# Patient Record
Sex: MALE | Race: WHITE | NOT HISPANIC OR LATINO | Employment: FULL TIME | URBAN - METROPOLITAN AREA
[De-identification: names, ages, dates, MRNs, and addresses within clinical notes are randomized per-mention and may not be internally consistent; named-entity substitution may affect disease eponyms.]

---

## 2017-02-01 ENCOUNTER — OFFICE VISIT (OUTPATIENT)
Dept: INTERNAL MEDICINE | Facility: CLINIC | Age: 62
End: 2017-02-01

## 2017-02-01 VITALS
BODY MASS INDEX: 28.71 KG/M2 | HEART RATE: 88 BPM | SYSTOLIC BLOOD PRESSURE: 140 MMHG | DIASTOLIC BLOOD PRESSURE: 100 MMHG | WEIGHT: 212 LBS | HEIGHT: 72 IN | TEMPERATURE: 98.4 F | OXYGEN SATURATION: 98 %

## 2017-02-01 DIAGNOSIS — N52.9 ERECTILE DYSFUNCTION, UNSPECIFIED ERECTILE DYSFUNCTION TYPE: ICD-10-CM

## 2017-02-01 DIAGNOSIS — J06.9 ACUTE URI: Primary | ICD-10-CM

## 2017-02-01 DIAGNOSIS — I10 ESSENTIAL HYPERTENSION: ICD-10-CM

## 2017-02-01 PROCEDURE — 99213 OFFICE O/P EST LOW 20 MIN: CPT | Performed by: NURSE PRACTITIONER

## 2017-02-01 RX ORDER — SILDENAFIL 100 MG/1
100 TABLET, FILM COATED ORAL DAILY PRN
COMMUNITY
End: 2017-02-01 | Stop reason: SDUPTHER

## 2017-02-01 RX ORDER — FLUTICASONE PROPIONATE 50 MCG
2 SPRAY, SUSPENSION (ML) NASAL DAILY
Qty: 9.9 ML | Refills: 1 | Status: SHIPPED | OUTPATIENT
Start: 2017-02-01 | End: 2017-03-03

## 2017-02-01 RX ORDER — GUAIFENESIN 600 MG/1
1200 TABLET, EXTENDED RELEASE ORAL 2 TIMES DAILY
Qty: 28 TABLET | Refills: 0 | Status: SHIPPED | OUTPATIENT
Start: 2017-02-01 | End: 2017-02-08

## 2017-02-01 RX ORDER — AZITHROMYCIN 250 MG/1
250 TABLET, FILM COATED ORAL DAILY
Qty: 6 TABLET | Refills: 0 | Status: SHIPPED | OUTPATIENT
Start: 2017-02-01 | End: 2017-02-21 | Stop reason: HOSPADM

## 2017-02-01 RX ORDER — SILDENAFIL 100 MG/1
100 TABLET, FILM COATED ORAL DAILY PRN
Qty: 10 TABLET | Refills: 2 | Status: SHIPPED | OUTPATIENT
Start: 2017-02-01 | End: 2017-02-21 | Stop reason: HOSPADM

## 2017-02-01 NOTE — PATIENT INSTRUCTIONS
"Upper Respiratory Infection, Adult  Most upper respiratory infections (URIs) are a viral infection of the air passages leading to the lungs. A URI affects the nose, throat, and upper air passages. The most common type of URI is nasopharyngitis and is typically referred to as \"the common cold.\"  URIs run their course and usually go away on their own. Most of the time, a URI does not require medical attention, but sometimes a bacterial infection in the upper airways can follow a viral infection. This is called a secondary infection. Sinus and middle ear infections are common types of secondary upper respiratory infections.  Bacterial pneumonia can also complicate a URI. A URI can worsen asthma and chronic obstructive pulmonary disease (COPD). Sometimes, these complications can require emergency medical care and may be life threatening.   CAUSES  Almost all URIs are caused by viruses. A virus is a type of germ and can spread from one person to another.   RISKS FACTORS  You may be at risk for a URI if:   · You smoke.    · You have chronic heart or lung disease.  · You have a weakened defense (immune) system.    · You are very young or very old.    · You have nasal allergies or asthma.  · You work in crowded or poorly ventilated areas.  · You work in health care facilities or schools.  SIGNS AND SYMPTOMS   Symptoms typically develop 2-3 days after you come in contact with a cold virus. Most viral URIs last 7-10 days. However, viral URIs from the influenza virus (flu virus) can last 14-18 days and are typically more severe. Symptoms may include:   · Runny or stuffy (congested) nose.    · Sneezing.    · Cough.    · Sore throat.    · Headache.    · Fatigue.    · Fever.    · Loss of appetite.    · Pain in your forehead, behind your eyes, and over your cheekbones (sinus pain).  · Muscle aches.    DIAGNOSIS   Your health care provider may diagnose a URI by:  · Physical exam.  · Tests to check that your symptoms are not due to " another condition such as:  ¨ Strep throat.  ¨ Sinusitis.  ¨ Pneumonia.  ¨ Asthma.  TREATMENT   A URI goes away on its own with time. It cannot be cured with medicines, but medicines may be prescribed or recommended to relieve symptoms. Medicines may help:  · Reduce your fever.  · Reduce your cough.  · Relieve nasal congestion.  HOME CARE INSTRUCTIONS   · Take medicines only as directed by your health care provider.    · Gargle warm saltwater or take cough drops to comfort your throat as directed by your health care provider.  · Use a warm mist humidifier or inhale steam from a shower to increase air moisture. This may make it easier to breathe.  · Drink enough fluid to keep your urine clear or pale yellow.    · Eat soups and other clear broths and maintain good nutrition.    · Rest as needed.    · Return to work when your temperature has returned to normal or as your health care provider advises. You may need to stay home longer to avoid infecting others. You can also use a face mask and careful hand washing to prevent spread of the virus.  · Increase the usage of your inhaler if you have asthma.    · Do not use any tobacco products, including cigarettes, chewing tobacco, or electronic cigarettes. If you need help quitting, ask your health care provider.  PREVENTION   The best way to protect yourself from getting a cold is to practice good hygiene.   · Avoid oral or hand contact with people with cold symptoms.    · Wash your hands often if contact occurs.    There is no clear evidence that vitamin C, vitamin E, echinacea, or exercise reduces the chance of developing a cold. However, it is always recommended to get plenty of rest, exercise, and practice good nutrition.   SEEK MEDICAL CARE IF:   · You are getting worse rather than better.    · Your symptoms are not controlled by medicine.    · You have chills.  · You have worsening shortness of breath.  · You have brown or red mucus.  · You have yellow or brown nasal  discharge.  · You have pain in your face, especially when you bend forward.  · You have a fever.  · You have swollen neck glands.  · You have pain while swallowing.  · You have white areas in the back of your throat.  SEEK IMMEDIATE MEDICAL CARE IF:   · You have severe or persistent:    Headache.    Ear pain.    Sinus pain.    Chest pain.  · You have chronic lung disease and any of the following:    Wheezing.    Prolonged cough.    Coughing up blood.    A change in your usual mucus.  · You have a stiff neck.  · You have changes in your:    Vision.    Hearing.    Thinking.    Mood.  MAKE SURE YOU:   · Understand these instructions.  · Will watch your condition.  · Will get help right away if you are not doing well or get worse.     This information is not intended to replace advice given to you by your health care provider. Make sure you discuss any questions you have with your health care provider.     Document Released: 06/13/2002 Document Revised: 05/03/2016 Document Reviewed: 03/25/2015  [employer] Interactive Patient Education ©2016 [employer]

## 2017-02-01 NOTE — PROGRESS NOTES
Subjective   Sander White is a 61 y.o. male.     Cough   This is a new problem. Episode onset: 2 weeks  The problem has been unchanged. The cough is non-productive. Associated symptoms include chills, rhinorrhea and shortness of breath. Pertinent negatives include no chest pain, ear congestion, ear pain, fever, headaches, myalgias, postnasal drip, sore throat or wheezing. Nothing aggravates the symptoms. He has tried nothing for the symptoms. His past medical history is significant for asthma (childhood ) and environmental allergies. There is no history of bronchitis or pneumonia.        The following portions of the patient's history were reviewed and updated as appropriate: allergies, current medications and problem list.    Review of Systems   Constitutional: Positive for chills. Negative for appetite change, fatigue and fever.   HENT: Positive for congestion (nasal ), rhinorrhea and sneezing. Negative for ear discharge, ear pain, facial swelling, hearing loss, postnasal drip, sinus pressure, sore throat and tinnitus.    Respiratory: Positive for cough and shortness of breath. Negative for chest tightness and wheezing.    Cardiovascular: Negative for chest pain.   Gastrointestinal: Negative for abdominal pain.   Musculoskeletal: Negative for myalgias.   Allergic/Immunologic: Positive for environmental allergies.   Neurological: Negative for dizziness and headaches.   Hematological: Negative for adenopathy.       Objective   Physical Exam   Constitutional: He appears well-developed and well-nourished. He is cooperative. He does not have a sickly appearance. He does not appear ill.   HENT:   Head: Normocephalic.   Right Ear: Hearing, tympanic membrane and external ear normal. No drainage, swelling or tenderness. No mastoid tenderness. Tympanic membrane is not injected, not scarred, not erythematous and not bulging. Tympanic membrane mobility is normal. No middle ear effusion. No decreased hearing is noted.   Left  Ear: Hearing, tympanic membrane and external ear normal. No drainage, swelling or tenderness. No mastoid tenderness. Tympanic membrane is not injected, not scarred and not bulging. Tympanic membrane mobility is normal.  No middle ear effusion. No decreased hearing is noted.   Nose: Mucosal edema and rhinorrhea present. No sinus tenderness or nasal deformity. Right sinus exhibits no maxillary sinus tenderness and no frontal sinus tenderness. Left sinus exhibits no maxillary sinus tenderness and no frontal sinus tenderness.   Mouth/Throat: Oropharynx is clear and moist and mucous membranes are normal. Normal dentition. No tonsillar exudate.   Eyes: Conjunctivae and lids are normal. Pupils are equal, round, and reactive to light. Right eye exhibits no discharge and no exudate. Left eye exhibits no discharge and no exudate.   Neck: Trachea normal and normal range of motion. Carotid bruit is not present. No edema present. No thyroid mass and no thyromegaly present.   Cardiovascular: Regular rhythm, normal heart sounds and normal pulses.    No murmur heard.  Pulses:       Dorsalis pedis pulses are 2+ on the right side, and 2+ on the left side.        Posterior tibial pulses are 2+ on the right side, and 2+ on the left side.   Repeat bp  Left arm 138/90  No pedal edema    Pulmonary/Chest: Breath sounds normal. No respiratory distress. He has no decreased breath sounds. He has no wheezes. He has no rhonchi. He has no rales.   Dry cough    Lymphadenopathy:        Head (right side): No submental, no submandibular, no tonsillar, no preauricular, no posterior auricular and no occipital adenopathy present.        Head (left side): No submental, no submandibular, no tonsillar, no preauricular, no posterior auricular and no occipital adenopathy present.   Neurological: He is alert.   Skin: Skin is warm, dry and intact. No cyanosis. Nails show no clubbing.       Assessment/Plan   Sander was seen today for cough and med  refill.    Diagnoses and all orders for this visit:    Acute URI  -     azithromycin (ZITHROMAX Z-KENDRICK) 250 MG tablet; Take 1 tablet by mouth Daily. Take 2 tablets the first day, then 1 tablet daily for 4 days.  -     guaiFENesin (MUCINEX) 600 MG 12 hr tablet; Take 2 tablets by mouth 2 (Two) Times a Day for 7 days.  -     fluticasone (FLONASE) 50 MCG/ACT nasal spray; 2 sprays into each nostril Daily for 30 days. Administer 2 sprays in each nostril for each dose.    Erectile dysfunction, unspecified erectile dysfunction type  -     sildenafil (VIAGRA) 100 MG tablet; Take 1 tablet by mouth Daily As Needed for erectile dysfunction.    Essential hypertension  Comments:  he didn't take his medication; goal less than 140/90; call in 2 weeks with readings        He reports he has been without his bp medication for several weeks and has not taken his dose today. I reminded him the importance of taking blood pressure daily and monitoring. He will call in 2 weeks with readings. The goal of bp is less than 140/90. He will need to return with fasting labs at next OV.

## 2017-02-15 ENCOUNTER — APPOINTMENT (OUTPATIENT)
Dept: CARDIOLOGY | Facility: HOSPITAL | Age: 62
End: 2017-02-15
Attending: INTERNAL MEDICINE

## 2017-02-15 ENCOUNTER — APPOINTMENT (OUTPATIENT)
Dept: CT IMAGING | Facility: HOSPITAL | Age: 62
End: 2017-02-15

## 2017-02-15 ENCOUNTER — APPOINTMENT (OUTPATIENT)
Dept: GENERAL RADIOLOGY | Facility: HOSPITAL | Age: 62
End: 2017-02-15
Attending: INTERNAL MEDICINE

## 2017-02-15 ENCOUNTER — APPOINTMENT (OUTPATIENT)
Dept: GENERAL RADIOLOGY | Facility: HOSPITAL | Age: 62
End: 2017-02-15

## 2017-02-15 ENCOUNTER — APPOINTMENT (OUTPATIENT)
Dept: NEUROLOGY | Facility: HOSPITAL | Age: 62
End: 2017-02-15
Attending: INTERNAL MEDICINE

## 2017-02-15 ENCOUNTER — HOSPITAL ENCOUNTER (INPATIENT)
Facility: HOSPITAL | Age: 62
LOS: 6 days | Discharge: HOME | End: 2017-02-21
Attending: EMERGENCY MEDICINE | Admitting: INTERNAL MEDICINE

## 2017-02-15 DIAGNOSIS — I21.3 ST ELEVATION MYOCARDIAL INFARCTION (STEMI), UNSPECIFIED ARTERY (HCC): Primary | ICD-10-CM

## 2017-02-15 DIAGNOSIS — I49.01 CARDIAC ARREST WITH VENTRICULAR FIBRILLATION (HCC): ICD-10-CM

## 2017-02-15 DIAGNOSIS — R13.10 DYSPHAGIA, UNSPECIFIED TYPE: ICD-10-CM

## 2017-02-15 DIAGNOSIS — I46.9 CARDIAC ARREST WITH VENTRICULAR FIBRILLATION (HCC): ICD-10-CM

## 2017-02-15 LAB
ABO GROUP BLD: NORMAL
ACT BLD: 358 SECONDS (ref 82–152)
ALBUMIN SERPL-MCNC: 3.8 G/DL (ref 3.5–5.2)
ALBUMIN SERPL-MCNC: 4.3 G/DL (ref 3.5–5.2)
ALBUMIN/GLOB SERPL: 1.5 G/DL
ALBUMIN/GLOB SERPL: 1.6 G/DL
ALP SERPL-CCNC: 78 U/L (ref 39–117)
ALP SERPL-CCNC: 88 U/L (ref 39–117)
ALT SERPL W P-5'-P-CCNC: 303 U/L (ref 1–41)
ALT SERPL W P-5'-P-CCNC: 414 U/L (ref 1–41)
ANION GAP SERPL CALCULATED.3IONS-SCNC: 13.6 MMOL/L
ANION GAP SERPL CALCULATED.3IONS-SCNC: 16.4 MMOL/L
ANION GAP SERPL CALCULATED.3IONS-SCNC: 19.9 MMOL/L
ANION GAP SERPL CALCULATED.3IONS-SCNC: 29.8 MMOL/L
APTT PPP: 27 SECONDS (ref 22.7–35.4)
APTT PPP: 28.9 SECONDS (ref 22.7–35.4)
APTT PPP: 30.6 SECONDS (ref 22.7–35.4)
APTT PPP: 52.4 SECONDS (ref 22.7–35.4)
ARTERIAL PATENCY WRIST A: ABNORMAL
ARTERIAL PATENCY WRIST A: ABNORMAL
ARTERIAL PATENCY WRIST A: POSITIVE
ASCENDING AORTA: 3.2 CM
AST SERPL-CCNC: 256 U/L (ref 1–40)
AST SERPL-CCNC: 443 U/L (ref 1–40)
ATMOSPHERIC PRESS: 742.7 MMHG
ATMOSPHERIC PRESS: 748.4 MMHG
ATMOSPHERIC PRESS: 749.5 MMHG
BASE EXCESS BLDA CALC-SCNC: -12.6 MMOL/L (ref 0–2)
BASE EXCESS BLDA CALC-SCNC: -3.3 MMOL/L (ref 0–2)
BASE EXCESS BLDA CALC-SCNC: -6.1 MMOL/L (ref 0–2)
BASOPHILS # BLD AUTO: 0.01 10*3/MM3 (ref 0–0.2)
BASOPHILS # BLD AUTO: 0.01 10*3/MM3 (ref 0–0.2)
BASOPHILS # BLD AUTO: 0.03 10*3/MM3 (ref 0–0.2)
BASOPHILS # BLD AUTO: 0.05 10*3/MM3 (ref 0–0.2)
BASOPHILS NFR BLD AUTO: 0.1 % (ref 0–1.5)
BASOPHILS NFR BLD AUTO: 0.3 % (ref 0–1.5)
BDY SITE: ABNORMAL
BH CV ECHO MEAS - ACS: 2.1 CM
BH CV ECHO MEAS - AO MEAN PG (FULL): 0 MMHG
BH CV ECHO MEAS - AO MEAN PG: 1 MMHG
BH CV ECHO MEAS - AO ROOT AREA (BSA CORRECTED): 1.6
BH CV ECHO MEAS - AO ROOT AREA: 9.1 CM^2
BH CV ECHO MEAS - AO ROOT DIAM: 3.4 CM
BH CV ECHO MEAS - AO V2 MAX: 75 CM/SEC
BH CV ECHO MEAS - AO V2 MEAN: 50 CM/SEC
BH CV ECHO MEAS - AO V2 VTI: 14.3 CM
BH CV ECHO MEAS - ASC AORTA: 3.2 CM
BH CV ECHO MEAS - AVA(I,A): 3.5 CM^2
BH CV ECHO MEAS - AVA(I,D): 3.5 CM^2
BH CV ECHO MEAS - BSA(HAYCOCK): 2.2 M^2
BH CV ECHO MEAS - BSA: 2.2 M^2
BH CV ECHO MEAS - BZI_BMI: 28.8 KILOGRAMS/M^2
BH CV ECHO MEAS - BZI_METRIC_HEIGHT: 182.9 CM
BH CV ECHO MEAS - BZI_METRIC_WEIGHT: 96.2 KG
BH CV ECHO MEAS - CONTRAST EF (2CH): 33.8 ML/M^2
BH CV ECHO MEAS - CONTRAST EF 4CH: 36.3 ML/M^2
BH CV ECHO MEAS - EDV(CUBED): 79.5 ML
BH CV ECHO MEAS - EDV(MOD-SP2): 77 ML
BH CV ECHO MEAS - EDV(MOD-SP4): 80 ML
BH CV ECHO MEAS - EDV(TEICH): 83.1 ML
BH CV ECHO MEAS - EF(CUBED): 54.8 %
BH CV ECHO MEAS - EF(MOD-SP2): 33.8 %
BH CV ECHO MEAS - EF(MOD-SP4): 36.3 %
BH CV ECHO MEAS - EF(TEICH): 46.9 %
BH CV ECHO MEAS - ESV(CUBED): 35.9 ML
BH CV ECHO MEAS - ESV(MOD-SP2): 51 ML
BH CV ECHO MEAS - ESV(MOD-SP4): 51 ML
BH CV ECHO MEAS - ESV(TEICH): 44.1 ML
BH CV ECHO MEAS - FS: 23.3 %
BH CV ECHO MEAS - IVS/LVPW: 1
BH CV ECHO MEAS - IVSD: 1.1 CM
BH CV ECHO MEAS - LAT PEAK E' VEL: 7 CM/SEC
BH CV ECHO MEAS - LV DIASTOLIC VOL/BSA (35-75): 36.6 ML/M^2
BH CV ECHO MEAS - LV MASS(C)D: 162.9 GRAMS
BH CV ECHO MEAS - LV MASS(C)DI: 74.6 GRAMS/M^2
BH CV ECHO MEAS - LV MEAN PG: 1 MMHG
BH CV ECHO MEAS - LV SYSTOLIC VOL/BSA (12-30): 23.4 ML/M^2
BH CV ECHO MEAS - LV V1 MAX: 57 CM/SEC
BH CV ECHO MEAS - LV V1 MEAN: 38.9 CM/SEC
BH CV ECHO MEAS - LV V1 VTI: 11.9 CM
BH CV ECHO MEAS - LVIDD: 4.3 CM
BH CV ECHO MEAS - LVIDS: 3.3 CM
BH CV ECHO MEAS - LVLD AP2: 7.2 CM
BH CV ECHO MEAS - LVLD AP4: 8 CM
BH CV ECHO MEAS - LVLS AP2: 7 CM
BH CV ECHO MEAS - LVLS AP4: 6.9 CM
BH CV ECHO MEAS - LVOT AREA (M): 4.2 CM^2
BH CV ECHO MEAS - LVOT AREA: 4.2 CM^2
BH CV ECHO MEAS - LVOT DIAM: 2.3 CM
BH CV ECHO MEAS - LVPWD: 1.1 CM
BH CV ECHO MEAS - MED PEAK E' VEL: 6 CM/SEC
BH CV ECHO MEAS - MV A DUR: 0.16 SEC
BH CV ECHO MEAS - MV A MAX VEL: 57.8 CM/SEC
BH CV ECHO MEAS - MV DEC SLOPE: 219 CM/SEC^2
BH CV ECHO MEAS - MV DEC TIME: 0.18 SEC
BH CV ECHO MEAS - MV E MAX VEL: 52.8 CM/SEC
BH CV ECHO MEAS - MV E/A: 0.91
BH CV ECHO MEAS - MV MEAN PG: 1 MMHG
BH CV ECHO MEAS - MV P1/2T MAX VEL: 66.2 CM/SEC
BH CV ECHO MEAS - MV P1/2T: 88.5 MSEC
BH CV ECHO MEAS - MV V2 MEAN: 42.6 CM/SEC
BH CV ECHO MEAS - MV V2 VTI: 23.3 CM
BH CV ECHO MEAS - MVA P1/2T LCG: 3.3 CM^2
BH CV ECHO MEAS - MVA(P1/2T): 2.5 CM^2
BH CV ECHO MEAS - MVA(VTI): 2.1 CM^2
BH CV ECHO MEAS - PA ACC SLOPE: 36.1 CM/SEC^2
BH CV ECHO MEAS - PA ACC TIME: 0.11 SEC
BH CV ECHO MEAS - PA MAX PG (FULL): 2.1 MMHG
BH CV ECHO MEAS - PA MAX PG: 2.5 MMHG
BH CV ECHO MEAS - PA PR(ACCEL): 28.2 MMHG
BH CV ECHO MEAS - PA V2 MAX: 78.7 CM/SEC
BH CV ECHO MEAS - PVA(V,A): 2.9 CM^2
BH CV ECHO MEAS - PVA(V,D): 2.9 CM^2
BH CV ECHO MEAS - QP/QS: 0.9
BH CV ECHO MEAS - RAP SYSTOLE: 15 MMHG
BH CV ECHO MEAS - RV MAX PG: 0.36 MMHG
BH CV ECHO MEAS - RV MEAN PG: 0 MMHG
BH CV ECHO MEAS - RV V1 MAX: 30.1 CM/SEC
BH CV ECHO MEAS - RV V1 MEAN: 18 CM/SEC
BH CV ECHO MEAS - RV V1 VTI: 5.9 CM
BH CV ECHO MEAS - RVOT AREA: 7.5 CM^2
BH CV ECHO MEAS - RVOT DIAM: 3.1 CM
BH CV ECHO MEAS - RVSP: 40.4 MMHG
BH CV ECHO MEAS - SI(AO): 59.5 ML/M^2
BH CV ECHO MEAS - SI(CUBED): 20 ML/M^2
BH CV ECHO MEAS - SI(LVOT): 22.6 ML/M^2
BH CV ECHO MEAS - SI(MOD-SP2): 11.9 ML/M^2
BH CV ECHO MEAS - SI(MOD-SP4): 13.3 ML/M^2
BH CV ECHO MEAS - SI(TEICH): 17.8 ML/M^2
BH CV ECHO MEAS - SV(AO): 129.8 ML
BH CV ECHO MEAS - SV(CUBED): 43.6 ML
BH CV ECHO MEAS - SV(LVOT): 49.4 ML
BH CV ECHO MEAS - SV(MOD-SP2): 26 ML
BH CV ECHO MEAS - SV(MOD-SP4): 29 ML
BH CV ECHO MEAS - SV(RVOT): 44.4 ML
BH CV ECHO MEAS - SV(TEICH): 38.9 ML
BH CV ECHO MEAS - TAPSE (>1.6): 1.6 CM2
BH CV ECHO MEAS - TR MAX VEL: 252 CM/SEC
BH CV XLRA - RV BASE: 3.1 CM
BH CV XLRA - TDI S': 11 CM/SEC
BILIRUB SERPL-MCNC: 0.4 MG/DL (ref 0.1–1.2)
BILIRUB SERPL-MCNC: 0.5 MG/DL (ref 0.1–1.2)
BLD GP AB SCN SERPL QL: NEGATIVE
BUN BLD-MCNC: 23 MG/DL (ref 8–23)
BUN BLD-MCNC: 24 MG/DL (ref 8–23)
BUN BLD-MCNC: 25 MG/DL (ref 8–23)
BUN BLD-MCNC: 26 MG/DL (ref 8–23)
BUN/CREAT SERPL: 14.2 (ref 7–25)
BUN/CREAT SERPL: 18.3 (ref 7–25)
BUN/CREAT SERPL: 24 (ref 7–25)
BUN/CREAT SERPL: 27.7 (ref 7–25)
CA-I BLD-MCNC: 4.7 MG/DL (ref 4.6–5.4)
CA-I BLD-MCNC: 4.7 MG/DL (ref 4.6–5.4)
CA-I BLD-MCNC: 4.8 MG/DL (ref 4.6–5.4)
CA-I SERPL ISE-MCNC: 1.17 MMOL/L (ref 1.1–1.35)
CA-I SERPL ISE-MCNC: 1.17 MMOL/L (ref 1.1–1.35)
CA-I SERPL ISE-MCNC: 1.19 MMOL/L (ref 1.1–1.35)
CALCIUM SPEC-SCNC: 7.6 MG/DL (ref 8.6–10.5)
CALCIUM SPEC-SCNC: 7.9 MG/DL (ref 8.6–10.5)
CALCIUM SPEC-SCNC: 8.3 MG/DL (ref 8.6–10.5)
CALCIUM SPEC-SCNC: 8.7 MG/DL (ref 8.6–10.5)
CHLORIDE SERPL-SCNC: 101 MMOL/L (ref 98–107)
CHLORIDE SERPL-SCNC: 106 MMOL/L (ref 98–107)
CHLORIDE SERPL-SCNC: 106 MMOL/L (ref 98–107)
CHLORIDE SERPL-SCNC: 97 MMOL/L (ref 98–107)
CHOLEST SERPL-MCNC: 202 MG/DL (ref 0–200)
CO2 SERPL-SCNC: 15.2 MMOL/L (ref 22–29)
CO2 SERPL-SCNC: 18.6 MMOL/L (ref 22–29)
CO2 SERPL-SCNC: 21.1 MMOL/L (ref 22–29)
CO2 SERPL-SCNC: 21.4 MMOL/L (ref 22–29)
CREAT BLD-MCNC: 0.83 MG/DL (ref 0.76–1.27)
CREAT BLD-MCNC: 1 MG/DL (ref 0.76–1.27)
CREAT BLD-MCNC: 1.42 MG/DL (ref 0.76–1.27)
CREAT BLD-MCNC: 1.76 MG/DL (ref 0.76–1.27)
D-LACTATE SERPL-SCNC: 2.1 MMOL/L (ref 0.5–2)
D-LACTATE SERPL-SCNC: 2.8 MMOL/L (ref 0.5–2)
D-LACTATE SERPL-SCNC: 5.6 MMOL/L (ref 0.5–2)
DEPRECATED RDW RBC AUTO: 41.7 FL (ref 37–54)
DEPRECATED RDW RBC AUTO: 42.4 FL (ref 37–54)
DEPRECATED RDW RBC AUTO: 42.7 FL (ref 37–54)
DEPRECATED RDW RBC AUTO: 43.4 FL (ref 37–54)
E/E' RATIO: 9
EOSINOPHIL # BLD AUTO: 0 10*3/MM3 (ref 0–0.7)
EOSINOPHIL # BLD AUTO: 0 10*3/MM3 (ref 0–0.7)
EOSINOPHIL # BLD AUTO: 0.04 10*3/MM3 (ref 0–0.7)
EOSINOPHIL # BLD AUTO: 0.48 10*3/MM3 (ref 0–0.7)
EOSINOPHIL NFR BLD AUTO: 0 % (ref 0.3–6.2)
EOSINOPHIL NFR BLD AUTO: 0 % (ref 0.3–6.2)
EOSINOPHIL NFR BLD AUTO: 0.2 % (ref 0.3–6.2)
EOSINOPHIL NFR BLD AUTO: 3.3 % (ref 0.3–6.2)
ERYTHROCYTE [DISTWIDTH] IN BLOOD BY AUTOMATED COUNT: 12.8 % (ref 11.5–14.5)
ERYTHROCYTE [DISTWIDTH] IN BLOOD BY AUTOMATED COUNT: 12.9 % (ref 11.5–14.5)
ERYTHROCYTE [DISTWIDTH] IN BLOOD BY AUTOMATED COUNT: 12.9 % (ref 11.5–14.5)
ERYTHROCYTE [DISTWIDTH] IN BLOOD BY AUTOMATED COUNT: 13 % (ref 11.5–14.5)
GFR SERPL CREATININE-BSD FRML MDRD: 40 ML/MIN/1.73
GFR SERPL CREATININE-BSD FRML MDRD: 51 ML/MIN/1.73
GFR SERPL CREATININE-BSD FRML MDRD: 76 ML/MIN/1.73
GFR SERPL CREATININE-BSD FRML MDRD: 95 ML/MIN/1.73
GLOBULIN UR ELPH-MCNC: 2.4 GM/DL
GLOBULIN UR ELPH-MCNC: 2.8 GM/DL
GLUCOSE BLD-MCNC: 138 MG/DL (ref 65–99)
GLUCOSE BLD-MCNC: 144 MG/DL (ref 65–99)
GLUCOSE BLD-MCNC: 248 MG/DL (ref 65–99)
GLUCOSE BLD-MCNC: 419 MG/DL (ref 65–99)
GLUCOSE BLDC GLUCOMTR-MCNC: 109 MG/DL (ref 70–130)
GLUCOSE BLDC GLUCOMTR-MCNC: 114 MG/DL (ref 70–130)
GLUCOSE BLDC GLUCOMTR-MCNC: 116 MG/DL (ref 70–130)
GLUCOSE BLDC GLUCOMTR-MCNC: 120 MG/DL (ref 70–130)
GLUCOSE BLDC GLUCOMTR-MCNC: 123 MG/DL (ref 70–130)
GLUCOSE BLDC GLUCOMTR-MCNC: 132 MG/DL (ref 70–130)
GLUCOSE BLDC GLUCOMTR-MCNC: 134 MG/DL (ref 70–130)
GLUCOSE BLDC GLUCOMTR-MCNC: 160 MG/DL (ref 70–130)
GLUCOSE BLDC GLUCOMTR-MCNC: 206 MG/DL (ref 70–130)
HBA1C MFR BLD: 5.59 % (ref 4.8–5.6)
HCO3 BLDA-SCNC: 15.9 MMOL/L (ref 22–28)
HCO3 BLDA-SCNC: 21.2 MMOL/L (ref 22–28)
HCO3 BLDA-SCNC: 21.9 MMOL/L (ref 22–28)
HCT VFR BLD AUTO: 44.4 % (ref 40.4–52.2)
HCT VFR BLD AUTO: 44.5 % (ref 40.4–52.2)
HCT VFR BLD AUTO: 45.4 % (ref 40.4–52.2)
HCT VFR BLD AUTO: 48.8 % (ref 40.4–52.2)
HCT VFR BLDA CALC: 49 % (ref 38–51)
HDLC SERPL-MCNC: 42 MG/DL (ref 40–60)
HGB BLD-MCNC: 14.9 G/DL (ref 13.7–17.6)
HGB BLD-MCNC: 15.2 G/DL (ref 13.7–17.6)
HGB BLD-MCNC: 15.3 G/DL (ref 13.7–17.6)
HGB BLD-MCNC: 16.2 G/DL (ref 13.7–17.6)
HGB BLDA-MCNC: 16.7 G/DL (ref 12–17)
HOLD SPECIMEN: NORMAL
HOLD SPECIMEN: NORMAL
HOROWITZ INDEX BLD+IHG-RTO: 100 %
HOROWITZ INDEX BLD+IHG-RTO: 40 %
HOROWITZ INDEX BLD+IHG-RTO: 70 %
IMM GRANULOCYTES # BLD: 0.04 10*3/MM3 (ref 0–0.03)
IMM GRANULOCYTES # BLD: 0.12 10*3/MM3 (ref 0–0.03)
IMM GRANULOCYTES # BLD: 0.25 10*3/MM3 (ref 0–0.03)
IMM GRANULOCYTES # BLD: 0.36 10*3/MM3 (ref 0–0.03)
IMM GRANULOCYTES NFR BLD: 0.3 % (ref 0–0.5)
IMM GRANULOCYTES NFR BLD: 0.6 % (ref 0–0.5)
IMM GRANULOCYTES NFR BLD: 0.9 % (ref 0–0.5)
IMM GRANULOCYTES NFR BLD: 2.5 % (ref 0–0.5)
INR PPP: 1.05 (ref 0.9–1.1)
INR PPP: 1.09 (ref 0.9–1.1)
INR PPP: 1.09 (ref 0.9–1.1)
INR PPP: 1.39 (ref 0.9–1.1)
LDLC SERPL CALC-MCNC: 128 MG/DL (ref 0–100)
LDLC/HDLC SERPL: 3.04 {RATIO}
LEFT ATRIUM VOLUME INDEX: 16 ML/M2
LYMPHOCYTES # BLD AUTO: 0.45 10*3/MM3 (ref 0.9–4.8)
LYMPHOCYTES # BLD AUTO: 0.65 10*3/MM3 (ref 0.9–4.8)
LYMPHOCYTES # BLD AUTO: 1.48 10*3/MM3 (ref 0.9–4.8)
LYMPHOCYTES # BLD AUTO: 5.46 10*3/MM3 (ref 0.9–4.8)
LYMPHOCYTES NFR BLD AUTO: 2.4 % (ref 19.6–45.3)
LYMPHOCYTES NFR BLD AUTO: 37.4 % (ref 19.6–45.3)
LYMPHOCYTES NFR BLD AUTO: 5 % (ref 19.6–45.3)
LYMPHOCYTES NFR BLD AUTO: 5.6 % (ref 19.6–45.3)
MAGNESIUM SERPL-MCNC: 2 MG/DL (ref 1.6–2.4)
MAGNESIUM SERPL-MCNC: 2.1 MG/DL (ref 1.6–2.4)
MAGNESIUM SERPL-MCNC: 2.2 MG/DL (ref 1.6–2.4)
MCH RBC QN AUTO: 30.5 PG (ref 27–32.7)
MCH RBC QN AUTO: 30.7 PG (ref 27–32.7)
MCH RBC QN AUTO: 30.7 PG (ref 27–32.7)
MCH RBC QN AUTO: 30.8 PG (ref 27–32.7)
MCHC RBC AUTO-ENTMCNC: 32.8 G/DL (ref 32.6–36.4)
MCHC RBC AUTO-ENTMCNC: 33.2 G/DL (ref 32.6–36.4)
MCHC RBC AUTO-ENTMCNC: 34.2 G/DL (ref 32.6–36.4)
MCHC RBC AUTO-ENTMCNC: 34.4 G/DL (ref 32.6–36.4)
MCV RBC AUTO: 89.5 FL (ref 79.8–96.2)
MCV RBC AUTO: 89.7 FL (ref 79.8–96.2)
MCV RBC AUTO: 91.7 FL (ref 79.8–96.2)
MCV RBC AUTO: 93.6 FL (ref 79.8–96.2)
MODALITY: ABNORMAL
MONOCYTES # BLD AUTO: 1.05 10*3/MM3 (ref 0.2–1.2)
MONOCYTES # BLD AUTO: 1.19 10*3/MM3 (ref 0.2–1.2)
MONOCYTES # BLD AUTO: 1.61 10*3/MM3 (ref 0.2–1.2)
MONOCYTES # BLD AUTO: 2.46 10*3/MM3 (ref 0.2–1.2)
MONOCYTES NFR BLD AUTO: 7.2 % (ref 5–12)
MONOCYTES NFR BLD AUTO: 8.7 % (ref 5–12)
MONOCYTES NFR BLD AUTO: 9.2 % (ref 5–12)
MONOCYTES NFR BLD AUTO: 9.3 % (ref 5–12)
NEUTROPHILS # BLD AUTO: 11.01 10*3/MM3 (ref 1.9–8.1)
NEUTROPHILS # BLD AUTO: 16.42 10*3/MM3 (ref 1.9–8.1)
NEUTROPHILS # BLD AUTO: 22.28 10*3/MM3 (ref 1.9–8.1)
NEUTROPHILS # BLD AUTO: 7.2 10*3/MM3 (ref 1.9–8.1)
NEUTROPHILS NFR BLD AUTO: 49.3 % (ref 42.7–76)
NEUTROPHILS NFR BLD AUTO: 83.9 % (ref 42.7–76)
NEUTROPHILS NFR BLD AUTO: 85.4 % (ref 42.7–76)
NEUTROPHILS NFR BLD AUTO: 88.2 % (ref 42.7–76)
O2 A-A PPRESDIFF RESPIRATORY: 0.1 MMHG
O2 A-A PPRESDIFF RESPIRATORY: 0.4 MMHG
O2 A-A PPRESDIFF RESPIRATORY: 0.5 MMHG
PCO2 BLDA: 39 MM HG (ref 35–45)
PCO2 BLDA: 44.7 MM HG (ref 35–45)
PCO2 BLDA: 47 MM HG (ref 35–45)
PEEP RESPIRATORY: 5 CM[H2O]
PH BLDA: 7.16 PH UNITS (ref 7.35–7.45)
PH BLDA: 7.26 PH UNITS (ref 7.35–7.45)
PH BLDA: 7.36 PH UNITS (ref 7.35–7.45)
PHOSPHATE SERPL-MCNC: 2.5 MG/DL (ref 2.5–4.5)
PHOSPHATE SERPL-MCNC: 4.8 MG/DL (ref 2.5–4.5)
PHOSPHATE SERPL-MCNC: 4.8 MG/DL (ref 2.5–4.5)
PLATELET # BLD AUTO: 135 10*3/MM3 (ref 140–500)
PLATELET # BLD AUTO: 175 10*3/MM3 (ref 140–500)
PLATELET # BLD AUTO: 202 10*3/MM3 (ref 140–500)
PLATELET # BLD AUTO: 253 10*3/MM3 (ref 140–500)
PMV BLD AUTO: 11.1 FL (ref 6–12)
PMV BLD AUTO: 11.2 FL (ref 6–12)
PMV BLD AUTO: 11.3 FL (ref 6–12)
PMV BLD AUTO: 11.5 FL (ref 6–12)
PO2 BLDA: 122.3 MM HG (ref 80–100)
PO2 BLDA: 180.2 MM HG (ref 80–100)
PO2 BLDA: 86.8 MM HG (ref 80–100)
POTASSIUM BLD-SCNC: 2.8 MMOL/L (ref 3.5–5.2)
POTASSIUM BLD-SCNC: 3.8 MMOL/L (ref 3.5–5.2)
POTASSIUM BLD-SCNC: 4.1 MMOL/L (ref 3.5–5.2)
POTASSIUM BLD-SCNC: 4.2 MMOL/L (ref 3.5–5.2)
PROT SERPL-MCNC: 6.2 G/DL (ref 6–8.5)
PROT SERPL-MCNC: 7.1 G/DL (ref 6–8.5)
PROTHROMBIN TIME: 13.3 SECONDS (ref 11.7–14.2)
PROTHROMBIN TIME: 13.7 SECONDS (ref 11.7–14.2)
PROTHROMBIN TIME: 13.7 SECONDS (ref 11.7–14.2)
PROTHROMBIN TIME: 16.6 SECONDS (ref 11.7–14.2)
RBC # BLD AUTO: 4.85 10*6/MM3 (ref 4.6–6)
RBC # BLD AUTO: 4.95 10*6/MM3 (ref 4.6–6)
RBC # BLD AUTO: 4.97 10*6/MM3 (ref 4.6–6)
RBC # BLD AUTO: 5.32 10*6/MM3 (ref 4.6–6)
RH BLD: NEGATIVE
SAO2 % BLDA: 93 % (ref 95–98)
SAO2 % BLDCOA: 93.4 % (ref 92–99)
SAO2 % BLDCOA: 98.6 % (ref 92–99)
SAO2 % BLDCOA: 99.4 % (ref 92–99)
SET MECH RESP RATE: 14
SET MECH RESP RATE: 14
SET MECH RESP RATE: 20
SODIUM BLD-SCNC: 141 MMOL/L (ref 136–145)
SODIUM BLD-SCNC: 141 MMOL/L (ref 136–145)
SODIUM BLD-SCNC: 142 MMOL/L (ref 136–145)
SODIUM BLD-SCNC: 142 MMOL/L (ref 136–145)
TOTAL RATE: 20 BREATHS/MINUTE
TOTAL RATE: 20 BREATHS/MINUTE
TOTAL RATE: 37 BREATHS/MINUTE
TRIGL SERPL-MCNC: 161 MG/DL (ref 0–150)
TROPONIN T SERPL-MCNC: 0.02 NG/ML (ref 0–0.03)
TROPONIN T SERPL-MCNC: 2.83 NG/ML (ref 0–0.03)
TROPONIN T SERPL-MCNC: 3.11 NG/ML (ref 0–0.03)
VENTILATOR MODE: ABNORMAL
VLDLC SERPL-MCNC: 32.2 MG/DL (ref 5–40)
VT ON VENT VENT: 489 ML
VT ON VENT VENT: 532 ML
VT ON VENT VENT: 600 ML
WBC NRBC COR # BLD: 12.9 10*3/MM3 (ref 4.5–10.7)
WBC NRBC COR # BLD: 14.6 10*3/MM3 (ref 4.5–10.7)
WBC NRBC COR # BLD: 18.61 10*3/MM3 (ref 4.5–10.7)
WBC NRBC COR # BLD: 26.54 10*3/MM3 (ref 4.5–10.7)
WHOLE BLOOD HOLD SPECIMEN: NORMAL
WHOLE BLOOD HOLD SPECIMEN: NORMAL

## 2017-02-15 PROCEDURE — 85014 HEMATOCRIT: CPT

## 2017-02-15 PROCEDURE — 86850 RBC ANTIBODY SCREEN: CPT

## 2017-02-15 PROCEDURE — 25010000002 MIDAZOLAM PER 1 MG

## 2017-02-15 PROCEDURE — 99255 IP/OBS CONSLTJ NEW/EST HI 80: CPT | Performed by: PSYCHIATRY & NEUROLOGY

## 2017-02-15 PROCEDURE — 74000 HC ABDOMEN KUB: CPT

## 2017-02-15 PROCEDURE — 85025 COMPLETE CBC W/AUTO DIFF WBC: CPT | Performed by: INTERNAL MEDICINE

## 2017-02-15 PROCEDURE — 84484 ASSAY OF TROPONIN QUANT: CPT | Performed by: EMERGENCY MEDICINE

## 2017-02-15 PROCEDURE — 4A023N7 MEASUREMENT OF CARDIAC SAMPLING AND PRESSURE, LEFT HEART, PERCUTANEOUS APPROACH: ICD-10-PCS | Performed by: INTERNAL MEDICINE

## 2017-02-15 PROCEDURE — 82330 ASSAY OF CALCIUM: CPT | Performed by: INTERNAL MEDICINE

## 2017-02-15 PROCEDURE — 82803 BLOOD GASES ANY COMBINATION: CPT

## 2017-02-15 PROCEDURE — 93306 TTE W/DOPPLER COMPLETE: CPT | Performed by: INTERNAL MEDICINE

## 2017-02-15 PROCEDURE — 25010000002 PROPOFOL 10 MG/ML EMULSION

## 2017-02-15 PROCEDURE — 83605 ASSAY OF LACTIC ACID: CPT | Performed by: INTERNAL MEDICINE

## 2017-02-15 PROCEDURE — 82962 GLUCOSE BLOOD TEST: CPT

## 2017-02-15 PROCEDURE — C1874 STENT, COATED/COV W/DEL SYS: HCPCS | Performed by: INTERNAL MEDICINE

## 2017-02-15 PROCEDURE — 25010000002 FENTANYL CITRATE (PF) 100 MCG/2ML SOLUTION 20 ML VIAL: Performed by: INTERNAL MEDICINE

## 2017-02-15 PROCEDURE — 25010000002 AMIODARONE IN DEXTROSE 5% 360 MG/200ML SOLUTION: Performed by: EMERGENCY MEDICINE

## 2017-02-15 PROCEDURE — C1894 INTRO/SHEATH, NON-LASER: HCPCS | Performed by: INTERNAL MEDICINE

## 2017-02-15 PROCEDURE — 02C04ZZ EXTIRPATION OF MATTER FROM CORONARY ARTERY, ONE ARTERY, PERCUTANEOUS ENDOSCOPIC APPROACH: ICD-10-PCS | Performed by: INTERNAL MEDICINE

## 2017-02-15 PROCEDURE — 25010000002 PROPOFOL 10 MG/ML EMULSION: Performed by: EMERGENCY MEDICINE

## 2017-02-15 PROCEDURE — 0 IOPAMIDOL PER 1 ML: Performed by: INTERNAL MEDICINE

## 2017-02-15 PROCEDURE — C1769 GUIDE WIRE: HCPCS | Performed by: INTERNAL MEDICINE

## 2017-02-15 PROCEDURE — 85018 HEMOGLOBIN: CPT

## 2017-02-15 PROCEDURE — 5A1935Z RESPIRATORY VENTILATION, LESS THAN 24 CONSECUTIVE HOURS: ICD-10-PCS | Performed by: EMERGENCY MEDICINE

## 2017-02-15 PROCEDURE — 5A2204Z RESTORATION OF CARDIAC RHYTHM, SINGLE: ICD-10-PCS | Performed by: EMERGENCY MEDICINE

## 2017-02-15 PROCEDURE — 71010 HC CHEST PA OR AP: CPT

## 2017-02-15 PROCEDURE — C9606 PERC D-E COR REVASC W AMI S: HCPCS | Performed by: INTERNAL MEDICINE

## 2017-02-15 PROCEDURE — 85730 THROMBOPLASTIN TIME PARTIAL: CPT | Performed by: INTERNAL MEDICINE

## 2017-02-15 PROCEDURE — 93458 L HRT ARTERY/VENTRICLE ANGIO: CPT | Performed by: INTERNAL MEDICINE

## 2017-02-15 PROCEDURE — 85610 PROTHROMBIN TIME: CPT | Performed by: EMERGENCY MEDICINE

## 2017-02-15 PROCEDURE — 84100 ASSAY OF PHOSPHORUS: CPT | Performed by: INTERNAL MEDICINE

## 2017-02-15 PROCEDURE — 94799 UNLISTED PULMONARY SVC/PX: CPT

## 2017-02-15 PROCEDURE — C1887 CATHETER, GUIDING: HCPCS | Performed by: INTERNAL MEDICINE

## 2017-02-15 PROCEDURE — 94002 VENT MGMT INPAT INIT DAY: CPT

## 2017-02-15 PROCEDURE — 25010000002 EPTIFIBATIDE PER 5 MG: Performed by: INTERNAL MEDICINE

## 2017-02-15 PROCEDURE — 86901 BLOOD TYPING SEROLOGIC RH(D): CPT

## 2017-02-15 PROCEDURE — 80048 BASIC METABOLIC PNL TOTAL CA: CPT | Performed by: INTERNAL MEDICINE

## 2017-02-15 PROCEDURE — 31500 INSERT EMERGENCY AIRWAY: CPT

## 2017-02-15 PROCEDURE — 86900 BLOOD TYPING SEROLOGIC ABO: CPT

## 2017-02-15 PROCEDURE — 31500 INSERT EMERGENCY AIRWAY: CPT | Performed by: EMERGENCY MEDICINE

## 2017-02-15 PROCEDURE — 5A12012 PERFORMANCE OF CARDIAC OUTPUT, SINGLE, MANUAL: ICD-10-PCS | Performed by: EMERGENCY MEDICINE

## 2017-02-15 PROCEDURE — 25010000002 PROPOFOL 1000 MG/ML EMULSION: Performed by: EMERGENCY MEDICINE

## 2017-02-15 PROCEDURE — 83036 HEMOGLOBIN GLYCOSYLATED A1C: CPT | Performed by: INTERNAL MEDICINE

## 2017-02-15 PROCEDURE — 83735 ASSAY OF MAGNESIUM: CPT | Performed by: INTERNAL MEDICINE

## 2017-02-15 PROCEDURE — 93306 TTE W/DOPPLER COMPLETE: CPT

## 2017-02-15 PROCEDURE — 0BH17EZ INSERTION OF ENDOTRACHEAL AIRWAY INTO TRACHEA, VIA NATURAL OR ARTIFICIAL OPENING: ICD-10-PCS | Performed by: EMERGENCY MEDICINE

## 2017-02-15 PROCEDURE — 85730 THROMBOPLASTIN TIME PARTIAL: CPT | Performed by: EMERGENCY MEDICINE

## 2017-02-15 PROCEDURE — 05HM33Z INSERTION OF INFUSION DEVICE INTO RIGHT INTERNAL JUGULAR VEIN, PERCUTANEOUS APPROACH: ICD-10-PCS | Performed by: INTERNAL MEDICINE

## 2017-02-15 PROCEDURE — 25010000002 BIVALIRUDIN

## 2017-02-15 PROCEDURE — 70450 CT HEAD/BRAIN W/O DYE: CPT

## 2017-02-15 PROCEDURE — C1725 CATH, TRANSLUMIN NON-LASER: HCPCS | Performed by: INTERNAL MEDICINE

## 2017-02-15 PROCEDURE — 93010 ELECTROCARDIOGRAM REPORT: CPT | Performed by: INTERNAL MEDICINE

## 2017-02-15 PROCEDURE — 027044Z DILATION OF CORONARY ARTERY, ONE ARTERY WITH DRUG-ELUTING INTRALUMINAL DEVICE, PERCUTANEOUS ENDOSCOPIC APPROACH: ICD-10-PCS | Performed by: INTERNAL MEDICINE

## 2017-02-15 PROCEDURE — 93005 ELECTROCARDIOGRAM TRACING: CPT | Performed by: EMERGENCY MEDICINE

## 2017-02-15 PROCEDURE — 25010000003 POTASSIUM CHLORIDE 10 MEQ/100ML SOLUTION: Performed by: INTERNAL MEDICINE

## 2017-02-15 PROCEDURE — 84484 ASSAY OF TROPONIN QUANT: CPT | Performed by: INTERNAL MEDICINE

## 2017-02-15 PROCEDURE — 80061 LIPID PANEL: CPT | Performed by: INTERNAL MEDICINE

## 2017-02-15 PROCEDURE — 85610 PROTHROMBIN TIME: CPT | Performed by: INTERNAL MEDICINE

## 2017-02-15 PROCEDURE — 85347 COAGULATION TIME ACTIVATED: CPT

## 2017-02-15 PROCEDURE — 25010000002 FENTANYL CITRATE (PF) 100 MCG/2ML SOLUTION 5 ML AMPULE: Performed by: INTERNAL MEDICINE

## 2017-02-15 PROCEDURE — 80053 COMPREHEN METABOLIC PANEL: CPT | Performed by: EMERGENCY MEDICINE

## 2017-02-15 PROCEDURE — 92941 PRQ TRLML REVSC TOT OCCL AMI: CPT | Performed by: INTERNAL MEDICINE

## 2017-02-15 PROCEDURE — B2111ZZ FLUOROSCOPY OF MULTIPLE CORONARY ARTERIES USING LOW OSMOLAR CONTRAST: ICD-10-PCS | Performed by: INTERNAL MEDICINE

## 2017-02-15 PROCEDURE — 36600 WITHDRAWAL OF ARTERIAL BLOOD: CPT

## 2017-02-15 PROCEDURE — 93005 ELECTROCARDIOGRAM TRACING: CPT | Performed by: INTERNAL MEDICINE

## 2017-02-15 PROCEDURE — 99291 CRITICAL CARE FIRST HOUR: CPT | Performed by: INTERNAL MEDICINE

## 2017-02-15 PROCEDURE — 95953 HC EEG 24 HRS 16 OR MORE LEADS PORTABLE MONITOR: CPT

## 2017-02-15 PROCEDURE — 99284 EMERGENCY DEPT VISIT MOD MDM: CPT

## 2017-02-15 PROCEDURE — C1757 CATH, THROMBECTOMY/EMBOLECT: HCPCS | Performed by: INTERNAL MEDICINE

## 2017-02-15 PROCEDURE — 85025 COMPLETE CBC W/AUTO DIFF WBC: CPT | Performed by: EMERGENCY MEDICINE

## 2017-02-15 DEVICE — XIENCE ALPINE EVEROLIMUS ELUTING CORONARY STENT SYSTEM 2.50 MM X 15 MM / RAPID-EXCHANGE
Type: IMPLANTABLE DEVICE | Status: FUNCTIONAL
Brand: XIENCE ALPINE

## 2017-02-15 RX ORDER — ACETAMINOPHEN 650 MG/1
650 SUPPOSITORY RECTAL EVERY 4 HOURS PRN
Status: DISCONTINUED | OUTPATIENT
Start: 2017-02-15 | End: 2017-02-21 | Stop reason: HOSPADM

## 2017-02-15 RX ORDER — ASPIRIN 300 MG/1
300 SUPPOSITORY RECTAL ONCE
Status: COMPLETED | OUTPATIENT
Start: 2017-02-15 | End: 2017-02-15

## 2017-02-15 RX ORDER — PANTOPRAZOLE SODIUM 40 MG/10ML
40 INJECTION, POWDER, LYOPHILIZED, FOR SOLUTION INTRAVENOUS
Status: DISCONTINUED | OUTPATIENT
Start: 2017-02-15 | End: 2017-02-17

## 2017-02-15 RX ORDER — LIDOCAINE HYDROCHLORIDE 20 MG/ML
INJECTION, SOLUTION INFILTRATION; PERINEURAL AS NEEDED
Status: DISCONTINUED | OUTPATIENT
Start: 2017-02-15 | End: 2017-02-15 | Stop reason: HOSPADM

## 2017-02-15 RX ORDER — ASPIRIN 325 MG
TABLET ORAL AS NEEDED
Status: DISCONTINUED | OUTPATIENT
Start: 2017-02-15 | End: 2017-02-15 | Stop reason: HOSPADM

## 2017-02-15 RX ORDER — ACETAMINOPHEN 325 MG/1
650 TABLET ORAL EVERY 4 HOURS PRN
Status: DISCONTINUED | OUTPATIENT
Start: 2017-02-15 | End: 2017-02-21 | Stop reason: HOSPADM

## 2017-02-15 RX ORDER — SODIUM CHLORIDE 9 MG/ML
100 INJECTION, SOLUTION INTRAVENOUS CONTINUOUS
Status: DISCONTINUED | OUTPATIENT
Start: 2017-02-15 | End: 2017-02-17

## 2017-02-15 RX ORDER — EPTIFIBATIDE 0.75 MG/ML
INJECTION, SOLUTION INTRAVENOUS CONTINUOUS PRN
Status: DISCONTINUED | OUTPATIENT
Start: 2017-02-15 | End: 2017-02-15 | Stop reason: HOSPADM

## 2017-02-15 RX ORDER — CISATRACURIUM BESYLATE 2 MG/ML
100 INJECTION, SOLUTION INTRAVENOUS ONCE
Status: COMPLETED | OUTPATIENT
Start: 2017-02-15 | End: 2017-02-15

## 2017-02-15 RX ORDER — VECURONIUM BROMIDE 1 MG/ML
INJECTION, POWDER, LYOPHILIZED, FOR SOLUTION INTRAVENOUS
Status: COMPLETED
Start: 2017-02-15 | End: 2017-02-15

## 2017-02-15 RX ORDER — ASPIRIN 81 MG/1
81 TABLET, CHEWABLE ORAL DAILY
Status: DISCONTINUED | OUTPATIENT
Start: 2017-02-15 | End: 2017-02-21 | Stop reason: HOSPADM

## 2017-02-15 RX ORDER — PROPOFOL 10 MG/ML
VIAL (ML) INTRAVENOUS
Status: COMPLETED
Start: 2017-02-15 | End: 2017-02-15

## 2017-02-15 RX ORDER — POTASSIUM CHLORIDE 7.45 MG/ML
INJECTION INTRAVENOUS CONTINUOUS PRN
Status: DISCONTINUED | OUTPATIENT
Start: 2017-02-15 | End: 2017-02-15 | Stop reason: HOSPADM

## 2017-02-15 RX ORDER — MIDAZOLAM HYDROCHLORIDE 1 MG/ML
INJECTION INTRAMUSCULAR; INTRAVENOUS
Status: COMPLETED
Start: 2017-02-15 | End: 2017-02-15

## 2017-02-15 RX ORDER — LISINOPRIL 2.5 MG/1
2.5 TABLET ORAL DAILY
Status: DISCONTINUED | OUTPATIENT
Start: 2017-02-15 | End: 2017-02-17

## 2017-02-15 RX ORDER — SODIUM CHLORIDE 0.9 % (FLUSH) 0.9 %
1-10 SYRINGE (ML) INJECTION AS NEEDED
Status: DISCONTINUED | OUTPATIENT
Start: 2017-02-15 | End: 2017-02-21 | Stop reason: HOSPADM

## 2017-02-15 RX ORDER — VECURONIUM BROMIDE 1 MG/ML
10 INJECTION, POWDER, LYOPHILIZED, FOR SOLUTION INTRAVENOUS ONCE
Status: COMPLETED | OUTPATIENT
Start: 2017-02-15 | End: 2017-02-15

## 2017-02-15 RX ORDER — MIDAZOLAM HYDROCHLORIDE 1 MG/ML
5 INJECTION INTRAMUSCULAR; INTRAVENOUS ONCE
Status: COMPLETED | OUTPATIENT
Start: 2017-02-15 | End: 2017-02-15

## 2017-02-15 RX ORDER — SODIUM CHLORIDE 9 MG/ML
INJECTION, SOLUTION INTRAVENOUS CONTINUOUS PRN
Status: DISCONTINUED | OUTPATIENT
Start: 2017-02-15 | End: 2017-02-15 | Stop reason: HOSPADM

## 2017-02-15 RX ADMIN — PROPOFOL 40 MCG/KG/MIN: 10 INJECTION, EMULSION INTRAVENOUS at 09:00

## 2017-02-15 RX ADMIN — MIDAZOLAM HYDROCHLORIDE 5 MG: 1 INJECTION, SOLUTION INTRAMUSCULAR; INTRAVENOUS at 03:51

## 2017-02-15 RX ADMIN — MINERAL OIL, PETROLATUM: 425; 568 OINTMENT OPHTHALMIC at 23:59

## 2017-02-15 RX ADMIN — PANTOPRAZOLE SODIUM 40 MG: 40 INJECTION, POWDER, FOR SOLUTION INTRAVENOUS at 12:54

## 2017-02-15 RX ADMIN — MINERAL OIL, PETROLATUM: 425; 568 OINTMENT OPHTHALMIC at 16:55

## 2017-02-15 RX ADMIN — PROPOFOL 40 MCG/KG/MIN: 10 INJECTION, EMULSION INTRAVENOUS at 08:18

## 2017-02-15 RX ADMIN — PROPOFOL 5 MCG/KG/MIN: 10 INJECTION, EMULSION INTRAVENOUS at 03:58

## 2017-02-15 RX ADMIN — MIDAZOLAM HYDROCHLORIDE 5 MG: 1 INJECTION INTRAMUSCULAR; INTRAVENOUS at 03:51

## 2017-02-15 RX ADMIN — CISATRACURIUM BESYLATE 9620 MCG: 2 INJECTION INTRAVENOUS at 08:55

## 2017-02-15 RX ADMIN — PROPOFOL 40 MCG/KG/MIN: 10 INJECTION, EMULSION INTRAVENOUS at 12:33

## 2017-02-15 RX ADMIN — MINERAL OIL, PETROLATUM: 425; 568 OINTMENT OPHTHALMIC at 13:38

## 2017-02-15 RX ADMIN — MINERAL OIL, PETROLATUM: 425; 568 OINTMENT OPHTHALMIC at 20:16

## 2017-02-15 RX ADMIN — FENTANYL CITRATE 25 MCG/HR: 50 INJECTION, SOLUTION INTRAMUSCULAR; INTRAVENOUS at 09:12

## 2017-02-15 RX ADMIN — VECURONIUM BROMIDE 10 MG: 1 INJECTION, POWDER, LYOPHILIZED, FOR SOLUTION INTRAVENOUS at 04:02

## 2017-02-15 RX ADMIN — AMIODARONE HYDROCHLORIDE 0.5 MG/MIN: 1.8 INJECTION, SOLUTION INTRAVENOUS at 14:12

## 2017-02-15 RX ADMIN — PROPOFOL 40 MCG/KG/MIN: 10 INJECTION, EMULSION INTRAVENOUS at 20:43

## 2017-02-15 RX ADMIN — SODIUM CHLORIDE 3 MCG/KG/MIN: 9 INJECTION, SOLUTION INTRAVENOUS at 08:55

## 2017-02-15 RX ADMIN — SODIUM CHLORIDE 100 ML/HR: 9 INJECTION, SOLUTION INTRAVENOUS at 15:01

## 2017-02-15 RX ADMIN — PROPOFOL 40 MCG/KG/MIN: 10 INJECTION, EMULSION INTRAVENOUS at 16:29

## 2017-02-15 RX ADMIN — TICAGRELOR 90 MG: 90 TABLET ORAL at 17:29

## 2017-02-15 RX ADMIN — AMIODARONE HYDROCHLORIDE 1 MG/MIN: 1.8 INJECTION, SOLUTION INTRAVENOUS at 04:01

## 2017-02-15 RX ADMIN — AMIODARONE HYDROCHLORIDE 1 MG/MIN: 1.8 INJECTION, SOLUTION INTRAVENOUS at 09:00

## 2017-02-15 RX ADMIN — SODIUM CHLORIDE 1000 ML: 9 INJECTION, SOLUTION INTRAVENOUS at 04:01

## 2017-02-15 RX ADMIN — SODIUM CHLORIDE 100 ML/HR: 9 INJECTION, SOLUTION INTRAVENOUS at 09:30

## 2017-02-15 RX ADMIN — TICAGRELOR 90 MG: 90 TABLET ORAL at 12:08

## 2017-02-15 RX ADMIN — ASPIRIN 300 MG: 300 SUPPOSITORY RECTAL at 04:16

## 2017-02-15 NOTE — ED NOTES
COOLING PADS FOR GAYMAR 3 APPLIED BY CCU CHARGE RN AND ANOTHER CCU NURSE     Fatoumata Álvarez RN  02/15/17 [id number]

## 2017-02-15 NOTE — PLAN OF CARE
Problem: Patient Care Overview (Adult)  Goal: Plan of Care Review  Outcome: Ongoing (interventions implemented as appropriate)  Goal: Adult Individualization and Mutuality  Outcome: Ongoing (interventions implemented as appropriate)  Goal: Discharge Needs Assessment  Outcome: Ongoing (interventions implemented as appropriate)    Problem: Fall Risk (Adult)  Goal: Identify Related Risk Factors and Signs and Symptoms  Outcome: Ongoing (interventions implemented as appropriate)  Goal: Absence of Falls  Outcome: Ongoing (interventions implemented as appropriate)    Problem: Airway, Artificial (Adult)  Goal: Signs and Symptoms of Listed Potential Problems Will be Absent or Manageable (Airway, Artificial)  Outcome: Ongoing (interventions implemented as appropriate)    Problem: Skin Integrity Impairment, Risk/Actual (Adult)  Goal: Identify Related Risk Factors and Signs and Symptoms  Outcome: Ongoing (interventions implemented as appropriate)  Goal: Skin Integrity/Wound Healing  Outcome: Ongoing (interventions implemented as appropriate)    Problem: Cardiac Catheterization with/without PCI (Adult)  Goal: Signs and Symptoms of Listed Potential Problems Will be Absent or Manageable (Cardiac Catheterization with/without PCI)  Outcome: Ongoing (interventions implemented as appropriate)

## 2017-02-15 NOTE — ED NOTES
"PER PT'S WIFE, PT TOOK A \"VIAGRA-LIKE\" PILL APROX 2100.     Fatoumata Álvarez, RN  02/15/17 0411    "

## 2017-02-15 NOTE — CONSULTS
[facility]  Phone: [phone]  Charles Medrano MD      Subjective   LOS: 0 days     Thank you for this consultation.  61-year-old male who was complaining of some heartburn last night.  He also developed increasing chest pain.  He was nauseous and throwing up.  Around 2 AM he became unresponsive and EMS was called.  He received CPR for 40-45 minutes before return of systemic circulation.  He was intubated.  On arrival here he has the acute MI and was taken to the catheter lab.  The coronary ramus was occluded and the stent was placed.  He is now in the ICU.  He is not hypotensive.  He is moving somewhat but the not appropriately.  He was quite obtunded down in the ED.  Decision was made to hold the patient due to prolonged downtime.    Otherwise no prior history of heart disease.  Patient does often complain of burning.  No prior cardiac workup.  History of hypertension.  Remote smoker quit in 1999.  No regular alcohol.  Strong family history of heart disease.  Brother dying at an early age of massive MI.     I have reviewed and edited the Past Medical History, Past Surgical History, Home Medications, Social History and Family History as of 6:47 AM on 02/15/17.    Prescriptions Prior to Admission   Medication Sig Dispense Refill Last Dose   • azithromycin (ZITHROMAX Z-KENDRICK) 250 MG tablet Take 1 tablet by mouth Daily. Take 2 tablets the first day, then 1 tablet daily for 4 days. 6 tablet 0    • fluticasone (FLONASE) 50 MCG/ACT nasal spray 2 sprays into each nostril Daily for 30 days. Administer 2 sprays in each nostril for each dose. 9.9 mL 1    • lisinopril (ZESTRIL) 2.5 MG tablet Take 1 tablet by mouth daily. 90 tablet 3 Taking   • Omega-3 Fatty Acids (OMEGA 3 PO) Take 1 capsule by mouth daily.   Taking   • Pyridoxine HCl (VITAMIN B-6 PO) Take 1 tablet by mouth daily.   Taking   • sildenafil (VIAGRA) 100 MG tablet Take 1 tablet by mouth Daily As Needed for erectile dysfunction. 10 tablet 2    • vitamin  B-12 (CYANOCOBALAMIN) 1000 MCG tablet Take 1,000 mcg by mouth daily.   Taking   • vitamin C (ASCORBIC ACID) 500 MG tablet Take 500 mg by mouth daily.   Taking       Review of Systems   Unable to perform ROS: Patient unresponsive       Vital Signs past 24hrs  BP range: BP: ()/() 111/96  Pulse range: Heart Rate:  [] 98  Resp rate range: Resp:  [22-32] 22  Temp range: Temp (24hrs), Av.1 °F (36.2 °C), Min:96.6 °F (35.9 °C), Max:97.5 °F (36.4 °C)    Oxygen range: SpO2:  [93 %-100 %] 100 %;  ;   O2 Device: mechanical ventilator   ; There is no height or weight on file to calculate BMI.  I/O this shift:  In: 85 [P.O.:60; I.V.:25]  Out: 1175 [Urine:1175]  Adult male currently on a ventilator.  Pupils are equal and reactive to light.  I could not get him to withdraw to painful stimuli but he is currently on propofol.  He was earlier reaching for his ET tube.  Lungs reveal bilateral air entry clear to auscultation.  Heart examination S1-S2 present rhythm regular.  No edema lower extremities.  Abdomen soft nontender bowel sounds present though diminished.  Peripheral pulses palpated.  The skin is cool but patient is being cold.  Capillary refill is good.    Results Review:    I have reviewed the laboratory and imaging data since the last note by LPC physician. My annotations are as noted in assessment and plan.    Medication Review:  I have reviewed the current MAR.  My annotations are as noted in assessment and plan.    Plan   PCCM Problems  Acute MI now status post primary PCI  Status post cardiac arrest with prolonged resuscitation  Acute respiratory failure on mechanical ventilation  Severe metabolic acidosis due to above  Severe hypokalemia  Acute renal failure  Suspected aspiration pneumonia  Relevant Medical Diagnoses  Hypertension    Plan of Treatment  Manage for the acute MI per cardiology team.    Prolonged resuscitation and the decreased mental status.  Therefore patient is being pulled for  hypothermia.    Ventilator settings were adjusted and repeat ABG will be reviewed.    So far has received one run of potassium.  Due to acute renal failure will be cautious and await repeat labs.  Ex    Chest x-ray showed suggestive of aspiration pneumonia.  We will start him on Zosyn.    Thankfully not hypotensive.  Monitor blood pressure and consider using metoprolol for acute MI.    I spoke with the wife and updated her.    Since patient is to receive Brilinta and the levels of this drug may be reduced by the fosphenytoin I am not going to give him Cerebyx.    Charles Medrano MD  02/15/17  6:47 AM     I spent +45 mins critical care time in care of this patient outside of any procedures.      EMR Dragon/Transcription disclaimer:   Much of this encounter note is an electronic transcription/translation of spoken language to printed text. The electronic translation of spoken language may permit erroneous, or at times, nonsensical words or phrases to be inadvertently transcribed; Although I have reviewed the note for such errors, some may still exist.

## 2017-02-15 NOTE — CONSULTS
INTERNAL MEDICINE CONSULTATION     CONSULTING PHYSICIAN: Latrell Rivera MD     REQUESTING PHYSICIAN: Kristina Billings MD    REASON FOR CONSULTATION: Follow medical problems.    HISTORY: A 61-year-old white male with history of hypertension, gastroesophageal reflux disease and osteoarthritis, who had been in good health until last night when he developed severe nausea and vomiting followed by chest pressure. He ended up in the [facility] ER and was found to have acute MI. He was taken to the cardiac catheterization lab and had a stent placed and is now on the ventilator and sedated. I am asked to follow the patient for medical problems.    At the time of interview, again, the patient is unresponsive on the ventilator and sedated. Most of the history was obtained from the wife, nursing staff and old records.     According to the wife, he was fine until 1 a.m. last night when he had severe heartburn followed by nausea and vomiting which did not relieve ,and then he had chest discomfort with some shortness of breath. He was diaphoretic too. No fever, no diarrhea, no other complaint.    PAST MEDICAL HISTORY:   1. Hypertension.  2. Gastroesophageal reflux disease.  3. Osteoarthritis.    SOCIAL HISTORY: No tobacco, alcohol or drug abuse.    FAMILY HISTORY: Noncontributory.    ALLERGIES:   1. AMOXICILLIN.  2. PENICILLIN.    CURRENT MEDICATIONS:  1. Aspirin.  2. Lisinopril.  3. Protonix.  4. Brilinta.  5. Amiodarone.    PHYSICAL EXAMINATION:   GENERAL: On the ventilator, sedated.  VITAL SIGNS: Hypothermic. Pulse 65, respirations 14, blood pressure 109/79.  LUNGS: He is moving air bilaterally.  HEART: S1 and S2.  ABDOMEN: Hypoactive bowel sounds.  EXTREMITIES: No edema.  NEUROLOGIC: Unresponsive.    DIAGNOSTIC DATA: White count 18.6, hemoglobin 15.2, platelets 282,000. BUN 24, creatinine 1.0, potassium 4.2, CO2 is 18.6. Blood sugar is 123 to 138. LDL is 128. Chest x-ray is no active disease. EKG shows  sinus rhythm, prolonged QT interval, nonspecific ST-T wave changes. Initial EKG showed AFib/flutter.    ASSESSMENT:   1. Status post acute myocardial infarction.  2. Status post cardiac catheterization with stent placement.  3. Respiratory failure.  4. Decreased mental status.  5. Hypothermia.  6. History of hypertension.  7. History of gastroesophageal reflux disease.  8. Increased white count.    RECOMMENDATIONS:   1. He is on supportive care.  2. Empiric IV antibiotics started.  3. Celebrex started by Neurology.  4. Will check a thyroid profile.  5. Will follow closely along with Cardiology, Pulmonary and Neurology. Further recommendations according to hospital course.

## 2017-02-15 NOTE — PROCEDURES
Central Venous Catheter Insertion Procedure Note      Indications:  need for frequent blood draws    Procedure Details   Informed consent was obtained for the procedure, including sedation.  Risks of lung perforation, hemorrhage, arrhythmia, and adverse drug reaction were discussed.     Maximum sterile technique was used including usual patient drapes, antiseptics and physician sterile garments.    Under sterile conditions the skin above the on the right internal jugular vein was prepped with Chlorhexidine and covered with a sterile drape. Local anesthesia was applied to the skin and subcutaneous tissues with lidocaine 1%. An 18-gauge needle was then inserted into the vein. A guide wire was then passed easily through the catheter. A quad was then inserted into the vessel over the guide wire. The catheter was sutured into place and dressed following sterile protocol with Biopatch placed.  Ultrasound was used to visualize the site of insertion    Findings:  There were no changes to vital signs. All catheter ports were flushed with saline. Patient did tolerate procedure well.    Recommendations:  CXR ordered to verify placement. No chest x-ray ordered if this was a femoral vein.    Uli Resendiz MD  2/15/2017

## 2017-02-15 NOTE — ED PROVIDER NOTES
EMERGENCY DEPARTMENT ENCOUNTER    CHIEF COMPLAINT  Chief Complaint: Cardiac Arrest  History given by: EMS  History limited by: Acuity of condition  Room Number: 20/20  PMD: Husam Gomez MD      HPI:  Pt is a 61 y.o. male who presents with cardiac arrest. EMS was called for chest pain and vomiting. Pt was in cardiac arrest on EMS arrival. CPR was started at 0248. Pt was given 10 epi, 300 amnio, 1 bicarb and defibrillated 3 times PTA. Pt has a hx of Hypertension.    Location of Arrest - Home  Witnessed Arrest - Yes  Bystander CPR - No  Time of Collapse - Unknown  Time CPR Initiated - 0248  First Medical Professional on Scene - EMS   Time on Scene - 0247  AED Used - Yes  Initial Cardiac Rhythm - Other - Sinus tachycardia  ROSC in Field - Yes, with pulse on arrival to ED  Time of Arrival to [facility] ED - 0332  STEMI - Yes  Treatment Prior to Arrival - 10 epi, 300 amnio, 1 bicarb      PAST MEDICAL HISTORY  Active Ambulatory Problems     Diagnosis Date Noted   • Airway hyperreactivity 05/03/2016   • Elevated cholesterol 05/03/2016   • Gain of weight 05/03/2016   • ED (erectile dysfunction) 07/15/2016     Resolved Ambulatory Problems     Diagnosis Date Noted   • No Resolved Ambulatory Problems     Past Medical History   Diagnosis Date   • Chronic pain in right foot    • GERD (gastroesophageal reflux disease)    • Hypertension        PAST SURGICAL HISTORY  Past Surgical History   Procedure Laterality Date   • Colonoscopy N/A 02/27/2015     Normal   • Appendectomy     • Hernia repair Right      Inguinal   • Cyst removal N/A      Neck   • Cholecystectomy         FAMILY HISTORY  Family History   Problem Relation Age of Onset   • Diabetes Mother      Type 2   • Diabetes Father      Type 2   • Stroke Father    • Aneurysm Brother      Brain       SOCIAL HISTORY  Social History     Social History   • Marital status:      Spouse name: N/A   • Number of children: N/A   • Years of education: N/A     Occupational History   •  Not on file.     Social History Main Topics   • Smoking status: Former Smoker     Packs/day: 0.50     Years: 7.00     Types: Cigarettes     Quit date: 1999   • Smokeless tobacco: Never Used   • Alcohol use Yes   • Drug use: No   • Sexual activity: Defer     Other Topics Concern   • Not on file     Social History Narrative   • No narrative on file       ALLERGIES  Amoxicillin and Penicillins    REVIEW OF SYSTEMS  Review of Systems   Unable to perform ROS: Acuity of condition       PHYSICAL EXAM  ED Triage Vitals   Temp Heart Rate Resp BP SpO2   02/15/17 0340 02/15/17 0340 02/15/17 0340 02/15/17 0340 02/15/17 0340   96.6 °F (35.9 °C) 130 32 137/105 99 %      Temp src Heart Rate Source Patient Position BP Location FiO2 (%)   02/15/17 0340 -- -- -- --   Tympanic           Physical Exam   HENT:   Pt is minimally responsive with some non purposeful movements. Pt is breathing on his own.    Eyes:   Pupils are 4 and sluggish.   Cardiovascular: Tachycardia present.    Palpable Pulse.   Pulmonary/Chest:   Keen airway in place.    Abdominal: Soft.   Nursing note and vitals reviewed.      LAB RESULTS  Lab Results (last 24 hours)     Procedure Component Value Units Date/Time    CBC & Differential [[id number]] Collected:  02/15/17 0339    Specimen:  Blood Updated:  02/15/17 0348    Narrative:       The following orders were created for panel order CBC & Differential.  Procedure                               Abnormality         Status                     ---------                               -----------         ------                     CBC Auto Differential[[id number]]         Abnormal            Final result                 Please view results for these tests on the individual orders.    Comprehensive Metabolic Panel [[id number]] Collected:  02/15/17 0339    Specimen:  Blood Updated:  02/15/17 0354    Troponin [[id number]] Collected:  02/15/17 0339    Specimen:  Blood Updated:  02/15/17 0354    CBC Auto Differential [[id number]]   (Abnormal) Collected:  02/15/17 0339    Specimen:  Blood Updated:  02/15/17 0348     WBC 14.60 (H) 10*3/mm3      RBC 4.85 10*6/mm3      Hemoglobin 14.9 g/dL      Hematocrit 45.4 %      MCV 93.6 fL      MCH 30.7 pg      MCHC 32.8 g/dL      RDW 12.8 %      RDW-SD 43.4 fl      MPV 11.5 fL      Platelets 135 (L) 10*3/mm3      Neutrophil % 49.3 %      Lymphocyte % 37.4 %      Monocyte % 7.2 %      Eosinophil % 3.3 %      Basophil % 0.3 %      Immature Grans % 2.5 (H) %      Neutrophils, Absolute 7.20 10*3/mm3      Lymphocytes, Absolute 5.46 (H) 10*3/mm3      Monocytes, Absolute 1.05 10*3/mm3      Eosinophils, Absolute 0.48 10*3/mm3      Basophils, Absolute 0.05 10*3/mm3      Immature Grans, Absolute 0.36 (H) 10*3/mm3     Protime-INR [[id number]] Collected:  02/15/17 0339    Specimen:  Blood Updated:  02/15/17 0439    aPTT [[id number]] Collected:  02/15/17 0339    Specimen:  Blood Updated:  02/15/17 0439    Blood Gas, Arterial [[id number]]  (Abnormal) Collected:  02/15/17 0352    Specimen:  Arterial Blood Updated:  02/15/17 0354     Site Arterial: right radial      Agapito's Test Positive      pH, Arterial 7.159 (C) pH units       Critical:Notify Dr dr ervin (15-Feb-17 03:53:54)Read back ok        pCO2, Arterial 44.7 mm Hg      pO2, Arterial 86.8 mm Hg      HCO3, Arterial 15.9 (L) mmol/L      Base Excess, Arterial -12.6 (L) mmol/L      O2 Saturation Calculated 93.4 %      A-a Gradiant 0.1 mmHg      Barometric Pressure for Blood Gas 742.7 mmHg      Modality Adult Vent      FIO2 100 %      Ventilator Mode VC      Set Tidal Volume 600      Set Mech Resp Rate 14      Rate 37 Breaths/minute      PEEP 5     Narrative:       sat 97 Meter: [id number] : [id number] Kimberly Gómez I ordered the above labs and reviewed the results    RADIOLOGY  CT Head Without Contrast    (Results Pending)   XR Chest 1 View    (Results Pending)            PROCEDURES  Intubation  Date/Time: 2/15/2017 3:55 AM  Performed by: JORDAN ERVIN  GENEVIEVE  Authorized by: JORDAN MORA   Consent: The procedure was performed in an emergent situation.  Patient identity confirmed: arm band  Indications: airway protection  Intubation method: direct  Patient status: sedated  Preoxygenation: KING/LMA  Pretreatment medications: midazolam  Sedatives: propofol  Paralytic: vecuronium  Laryngoscope size: Mac 4  Tube size: 7.5 mm  Tube type: cuffed  Number of attempts: 1  Cricoid pressure: no  Cords visualized: yes  Post-procedure assessment: chest rise and CO2 detector  Breath sounds: equal  Cuff inflated: yes  ETT to lip: 24 cm  Tube secured with: ETT arechiga  Chest x-ray interpreted by radiologist.  Chest x-ray findings: endotracheal tube in appropriate position  Patient tolerance: Patient tolerated the procedure well with no immediate complications    Critical Care  Performed by: JORDAN MORA  Authorized by: JORDAN MORA   Total critical care time: 45 minutes  Critical care time was exclusive of separately billable procedures and treating other patients.  Critical care was necessary to treat or prevent imminent or life-threatening deterioration of the following conditions: cardiac failure.  Critical care was time spent personally by me on the following activities: blood draw for specimens, development of treatment plan with patient or surrogate, discussions with consultants, discussions with primary provider, gastric intubation, interpretation of cardiac output measurements, evaluation of patient's response to treatment, examination of patient, obtaining history from patient or surrogate, ordering and performing treatments and interventions, ordering and review of radiographic studies, ordering and review of laboratory studies, pulse oximetry, re-evaluation of patient's condition and review of old charts.        EKG           EKG time: 0341  Rhythm/Rate: Narrow complex tachycardia  ST and T waves: ST elevation in V5 and VT with ST depression in V3 and AVF suggestive  of MI     PROGRESS AND CONSULTS  ED Course     0334  Pt is breathing on his own and has a pulse    0335  Placed call to cardiac interventionalist     0338  Ordered labs for further evaluation.     0340  Spoke with dr. Duncan.Informed him of pt's arrival to ED and plans for care as of now     0343  Pt is conscious but not alert and not following commands and moving around non purposefully.  He has intact brainstem reflexes, but shows signs of significantly impaired cerebral function.    0344  Transmitted EKG to dr. Duncan for review.     0347  Spoke with Dr. duncan. Said to change ET tube. The decision to cool pt was made after discussing with dr. Duncan based on pt having altered level of consciousness, not following commands and non purposeful movements only. Cath lab activated. Dr Duncan accepts pt to be admitted to critical care.     0355  Ordered Versed to calm pt    0359  Ordered Norcuron, Diprivan, aspirin suppository.    0403  Per spouse, pt took something viagra-like tonight around 2100. She reports they went to bed around 2200. She woke up around 0100 and heard him in the bathroom. He had been c/o chest pain, which he thought was indigestion. He went to the bathroom and stayed longer then usual and when she arrived he was clutching his chest and stated it was not indigestion. Discussed MI with family and plan for pt to go to cardiac cath lab. Spouse reports hx of brother dying of MI.     0418  Placed a call to Pulmonology for consult to admit.    0419  Ordered CT head and CXR for further evaluation.     0425  Discussed case with Dr. Medrano. He agrees with plan of care.      MEDICAL DECISION MAKING  Results were reviewed/discussed with the patient and they were also made aware of online access. Pt also made aware that some labs, such as cultures, will not be resulted during ER visit and follow up with PMD is necessary.     MDM  Number of Diagnoses or Management Options     Amount  and/or Complexity of Data Reviewed  Clinical lab tests: reviewed and ordered (pH, Arterial 7.159 (C)   WBC - 14.6)  Obtain history from someone other than the patient: yes (EMS/family)  Discuss the patient with other providers: yes (Dr. Medrano, Dr Billings)           DIAGNOSIS  Final diagnoses:   ST elevation myocardial infarction (STEMI), unspecified artery   Cardiac arrest with ventricular fibrillation       DISPOSITION  ADMISSION    Discussed treatment plan and reason for admission with pt/family and admitting physician.  Pt/family voiced understanding of the plan for admission for further testing/treatment as needed.         Latest Documented Vital Signs:  As of 4:41 AM  BP- 108/71 HR- 100 Temp- 96.6 °F (35.9 °C) (Tympanic) O2 sat- 94%    --  Documentation assistance provided by nahed Brown for Dr. Linares.  Information recorded by the nahed was done at my direction and has been verified and validated by me.       Charla Brown  02/15/17 0446       Charla Brown  02/15/17 0454       Peng Linares MD  02/16/17 0041

## 2017-02-15 NOTE — H&P
"[facility]  History & Physical Note                                                                                    Patient Identification:  Sander White:   61 y.o.  male  1955  [id number]            Chief Complaint   Patient presents with   • Cardiac Arrest     CPR IN PROGRESS UPON ARRIVAL       Date of Admission:  2-    Admitting Physician-: Dr Billings    Reason for Admission:ST elevation myocardial infarction (STEMI), unspecified artery [I21.3]  Cardiac arrest with ventricular fibrillation [I46.9, I49.01], Chief Complaint   Patient presents with   • Cardiac Arrest     CPR IN PROGRESS UPON ARRIVAL       History of Present Illness:     HPI & PMH limited due to patient critical condition    This 68-year-old white male presented to the ER with CPR in progress on amiodarone with oral airway in place.  There was no documented history of CAD or previous ischemic workup however he is being treated for hypertension and ED.  According to medical records, he took a he took a Viagra-like substance at approximately 2100.  At some point in the evening, he complained of complaint of heartburn and developed nausea and vomiting.  He was in full arrest upon EMS arrival at 02 48 and received 10 doses of epinephrine, 300 mg of amiodarone and one amp of sodium bicarbonate.  CPR was in progress upon arrival to ED with return of spontaneous rhythm \"arrow complex tachycardia\" after arrival.  EKG (see below  shows lateral ST elevation with reciprocal depression in the inferior leads consistent with acute coronary syndrome    Cardiac Risk Factors: Hypertension    Past Medical History:  Past Medical History   Diagnosis Date   • Chronic pain in right foot    • ED (erectile dysfunction) 7/15/2016   • GERD (gastroesophageal reflux disease)    • Hypertension     at least 3 stable    Past Surgical History:  Past Surgical History   Procedure Laterality Date   • Colonoscopy N/A 02/27/2015     Normal   • " Appendectomy     • Hernia repair Right      Inguinal   • Cyst removal N/A      Neck   • Cholecystectomy          Social History:   Social History   Substance Use Topics   • Smoking status: Former Smoker     Packs/day: 0.50     Years: 7.00     Types: Cigarettes     Quit date: 1999   • Smokeless tobacco: Never Used   • Alcohol use Yes        Family History:  Family History   Problem Relation Age of Onset   • Diabetes Mother      Type 2   • Diabetes Father      Type 2   • Stroke Father    • Aneurysm Brother      Brain          Allergies:  Allergies   Allergen Reactions   • Amoxicillin Itching   • Penicillins        Home Meds:  No current facility-administered medications on file prior to encounter.      Current Outpatient Prescriptions on File Prior to Encounter   Medication Sig Dispense Refill   • azithromycin (ZITHROMAX Z-KENDRICK) 250 MG tablet Take 1 tablet by mouth Daily. Take 2 tablets the first day, then 1 tablet daily for 4 days. 6 tablet 0   • fluticasone (FLONASE) 50 MCG/ACT nasal spray 2 sprays into each nostril Daily for 30 days. Administer 2 sprays in each nostril for each dose. 9.9 mL 1   • lisinopril (ZESTRIL) 2.5 MG tablet Take 1 tablet by mouth daily. 90 tablet 3   • Omega-3 Fatty Acids (OMEGA 3 PO) Take 1 capsule by mouth daily.     • Pyridoxine HCl (VITAMIN B-6 PO) Take 1 tablet by mouth daily.     • sildenafil (VIAGRA) 100 MG tablet Take 1 tablet by mouth Daily As Needed for erectile dysfunction. 10 tablet 2   • vitamin B-12 (CYANOCOBALAMIN) 1000 MCG tablet Take 1,000 mcg by mouth daily.     • vitamin C (ASCORBIC ACID) 500 MG tablet Take 500 mg by mouth daily.           Scheduled Meds:    artificial tears  Q4H   aspirin 81 mg Daily   cisatracurium 100 mcg/kg Once   lisinopril 2.5 mg Daily   ticagrelor 90 mg BID           INTAKE AND OUTPUT:    Intake/Output Summary (Last 24 hours) at 02/15/17 0861  Last data filed at 02/15/17 0679   Gross per 24 hour   Intake     85 ml   Output   1175 ml   Net  -1090 ml            Review of Systems:  Unable to obtain due to patient illness/condition  Constitutional:    Eyes:    ENT/oropharynx:    Cardiovascular:    Respiratory:    Gastrointestinal:    Genitourinary:    Neurological:    Musculoskeletal:    Integument:        Constitutional:  Temp:  [96.6 °F (35.9 °C)-97.5 °F (36.4 °C)] 97.5 °F (36.4 °C)  Heart Rate:  [] 86  Resp:  [18-32] 18  BP: ()/() 109/81  FiO2 (%):  [100 %] 100 %       Physical Exam by Dr Billings             Physical Exam  Visit Vitals   • /81   • Pulse 65   • Temp 97.5 °F (36.4 °C) (Rectal)   • Resp 14   • SpO2 100%       General appearance: intubated, paralised  Eyes: anicteric sclerae, moist conjunctivae; no lid-lag; PERRLA   HENT: Atraumatic; oropharynx clear with moist mucous membranes and no mucosal ulcerations;  normal hard and soft palate   Neck: Trachea midline; FROM, supple, no thyromegaly or lymphadenopathy   Lungs: CTA, with normal respiratory effort and no intercostal retractions   CV: S1-S2 regular, no murmurs, no rub, no gallop   Abdomen: Soft, non-tender; no masses or HSM   Extremities: No peripheral edema or extremity lymphadenopathy  Skin: Normal temperature, turgor and texture; no rash, ulcers or subcutaneous nodules   Psych:intubated                                      Cardiographics    Admit ECG:       Telemetry:        ECHO:  pending      Lab Review:    Results from last 7 days  Lab Units 02/15/17  0632 02/15/17  0339   TROPONIN T ng/mL 2.830* 0.016       Results from last 7 days  Lab Units 02/15/17  0632   MAGNESIUM mg/dL 2.2       Results from last 7 days  Lab Units 02/15/17  0632   SODIUM mmol/L 142   POTASSIUM mmol/L 3.8   BUN mg/dL 26*   CREATININE mg/dL 1.42*   CALCIUM mg/dL 8.3*       Results from last 7 days  Lab Units 02/15/17  0632 02/15/17  0457 02/15/17  0339   WBC 10*3/mm3 26.54*  --  14.60*   HEMOGLOBIN g/dL 16.2  --  14.9   HEMOGLOBIN, ARTERIAL POC g/dL  --  16.7  --    HEMATOCRIT % 48.8  --   45.4   HEMATOCRIT POC %  --  49  --    PLATELETS 10*3/mm3 253  --  135*       Results from last 7 days  Lab Units 02/15/17  0632 02/15/17  0339   INR  1.39* 1.09   APTT seconds 52.4* 30.6        CXR IMPRESSION:  Extensive infiltrates in the right lung are suspected, clinical  correlation is recommended           Assessment / Plan:  - STEMI  - s/p resuscitated cardiopulmonary arrest            Recommendations:  Pt with cp for 12 hrs, had sudden cardiac arrest, required defib x 3 , with EKG suggestive of emily st elevation will need emergency cath possible pci    Con vent support    D/ W family    Started on cooling protocol for anoxic encephalopathy      Con Sedation    Pul consult    Con antiplateltes          Kristina Billings MD  2/15/2017  8:56 AM    Critical level encounter of 45 min          EMR Dragon/Transcription disclaimer:   Much of this encounter note is an electronic transcription/translation of spoken language to printed text. The electronic translation of spoken language may permit erroneous, or at times, nonsensical words or phrases to be inadvertently transcribed; Although I have reviewed the note for such errors, some may still exist.

## 2017-02-15 NOTE — NURSING NOTE
Wife updated on plan of care and pt status. Consents signed for Central and arterial line.  Juanito ENGLISH.

## 2017-02-15 NOTE — PLAN OF CARE
Problem: Skin Integrity Impairment, Risk/Actual (Adult)  Goal: Identify Related Risk Factors and Signs and Symptoms  Outcome: Ongoing (interventions implemented as appropriate)  Goal: Skin Integrity/Wound Healing  Outcome: Ongoing (interventions implemented as appropriate)    Problem: Cardiac Catheterization with/without PCI (Adult)  Goal: Signs and Symptoms of Listed Potential Problems Will be Absent or Manageable (Cardiac Catheterization with/without PCI)  Outcome: Ongoing (interventions implemented as appropriate)

## 2017-02-15 NOTE — PLAN OF CARE
Problem: SAFETY - NON-VIOLENT RESTRAINT  Goal: Remains free of injury from restraints (Non-Violent Restraint)  Outcome: Outcome(s) achieved Date Met:  02/15/17  Goal: Free from restraint(s) (Non-Violent Restraint)  Outcome: Outcome(s) achieved Date Met:  02/15/17

## 2017-02-15 NOTE — CONSULTS
Patient Identification:  NAME:  Sander White  Age:  61 y.o.   Sex:  male   :  1955   MRN:  [id number]       Chief complaint: Does not have one, reason for consult cardiac arrest.  Evaluate mental status decreased    History of present illness:  This patient is a 61-year-old white male with history of hypertension GERD arthritis who had cardiac arrest today he is been coded appropriately come to the hospital raise now being cooled on a ventilator getting IV Cerebyx and a paralytic he does not over breathes the ventilator is he is on a paralytic the duration of this arrest is not known quality was a total cardiac arrest location is not pertinent modifying factors he's on a ventilator in getting sedation and a paralytic agent reviewed the CAT scan with an independent eyeball review and it shows chronic changes only      Past medical history:  Past Medical History   Diagnosis Date   • Arthritis    • Chronic pain in right foot    • ED (erectile dysfunction) 7/15/2016   • GERD (gastroesophageal reflux disease)    • Hypertension        Allergies:  Amoxicillin and Penicillins    Home medications:  Prescriptions Prior to Admission   Medication Sig Dispense Refill Last Dose   • azithromycin (ZITHROMAX Z-KENDRICK) 250 MG tablet Take 1 tablet by mouth Daily. Take 2 tablets the first day, then 1 tablet daily for 4 days. 6 tablet 0    • fluticasone (FLONASE) 50 MCG/ACT nasal spray 2 sprays into each nostril Daily for 30 days. Administer 2 sprays in each nostril for each dose. 9.9 mL 1    • lisinopril (ZESTRIL) 2.5 MG tablet Take 1 tablet by mouth daily. 90 tablet 3 Taking   • Omega-3 Fatty Acids (OMEGA 3 PO) Take 1 capsule by mouth daily.   Taking   • Pyridoxine HCl (VITAMIN B-6 PO) Take 1 tablet by mouth daily.   Taking   • sildenafil (VIAGRA) 100 MG tablet Take 1 tablet by mouth Daily As Needed for erectile dysfunction. 10 tablet 2    • vitamin B-12 (CYANOCOBALAMIN) 1000 MCG tablet Take 1,000 mcg by mouth daily.   Taking    • vitamin C (ASCORBIC ACID) 500 MG tablet Take 500 mg by mouth daily.   Taking        Hospital medications:    artificial tears  Both Eyes Q4H   aspirin 81 mg Oral Daily   lisinopril 2.5 mg Oral Daily   pantoprazole 40 mg Intravenous Q AM   ticagrelor 90 mg Oral BID       amiodarone 1 mg/min Last Rate: 0.5 mg/min (02/15/17 1412)   cisatracurium (NIMBEX) infusion 0.5 mcg/kg/min (Ideal) Last Rate: 1 mcg/kg/min (02/15/17 1200)   fentaNYL (SUBLIMAZE) infusion 5 mcg/ml 250 mL 25-50 mcg/hr Last Rate: 25 mcg/hr (02/15/17 1400)   propofol 5-50 mcg/kg/min Last Rate: 40 mcg/kg/min (02/15/17 1233)   sodium chloride 100 mL/hr Last Rate: 100 mL/hr (02/15/17 0930)     •  acetaminophen **OR** acetaminophen  •  influenza vaccine  •  sodium chloride  •  sodium phosphate IVPB      Objective:  Vitals Ranges:   Temp:  [96.6 °F (35.9 °C)-97.5 °F (36.4 °C)] 97.5 °F (36.4 °C)  Heart Rate:  [] 71  Resp:  [14-32] 14  BP: ()/() 103/79  Arterial Line BP: (105-155)/(65-89) 105/67  FiO2 (%):  [40 %-100 %] 40 %    Review of systems cannot be performed as he is sedated on a ventilator  Physical Exam:  He is sedated lying in bed quietly cannot answer questions orientation fund of knowledge attention span and concentration recent remote memory he is in no distress he does not over breathes the ventilator but he is on a paralytic pupils 1 mm without reaction no corneal responses no doll's no other cranial nerves to be performed again he is on a paralytic no pain was given so the motor exam reveals flaccid ×4 extremities no atrophy fasciculations or resting tremor reflexes absent throughout of course as he is on a paralytic and toes are mute bilaterally sensation coordination station and gait cannot be tested heart regular without murmur neck supple without bruits    Results review:   I reviewed the patient's new clinical results.    Data review:  Lab Results (last 24 hours)     Procedure Component Value Units Date/Time    CBC &  Differential [[id number]] Collected:  02/15/17 0339    Specimen:  Blood Updated:  02/15/17 0348    Narrative:       The following orders were created for panel order CBC & Differential.  Procedure                               Abnormality         Status                     ---------                               -----------         ------                     CBC Auto Differential[[id number]]         Abnormal            Final result                 Please view results for these tests on the individual orders.    CBC Auto Differential [[id number]]  (Abnormal) Collected:  02/15/17 0339    Specimen:  Blood Updated:  02/15/17 0348     WBC 14.60 (H) 10*3/mm3      RBC 4.85 10*6/mm3      Hemoglobin 14.9 g/dL      Hematocrit 45.4 %      MCV 93.6 fL      MCH 30.7 pg      MCHC 32.8 g/dL      RDW 12.8 %      RDW-SD 43.4 fl      MPV 11.5 fL      Platelets 135 (L) 10*3/mm3      Neutrophil % 49.3 %      Lymphocyte % 37.4 %      Monocyte % 7.2 %      Eosinophil % 3.3 %      Basophil % 0.3 %      Immature Grans % 2.5 (H) %      Neutrophils, Absolute 7.20 10*3/mm3      Lymphocytes, Absolute 5.46 (H) 10*3/mm3      Monocytes, Absolute 1.05 10*3/mm3      Eosinophils, Absolute 0.48 10*3/mm3      Basophils, Absolute 0.05 10*3/mm3      Immature Grans, Absolute 0.36 (H) 10*3/mm3     Blood Gas, Arterial [[id number]]  (Abnormal) Collected:  02/15/17 0352    Specimen:  Arterial Blood Updated:  02/15/17 0354     Site Arterial: right radial      Agapito's Test Positive      pH, Arterial 7.159 (C) pH units       Critical:Notify Dr dr ervin (15-Feb-17 03:53:54)Read back ok        pCO2, Arterial 44.7 mm Hg      pO2, Arterial 86.8 mm Hg      HCO3, Arterial 15.9 (L) mmol/L      Base Excess, Arterial -12.6 (L) mmol/L      O2 Saturation Calculated 93.4 %      A-a Gradiant 0.1 mmHg      Barometric Pressure for Blood Gas 742.7 mmHg      Modality Adult Vent      FIO2 100 %      Ventilator Mode VC      Set Tidal Volume 600      Set Mech Resp Rate 14      Rate 37  Breaths/minute      PEEP 5     Narrative:       sat 97 Meter: [id number] : [id number] Kimberly Gómez    Troponin [[id number]]  (Normal) Collected:  02/15/17 0339    Specimen:  Blood Updated:  02/15/17 0447     Troponin T 0.016 ng/mL     Narrative:       Troponin T Reference Ranges:  Less than 0.03 ng/mL:    Negative for AMI  0.03 to 0.09 ng/mL:      Indeterminant for AMI  Greater than 0.09 ng/mL: Positive for AMI    Protime-INR [[id number]]  (Normal) Collected:  02/15/17 0339    Specimen:  Blood Updated:  02/15/17 0451     Protime 13.7 Seconds      INR 1.09     aPTT [[id number]]  (Normal) Collected:  02/15/17 0339    Specimen:  Blood Updated:  02/15/17 0451     PTT 30.6 seconds     Comprehensive Metabolic Panel [[id number]]  (Abnormal) Collected:  02/15/17 0339    Specimen:  Blood Updated:  02/15/17 0500     Glucose 419 (C) mg/dL      BUN 25 (H) mg/dL      Creatinine 1.76 (H) mg/dL      Sodium 142 mmol/L      Potassium 2.8 (L) mmol/L      Chloride 97 (L) mmol/L      CO2 15.2 (L) mmol/L      Calcium 8.7 mg/dL      Total Protein 6.2 g/dL      Albumin 3.80 g/dL      ALT (SGPT) 303 (H) U/L      AST (SGOT) 256 (H) U/L      Alkaline Phosphatase 78 U/L      Total Bilirubin 0.4 mg/dL      eGFR Non African Amer 40 (L) mL/min/1.73      Globulin 2.4 gm/dL      A/G Ratio 1.6 g/dL      BUN/Creatinine Ratio 14.2      Anion Gap 29.8 mmol/L     POC Glucose Fingerstick [[id number]]  (Abnormal) Collected:  02/15/17 0612    Specimen:  Blood Updated:  02/15/17 0613     Glucose 206 (H) mg/dL     Narrative:       Meter: [id number] : [id number] Kevin Crooks    POC Panel 9, ISTAT [[id number]]  (Abnormal) Collected:  02/15/17 0457    Specimen:  Blood Updated:  02/15/17 0625     Hemoglobin 16.7 g/dL      Hematocrit 49 %      O2 Saturation, Arterial 93 (L) %       Serial Number: [id number]    : [id number]       POC Activated Clotting Time [[id number]]  (Abnormal) Collected:  02/15/17 0512    Specimen:  Blood Updated:  02/15/17 0625      Activated Clotting Time  358 (H) Seconds       Serial Number: [id number]    : [id number]       CBC & Differential [[id number]] Collected:  02/15/17 0632    Specimen:  Blood Updated:  02/15/17 0707    Narrative:       The following orders were created for panel order CBC & Differential.  Procedure                               Abnormality         Status                     ---------                               -----------         ------                     CBC Auto Differential[[id number]]         Abnormal            Final result                 Please view results for these tests on the individual orders.    CBC Auto Differential [[id number]]  (Abnormal) Collected:  02/15/17 0632    Specimen:  Blood Updated:  02/15/17 0707     WBC 26.54 (H) 10*3/mm3      RBC 5.32 10*6/mm3      Hemoglobin 16.2 g/dL      Hematocrit 48.8 %      MCV 91.7 fL      MCH 30.5 pg      MCHC 33.2 g/dL      RDW 12.9 %      RDW-SD 42.7 fl      MPV 11.3 fL      Platelets 253 10*3/mm3      Neutrophil % 83.9 (H) %      Lymphocyte % 5.6 (L) %      Monocyte % 9.3 %      Eosinophil % 0.2 (L) %      Basophil % 0.1 %      Immature Grans % 0.9 (H) %      Neutrophils, Absolute 22.28 (H) 10*3/mm3      Lymphocytes, Absolute 1.48 10*3/mm3      Monocytes, Absolute 2.46 (H) 10*3/mm3      Eosinophils, Absolute 0.04 10*3/mm3      Basophils, Absolute 0.03 10*3/mm3      Immature Grans, Absolute 0.25 (H) 10*3/mm3     Lactic Acid, Plasma [[id number]]  (Abnormal) Collected:  02/15/17 0632    Specimen:  Blood Updated:  02/15/17 0711     Lactate 5.6 (C) mmol/L     Protime-INR [[id number]]  (Abnormal) Collected:  02/15/17 0632    Specimen:  Blood Updated:  02/15/17 0712     Protime 16.6 (H) Seconds      INR 1.39 (H)     aPTT [[id number]]  (Abnormal) Collected:  02/15/17 0632    Specimen:  Blood Updated:  02/15/17 0712     PTT 52.4 (H) seconds     Comprehensive Metabolic Panel [[id number]]  (Abnormal) Collected:  02/15/17 0632    Specimen:  Blood Updated:  02/15/17 0716     Glucose  248 (H) mg/dL      BUN 26 (H) mg/dL      Creatinine 1.42 (H) mg/dL      Sodium 142 mmol/L      Potassium 3.8 mmol/L      Chloride 101 mmol/L      CO2 21.1 (L) mmol/L      Calcium 8.3 (L) mg/dL      Total Protein 7.1 g/dL      Albumin 4.30 g/dL      ALT (SGPT) 414 (H) U/L      AST (SGOT) 443 (H) U/L      Alkaline Phosphatase 88 U/L      Total Bilirubin 0.5 mg/dL      eGFR Non African Amer 51 (L) mL/min/1.73      Globulin 2.8 gm/dL      A/G Ratio 1.5 g/dL      BUN/Creatinine Ratio 18.3      Anion Gap 19.9 mmol/L     Calcium, Ionized [[id number]]  (Normal) Collected:  02/15/17 0632    Specimen:  Blood Updated:  02/15/17 0723     Ionized Calcium 1.17 mmol/L      Ionized Calcium 4.7 mg/dL     POC Glucose Fingerstick [[id number]]  (Abnormal) Collected:  02/15/17 0726    Specimen:  Blood Updated:  02/15/17 0729     Glucose 160 (H) mg/dL     Narrative:       Meter: [id number] : [id number] Thomas Orta    Troponin [[id number]]  (Abnormal) Collected:  02/15/17 0632    Specimen:  Blood Updated:  02/15/17 0740     Troponin T 2.830 (C) ng/mL     Narrative:       Troponin T Reference Ranges:  Less than 0.03 ng/mL:    Negative for AMI  0.03 to 0.09 ng/mL:      Indeterminant for AMI  Greater than 0.09 ng/mL: Positive for AMI    Magnesium [[id number]]  (Normal) Collected:  02/15/17 0632    Specimen:  Blood Updated:  02/15/17 0749     Magnesium 2.2 mg/dL     Phosphorus [[id number]]  (Abnormal) Collected:  02/15/17 0632    Specimen:  Blood Updated:  02/15/17 0749     Phosphorus 4.8 (H) mg/dL     May Draw [[id number]] Collected:  02/15/17 0339    Specimen:  Blood Updated:  02/15/17 0801    Narrative:       The following orders were created for panel order May Draw.  Procedure                               Abnormality         Status                     ---------                               -----------         ------                     Light Blue Top[[id number]]                                    Final result               Green Top  (Gel)[[id number]]                                   Final result               Lavender Top[[id number]]                                      Final result               Gold Top - SST[[id number]]                                    Final result                 Please view results for these tests on the individual orders.    Light Blue Top [[id number]] Collected:  02/15/17 0339    Specimen:  Blood Updated:  02/15/17 0801     Extra Tube hold for add-on       Auto resulted       Green Top (Gel) [[id number]] Collected:  02/15/17 0339    Specimen:  Blood Updated:  02/15/17 0801     Extra Tube Hold for add-ons.       Auto resulted.       Lavender Top [[id number]] Collected:  02/15/17 0339    Specimen:  Blood Updated:  02/15/17 0801     Extra Tube hold for add-on       Auto resulted       Gold Top - SST [[id number]] Collected:  02/15/17 0339    Specimen:  Blood Updated:  02/15/17 0801     Extra Tube Hold for add-ons.       Auto resulted.       POC Glucose Fingerstick [[id number]]  (Abnormal) Collected:  02/15/17 1007    Specimen:  Blood Updated:  02/15/17 1008     Glucose 132 (H) mg/dL     Narrative:       Meter: [id number] : [id number] Amrita MCKEON    Blood Gas, Arterial [[id number]]  (Abnormal) Collected:  02/15/17 1151    Specimen:  Arterial Blood Updated:  02/15/17 1155     Site Arterial Line      Agapito's Test N/A      pH, Arterial 7.261 (C) pH units       Critical:Notify Dr dr skinner (15-Feb-17 11:53:29)Read back ok        pCO2, Arterial 47.0 (H) mm Hg      pO2, Arterial 180.2 (H) mm Hg      HCO3, Arterial 21.2 (L) mmol/L      Base Excess, Arterial -6.1 (L) mmol/L      O2 Saturation Calculated 99.4 (H) %      A-a Gradiant 0.4 mmHg      Barometric Pressure for Blood Gas 748.4 mmHg      Modality Adult Vent      FIO2 70 %      Ventilator Mode PC      Set Tidal Volume 532      Set Mech Resp Rate 14      Rate 20 Breaths/minute      PEEP 5     Narrative:       o2sat 99 insp press 14 Meter: [id number] : [id number] Nico Saldaña     CBC & Differential [[id number]] Collected:  02/15/17 1229    Specimen:  Blood Updated:  02/15/17 1255    Narrative:       The following orders were created for panel order CBC & Differential.  Procedure                               Abnormality         Status                     ---------                               -----------         ------                     CBC Auto Differential[[id number]]         Abnormal            Final result                 Please view results for these tests on the individual orders.    CBC Auto Differential [[id number]]  (Abnormal) Collected:  02/15/17 1229    Specimen:  Blood Updated:  02/15/17 1255     WBC 18.61 (H) 10*3/mm3      RBC 4.95 10*6/mm3      Hemoglobin 15.2 g/dL      Hematocrit 44.4 %      MCV 89.7 fL      MCH 30.7 pg      MCHC 34.2 g/dL      RDW 13.0 %      RDW-SD 42.4 fl      MPV 11.1 fL      Platelets 202 10*3/mm3      Neutrophil % 88.2 (H) %      Lymphocyte % 2.4 (L) %      Monocyte % 8.7 %      Eosinophil % 0.0 (L) %      Basophil % 0.1 %      Immature Grans % 0.6 (H) %      Neutrophils, Absolute 16.42 (H) 10*3/mm3      Lymphocytes, Absolute 0.45 (L) 10*3/mm3      Monocytes, Absolute 1.61 (H) 10*3/mm3      Eosinophils, Absolute 0.00 10*3/mm3      Basophils, Absolute 0.01 10*3/mm3      Immature Grans, Absolute 0.12 (H) 10*3/mm3     POC Glucose Fingerstick [[id number]]  (Abnormal) Collected:  02/15/17 1249    Specimen:  Blood Updated:  02/15/17 1301     Glucose 134 (H) mg/dL     Narrative:       Meter: [id number] : [id number] Amrita Mary Carmen L.    aPTT [[id number]]  (Normal) Collected:  02/15/17 1229    Specimen:  Blood Updated:  02/15/17 1307     PTT 28.9 seconds     Protime-INR [[id number]]  (Normal) Collected:  02/15/17 1229    Specimen:  Blood Updated:  02/15/17 1307     Protime 13.7 Seconds      INR 1.09     Magnesium [[id number]]  (Normal) Collected:  02/15/17 1228    Specimen:  Blood Updated:  02/15/17 1313     Magnesium 2.0 mg/dL     Lipid Panel [[id number]]  (Abnormal)  Collected:  02/15/17 1228    Specimen:  Blood Updated:  02/15/17 1313     Total Cholesterol 202 (H) mg/dL      Triglycerides 161 (H) mg/dL      HDL Cholesterol 42 mg/dL      LDL Cholesterol  128 (H) mg/dL      VLDL Cholesterol 32.2 mg/dL      LDL/HDL Ratio 3.04     Narrative:       Cholesterol Reference Ranges  (U.S. Department of Health and Human Services ATP III Classifications)    Desirable          <200 mg/dL  Borderline High    200-239 mg/dL  High Risk          >240 mg/dL      Triglyceride Reference Ranges  (U.S. Department of Health and Human Services ATP III Classifications)    Normal           <150 mg/dL  Borderline High  150-199 mg/dL  High             200-499 mg/dL  Very High        >500 mg/dL    HDL Reference Ranges  (U.S. Department of Health and Human Services ATP III Classifcations)    Low     <40 mg/dl (major risk factor for CHD)  High    >60 mg/dl ('negative' risk factor for CHD)        LDL Reference Ranges  (U.S. Department of Health and Human Services ATP III Classifcations)    Optimal          <100 mg/dL  Near Optimal     100-129 mg/dL  Borderline High  130-159 mg/dL  High             160-189 mg/dL  Very High        >189 mg/dL    Basic Metabolic Panel [[id number]]  (Abnormal) Collected:  02/15/17 1228    Specimen:  Blood Updated:  02/15/17 1313     Glucose 138 (H) mg/dL      BUN 24 (H) mg/dL      Creatinine 1.00 mg/dL      Sodium 141 mmol/L      Potassium 4.2 mmol/L      Chloride 106 mmol/L      CO2 18.6 (L) mmol/L      Calcium 7.6 (L) mg/dL      eGFR Non African Amer 76 mL/min/1.73      BUN/Creatinine Ratio 24.0      Anion Gap 16.4 mmol/L     Narrative:       GFR Normal >60  Chronic Kidney Disease <60  Kidney Failure <15    Phosphorus [[id number]]  (Abnormal) Collected:  02/15/17 1228    Specimen:  Blood Updated:  02/15/17 1313     Phosphorus 4.8 (H) mg/dL     Calcium, Ionized [[id number]]  (Normal) Collected:  02/15/17 1229    Specimen:  Blood Updated:  02/15/17 1314     Ionized Calcium 1.17 mmol/L       Ionized Calcium 4.7 mg/dL     Lactic Acid, Plasma [[id number]]  (Abnormal) Collected:  02/15/17 1228    Specimen:  Blood Updated:  02/15/17 1321     Lactate 2.8 (C) mmol/L     POC Glucose Fingerstick [[id number]]  (Normal) Collected:  02/15/17 1420    Specimen:  Blood Updated:  02/15/17 1421     Glucose 123 mg/dL     Narrative:       Meter: [id number] : [id number] Amrita MCKEON    Hemoglobin A1c [[id number]]  (Normal) Collected:  02/15/17 1229    Specimen:  Blood Updated:  02/15/17 1427     Hemoglobin A1C 5.59 %     Narrative:       Hemoglobin A1C Ranges:    Increased Risk for Diabetes  5.7% to 6.4%  Diabetes                     >= 6.5%  Diabetic Goal                < 7.0%           Imaging:  Imaging Results (last 24 hours)     Procedure Component Value Units Date/Time    XR Chest 1 View [[id number]] Collected:  02/15/17 0444     Updated:  02/15/17 0444    Narrative:       X-RAY CHEST 1 VIEW.     HISTORY: Intubation.     COMPARISON: No prior studies for comparison.     FINDINGS:  Cardiomediastinal silhouette is within normal limits.         Diffuse opacity within the right hemithorax concerning for infiltrates,  no definite effusion.              Impression:       Extensive infiltrates in the right lung are suspected, clinical  correlation is recommended.           CT Head Without Contrast [[id number]] Collected:  02/15/17 0447     Updated:  02/15/17 0447    Narrative:       CT SCAN OF THE BRAIN WITHOUT CONTRAST.     TECHNIQUE: Multiple axial images of the brain were obtained from the  vertex to the base of the brain.     HISTORY:  Unresponsive, cardiac arrest.     COMPARISON:  No prior studies for comparison.     FINDINGS:   Midline structures are within normal limits, there is no hydrocephalus.  Gray-white matter differentiation is maintained.         Orbits are within normal limits. Mild mucosal disease of the paranasal  sinuses. Mastoid air cells are well aerated.             Impression:       No acute  intracranial pathology.                 XR Abdomen KUB [[id number]] Collected:  02/15/17 0702     Updated:  02/15/17 0708    Narrative:       EMERGENCY SUPINE ABDOMEN     HISTORY: 61-year-old male for oral gastric tube placement     FINDINGS:  1. Tip of the oral gastric tube is in good position at the distal  stomach.     This report was finalized on 2/15/2017 7:05 AM by Dr. Aidan Blackwell MD.       XR Chest Post CVA Port [[id number]] Collected:  02/15/17 0848     Updated:  02/15/17 0855    Narrative:       XR CHEST POST CVA PORT-     Clinical: Central line placement     COMPARISON 02/15/2017 at 0431 hours, current examination 0828 hours     FINDINGS: There is been interval placement of a nasogastric tube tip is  subdiaphragmatic out of the field-of-view. Endotracheal tube unchanged  in position.        Right IJ catheter tip superimposes the projected area of the superior  vena cava, no pneumothorax.        Progression of the right lung opacity with now perihilar consolidation.  Pneumonia versus asymmetric pulmonary edema. There is however no  associated pleural effusion identified. Cardiomediastinal silhouette is  stable. Cardiac size upper limits of normal. The remainder is  unremarkable.     This report was finalized on 2/15/2017 8:52 AM by Dr. Mick Montalvo MD.                Assessment and Plan:     Active Problems:    ST elevation myocardial infarction (STEMI)    This patient is status post cardiac arrest.  He has some degree of an anoxic encephalopathy I am certain, but at this point he is sedated and on a paralytic.  We will continue to give him aggressive support time and prayer he is being cooled and is being treated with IV  Cerebyx further recommendations will be made when I reassess him tomorrow.  I did perform an independent eyeball review of the CAT scan shows no acute abnormality of the brain  I will check additional diagnostic testing in the form of an EEG that I will independently eyeball review.     02/15/17  2:37 PM

## 2017-02-16 ENCOUNTER — APPOINTMENT (OUTPATIENT)
Dept: GENERAL RADIOLOGY | Facility: HOSPITAL | Age: 62
End: 2017-02-16
Attending: INTERNAL MEDICINE

## 2017-02-16 LAB
ANION GAP SERPL CALCULATED.3IONS-SCNC: 12.9 MMOL/L
ANION GAP SERPL CALCULATED.3IONS-SCNC: 13.5 MMOL/L
ANION GAP SERPL CALCULATED.3IONS-SCNC: 14.7 MMOL/L
ANION GAP SERPL CALCULATED.3IONS-SCNC: 16.3 MMOL/L
APTT PPP: 24.5 SECONDS (ref 22.7–35.4)
APTT PPP: 26.3 SECONDS (ref 22.7–35.4)
APTT PPP: 26.4 SECONDS (ref 22.7–35.4)
APTT PPP: 28 SECONDS (ref 22.7–35.4)
ARTERIAL PATENCY WRIST A: POSITIVE
ATMOSPHERIC PRESS: 749.5 MMHG
ATMOSPHERIC PRESS: 750.5 MMHG
ATMOSPHERIC PRESS: 750.9 MMHG
BACTERIA UR QL AUTO: ABNORMAL /HPF
BASE EXCESS BLDA CALC-SCNC: -0.2 MMOL/L (ref 0–2)
BASE EXCESS BLDA CALC-SCNC: -3.7 MMOL/L (ref 0–2)
BASE EXCESS BLDA CALC-SCNC: 0.5 MMOL/L (ref 0–2)
BASOPHILS # BLD AUTO: 0.01 10*3/MM3 (ref 0–0.2)
BASOPHILS NFR BLD AUTO: 0.1 % (ref 0–1.5)
BDY SITE: ABNORMAL
BILIRUB UR QL STRIP: NEGATIVE
BUN BLD-MCNC: 16 MG/DL (ref 8–23)
BUN BLD-MCNC: 16 MG/DL (ref 8–23)
BUN BLD-MCNC: 18 MG/DL (ref 8–23)
BUN BLD-MCNC: 20 MG/DL (ref 8–23)
BUN/CREAT SERPL: 16.3 (ref 7–25)
BUN/CREAT SERPL: 17.8 (ref 7–25)
BUN/CREAT SERPL: 22 (ref 7–25)
BUN/CREAT SERPL: 24.1 (ref 7–25)
CA-I BLD-MCNC: 4.6 MG/DL (ref 4.6–5.4)
CA-I BLD-MCNC: 4.6 MG/DL (ref 4.6–5.4)
CA-I BLD-MCNC: 4.8 MG/DL (ref 4.6–5.4)
CA-I BLD-MCNC: 4.8 MG/DL (ref 4.6–5.4)
CA-I SERPL ISE-MCNC: 1.14 MMOL/L (ref 1.1–1.35)
CA-I SERPL ISE-MCNC: 1.15 MMOL/L (ref 1.1–1.35)
CA-I SERPL ISE-MCNC: 1.19 MMOL/L (ref 1.1–1.35)
CA-I SERPL ISE-MCNC: 1.19 MMOL/L (ref 1.1–1.35)
CALCIUM SPEC-SCNC: 7.8 MG/DL (ref 8.6–10.5)
CALCIUM SPEC-SCNC: 8 MG/DL (ref 8.6–10.5)
CALCIUM SPEC-SCNC: 8.1 MG/DL (ref 8.6–10.5)
CALCIUM SPEC-SCNC: 8.2 MG/DL (ref 8.6–10.5)
CHLORIDE SERPL-SCNC: 105 MMOL/L (ref 98–107)
CHLORIDE SERPL-SCNC: 106 MMOL/L (ref 98–107)
CHLORIDE SERPL-SCNC: 106 MMOL/L (ref 98–107)
CHLORIDE SERPL-SCNC: 107 MMOL/L (ref 98–107)
CLARITY UR: ABNORMAL
CO2 SERPL-SCNC: 21.3 MMOL/L (ref 22–29)
CO2 SERPL-SCNC: 21.7 MMOL/L (ref 22–29)
CO2 SERPL-SCNC: 22.1 MMOL/L (ref 22–29)
CO2 SERPL-SCNC: 23.5 MMOL/L (ref 22–29)
COLOR UR: YELLOW
CREAT BLD-MCNC: 0.82 MG/DL (ref 0.76–1.27)
CREAT BLD-MCNC: 0.83 MG/DL (ref 0.76–1.27)
CREAT BLD-MCNC: 0.9 MG/DL (ref 0.76–1.27)
CREAT BLD-MCNC: 0.98 MG/DL (ref 0.76–1.27)
D-LACTATE SERPL-SCNC: 1.3 MMOL/L (ref 0.5–2)
D-LACTATE SERPL-SCNC: 1.5 MMOL/L (ref 0.5–2)
D-LACTATE SERPL-SCNC: 1.8 MMOL/L (ref 0.5–2)
D-LACTATE SERPL-SCNC: 1.9 MMOL/L (ref 0.5–2)
D-LACTATE SERPL-SCNC: 2.6 MMOL/L (ref 0.5–2)
DEPRECATED RDW RBC AUTO: 42.9 FL (ref 37–54)
DEPRECATED RDW RBC AUTO: 42.9 FL (ref 37–54)
DEPRECATED RDW RBC AUTO: 43.3 FL (ref 37–54)
DEPRECATED RDW RBC AUTO: 43.8 FL (ref 37–54)
EOSINOPHIL # BLD AUTO: 0.01 10*3/MM3 (ref 0–0.7)
EOSINOPHIL # BLD AUTO: 0.03 10*3/MM3 (ref 0–0.7)
EOSINOPHIL # BLD AUTO: 0.06 10*3/MM3 (ref 0–0.7)
EOSINOPHIL # BLD AUTO: 0.06 10*3/MM3 (ref 0–0.7)
EOSINOPHIL NFR BLD AUTO: 0.1 % (ref 0.3–6.2)
EOSINOPHIL NFR BLD AUTO: 0.3 % (ref 0.3–6.2)
EOSINOPHIL NFR BLD AUTO: 0.5 % (ref 0.3–6.2)
EOSINOPHIL NFR BLD AUTO: 0.5 % (ref 0.3–6.2)
ERYTHROCYTE [DISTWIDTH] IN BLOOD BY AUTOMATED COUNT: 13 % (ref 11.5–14.5)
ERYTHROCYTE [DISTWIDTH] IN BLOOD BY AUTOMATED COUNT: 13.1 % (ref 11.5–14.5)
ERYTHROCYTE [DISTWIDTH] IN BLOOD BY AUTOMATED COUNT: 13.2 % (ref 11.5–14.5)
ERYTHROCYTE [DISTWIDTH] IN BLOOD BY AUTOMATED COUNT: 13.3 % (ref 11.5–14.5)
GFR SERPL CREATININE-BSD FRML MDRD: 78 ML/MIN/1.73
GFR SERPL CREATININE-BSD FRML MDRD: 86 ML/MIN/1.73
GFR SERPL CREATININE-BSD FRML MDRD: 95 ML/MIN/1.73
GFR SERPL CREATININE-BSD FRML MDRD: 96 ML/MIN/1.73
GLUCOSE BLD-MCNC: 110 MG/DL (ref 65–99)
GLUCOSE BLD-MCNC: 115 MG/DL (ref 65–99)
GLUCOSE BLD-MCNC: 118 MG/DL (ref 65–99)
GLUCOSE BLD-MCNC: 119 MG/DL (ref 65–99)
GLUCOSE BLDC GLUCOMTR-MCNC: 104 MG/DL (ref 70–130)
GLUCOSE BLDC GLUCOMTR-MCNC: 113 MG/DL (ref 70–130)
GLUCOSE BLDC GLUCOMTR-MCNC: 117 MG/DL (ref 70–130)
GLUCOSE BLDC GLUCOMTR-MCNC: 121 MG/DL (ref 70–130)
GLUCOSE BLDC GLUCOMTR-MCNC: 78 MG/DL (ref 70–130)
GLUCOSE BLDC GLUCOMTR-MCNC: 81 MG/DL (ref 70–130)
GLUCOSE BLDC GLUCOMTR-MCNC: 84 MG/DL (ref 70–130)
GLUCOSE BLDC GLUCOMTR-MCNC: 93 MG/DL (ref 70–130)
GLUCOSE BLDC GLUCOMTR-MCNC: 94 MG/DL (ref 70–130)
GLUCOSE UR STRIP-MCNC: NEGATIVE MG/DL
HCO3 BLDA-SCNC: 21.6 MMOL/L (ref 22–28)
HCO3 BLDA-SCNC: 24.4 MMOL/L (ref 22–28)
HCO3 BLDA-SCNC: 25.3 MMOL/L (ref 22–28)
HCT VFR BLD AUTO: 40.7 % (ref 40.4–52.2)
HCT VFR BLD AUTO: 41.3 % (ref 40.4–52.2)
HCT VFR BLD AUTO: 42.3 % (ref 40.4–52.2)
HCT VFR BLD AUTO: 43 % (ref 40.4–52.2)
HGB BLD-MCNC: 13.5 G/DL (ref 13.7–17.6)
HGB BLD-MCNC: 13.9 G/DL (ref 13.7–17.6)
HGB BLD-MCNC: 14.3 G/DL (ref 13.7–17.6)
HGB BLD-MCNC: 14.5 G/DL (ref 13.7–17.6)
HGB UR QL STRIP.AUTO: ABNORMAL
HOROWITZ INDEX BLD+IHG-RTO: 30 %
HOROWITZ INDEX BLD+IHG-RTO: 40 %
HOROWITZ INDEX BLD+IHG-RTO: 40 %
HYALINE CASTS UR QL AUTO: ABNORMAL /LPF
IMM GRANULOCYTES # BLD: 0.03 10*3/MM3 (ref 0–0.03)
IMM GRANULOCYTES # BLD: 0.03 10*3/MM3 (ref 0–0.03)
IMM GRANULOCYTES # BLD: 0.04 10*3/MM3 (ref 0–0.03)
IMM GRANULOCYTES # BLD: 0.08 10*3/MM3 (ref 0–0.03)
IMM GRANULOCYTES NFR BLD: 0.2 % (ref 0–0.5)
IMM GRANULOCYTES NFR BLD: 0.3 % (ref 0–0.5)
IMM GRANULOCYTES NFR BLD: 0.3 % (ref 0–0.5)
IMM GRANULOCYTES NFR BLD: 0.5 % (ref 0–0.5)
INR PPP: 0.99 (ref 0.9–1.1)
INR PPP: 1 (ref 0.9–1.1)
INR PPP: 1.02 (ref 0.9–1.1)
INR PPP: 1.05 (ref 0.9–1.1)
KETONES UR QL STRIP: ABNORMAL
LEUKOCYTE ESTERASE UR QL STRIP.AUTO: NEGATIVE
LYMPHOCYTES # BLD AUTO: 0.51 10*3/MM3 (ref 0.9–4.8)
LYMPHOCYTES # BLD AUTO: 0.66 10*3/MM3 (ref 0.9–4.8)
LYMPHOCYTES # BLD AUTO: 0.86 10*3/MM3 (ref 0.9–4.8)
LYMPHOCYTES # BLD AUTO: 1.21 10*3/MM3 (ref 0.9–4.8)
LYMPHOCYTES NFR BLD AUTO: 10.7 % (ref 19.6–45.3)
LYMPHOCYTES NFR BLD AUTO: 3.9 % (ref 19.6–45.3)
LYMPHOCYTES NFR BLD AUTO: 3.9 % (ref 19.6–45.3)
LYMPHOCYTES NFR BLD AUTO: 7.4 % (ref 19.6–45.3)
MAGNESIUM SERPL-MCNC: 1.8 MG/DL (ref 1.6–2.4)
MAGNESIUM SERPL-MCNC: 1.9 MG/DL (ref 1.6–2.4)
MAGNESIUM SERPL-MCNC: 1.9 MG/DL (ref 1.6–2.4)
MAGNESIUM SERPL-MCNC: 2 MG/DL (ref 1.6–2.4)
MCH RBC QN AUTO: 30.1 PG (ref 27–32.7)
MCH RBC QN AUTO: 30.3 PG (ref 27–32.7)
MCH RBC QN AUTO: 30.5 PG (ref 27–32.7)
MCH RBC QN AUTO: 30.7 PG (ref 27–32.7)
MCHC RBC AUTO-ENTMCNC: 33.2 G/DL (ref 32.6–36.4)
MCHC RBC AUTO-ENTMCNC: 33.7 G/DL (ref 32.6–36.4)
MCHC RBC AUTO-ENTMCNC: 33.7 G/DL (ref 32.6–36.4)
MCHC RBC AUTO-ENTMCNC: 33.8 G/DL (ref 32.6–36.4)
MCV RBC AUTO: 90 FL (ref 79.8–96.2)
MCV RBC AUTO: 90.2 FL (ref 79.8–96.2)
MCV RBC AUTO: 90.8 FL (ref 79.8–96.2)
MCV RBC AUTO: 90.9 FL (ref 79.8–96.2)
MODALITY: ABNORMAL
MONOCYTES # BLD AUTO: 1.24 10*3/MM3 (ref 0.2–1.2)
MONOCYTES # BLD AUTO: 1.46 10*3/MM3 (ref 0.2–1.2)
MONOCYTES # BLD AUTO: 1.47 10*3/MM3 (ref 0.2–1.2)
MONOCYTES # BLD AUTO: 1.77 10*3/MM3 (ref 0.2–1.2)
MONOCYTES NFR BLD AUTO: 10.5 % (ref 5–12)
MONOCYTES NFR BLD AUTO: 10.7 % (ref 5–12)
MONOCYTES NFR BLD AUTO: 11.2 % (ref 5–12)
MONOCYTES NFR BLD AUTO: 13 % (ref 5–12)
NEUTROPHILS # BLD AUTO: 10.91 10*3/MM3 (ref 1.9–8.1)
NEUTROPHILS # BLD AUTO: 14.36 10*3/MM3 (ref 1.9–8.1)
NEUTROPHILS # BLD AUTO: 8.52 10*3/MM3 (ref 1.9–8.1)
NEUTROPHILS # BLD AUTO: 9.39 10*3/MM3 (ref 1.9–8.1)
NEUTROPHILS NFR BLD AUTO: 75.4 % (ref 42.7–76)
NEUTROPHILS NFR BLD AUTO: 81.2 % (ref 42.7–76)
NEUTROPHILS NFR BLD AUTO: 84.1 % (ref 42.7–76)
NEUTROPHILS NFR BLD AUTO: 84.9 % (ref 42.7–76)
NITRITE UR QL STRIP: NEGATIVE
O2 A-A PPRESDIFF RESPIRATORY: 0.4 MMHG
PCO2 BLDA: 36.3 MM HG (ref 35–45)
PCO2 BLDA: 39 MM HG (ref 35–45)
PCO2 BLDA: 43.6 MM HG (ref 35–45)
PEEP RESPIRATORY: 5 CM[H2O]
PH BLDA: 7.35 PH UNITS (ref 7.35–7.45)
PH BLDA: 7.37 PH UNITS (ref 7.35–7.45)
PH BLDA: 7.43 PH UNITS (ref 7.35–7.45)
PH UR STRIP.AUTO: 5.5 [PH] (ref 5–8)
PHOSPHATE SERPL-MCNC: 1.2 MG/DL (ref 2.5–4.5)
PHOSPHATE SERPL-MCNC: 2.4 MG/DL (ref 2.5–4.5)
PHOSPHATE SERPL-MCNC: 2.5 MG/DL (ref 2.5–4.5)
PHOSPHATE SERPL-MCNC: 3 MG/DL (ref 2.5–4.5)
PLATELET # BLD AUTO: 153 10*3/MM3 (ref 140–500)
PLATELET # BLD AUTO: 162 10*3/MM3 (ref 140–500)
PLATELET # BLD AUTO: 163 10*3/MM3 (ref 140–500)
PLATELET # BLD AUTO: 171 10*3/MM3 (ref 140–500)
PMV BLD AUTO: 10.9 FL (ref 6–12)
PMV BLD AUTO: 10.9 FL (ref 6–12)
PMV BLD AUTO: 11.2 FL (ref 6–12)
PMV BLD AUTO: 11.3 FL (ref 6–12)
PO2 BLDA: 100.9 MM HG (ref 80–100)
PO2 BLDA: 66.5 MM HG (ref 80–100)
PO2 BLDA: 94.7 MM HG (ref 80–100)
POTASSIUM BLD-SCNC: 3.3 MMOL/L (ref 3.5–5.2)
POTASSIUM BLD-SCNC: 3.8 MMOL/L (ref 3.5–5.2)
POTASSIUM BLD-SCNC: 3.9 MMOL/L (ref 3.5–5.2)
POTASSIUM BLD-SCNC: 4.3 MMOL/L (ref 3.5–5.2)
PROT UR QL STRIP: NEGATIVE
PROTHROMBIN TIME: 12.7 SECONDS (ref 11.7–14.2)
PROTHROMBIN TIME: 12.8 SECONDS (ref 11.7–14.2)
PROTHROMBIN TIME: 13 SECONDS (ref 11.7–14.2)
PROTHROMBIN TIME: 13.3 SECONDS (ref 11.7–14.2)
PSV: 8 CMH2O
RBC # BLD AUTO: 4.48 10*6/MM3 (ref 4.6–6)
RBC # BLD AUTO: 4.59 10*6/MM3 (ref 4.6–6)
RBC # BLD AUTO: 4.69 10*6/MM3 (ref 4.6–6)
RBC # BLD AUTO: 4.73 10*6/MM3 (ref 4.6–6)
RBC # UR: ABNORMAL /HPF
REF LAB TEST METHOD: ABNORMAL
SAO2 % BLDCOA: 93.6 % (ref 92–99)
SAO2 % BLDCOA: 97 % (ref 92–99)
SAO2 % BLDCOA: 97.6 % (ref 92–99)
SET MECH RESP RATE: 14
SET MECH RESP RATE: 20
SET MECH RESP RATE: 8
SODIUM BLD-SCNC: 142 MMOL/L (ref 136–145)
SODIUM BLD-SCNC: 144 MMOL/L (ref 136–145)
SP GR UR STRIP: 1.02 (ref 1–1.03)
SQUAMOUS #/AREA URNS HPF: ABNORMAL /HPF
TOTAL RATE: 14 BREATHS/MINUTE
TOTAL RATE: 20 BREATHS/MINUTE
TOTAL RATE: 9 BREATHS/MINUTE
TROPONIN T SERPL-MCNC: 1.9 NG/ML (ref 0–0.03)
TROPONIN T SERPL-MCNC: 2.32 NG/ML (ref 0–0.03)
TSH SERPL DL<=0.05 MIU/L-ACNC: 0.28 MIU/ML (ref 0.27–4.2)
UROBILINOGEN UR QL STRIP: ABNORMAL
VENTILATOR MODE: ABNORMAL
VT ON VENT VENT: 523 ML
VT ON VENT VENT: 772 ML
VT ON VENT VENT: 775 ML
WBC NRBC COR # BLD: 11.3 10*3/MM3 (ref 4.5–10.7)
WBC NRBC COR # BLD: 11.57 10*3/MM3 (ref 4.5–10.7)
WBC NRBC COR # BLD: 12.98 10*3/MM3 (ref 4.5–10.7)
WBC NRBC COR # BLD: 16.89 10*3/MM3 (ref 4.5–10.7)
WBC UR QL AUTO: ABNORMAL /HPF

## 2017-02-16 PROCEDURE — 84484 ASSAY OF TROPONIN QUANT: CPT | Performed by: INTERNAL MEDICINE

## 2017-02-16 PROCEDURE — 82330 ASSAY OF CALCIUM: CPT | Performed by: INTERNAL MEDICINE

## 2017-02-16 PROCEDURE — 93010 ELECTROCARDIOGRAM REPORT: CPT | Performed by: INTERNAL MEDICINE

## 2017-02-16 PROCEDURE — 25010000002 PROPOFOL 1000 MG/ML EMULSION: Performed by: EMERGENCY MEDICINE

## 2017-02-16 PROCEDURE — 94799 UNLISTED PULMONARY SVC/PX: CPT

## 2017-02-16 PROCEDURE — 99232 SBSQ HOSP IP/OBS MODERATE 35: CPT | Performed by: INTERNAL MEDICINE

## 2017-02-16 PROCEDURE — 71010 HC CHEST PA OR AP: CPT

## 2017-02-16 PROCEDURE — 25010000002 FUROSEMIDE PER 20 MG: Performed by: INTERNAL MEDICINE

## 2017-02-16 PROCEDURE — 82803 BLOOD GASES ANY COMBINATION: CPT

## 2017-02-16 PROCEDURE — 83605 ASSAY OF LACTIC ACID: CPT | Performed by: INTERNAL MEDICINE

## 2017-02-16 PROCEDURE — 82962 GLUCOSE BLOOD TEST: CPT

## 2017-02-16 PROCEDURE — 87040 BLOOD CULTURE FOR BACTERIA: CPT | Performed by: INTERNAL MEDICINE

## 2017-02-16 PROCEDURE — 83735 ASSAY OF MAGNESIUM: CPT | Performed by: INTERNAL MEDICINE

## 2017-02-16 PROCEDURE — 80048 BASIC METABOLIC PNL TOTAL CA: CPT | Performed by: INTERNAL MEDICINE

## 2017-02-16 PROCEDURE — 94003 VENT MGMT INPAT SUBQ DAY: CPT

## 2017-02-16 PROCEDURE — 81001 URINALYSIS AUTO W/SCOPE: CPT | Performed by: INTERNAL MEDICINE

## 2017-02-16 PROCEDURE — 85730 THROMBOPLASTIN TIME PARTIAL: CPT | Performed by: INTERNAL MEDICINE

## 2017-02-16 PROCEDURE — 25010000002 AMIODARONE IN DEXTROSE 5% 360 MG/200ML SOLUTION: Performed by: EMERGENCY MEDICINE

## 2017-02-16 PROCEDURE — 25010000003 POTASSIUM CHLORIDE PER 2 MEQ: Performed by: INTERNAL MEDICINE

## 2017-02-16 PROCEDURE — 93005 ELECTROCARDIOGRAM TRACING: CPT | Performed by: INTERNAL MEDICINE

## 2017-02-16 PROCEDURE — 99231 SBSQ HOSP IP/OBS SF/LOW 25: CPT | Performed by: PSYCHIATRY & NEUROLOGY

## 2017-02-16 PROCEDURE — 85025 COMPLETE CBC W/AUTO DIFF WBC: CPT | Performed by: INTERNAL MEDICINE

## 2017-02-16 PROCEDURE — 84484 ASSAY OF TROPONIN QUANT: CPT | Performed by: NURSE PRACTITIONER

## 2017-02-16 PROCEDURE — 25010000002 MORPHINE PER 10 MG: Performed by: INTERNAL MEDICINE

## 2017-02-16 PROCEDURE — 84443 ASSAY THYROID STIM HORMONE: CPT | Performed by: HOSPITALIST

## 2017-02-16 PROCEDURE — 84100 ASSAY OF PHOSPHORUS: CPT | Performed by: INTERNAL MEDICINE

## 2017-02-16 PROCEDURE — 85610 PROTHROMBIN TIME: CPT | Performed by: INTERNAL MEDICINE

## 2017-02-16 PROCEDURE — 36600 WITHDRAWAL OF ARTERIAL BLOOD: CPT

## 2017-02-16 RX ORDER — DEXMEDETOMIDINE HYDROCHLORIDE 4 UG/ML
.2-1.5 INJECTION, SOLUTION INTRAVENOUS CONTINUOUS
Status: DISCONTINUED | OUTPATIENT
Start: 2017-02-16 | End: 2017-02-16 | Stop reason: SDUPTHER

## 2017-02-16 RX ORDER — FUROSEMIDE 10 MG/ML
40 INJECTION INTRAMUSCULAR; INTRAVENOUS ONCE
Status: COMPLETED | OUTPATIENT
Start: 2017-02-16 | End: 2017-02-16

## 2017-02-16 RX ORDER — ALPRAZOLAM 0.25 MG/1
0.25 TABLET ORAL EVERY 6 HOURS PRN
Status: DISCONTINUED | OUTPATIENT
Start: 2017-02-16 | End: 2017-02-21 | Stop reason: HOSPADM

## 2017-02-16 RX ORDER — POTASSIUM CHLORIDE 29.8 MG/ML
20 INJECTION INTRAVENOUS ONCE
Status: COMPLETED | OUTPATIENT
Start: 2017-02-16 | End: 2017-02-16

## 2017-02-16 RX ORDER — NICOTINE POLACRILEX 4 MG
15 LOZENGE BUCCAL
Status: DISCONTINUED | OUTPATIENT
Start: 2017-02-16 | End: 2017-02-21 | Stop reason: HOSPADM

## 2017-02-16 RX ORDER — CARVEDILOL 3.12 MG/1
3.12 TABLET ORAL EVERY 12 HOURS SCHEDULED
Status: DISCONTINUED | OUTPATIENT
Start: 2017-02-16 | End: 2017-02-20

## 2017-02-16 RX ORDER — DEXTROSE MONOHYDRATE 25 G/50ML
25 INJECTION, SOLUTION INTRAVENOUS
Status: DISCONTINUED | OUTPATIENT
Start: 2017-02-16 | End: 2017-02-21 | Stop reason: HOSPADM

## 2017-02-16 RX ORDER — AMIODARONE HYDROCHLORIDE 200 MG/1
200 TABLET ORAL
Status: DISCONTINUED | OUTPATIENT
Start: 2017-02-24 | End: 2017-02-16

## 2017-02-16 RX ORDER — AMIODARONE HYDROCHLORIDE 200 MG/1
400 TABLET ORAL EVERY 12 HOURS SCHEDULED
Status: DISCONTINUED | OUTPATIENT
Start: 2017-02-16 | End: 2017-02-20

## 2017-02-16 RX ORDER — DEXMEDETOMIDINE HYDROCHLORIDE 4 UG/ML
.2-1.5 INJECTION, SOLUTION INTRAVENOUS
Status: DISCONTINUED | OUTPATIENT
Start: 2017-02-16 | End: 2017-02-17

## 2017-02-16 RX ORDER — CLINDAMYCIN PHOSPHATE 300 MG/50ML
300 INJECTION INTRAVENOUS EVERY 8 HOURS
Status: DISCONTINUED | OUTPATIENT
Start: 2017-02-16 | End: 2017-02-18

## 2017-02-16 RX ADMIN — ALPRAZOLAM 0.25 MG: 0.25 TABLET ORAL at 23:56

## 2017-02-16 RX ADMIN — SODIUM CHLORIDE 100 ML/HR: 9 INJECTION, SOLUTION INTRAVENOUS at 21:12

## 2017-02-16 RX ADMIN — FUROSEMIDE 40 MG: 10 INJECTION, SOLUTION INTRAMUSCULAR; INTRAVENOUS at 02:48

## 2017-02-16 RX ADMIN — CARVEDILOL 3.12 MG: 3.12 TABLET, FILM COATED ORAL at 20:25

## 2017-02-16 RX ADMIN — TICAGRELOR 90 MG: 90 TABLET ORAL at 18:21

## 2017-02-16 RX ADMIN — AMIODARONE HYDROCHLORIDE 400 MG: 200 TABLET ORAL at 20:25

## 2017-02-16 RX ADMIN — PROPOFOL 40 MCG/KG/MIN: 10 INJECTION, EMULSION INTRAVENOUS at 00:18

## 2017-02-16 RX ADMIN — PROPOFOL 35 MCG/KG/MIN: 10 INJECTION, EMULSION INTRAVENOUS at 13:19

## 2017-02-16 RX ADMIN — MINERAL OIL, PETROLATUM: 425; 568 OINTMENT OPHTHALMIC at 12:13

## 2017-02-16 RX ADMIN — CARVEDILOL 3.12 MG: 3.12 TABLET, FILM COATED ORAL at 10:40

## 2017-02-16 RX ADMIN — MINERAL OIL, PETROLATUM: 425; 568 OINTMENT OPHTHALMIC at 21:30

## 2017-02-16 RX ADMIN — AMIODARONE HYDROCHLORIDE 0.5 MG/MIN: 1.8 INJECTION, SOLUTION INTRAVENOUS at 01:08

## 2017-02-16 RX ADMIN — DEXMEDETOMIDINE HYDROCHLORIDE 1.2 MCG/KG/HR: 4 INJECTION, SOLUTION INTRAVENOUS at 14:58

## 2017-02-16 RX ADMIN — MINERAL OIL, PETROLATUM: 425; 568 OINTMENT OPHTHALMIC at 05:10

## 2017-02-16 RX ADMIN — MINERAL OIL, PETROLATUM: 425; 568 OINTMENT OPHTHALMIC at 16:20

## 2017-02-16 RX ADMIN — SODIUM CHLORIDE 100 ML/HR: 9 INJECTION, SOLUTION INTRAVENOUS at 00:16

## 2017-02-16 RX ADMIN — TICAGRELOR 90 MG: 90 TABLET ORAL at 08:03

## 2017-02-16 RX ADMIN — ASPIRIN 81 MG: 81 TABLET, CHEWABLE ORAL at 08:03

## 2017-02-16 RX ADMIN — ACETAMINOPHEN 650 MG: 325 TABLET ORAL at 22:01

## 2017-02-16 RX ADMIN — PANTOPRAZOLE SODIUM 40 MG: 40 INJECTION, POWDER, FOR SOLUTION INTRAVENOUS at 06:06

## 2017-02-16 RX ADMIN — PROPOFOL 45 MCG/KG/MIN: 10 INJECTION, EMULSION INTRAVENOUS at 07:57

## 2017-02-16 RX ADMIN — CLINDAMYCIN IN 5 PERCENT DEXTROSE 300 MG: 6 INJECTION, SOLUTION INTRAVENOUS at 20:25

## 2017-02-16 RX ADMIN — MINERAL OIL, PETROLATUM: 425; 568 OINTMENT OPHTHALMIC at 08:03

## 2017-02-16 RX ADMIN — MORPHINE SULFATE 3 MG: 4 INJECTION, SOLUTION INTRAMUSCULAR; INTRAVENOUS at 23:56

## 2017-02-16 RX ADMIN — SODIUM CHLORIDE 0.5 MCG/KG/MIN: 9 INJECTION, SOLUTION INTRAVENOUS at 01:33

## 2017-02-16 RX ADMIN — PROPOFOL 40 MCG/KG/MIN: 10 INJECTION, EMULSION INTRAVENOUS at 04:06

## 2017-02-16 RX ADMIN — POTASSIUM CHLORIDE 20 MEQ: 400 INJECTION, SOLUTION INTRAVENOUS at 09:53

## 2017-02-16 RX ADMIN — AMIODARONE HYDROCHLORIDE 400 MG: 200 TABLET ORAL at 10:40

## 2017-02-16 NOTE — PLAN OF CARE
Problem: Patient Care Overview (Adult)  Goal: Plan of Care Review  Outcome: Ongoing (interventions implemented as appropriate)    02/16/17 1586   Outcome Evaluation   Outcome Summary/Follow up Plan rewarmed and extubated. Cont to monitor temp and labs. u/o decreased slightly.        Goal: Adult Individualization and Mutuality  Outcome: Ongoing (interventions implemented as appropriate)  Goal: Discharge Needs Assessment  Outcome: Ongoing (interventions implemented as appropriate)    Problem: Fall Risk (Adult)  Goal: Identify Related Risk Factors and Signs and Symptoms  Outcome: Ongoing (interventions implemented as appropriate)  Goal: Absence of Falls  Outcome: Ongoing (interventions implemented as appropriate)    Problem: Airway, Artificial (Adult)  Goal: Signs and Symptoms of Listed Potential Problems Will be Absent or Manageable (Airway, Artificial)  Outcome: Outcome(s) achieved Date Met:  02/16/17    Problem: Skin Integrity Impairment, Risk/Actual (Adult)  Goal: Identify Related Risk Factors and Signs and Symptoms  Outcome: Ongoing (interventions implemented as appropriate)  Goal: Skin Integrity/Wound Healing  Outcome: Ongoing (interventions implemented as appropriate)    Problem: Cardiac Catheterization with/without PCI (Adult)  Goal: Signs and Symptoms of Listed Potential Problems Will be Absent or Manageable (Cardiac Catheterization with/without PCI)  Outcome: Ongoing (interventions implemented as appropriate)    Problem: SAFETY - NON-VIOLENT RESTRAINT  Goal: Remains free of injury from restraints (Non-Violent Restraint)  Outcome: Outcome(s) achieved Date Met:  02/16/17  Goal: Free from restraint(s) (Non-Violent Restraint)  Outcome: Outcome(s) achieved Date Met:  02/16/17

## 2017-02-16 NOTE — PROGRESS NOTES
Discharge Planning Assessment  [facility]     Patient Name: Sander White  MRN: [id number]  Today's Date: 2/16/2017    Admit Date: 2/15/2017          Discharge Needs Assessment       02/16/17 1630    Living Environment    Lives With spouse    Living Arrangements house    Provides Primary Care For no one    Quality Of Family Relationships supportive    Able to Return to Prior Living Arrangements yes    Discharge Needs Assessment    Concerns To Be Addressed discharge planning concerns    Concerns Comments undetermined    Readmission Within The Last 30 Days no previous admission in last 30 days    Anticipated Changes Related to Illness inability to care for self    Equipment Currently Used at Home none    Equipment Needed After Discharge --   undetermined    Transportation Available family or friend will provide    Current Discharge Risk dependent with mobility/activities of daily living    Discharge Disposition still a patient            Discharge Plan       02/16/17 1632    Case Management/Social Work Plan    Plan Undetermined    Patient/Family In Agreement With Plan yes    Additional Comments Spoke with pt and wife at bedside.  Face sheet verified.  IMM received.  Introduced self and explained role.  CCP contact information provided.  Pt denies any prior use of HH or SNF.  CCP will continue to follow.        Discharge Placement     No information found                Demographic Summary       02/16/17 1630    Referral Information    Admission Type inpatient    Arrived From home or self-care    Primary Care Physician Information    Name Dr. Husam Gomez            Functional Status       02/16/17 1630    Functional Status Prior    Ambulation 0-->independent    Transferring 0-->independent    Toileting 0-->independent    Bathing 0-->independent    Dressing 0-->independent    Eating 0-->independent    Communication 0-->understands/communicates without difficulty    Swallowing 0-->swallows foods/liquids without  difficulty    IADL    Medications independent    Meal Preparation independent    Housekeeping independent    Laundry independent    Shopping independent    Oral Care independent            Psychosocial     None            Abuse/Neglect     None            Legal     None            Substance Abuse     None            Patient Forms     None          Alexia Franco RN

## 2017-02-16 NOTE — PLAN OF CARE
Problem: Patient Care Overview (Adult)  Goal: Plan of Care Review  Outcome: Ongoing (interventions implemented as appropriate)    02/16/17 4098   Outcome Evaluation   Outcome Summary/Follow up Plan Target temperature management continued. Plan to begin rewarming at 0600 per protocol. Pt had low urine output for several hours througout the night, MD made aware and one time dose of lasix given. Pain and shivering controlled with sedation/analgesic and paralytic. Labs monitored Q6Hr. Wife at bedside, updated on pt status.    Coping/Psychosocial Response Interventions   Plan Of Care Reviewed With spouse   Patient Care Overview   Progress no change       Goal: Adult Individualization and Mutuality  Outcome: Ongoing (interventions implemented as appropriate)  Goal: Discharge Needs Assessment  Outcome: Ongoing (interventions implemented as appropriate)    Problem: Fall Risk (Adult)  Goal: Identify Related Risk Factors and Signs and Symptoms  Outcome: Ongoing (interventions implemented as appropriate)  Goal: Absence of Falls  Outcome: Ongoing (interventions implemented as appropriate)    Problem: Airway, Artificial (Adult)  Goal: Signs and Symptoms of Listed Potential Problems Will be Absent or Manageable (Airway, Artificial)  Outcome: Ongoing (interventions implemented as appropriate)    Problem: Skin Integrity Impairment, Risk/Actual (Adult)  Goal: Identify Related Risk Factors and Signs and Symptoms  Outcome: Ongoing (interventions implemented as appropriate)  Goal: Skin Integrity/Wound Healing  Outcome: Ongoing (interventions implemented as appropriate)    Problem: Cardiac Catheterization with/without PCI (Adult)  Goal: Signs and Symptoms of Listed Potential Problems Will be Absent or Manageable (Cardiac Catheterization with/without PCI)  Outcome: Ongoing (interventions implemented as appropriate)

## 2017-02-16 NOTE — PROGRESS NOTES
Patient Identification:  NAME:  Sander White  Age:  61 y.o.   Sex:  male   :  1955   MRN:  [id number]       Chief complaint: Cardiac arrest and anoxic encephalopathy    History of present illness:  Patient is being warmed up he still on a ventilator still getting a paralytic his EEG shows a moderately well regulated rhythm in the 8 Hz range bilaterally he is not having seizure activity      Past medical history:  Past Medical History   Diagnosis Date   • Arthritis    • Chronic pain in right foot    • ED (erectile dysfunction) 7/15/2016   • GERD (gastroesophageal reflux disease)    • Hypertension        Allergies:  Amoxicillin and Penicillins    Home medications:  Prescriptions Prior to Admission   Medication Sig Dispense Refill Last Dose   • azithromycin (ZITHROMAX Z-KENDRICK) 250 MG tablet Take 1 tablet by mouth Daily. Take 2 tablets the first day, then 1 tablet daily for 4 days. 6 tablet 0    • fluticasone (FLONASE) 50 MCG/ACT nasal spray 2 sprays into each nostril Daily for 30 days. Administer 2 sprays in each nostril for each dose. 9.9 mL 1    • lisinopril (ZESTRIL) 2.5 MG tablet Take 1 tablet by mouth daily. 90 tablet 3 Taking   • Omega-3 Fatty Acids (OMEGA 3 PO) Take 1 capsule by mouth daily.   Taking   • Pyridoxine HCl (VITAMIN B-6 PO) Take 1 tablet by mouth daily.   Taking   • sildenafil (VIAGRA) 100 MG tablet Take 1 tablet by mouth Daily As Needed for erectile dysfunction. 10 tablet 2    • vitamin B-12 (CYANOCOBALAMIN) 1000 MCG tablet Take 1,000 mcg by mouth daily.   Taking   • vitamin C (ASCORBIC ACID) 500 MG tablet Take 500 mg by mouth daily.   Taking        Hospital medications:    amiodarone 400 mg Oral Q12H   artificial tears  Both Eyes Q4H   aspirin 81 mg Oral Daily   carvedilol 3.125 mg Oral Q12H   lisinopril 2.5 mg Oral Daily   pantoprazole 40 mg Intravenous Q AM   ticagrelor 90 mg Oral BID       cisatracurium (NIMBEX) infusion 0.5 mcg/kg/min (Ideal) Last Rate: 0.5 mcg/kg/min (17 0930)    fentaNYL (SUBLIMAZE) infusion 5 mcg/ml 250 mL 25-50 mcg/hr Last Rate: 50 mcg/hr (02/16/17 0622)   propofol 5-50 mcg/kg/min Last Rate: 35 mcg/kg/min (02/16/17 1319)   sodium chloride 100 mL/hr Last Rate: 100 mL/hr (02/16/17 0016)     •  acetaminophen **OR** acetaminophen  •  dextrose  •  dextrose  •  glucagon (human recombinant)  •  influenza vaccine  •  sodium chloride  •  sodium phosphate IVPB      Objective:  Vitals Ranges:   Heart Rate:  [50-78] 68  Resp:  [10-20] 11  BP: ()/(68-95) 105/83  Arterial Line BP: (106-126)/(69-82) 107/69  FiO2 (%):  [40 %] 40 %      Physical Exam:  Patient is still sedated and receives a paralytic still.  He does have pupils that react slightly he does have slight trace reflexes at the knees toes are mute bilaterally no pain was given    Results review:   I reviewed the patient's new clinical results.    Data review:  Lab Results (last 24 hours)     Procedure Component Value Units Date/Time    POC Glucose Fingerstick [[id number]]  (Normal) Collected:  02/15/17 1420    Specimen:  Blood Updated:  02/15/17 1421     Glucose 123 mg/dL     Narrative:       Meter: [id number] : [id number] Amrita MCKEON    Hemoglobin A1c [[id number]]  (Normal) Collected:  02/15/17 1229    Specimen:  Blood Updated:  02/15/17 1427     Hemoglobin A1C 5.59 %     Narrative:       Hemoglobin A1C Ranges:    Increased Risk for Diabetes  5.7% to 6.4%  Diabetes                     >= 6.5%  Diabetic Goal                < 7.0%    POC Glucose Fingerstick [[id number]]  (Normal) Collected:  02/15/17 1618    Specimen:  Blood Updated:  02/15/17 1635     Glucose 120 mg/dL     Narrative:       Meter: [id number] : [id number] Amrita MCKEON    Blood Gas, Arterial [[id number]]  (Abnormal) Collected:  02/15/17 1742    Specimen:  Arterial Blood Updated:  02/15/17 1744     Site Arterial Line      Agapito's Test N/A      pH, Arterial 7.356 pH units      pCO2, Arterial 39.0 mm Hg      pO2, Arterial 122.3 (H) mm Hg       HCO3, Arterial 21.9 (L) mmol/L      Base Excess, Arterial -3.3 (L) mmol/L      O2 Saturation Calculated 98.6 %      A-a Gradiant 0.5 mmHg      Barometric Pressure for Blood Gas 749.5 mmHg      Modality Adult Vent      FIO2 40 %      Ventilator Mode PC      Set Tidal Volume 489      Set Mech Resp Rate 20      Rate 20 Breaths/minute      PEEP 5     Narrative:       o2 sat 99 insp press 14 Meter: [id number] : [id number] Nico Saldaña    CBC & Differential [[id number]] Collected:  02/15/17 1742    Specimen:  Blood Updated:  02/15/17 1755    Narrative:       The following orders were created for panel order CBC & Differential.  Procedure                               Abnormality         Status                     ---------                               -----------         ------                     CBC Auto Differential[[id number]]         Abnormal            Final result                 Please view results for these tests on the individual orders.    CBC Auto Differential [[id number]]  (Abnormal) Collected:  02/15/17 1742    Specimen:  Blood Updated:  02/15/17 1755     WBC 12.90 (H) 10*3/mm3      RBC 4.97 10*6/mm3      Hemoglobin 15.3 g/dL      Hematocrit 44.5 %      MCV 89.5 fL      MCH 30.8 pg      MCHC 34.4 g/dL      RDW 12.9 %      RDW-SD 41.7 fl      MPV 11.2 fL      Platelets 175 10*3/mm3      Neutrophil % 85.4 (H) %      Lymphocyte % 5.0 (L) %      Monocyte % 9.2 %      Eosinophil % 0.0 (L) %      Basophil % 0.1 %      Immature Grans % 0.3 %      Neutrophils, Absolute 11.01 (H) 10*3/mm3      Lymphocytes, Absolute 0.65 (L) 10*3/mm3      Monocytes, Absolute 1.19 10*3/mm3      Eosinophils, Absolute 0.00 10*3/mm3      Basophils, Absolute 0.01 10*3/mm3      Immature Grans, Absolute 0.04 (H) 10*3/mm3     aPTT [[id number]]  (Normal) Collected:  02/15/17 1742    Specimen:  Blood Updated:  02/15/17 1804     PTT 27.0 seconds     Protime-INR [[id number]]  (Normal) Collected:  02/15/17 1742    Specimen:  Blood Updated:   02/15/17 1804     Protime 13.3 Seconds      INR 1.05     Lactic Acid, Plasma [[id number]]  (Abnormal) Collected:  02/15/17 1740    Specimen:  Blood Updated:  02/15/17 1807     Lactate 2.1 (C) mmol/L     Calcium, Ionized [[id number]]  (Normal) Collected:  02/15/17 1742    Specimen:  Blood Updated:  02/15/17 1815     Ionized Calcium 1.19 mmol/L      Ionized Calcium 4.8 mg/dL     Basic Metabolic Panel [[id number]]  (Abnormal) Collected:  02/15/17 1742    Specimen:  Blood Updated:  02/15/17 1820     Glucose 144 (H) mg/dL      BUN 23 mg/dL      Creatinine 0.83 mg/dL      Sodium 141 mmol/L      Potassium 4.1 mmol/L      Chloride 106 mmol/L      CO2 21.4 (L) mmol/L      Calcium 7.9 (L) mg/dL      eGFR Non African Amer 95 mL/min/1.73      BUN/Creatinine Ratio 27.7 (H)      Anion Gap 13.6 mmol/L     Narrative:       GFR Normal >60  Chronic Kidney Disease <60  Kidney Failure <15    Magnesium [[id number]]  (Normal) Collected:  02/15/17 1742    Specimen:  Blood Updated:  02/15/17 1820     Magnesium 2.1 mg/dL     Troponin [[id number]]  (Abnormal) Collected:  02/15/17 1742    Specimen:  Blood Updated:  02/15/17 1826     Troponin T 3.110 (C) ng/mL     Narrative:       Troponin T Reference Ranges:  Less than 0.03 ng/mL:    Negative for AMI  0.03 to 0.09 ng/mL:      Indeterminant for AMI  Greater than 0.09 ng/mL: Positive for AMI    Phosphorus [[id number]]  (Normal) Collected:  02/15/17 1742    Specimen:  Blood Updated:  02/15/17 1826     Phosphorus 2.5 mg/dL     POC Glucose Fingerstick [[id number]]  (Normal) Collected:  02/15/17 1845    Specimen:  Blood Updated:  02/15/17 1846     Glucose 109 mg/dL     Narrative:       Meter: [id number] : [id number] Amrita MCKEON    POC Glucose Fingerstick [[id number]]  (Normal) Collected:  02/15/17 2015    Specimen:  Blood Updated:  02/15/17 2016     Glucose 114 mg/dL     Narrative:       Meter: [id number] : [id number] Ingrid Khan    POC Glucose Fingerstick [[id number]]  (Normal) Collected:  02/15/17  2202    Specimen:  Blood Updated:  02/15/17 2203     Glucose 116 mg/dL     Narrative:       Meter: [id number] : [id number] Ingrid Khan    POC Glucose Fingerstick [[id number]]  (Normal) Collected:  02/15/17 2348    Specimen:  Blood Updated:  02/16/17 0000     Glucose 113 mg/dL     Narrative:       Meter: [id number] : [id number] Ingrid Khan    Blood Gas, Arterial [[id number]]  (Abnormal) Collected:  02/16/17 0028    Specimen:  Arterial Blood Updated:  02/16/17 0030     Site Arterial: right radial      Agapito's Test Positive      pH, Arterial 7.351 pH units      pCO2, Arterial 39.0 mm Hg      pO2, Arterial 94.7 mm Hg      HCO3, Arterial 21.6 (L) mmol/L      Base Excess, Arterial -3.7 (L) mmol/L      O2 Saturation Calculated 97.0 %      A-a Gradiant 0.4 mmHg      Barometric Pressure for Blood Gas 749.5 mmHg      Modality Adult Vent      FIO2 40 %      Ventilator Mode PC      Set Tidal Volume 523      [facility]      Rate 20 Breaths/minute      PEEP 5     Narrative:       sats=99% ip=14 Meter: [id number] : [id number] Jose D Reid    CBC & Differential [[id number]] Collected:  02/16/17 0004    Specimen:  Blood Updated:  02/16/17 0032    Narrative:       The following orders were created for panel order CBC & Differential.  Procedure                               Abnormality         Status                     ---------                               -----------         ------                     CBC Auto Differential[[id number]]         Abnormal            Final result                 Please view results for these tests on the individual orders.    CBC Auto Differential [[id number]]  (Abnormal) Collected:  02/16/17 0004    Specimen:  Blood Updated:  02/16/17 0032     WBC 11.30 (H) 10*3/mm3      RBC 4.73 10*6/mm3      Hemoglobin 14.5 g/dL      Hematocrit 43.0 %      MCV 90.9 fL      MCH 30.7 pg      MCHC 33.7 g/dL      RDW 13.0 %      RDW-SD 42.9 fl      MPV 11.2 fL      Platelets 171 10*3/mm3      Neutrophil  % 75.4 %      Lymphocyte % 10.7 (L) %      Monocyte % 13.0 (H) %      Eosinophil % 0.5 %      Basophil % 0.1 %      Immature Grans % 0.3 %      Neutrophils, Absolute 8.52 (H) 10*3/mm3      Lymphocytes, Absolute 1.21 10*3/mm3      Monocytes, Absolute 1.47 (H) 10*3/mm3      Eosinophils, Absolute 0.06 10*3/mm3      Basophils, Absolute 0.01 10*3/mm3      Immature Grans, Absolute 0.03 10*3/mm3     Lactic Acid, Plasma [[id number]]  (Normal) Collected:  02/16/17 0004    Specimen:  Blood Updated:  02/16/17 0033     Lactate 1.8 mmol/L     Calcium, Ionized [[id number]]  (Normal) Collected:  02/16/17 0004    Specimen:  Blood Updated:  02/16/17 0044     Ionized Calcium 1.14 mmol/L      Ionized Calcium 4.6 mg/dL     aPTT [[id number]]  (Normal) Collected:  02/16/17 0004    Specimen:  Blood Updated:  02/16/17 0046     PTT 26.3 seconds     Protime-INR [[id number]]  (Normal) Collected:  02/16/17 0004    Specimen:  Blood Updated:  02/16/17 0046     Protime 12.8 Seconds      INR 1.00     Basic Metabolic Panel [[id number]]  (Abnormal) Collected:  02/16/17 0004    Specimen:  Blood Updated:  02/16/17 0053     Glucose 119 (H) mg/dL      BUN 20 mg/dL      Creatinine 0.83 mg/dL      Sodium 142 mmol/L      Potassium 3.9 mmol/L      Chloride 106 mmol/L      CO2 21.3 (L) mmol/L      Calcium 7.8 (L) mg/dL      eGFR Non African Amer 95 mL/min/1.73      BUN/Creatinine Ratio 24.1      Anion Gap 14.7 mmol/L     Narrative:       GFR Normal >60  Chronic Kidney Disease <60  Kidney Failure <15    Magnesium [[id number]]  (Normal) Collected:  02/16/17 0004    Specimen:  Blood Updated:  02/16/17 0053     Magnesium 2.0 mg/dL     Phosphorus [[id number]]  (Normal) Collected:  02/16/17 0004    Specimen:  Blood Updated:  02/16/17 0053     Phosphorus 3.0 mg/dL     POC Glucose Fingerstick [[id number]]  (Normal) Collected:  02/16/17 0208    Specimen:  Blood Updated:  02/16/17 0209     Glucose 121 mg/dL     Narrative:       Meter: [id number] : [id number] Ingrid LONG  Glucose Fingerstick [[id number]]  (Normal) Collected:  02/16/17 0436    Specimen:  Blood Updated:  02/16/17 0437     Glucose 117 mg/dL     Narrative:       Meter: [id number] : [id number] Madison Vaccinesflorencio Erin    POC Glucose Fingerstick [[id number]]  (Normal) Collected:  02/16/17 0613    Specimen:  Blood Updated:  02/16/17 0616     Glucose 104 mg/dL     Narrative:       Meter: [id number] : [id number] [employer]    CBC & Differential [[id number]] Collected:  02/16/17 0615    Specimen:  Blood Updated:  02/16/17 0647    Narrative:       The following orders were created for panel order CBC & Differential.  Procedure                               Abnormality         Status                     ---------                               -----------         ------                     CBC Auto Differential[[id number]]         Abnormal            Final result                 Please view results for these tests on the individual orders.    CBC Auto Differential [[id number]]  (Abnormal) Collected:  02/16/17 0615    Specimen:  Blood Updated:  02/16/17 0647     WBC 11.57 (H) 10*3/mm3      RBC 4.69 10*6/mm3      Hemoglobin 14.3 g/dL      Hematocrit 42.3 %      MCV 90.2 fL      MCH 30.5 pg      MCHC 33.8 g/dL      RDW 13.1 %      RDW-SD 42.9 fl      MPV 10.9 fL      Platelets 162 10*3/mm3      Neutrophil % 81.2 (H) %      Lymphocyte % 7.4 (L) %      Monocyte % 10.7 %      Eosinophil % 0.3 %      Basophil % 0.1 %      Immature Grans % 0.3 %      Neutrophils, Absolute 9.39 (H) 10*3/mm3      Lymphocytes, Absolute 0.86 (L) 10*3/mm3      Monocytes, Absolute 1.24 (H) 10*3/mm3      Eosinophils, Absolute 0.03 10*3/mm3      Basophils, Absolute 0.01 10*3/mm3      Immature Grans, Absolute 0.04 (H) 10*3/mm3     Lactic Acid, Plasma [[id number]]  (Normal) Collected:  02/16/17 0615    Specimen:  Blood Updated:  02/16/17 0650     Lactate 1.9 mmol/L     aPTT [[id number]]  (Normal) Collected:  02/16/17 0615    Specimen:  Blood Updated:  02/16/17 0657     PTT 26.4  seconds     Protime-INR [[id number]]  (Normal) Collected:  02/16/17 0615    Specimen:  Blood Updated:  02/16/17 0657     Protime 12.7 Seconds      INR 0.99     Calcium, Ionized [[id number]]  (Normal) Collected:  02/16/17 0615    Specimen:  Blood Updated:  02/16/17 0704     Ionized Calcium 1.15 mmol/L      Ionized Calcium 4.6 mg/dL     Magnesium [[id number]]  (Normal) Collected:  02/16/17 0615    Specimen:  Blood Updated:  02/16/17 0707     Magnesium 1.9 mg/dL     Basic Metabolic Panel [[id number]]  (Abnormal) Collected:  02/16/17 0615    Specimen:  Blood Updated:  02/16/17 0707     Glucose 115 (H) mg/dL      BUN 18 mg/dL      Creatinine 0.82 mg/dL      Sodium 144 mmol/L      Potassium 3.3 (L) mmol/L      Chloride 106 mmol/L      CO2 21.7 (L) mmol/L      Calcium 8.0 (L) mg/dL      eGFR Non African Amer 96 mL/min/1.73      BUN/Creatinine Ratio 22.0      Anion Gap 16.3 mmol/L     Narrative:       GFR Normal >60  Chronic Kidney Disease <60  Kidney Failure <15    Phosphorus [[id number]]  (Normal) Collected:  02/16/17 0615    Specimen:  Blood Updated:  02/16/17 0707     Phosphorus 2.5 mg/dL     TSH [[id number]]  (Normal) Collected:  02/16/17 0615    Specimen:  Blood Updated:  02/16/17 0716     TSH 0.283 mIU/mL     POC Glucose Fingerstick [[id number]]  (Normal) Collected:  02/16/17 0745    Specimen:  Blood Updated:  02/16/17 0747     Glucose 78 mg/dL     Narrative:       Meter: [id number] : [id number] Amrita MCKEON    Blood Gas, Arterial [[id number]]  (Abnormal) Collected:  02/16/17 0802    Specimen:  Arterial Blood Updated:  02/16/17 0804     Site Arterial: right radial      Agapito's Test Positive      pH, Arterial 7.372 pH units      pCO2, Arterial 43.6 mm Hg      pO2, Arterial 100.9 (H) mm Hg      HCO3, Arterial 25.3 mmol/L      Base Excess, Arterial -0.2 (L) mmol/L      O2 Saturation Calculated 97.6 %      A-a Gradiant 0.4 mmHg      Barometric Pressure for Blood Gas 750.9 mmHg      Modality Adult Vent      FIO2 40 %       Ventilator Mode PC      Set Tidal Volume 772      Set [facility] Resp Rate 8      Rate 9 Breaths/minute      PEEP 5     Narrative:       sats 100%,IP 15, Meter: [id number] : [id number] Gracie Grey    Troponin [[id number]]  (Abnormal) Collected:  02/16/17 0615    Specimen:  Blood Updated:  02/16/17 1024     Troponin T 2.320 (C) ng/mL     Narrative:       Troponin T Reference Ranges:  Less than 0.03 ng/mL:    Negative for AMI  0.03 to 0.09 ng/mL:      Indeterminant for AMI  Greater than 0.09 ng/mL: Positive for AMI    POC Glucose Fingerstick [[id number]]  (Normal) Collected:  02/16/17 1017    Specimen:  Blood Updated:  02/16/17 1027     Glucose 84 mg/dL     Narrative:       Meter: [id number] : [id number] Asher Garcia    CBC & Differential [[id number]] Collected:  02/16/17 1155    Specimen:  Blood Updated:  02/16/17 1229    Narrative:       The following orders were created for panel order CBC & Differential.  Procedure                               Abnormality         Status                     ---------                               -----------         ------                     CBC Auto Differential[[id number]]         Abnormal            Final result                 Please view results for these tests on the individual orders.    CBC Auto Differential [[id number]]  (Abnormal) Collected:  02/16/17 1155    Specimen:  Blood Updated:  02/16/17 1229     WBC 12.98 (H) 10*3/mm3      RBC 4.59 (L) 10*6/mm3      Hemoglobin 13.9 g/dL      Hematocrit 41.3 %      MCV 90.0 fL      MCH 30.3 pg      MCHC 33.7 g/dL      RDW 13.3 %      RDW-SD 43.3 fl      MPV 10.9 fL      Platelets 153 10*3/mm3      Neutrophil % 84.1 (H) %      Lymphocyte % 3.9 (L) %      Monocyte % 11.2 %      Eosinophil % 0.5 %      Basophil % 0.1 %      Immature Grans % 0.2 %      Neutrophils, Absolute 10.91 (H) 10*3/mm3      Lymphocytes, Absolute 0.51 (L) 10*3/mm3      Monocytes, Absolute 1.46 (H) 10*3/mm3      Eosinophils, Absolute 0.06 10*3/mm3       Basophils, Absolute 0.01 10*3/mm3      Immature Grans, Absolute 0.03 10*3/mm3     Lactic Acid, Plasma [[id number]]  (Normal) Collected:  02/16/17 1155    Specimen:  Blood Updated:  02/16/17 1234     Lactate 1.3 mmol/L     aPTT [[id number]]  (Normal) Collected:  02/16/17 1155    Specimen:  Blood Updated:  02/16/17 1242     PTT 24.5 seconds     Protime-INR [[id number]]  (Normal) Collected:  02/16/17 1155    Specimen:  Blood Updated:  02/16/17 1242     Protime 13.0 Seconds      INR 1.02     Magnesium [[id number]]  (Normal) Collected:  02/16/17 1155    Specimen:  Blood Updated:  02/16/17 1250     Magnesium 1.9 mg/dL     Phosphorus [[id number]]  (Abnormal) Collected:  02/16/17 1155    Specimen:  Blood Updated:  02/16/17 1250     Phosphorus 2.4 (L) mg/dL     Basic Metabolic Panel [[id number]]  (Abnormal) Collected:  02/16/17 1155    Specimen:  Blood Updated:  02/16/17 1255     Glucose 110 (H) mg/dL      BUN 16 mg/dL      Creatinine 0.90 mg/dL      Sodium 142 mmol/L      Potassium 4.3 mmol/L      Chloride 105 mmol/L      CO2 23.5 mmol/L      Calcium 8.1 (L) mg/dL      eGFR Non African Amer 86 mL/min/1.73      BUN/Creatinine Ratio 17.8      Anion Gap 13.5 mmol/L     Narrative:       GFR Normal >60  Chronic Kidney Disease <60  Kidney Failure <15    Calcium, Ionized [[id number]]  (Normal) Collected:  02/16/17 1155    Specimen:  Blood Updated:  02/16/17 1256     Ionized Calcium 1.19 mmol/L      Ionized Calcium 4.8 mg/dL            Imaging:  Imaging Results (last 24 hours)     Procedure Component Value Units Date/Time    CT Head Without Contrast [[id number]] Collected:  02/15/17 0447     Updated:  02/16/17 0000    Narrative:       CT SCAN OF THE BRAIN WITHOUT CONTRAST.     TECHNIQUE: Multiple axial images of the brain were obtained from the  vertex to the base of the brain.     HISTORY:  Unresponsive, cardiac arrest.     COMPARISON:  No prior studies for comparison.     FINDINGS:   Midline structures are within normal limits, there is no  hydrocephalus.  Gray-white matter differentiation is maintained.         Orbits are within normal limits. Mild mucosal disease of the paranasal  sinuses. Mastoid air cells are well aerated.             Impression:       No acute intracranial pathology.         This report was finalized on 2/15/2017 11:57 PM by Dr. Navin Thompson MD.       XR Chest 1 View [[id number]] Collected:  02/15/17 0444     Updated:  02/16/17 0000    Narrative:       X-RAY CHEST 1 VIEW.     HISTORY: Intubation.     COMPARISON: No prior studies for comparison.     FINDINGS:  Cardiomediastinal silhouette is within normal limits.         Diffuse opacity within the right hemithorax concerning for infiltrates,  no definite effusion.              Impression:       Extensive infiltrates in the right lung are suspected, clinical  correlation is recommended.         This report was finalized on 2/15/2017 11:57 PM by Dr. Navin Thompson MD.                Assessment and Plan:     Status post cardiac arrest and an anoxic encephalopathy he is been warmed up at this time and we are continuing to give aggressive support time and prayer.  I've reviewed the EEG which shows a moderately well regulated rhythm in the 8 Hz range and therefore there is still some hope for possible recovery.  He we'll keep our fingers crossed and see what he looks like tomorrow.      Jeff Reyes MD  02/16/17  1:45 PM

## 2017-02-16 NOTE — PROGRESS NOTES
[facility]     Mar/chart reviewed  F/u anoxic injury s/p STEMI.  Rewarming this morning.    Vital Sign Min/Max for last 24 hours  No Data Recorded   BP  Min: 90/74  Max: 137/91   Pulse  Min: 50  Max: 94   Resp  Min: 10  Max: 20   SpO2  Min: 96 %  Max: 100 %   No Data Recorded   Weight  Min: 227 lb 1.6 oz (103 kg)  Max: 227 lb 1.6 oz (103 kg)     Nad, sedated on vent,    perrl, no icterus,  mmm, no jvd, trachea midline, neck supple,  chest cta bilaterally, no crackles, no wheezes,   rrr,   soft, nt, nd +bs,  no c/c/ e    Labs:  7.37/43/100.9  Cr 0.82  k 3.3  Bicarb 21.7  Wbc 11.57  hgb 14.3  plts 162    Echo: ef 36.3    A/P:  1. Anoxic brain injury s/p arrest -- patient moving with purpose despite sedation, expect good neuro outcome.  2. Acute hypoxemic respiratory failure -- will plan weaning trail once rewarmed and sedation decreased  3. STEMI -- s/p stent in lab  4. Acute cardiomyopathy -- ef 36% cardiology following  5. Hypokalemia -- replace cautiously during rewarming  6. Metabolic acidosis --- resolved  7. SUSHILA -- resolved    Will plan extubation later today  D/w RN, RT and discussed on quality rounds.    D/w family at bedside    Cc 35

## 2017-02-16 NOTE — PROGRESS NOTES
DAILY PROGRESS NOTE    CHIEF COMPLAINT  ON VENT/SEDATED      HISTORY: A 61-year-old white male with history of hypertension, gastroesophageal reflux disease and osteoarthritis, who had been in good health until last night when he developed severe nausea and vomiting followed by chest pressure. He ended up in the [facility] ER and was found to have acute MI. He was taken to the cardiac catheterization lab and had a stent placed and is now on the ventilator and sedated. I am asked to follow the patient for medical problems.    PHYSICAL EXAMINATION: Blood pressure 105/83, pulse 68, temperature 97.5 °F (36.4 °C), temperature source Rectal, resp. rate 11, weight 227 lb 1.6 oz (103 kg), SpO2 100 %.    GENERAL: On the ventilator, sedated.  LUNGS: He is moving air bilaterally.  HEART: S1 and S2.  ABDOMEN: Hypoactive bowel sounds.  EXTREMITIES: No edema.  NEUROLOGIC: Unresponsive.    DIAGNOSTIC DATA:   Lab Results (last 24 hours)     Procedure Component Value Units Date/Time    POC Glucose Fingerstick [[id number]]  (Normal) Collected:  02/15/17 1420    Specimen:  Blood Updated:  02/15/17 1421     Glucose 123 mg/dL     Narrative:       Meter: [id number] : [id number] Amrita MCKEON    Hemoglobin A1c [[id number]]  (Normal) Collected:  02/15/17 1229    Specimen:  Blood Updated:  02/15/17 1427     Hemoglobin A1C 5.59 %     Narrative:       Hemoglobin A1C Ranges:    Increased Risk for Diabetes  5.7% to 6.4%  Diabetes                     >= 6.5%  Diabetic Goal                < 7.0%    POC Glucose Fingerstick [[id number]]  (Normal) Collected:  02/15/17 1618    Specimen:  Blood Updated:  02/15/17 1635     Glucose 120 mg/dL     Narrative:       Meter: [id number] : [id number] Amrita MCKEON    Blood Gas, Arterial [[id number]]  (Abnormal) Collected:  02/15/17 1742    Specimen:  Arterial Blood Updated:  02/15/17 1744     Site Arterial Line      Agapito's Test N/A      pH, Arterial 7.356 pH units      pCO2, Arterial  39.0 mm Hg      pO2, Arterial 122.3 (H) mm Hg      HCO3, Arterial 21.9 (L) mmol/L      Base Excess, Arterial -3.3 (L) mmol/L      O2 Saturation Calculated 98.6 %      A-a Gradiant 0.5 mmHg      Barometric Pressure for Blood Gas 749.5 mmHg      Modality Adult Vent      FIO2 40 %      Ventilator Mode PC      Set Tidal Volume 489      Set Mech Resp Rate 20      Rate 20 Breaths/minute      PEEP 5     Narrative:       o2 sat 99 insp press 14 Meter: [id number] : [id number] Nico Saldaña    CBC & Differential [[id number]] Collected:  02/15/17 1742    Specimen:  Blood Updated:  02/15/17 1755    Narrative:       The following orders were created for panel order CBC & Differential.  Procedure                               Abnormality         Status                     ---------                               -----------         ------                     CBC Auto Differential[[id number]]         Abnormal            Final result                 Please view results for these tests on the individual orders.    CBC Auto Differential [[id number]]  (Abnormal) Collected:  02/15/17 1742    Specimen:  Blood Updated:  02/15/17 1755     WBC 12.90 (H) 10*3/mm3      RBC 4.97 10*6/mm3      Hemoglobin 15.3 g/dL      Hematocrit 44.5 %      MCV 89.5 fL      MCH 30.8 pg      MCHC 34.4 g/dL      RDW 12.9 %      RDW-SD 41.7 fl      MPV 11.2 fL      Platelets 175 10*3/mm3      Neutrophil % 85.4 (H) %      Lymphocyte % 5.0 (L) %      Monocyte % 9.2 %      Eosinophil % 0.0 (L) %      Basophil % 0.1 %      Immature Grans % 0.3 %      Neutrophils, Absolute 11.01 (H) 10*3/mm3      Lymphocytes, Absolute 0.65 (L) 10*3/mm3      Monocytes, Absolute 1.19 10*3/mm3      Eosinophils, Absolute 0.00 10*3/mm3      Basophils, Absolute 0.01 10*3/mm3      Immature Grans, Absolute 0.04 (H) 10*3/mm3     aPTT [[id number]]  (Normal) Collected:  02/15/17 1742    Specimen:  Blood Updated:  02/15/17 1804     PTT 27.0 seconds     Protime-INR [[id number]]  (Normal) Collected:   02/15/17 1742    Specimen:  Blood Updated:  02/15/17 1804     Protime 13.3 Seconds      INR 1.05     Lactic Acid, Plasma [[id number]]  (Abnormal) Collected:  02/15/17 1740    Specimen:  Blood Updated:  02/15/17 1807     Lactate 2.1 (C) mmol/L     Calcium, Ionized [[id number]]  (Normal) Collected:  02/15/17 1742    Specimen:  Blood Updated:  02/15/17 1815     Ionized Calcium 1.19 mmol/L      Ionized Calcium 4.8 mg/dL     Basic Metabolic Panel [[id number]]  (Abnormal) Collected:  02/15/17 1742    Specimen:  Blood Updated:  02/15/17 1820     Glucose 144 (H) mg/dL      BUN 23 mg/dL      Creatinine 0.83 mg/dL      Sodium 141 mmol/L      Potassium 4.1 mmol/L      Chloride 106 mmol/L      CO2 21.4 (L) mmol/L      Calcium 7.9 (L) mg/dL      eGFR Non African Amer 95 mL/min/1.73      BUN/Creatinine Ratio 27.7 (H)      Anion Gap 13.6 mmol/L     Narrative:       GFR Normal >60  Chronic Kidney Disease <60  Kidney Failure <15    Magnesium [[id number]]  (Normal) Collected:  02/15/17 1742    Specimen:  Blood Updated:  02/15/17 1820     Magnesium 2.1 mg/dL     Troponin [[id number]]  (Abnormal) Collected:  02/15/17 1742    Specimen:  Blood Updated:  02/15/17 1826     Troponin T 3.110 (C) ng/mL     Narrative:       Troponin T Reference Ranges:  Less than 0.03 ng/mL:    Negative for AMI  0.03 to 0.09 ng/mL:      Indeterminant for AMI  Greater than 0.09 ng/mL: Positive for AMI    Phosphorus [[id number]]  (Normal) Collected:  02/15/17 1742    Specimen:  Blood Updated:  02/15/17 1826     Phosphorus 2.5 mg/dL     POC Glucose Fingerstick [[id number]]  (Normal) Collected:  02/15/17 1845    Specimen:  Blood Updated:  02/15/17 1846     Glucose 109 mg/dL     Narrative:       Meter: [id number] : [id number] Amrita MCKEON    POC Glucose Fingerstick [[id number]]  (Normal) Collected:  02/15/17 2015    Specimen:  Blood Updated:  02/15/17 2016     Glucose 114 mg/dL     Narrative:       Meter: [id number] : [id number] Ingrid Khan    POC Glucose Fingerstick  [[id number]]  (Normal) Collected:  02/15/17 2202    Specimen:  Blood Updated:  02/15/17 2203     Glucose 116 mg/dL     Narrative:       Meter: [id number] : [id number] Ingrid Khan    POC Glucose Fingerstick [[id number]]  (Normal) Collected:  02/15/17 2348    Specimen:  Blood Updated:  02/16/17 0000     Glucose 113 mg/dL     Narrative:       Meter: [id number] : [id number] Ingrid Khan    Blood Gas, Arterial [[id number]]  (Abnormal) Collected:  02/16/17 0028    Specimen:  Arterial Blood Updated:  02/16/17 0030     Site Arterial: right radial      Agapito's Test Positive      pH, Arterial 7.351 pH units      pCO2, Arterial 39.0 mm Hg      pO2, Arterial 94.7 mm Hg      HCO3, Arterial 21.6 (L) mmol/L      Base Excess, Arterial -3.7 (L) mmol/L      O2 Saturation Calculated 97.0 %      A-a Gradiant 0.4 mmHg      Barometric Pressure for Blood Gas 749.5 mmHg      Modality Adult Vent      FIO2 40 %      Ventilator Mode PC      Set Tidal Volume 523      Set Mech Resp Rate 20      Rate 20 Breaths/minute      PEEP 5     Narrative:       sats=99% ip=14 Meter: [id number] : [id number] Jose D Reid    CBC & Differential [[id number]] Collected:  02/16/17 0004    Specimen:  Blood Updated:  02/16/17 0032    Narrative:       The following orders were created for panel order CBC & Differential.  Procedure                               Abnormality         Status                     ---------                               -----------         ------                     CBC Auto Differential[[id number]]         Abnormal            Final result                 Please view results for these tests on the individual orders.    CBC Auto Differential [[id number]]  (Abnormal) Collected:  02/16/17 0004    Specimen:  Blood Updated:  02/16/17 0032     WBC 11.30 (H) 10*3/mm3      RBC 4.73 10*6/mm3      Hemoglobin 14.5 g/dL      Hematocrit 43.0 %      MCV 90.9 fL      MCH 30.7 pg      MCHC 33.7 g/dL      RDW 13.0 %      RDW-SD 42.9 fl      MPV 11.2 fL       Platelets 171 10*3/mm3      Neutrophil % 75.4 %      Lymphocyte % 10.7 (L) %      Monocyte % 13.0 (H) %      Eosinophil % 0.5 %      Basophil % 0.1 %      Immature Grans % 0.3 %      Neutrophils, Absolute 8.52 (H) 10*3/mm3      Lymphocytes, Absolute 1.21 10*3/mm3      Monocytes, Absolute 1.47 (H) 10*3/mm3      Eosinophils, Absolute 0.06 10*3/mm3      Basophils, Absolute 0.01 10*3/mm3      Immature Grans, Absolute 0.03 10*3/mm3     Lactic Acid, Plasma [[id number]]  (Normal) Collected:  02/16/17 0004    Specimen:  Blood Updated:  02/16/17 0033     Lactate 1.8 mmol/L     Calcium, Ionized [[id number]]  (Normal) Collected:  02/16/17 0004    Specimen:  Blood Updated:  02/16/17 0044     Ionized Calcium 1.14 mmol/L      Ionized Calcium 4.6 mg/dL     aPTT [[id number]]  (Normal) Collected:  02/16/17 0004    Specimen:  Blood Updated:  02/16/17 0046     PTT 26.3 seconds     Protime-INR [[id number]]  (Normal) Collected:  02/16/17 0004    Specimen:  Blood Updated:  02/16/17 0046     Protime 12.8 Seconds      INR 1.00     Basic Metabolic Panel [[id number]]  (Abnormal) Collected:  02/16/17 0004    Specimen:  Blood Updated:  02/16/17 0053     Glucose 119 (H) mg/dL      BUN 20 mg/dL      Creatinine 0.83 mg/dL      Sodium 142 mmol/L      Potassium 3.9 mmol/L      Chloride 106 mmol/L      CO2 21.3 (L) mmol/L      Calcium 7.8 (L) mg/dL      eGFR Non African Amer 95 mL/min/1.73      BUN/Creatinine Ratio 24.1      Anion Gap 14.7 mmol/L     Narrative:       GFR Normal >60  Chronic Kidney Disease <60  Kidney Failure <15    Magnesium [[id number]]  (Normal) Collected:  02/16/17 0004    Specimen:  Blood Updated:  02/16/17 0053     Magnesium 2.0 mg/dL     Phosphorus [[id number]]  (Normal) Collected:  02/16/17 0004    Specimen:  Blood Updated:  02/16/17 0053     Phosphorus 3.0 mg/dL     POC Glucose Fingerstick [[id number]]  (Normal) Collected:  02/16/17 0208    Specimen:  Blood Updated:  02/16/17 0209     Glucose 121 mg/dL     Narrative:       Meter:  [id number] : [id number] SEC Watch    POC Glucose Fingerstick [[id number]]  (Normal) Collected:  02/16/17 0436    Specimen:  Blood Updated:  02/16/17 0437     Glucose 117 mg/dL     Narrative:       Meter: [id number] : [id number] SEC Watch    POC Glucose Fingerstick [[id number]]  (Normal) Collected:  02/16/17 0613    Specimen:  Blood Updated:  02/16/17 0616     Glucose 104 mg/dL     Narrative:       Meter: [id number] : [id number] SEC Watch    CBC & Differential [[id number]] Collected:  02/16/17 0615    Specimen:  Blood Updated:  02/16/17 0647    Narrative:       The following orders were created for panel order CBC & Differential.  Procedure                               Abnormality         Status                     ---------                               -----------         ------                     CBC Auto Differential[[id number]]         Abnormal            Final result                 Please view results for these tests on the individual orders.    CBC Auto Differential [[id number]]  (Abnormal) Collected:  02/16/17 0615    Specimen:  Blood Updated:  02/16/17 0647     WBC 11.57 (H) 10*3/mm3      RBC 4.69 10*6/mm3      Hemoglobin 14.3 g/dL      Hematocrit 42.3 %      MCV 90.2 fL      MCH 30.5 pg      MCHC 33.8 g/dL      RDW 13.1 %      RDW-SD 42.9 fl      MPV 10.9 fL      Platelets 162 10*3/mm3      Neutrophil % 81.2 (H) %      Lymphocyte % 7.4 (L) %      Monocyte % 10.7 %      Eosinophil % 0.3 %      Basophil % 0.1 %      Immature Grans % 0.3 %      Neutrophils, Absolute 9.39 (H) 10*3/mm3      Lymphocytes, Absolute 0.86 (L) 10*3/mm3      Monocytes, Absolute 1.24 (H) 10*3/mm3      Eosinophils, Absolute 0.03 10*3/mm3      Basophils, Absolute 0.01 10*3/mm3      Immature Grans, Absolute 0.04 (H) 10*3/mm3     Lactic Acid, Plasma [[id number]]  (Normal) Collected:  02/16/17 0615    Specimen:  Blood Updated:  02/16/17 0650     Lactate 1.9 mmol/L     aPTT [[id number]]  (Normal) Collected:  02/16/17 0615    Specimen:   Blood Updated:  02/16/17 0657     PTT 26.4 seconds     Protime-INR [[id number]]  (Normal) Collected:  02/16/17 0615    Specimen:  Blood Updated:  02/16/17 0657     Protime 12.7 Seconds      INR 0.99     Calcium, Ionized [[id number]]  (Normal) Collected:  02/16/17 0615    Specimen:  Blood Updated:  02/16/17 0704     Ionized Calcium 1.15 mmol/L      Ionized Calcium 4.6 mg/dL     Magnesium [[id number]]  (Normal) Collected:  02/16/17 0615    Specimen:  Blood Updated:  02/16/17 0707     Magnesium 1.9 mg/dL     Basic Metabolic Panel [[id number]]  (Abnormal) Collected:  02/16/17 0615    Specimen:  Blood Updated:  02/16/17 0707     Glucose 115 (H) mg/dL      BUN 18 mg/dL      Creatinine 0.82 mg/dL      Sodium 144 mmol/L      Potassium 3.3 (L) mmol/L      Chloride 106 mmol/L      CO2 21.7 (L) mmol/L      Calcium 8.0 (L) mg/dL      eGFR Non African Amer 96 mL/min/1.73      BUN/Creatinine Ratio 22.0      Anion Gap 16.3 mmol/L     Narrative:       GFR Normal >60  Chronic Kidney Disease <60  Kidney Failure <15    Phosphorus [[id number]]  (Normal) Collected:  02/16/17 0615    Specimen:  Blood Updated:  02/16/17 0707     Phosphorus 2.5 mg/dL     TSH [[id number]]  (Normal) Collected:  02/16/17 0615    Specimen:  Blood Updated:  02/16/17 0716     TSH 0.283 mIU/mL     POC Glucose Fingerstick [[id number]]  (Normal) Collected:  02/16/17 0745    Specimen:  Blood Updated:  02/16/17 0747     Glucose 78 mg/dL     Narrative:       Meter: [id number] : [id number] Amrita MCKEON    Blood Gas, Arterial [[id number]]  (Abnormal) Collected:  02/16/17 0802    Specimen:  Arterial Blood Updated:  02/16/17 0804     Site Arterial: right radial      Agapito's Test Positive      pH, Arterial 7.372 pH units      pCO2, Arterial 43.6 mm Hg      pO2, Arterial 100.9 (H) mm Hg      HCO3, Arterial 25.3 mmol/L      Base Excess, Arterial -0.2 (L) mmol/L      O2 Saturation Calculated 97.6 %      A-a Gradiant 0.4 mmHg      Barometric Pressure for Blood Gas 750.9 mmHg       Modality Adult Vent      FIO2 40 %      Ventilator Mode PC      Set Tidal Volume 772      Set Mech Resp Rate 8      Rate 9 Breaths/minute      PEEP 5     Narrative:       sats 100%,IP 15, Meter: [id number] : [id number] Gracie Grey    Troponin [[id number]]  (Abnormal) Collected:  02/16/17 0615    Specimen:  Blood Updated:  02/16/17 1024     Troponin T 2.320 (C) ng/mL     Narrative:       Troponin T Reference Ranges:  Less than 0.03 ng/mL:    Negative for AMI  0.03 to 0.09 ng/mL:      Indeterminant for AMI  Greater than 0.09 ng/mL: Positive for AMI    POC Glucose Fingerstick [[id number]]  (Normal) Collected:  02/16/17 1017    Specimen:  Blood Updated:  02/16/17 1027     Glucose 84 mg/dL     Narrative:       Meter: [id number] : [id number] Asher Garcia    CBC & Differential [[id number]] Collected:  02/16/17 1155    Specimen:  Blood Updated:  02/16/17 1229    Narrative:       The following orders were created for panel order CBC & Differential.  Procedure                               Abnormality         Status                     ---------                               -----------         ------                     CBC Auto Differential[[id number]]         Abnormal            Final result                 Please view results for these tests on the individual orders.    CBC Auto Differential [[id number]]  (Abnormal) Collected:  02/16/17 1155    Specimen:  Blood Updated:  02/16/17 1229     WBC 12.98 (H) 10*3/mm3      RBC 4.59 (L) 10*6/mm3      Hemoglobin 13.9 g/dL      Hematocrit 41.3 %      MCV 90.0 fL      MCH 30.3 pg      MCHC 33.7 g/dL      RDW 13.3 %      RDW-SD 43.3 fl      MPV 10.9 fL      Platelets 153 10*3/mm3      Neutrophil % 84.1 (H) %      Lymphocyte % 3.9 (L) %      Monocyte % 11.2 %      Eosinophil % 0.5 %      Basophil % 0.1 %      Immature Grans % 0.2 %      Neutrophils, Absolute 10.91 (H) 10*3/mm3      Lymphocytes, Absolute 0.51 (L) 10*3/mm3      Monocytes, Absolute 1.46 (H) 10*3/mm3       Eosinophils, Absolute 0.06 10*3/mm3      Basophils, Absolute 0.01 10*3/mm3      Immature Grans, Absolute 0.03 10*3/mm3     Lactic Acid, Plasma [[id number]]  (Normal) Collected:  02/16/17 1155    Specimen:  Blood Updated:  02/16/17 1234     Lactate 1.3 mmol/L     aPTT [[id number]]  (Normal) Collected:  02/16/17 1155    Specimen:  Blood Updated:  02/16/17 1242     PTT 24.5 seconds     Protime-INR [[id number]]  (Normal) Collected:  02/16/17 1155    Specimen:  Blood Updated:  02/16/17 1242     Protime 13.0 Seconds      INR 1.02     Magnesium [[id number]]  (Normal) Collected:  02/16/17 1155    Specimen:  Blood Updated:  02/16/17 1250     Magnesium 1.9 mg/dL     Phosphorus [[id number]]  (Abnormal) Collected:  02/16/17 1155    Specimen:  Blood Updated:  02/16/17 1250     Phosphorus 2.4 (L) mg/dL     Basic Metabolic Panel [[id number]]  (Abnormal) Collected:  02/16/17 1155    Specimen:  Blood Updated:  02/16/17 1255     Glucose 110 (H) mg/dL      BUN 16 mg/dL      Creatinine 0.90 mg/dL      Sodium 142 mmol/L      Potassium 4.3 mmol/L      Chloride 105 mmol/L      CO2 23.5 mmol/L      Calcium 8.1 (L) mg/dL      eGFR Non African Amer 86 mL/min/1.73      BUN/Creatinine Ratio 17.8      Anion Gap 13.5 mmol/L     Narrative:       GFR Normal >60  Chronic Kidney Disease <60  Kidney Failure <15    Calcium, Ionized [[id number]]  (Normal) Collected:  02/16/17 1155    Specimen:  Blood Updated:  02/16/17 1256     Ionized Calcium 1.19 mmol/L      Ionized Calcium 4.8 mg/dL       White count 18.6, hemoglobin 15.2, platelets 282,000. BUN 24, creatinine 1.0, potassium 4.2, CO2 is 18.6. Blood sugar is 123 to 138. LDL is 128. Chest x-ray is no active disease. EKG shows sinus rhythm, prolonged QT interval, nonspecific ST-T wave changes. Initial EKG showed AFib/flutter.  Imaging Results (last 72 hours)     Procedure Component Value Units Date/Time    XR Abdomen KUB [[id number]] Collected:  02/15/17 0702     Updated:  02/15/17 0708    Narrative:       EMERGENCY  SUPINE ABDOMEN     HISTORY: 61-year-old male for oral gastric tube placement     FINDINGS:  1. Tip of the oral gastric tube is in good position at the distal  stomach.     This report was finalized on 2/15/2017 7:05 AM by Dr. Aidan Blackwell MD.       XR Chest Post CVA Port [[id number]] Collected:  02/15/17 0848     Updated:  02/15/17 0855    Narrative:       XR CHEST POST CVA PORT-     Clinical: Central line placement     COMPARISON 02/15/2017 at 0431 hours, current examination 0828 hours     FINDINGS: There is been interval placement of a nasogastric tube tip is  subdiaphragmatic out of the field-of-view. Endotracheal tube unchanged  in position.        Right IJ catheter tip superimposes the projected area of the superior  vena cava, no pneumothorax.        Progression of the right lung opacity with now perihilar consolidation.  Pneumonia versus asymmetric pulmonary edema. There is however no  associated pleural effusion identified. Cardiomediastinal silhouette is  stable. Cardiac size upper limits of normal. The remainder is  unremarkable.     This report was finalized on 2/15/2017 8:52 AM by Dr. Mick oMntalvo MD.       CT Head Without Contrast [[id number]] Collected:  02/15/17 0447     Updated:  02/16/17 0000    Narrative:       CT SCAN OF THE BRAIN WITHOUT CONTRAST.     TECHNIQUE: Multiple axial images of the brain were obtained from the  vertex to the base of the brain.     HISTORY:  Unresponsive, cardiac arrest.     COMPARISON:  No prior studies for comparison.     FINDINGS:   Midline structures are within normal limits, there is no hydrocephalus.  Gray-white matter differentiation is maintained.         Orbits are within normal limits. Mild mucosal disease of the paranasal  sinuses. Mastoid air cells are well aerated.             Impression:       No acute intracranial pathology.         This report was finalized on 2/15/2017 11:57 PM by Dr. Navin Thompson MD.       XR Chest 1 View [[id number]] Collected:   02/15/17 0444     Updated:  02/16/17 0000    Narrative:       X-RAY CHEST 1 VIEW.     HISTORY: Intubation.     COMPARISON: No prior studies for comparison.     FINDINGS:  Cardiomediastinal silhouette is within normal limits.         Diffuse opacity within the right hemithorax concerning for infiltrates,  no definite effusion.              Impression:       Extensive infiltrates in the right lung are suspected, clinical  correlation is recommended.         This report was finalized on 2/15/2017 11:57 PM by Dr. Navin Thompson MD.           Current Facility-Administered Medications:   •  acetaminophen (TYLENOL) tablet 650 mg, 650 mg, Oral, Q4H PRN **OR** acetaminophen (TYLENOL) suppository 650 mg, 650 mg, Rectal, Q4H PRN, Charles Medrano MD  •  amiodarone (PACERONE) tablet 400 mg, 400 mg, Oral, Q12H, ANGELICA Cedillo, 400 mg at 02/16/17 1040  •  artificial tears ophthalmic ointment, , Both Eyes, Q4H, Uli Resendiz MD  •  aspirin chewable tablet 81 mg, 81 mg, Oral, Daily, Kristina Billings MD, 81 mg at 02/16/17 0803  •  carvedilol (COREG) tablet 3.125 mg, 3.125 mg, Oral, Q12H, ANGELICA Cedillo, 3.125 mg at 02/16/17 1040  •  cisatracurium besylate (NIMBEX) 100 mg in sodium chloride 0.9 % 100 mL infusion, 0.5 mcg/kg/min (Ideal), Intravenous, Titrated, Uli Resendiz MD, Last Rate: 2.33 mL/hr at 02/16/17 0930, 0.5 mcg/kg/min at 02/16/17 0930  •  dextrose (D50W) solution 25 g, 25 g, Intravenous, Q15 Min PRN, Uli Resendiz MD  •  dextrose (GLUTOSE) oral gel 15 g, 15 g, Oral, Q15 Min PRN, Uli Resendiz MD  •  FentaNYL Citrate (PF) (SUBLIMAZE) 1,250 mcg in sodium chloride 0.9 % 250 mL infusion, 25-50 mcg/hr, Intravenous, Continuous, Uli Resendiz MD, Last Rate: 10 mL/hr at 02/16/17 0622, 50 mcg/hr at 02/16/17 0622  •  glucagon (human recombinant) (GLUCAGEN DIAGNOSTIC) injection 1 mg, 1 mg, Subcutaneous, Q15 Min PRN, Uli Resendiz MD  •  Influenza Vac Subunit Quad (FLUCELVAX) injection 0.5 mL, 0.5 mL,  Intramuscular, During Hospitalization, Uli Resendiz MD  •  lisinopril (PRINIVIL,ZESTRIL) tablet 2.5 mg, 2.5 mg, Oral, Daily, Kristina Billings MD, 2.5 mg at 02/15/17 1159  •  pantoprazole (PROTONIX) injection 40 mg, 40 mg, Intravenous, Q AM, Uli Resendiz MD, 40 mg at 02/16/17 0606  •  propofol (DIPRIVAN) infusion 10 mg/mL 100 mL, 5-50 mcg/kg/min, Intravenous, Titrated, Peng Linares MD, Last Rate: 20.2 mL/hr at 02/16/17 1319, 35 mcg/kg/min at 02/16/17 1319  •  sodium chloride 0.9 % flush 1-10 mL, 1-10 mL, Intravenous, PRN, Kristina Billings MD  •  sodium chloride 0.9 % infusion, 100 mL/hr, Intravenous, Continuous, Kristina Billings MD, Last Rate: 100 mL/hr at 02/16/17 0016, 100 mL/hr at 02/16/17 0016  •  sodium phosphate 12 mmol in sodium chloride 0.9 % 250 mL IVPB, 12 mmol, Intravenous, Once PRN, Charles Medrano MD  •  ticagrelor (BRILINTA) tablet 90 mg, 90 mg, Oral, BID, Kristina Billings MD, 90 mg at 02/16/17 0803     ASSESSMENT:   1. Status post acute myocardial infarction.  2. Status post cardiac catheterization with stent placement.  3. Respiratory failure.  4. Decreased mental status./S/P CARDIAC ARREST/S/P CODE 300  5. Hypothermia.  6. History of hypertension.  7. History of gastroesophageal reflux disease.  8. Increased white count.    PLAN  1. Supportive care.  2. IV antibiotics started.  3. Neurology.FOLLOWING  4. D/W FAMILY  5. Will follow closely along with Cardiology, Pulmonary and Neurology.   6. Further recommendations according to hospital course.    TANIA PANDA MD

## 2017-02-16 NOTE — PROGRESS NOTES
[facility]  Cardiology Progress Note    Patient Identification:  Name: Sander White  Age: 61 y.o.  Sex: male  :  1955  MRN: [id number]                 Follow Up / Chief Complaint: resuscitated arrest,VF, STEMI->emergent PCI, LV dysfx, h/o HTN    Interval History:  Reports of chest pain with n/v. Full arrest upon EMS arrival with multiple shocks for VF, received CPR for 40-45 minutes before return of systemic circulation. Emergent thrombectomy and CAYLA-> ramus. Hypothermia protocol, resp failure with possible aspiration pneumonia and anoxic encephalopathy     Subjective:  Unable to assess - remains intubated/sedated/paralyzed    Discussed cardiac test results to date and treatment plans with spouse and son. All questions were answered. They voiced understanding and agreement    Objective:  Beginning to warm this am. EEG in progress BP /70-80's without pressors. No further VF on Amio gtt    Past Medical History:  Past Medical History   Diagnosis Date   • Arthritis    • Chronic pain in right foot    • ED (erectile dysfunction) 7/15/2016   • GERD (gastroesophageal reflux disease)    • Hypertension      Past Surgical History:  Past Surgical History   Procedure Laterality Date   • Colonoscopy N/A 2015     Normal   • Appendectomy     • Hernia repair Right      Inguinal   • Cyst removal N/A      Neck   • Cholecystectomy     • Pr rt/lt heart catheters N/A 2/15/2017     Procedure: Percutaneous Coronary Intervention;  Surgeon: Kristina Billings MD;  Location:  [facility] CATH INVASIVE LOCATION;  Service: Cardiovascular   • Cardiac catheterization N/A 2/15/2017     Procedure: Left Heart Cath;  Surgeon: Kristina Billings MD;  [facility];  Service:    • Cardiac catheterization  2/15/2017     Procedure: Percutaneous Manual Thrombectomy;  Surgeon: Kristina Billings MD;  Location:  [facility] CATH INVASIVE LOCATION;  Service:    • Cardiac catheterization N/A  2/15/2017     Procedure: Coronary angiography;  Surgeon: Kristina Billings MD;  Location:  [facility] CATH INVASIVE LOCATION;  Service:    • Cardiac catheterization N/A 2/15/2017     Procedure: Left ventriculography;  Surgeon: Kristina Billings MD;  Location: [facility] CATH INVASIVE LOCATION;  Service:         Social History:   Social History   Substance Use Topics   • Smoking status: Former Smoker     Packs/day: 0.50     Years: 7.00     Types: Cigarettes     Quit date: 1999   • Smokeless tobacco: Never Used   • Alcohol use Yes      Family History:  Family History   Problem Relation Age of Onset   • Diabetes Mother      Type 2   • Diabetes Father      Type 2   • Stroke Father    • Aneurysm Brother      Brain          Allergies:  Allergies   Allergen Reactions   • Amoxicillin Itching   • Penicillins      Scheduled Meds:    artificial tears  Q4H   aspirin 81 mg Daily   lisinopril 2.5 mg Daily   pantoprazole 40 mg Q AM   ticagrelor 90 mg BID           INTAKE AND OUTPUT:    Intake/Output Summary (Last 24 hours) at 02/16/17 0822  Last data filed at 02/16/17 0811   Gross per 24 hour   Intake 4065.1 ml   Output   2342 ml   Net 1723.1 ml       Review of Systems: unable to assess, pt sedated/intubated  GI:  Cardiac:  Pulmonary:    Constitutional:  Heart Rate:  [50-95] 63  Resp:  [10-20] 10  BP: ()/(68-98) 105/83  Arterial Line BP: (105-155)/(65-89) 107/69  FiO2 (%):  [40 %-90 %] 40 %    Physical Exam by Kristina Billings MD  General:  Orally intubated, sedated/paralyzed with EEG per hypothermia protocol  HEENT: Orally intubated, No JVD. Thyroid not visibly enlarged. No mucosal cyanosis  Respiratory: Respirations regular and unlabored on vent. BBS with good air entry in all fields. Few coarse rhonchi right base, no wheezes auscultated  Cardiovascular: S1S2 Regular rate and rhythm. No murmur or gallop.DP pulses 2   . No pretibial pitting edema  Gastrointestinal: Abdomen soft, flat. No ascites  Musculoskeletal:  Paralyzed/sedated per hypothermia protocol No abnormal movements  Extremities: No digital  cyanosis  Skin: Color pink. Skin warm and dry to touch.   Neuro: Unable to fully assess. Sedated/paralyzed per hypothermia protocol          Cardiographics  Telemetry:  SR 60's with occasional, isolated PVC      ECG 2-16-17:       Echocardiogram:     · Left ventricular wall segments contract abnormally. Refer to wall scoring diagram for more information.  · Left atrial cavity size is borderline dilated.  · Mild mitral valve regurgitation is present  · Mild tricuspid valve regurgitation is present.  · Left Ventricle: Calculated EF = 36.3%.  · There is no evidence of pericardial effusion.               CATH & PCI  · Successful left heart catheter  · Successful thrombectomy of the ramus branch  · Successful angioplasty and stent to the ramus branch reduced from 100% to 0% with 2.5/15 XIENCE dilated to 2.6  · Mild global hypokinetic estimated ejection fraction 40%  · LAD 20-30% diffuse irregularity  · Normal left main  · Circumflex had a ramus branch which was and plasty and otherwise was normal  · Cor dominant RCA midportion 60%    Lab Review     Results from last 7 days  Lab Units 02/15/17  1742 02/15/17  0632 02/15/17  0339   TROPONIN T ng/mL 3.110* 2.830* 0.016       Results from last 7 days  Lab Units 02/16/17  0615   MAGNESIUM mg/dL 1.9       Results from last 7 days  Lab Units 02/16/17  0615   SODIUM mmol/L 144   POTASSIUM mmol/L 3.3*   BUN mg/dL 18   CREATININE mg/dL 0.82   CALCIUM mg/dL 8.0*       Results from last 7 days  Lab Units 02/16/17  0615 02/16/17  0004 02/15/17  1742   WBC 10*3/mm3 11.57* 11.30* 12.90*   HEMOGLOBIN g/dL 14.3 14.5 15.3   HEMATOCRIT % 42.3 43.0 44.5   PLATELETS 10*3/mm3 162 171 175       Results from last 7 days  Lab Units 02/16/17  0615 02/16/17  0004 02/15/17  1742   INR  0.99 1.00 1.05   APTT seconds 26.4 26.3 27.0         Assessment:  - acute lateral STEMI  - s/p resuscitated cardiac arrest  -  s/p emergent with 2.5/15 XIENCE -> 100% ramus  - LV dysfx - EF 35-40%, mild MR/TR  - acute resp failure -> Pulm  - suspected right aspiration pneumonia -> Pulm  - anoxic encephalopathy -> Neuro  - hypokalemia - replace per protocol    Plan:    - acute lateral STEMI -> - s/p emergent with 2.5/15 XIENCE -> 100% ramus    - s/p resuscitated cardiac arrest - sedated/paralyzed/EEG per hypothermia protocol    - VF - on Amio gtt per protocol-> transition to DHT. Occasional, isolated PVC, no recurrent arrythmia    - LV dysfx - EF 35-40%, mild MR/TR. Start BB, ACE-I/ARB  with blood pressure parameters. Lasix ordered for this am    Hypothermia protocol and vent management as per Pulmonary. Transition to po Amio. Continue asa and Brilinta. Orders for ACE-I noted, add betablocker. Eventually start statin. Monitor serial troponin. BNP, BMP and EKG ordered for am    I reviewed the patient's new clinical results and treatment plan. I personally viewed and interpreted the patient's EKG/Telemetry data    )2/16/2017  Kristina Billings MD      EMR Dragon/Transcription disclaimer:   Much of this encounter note is an electronic transcription/translation of spoken language to printed text. The electronic translation of spoken language may permit erroneous, or at times, nonsensical words or phrases to be inadvertently transcribed; Although I have reviewed the note for such errors, some may still exist.

## 2017-02-17 LAB
ANION GAP SERPL CALCULATED.3IONS-SCNC: 14 MMOL/L
BUN BLD-MCNC: 14 MG/DL (ref 8–23)
BUN/CREAT SERPL: 15.4 (ref 7–25)
CALCIUM SPEC-SCNC: 8 MG/DL (ref 8.6–10.5)
CHLORIDE SERPL-SCNC: 104 MMOL/L (ref 98–107)
CO2 SERPL-SCNC: 23 MMOL/L (ref 22–29)
CREAT BLD-MCNC: 0.91 MG/DL (ref 0.76–1.27)
DEPRECATED RDW RBC AUTO: 44.5 FL (ref 37–54)
ERYTHROCYTE [DISTWIDTH] IN BLOOD BY AUTOMATED COUNT: 13.3 % (ref 11.5–14.5)
GFR SERPL CREATININE-BSD FRML MDRD: 85 ML/MIN/1.73
GLUCOSE BLD-MCNC: 109 MG/DL (ref 65–99)
GLUCOSE BLDC GLUCOMTR-MCNC: 100 MG/DL (ref 70–130)
GLUCOSE BLDC GLUCOMTR-MCNC: 102 MG/DL (ref 70–130)
GLUCOSE BLDC GLUCOMTR-MCNC: 102 MG/DL (ref 70–130)
GLUCOSE BLDC GLUCOMTR-MCNC: 108 MG/DL (ref 70–130)
GLUCOSE BLDC GLUCOMTR-MCNC: 87 MG/DL (ref 70–130)
GLUCOSE BLDC GLUCOMTR-MCNC: 95 MG/DL (ref 70–130)
HCT VFR BLD AUTO: 36.6 % (ref 40.4–52.2)
HGB BLD-MCNC: 12.3 G/DL (ref 13.7–17.6)
MAGNESIUM SERPL-MCNC: 1.8 MG/DL (ref 1.6–2.4)
MCH RBC QN AUTO: 30.8 PG (ref 27–32.7)
MCHC RBC AUTO-ENTMCNC: 33.6 G/DL (ref 32.6–36.4)
MCV RBC AUTO: 91.5 FL (ref 79.8–96.2)
PLATELET # BLD AUTO: 154 10*3/MM3 (ref 140–500)
PMV BLD AUTO: 11.5 FL (ref 6–12)
POTASSIUM BLD-SCNC: 3.5 MMOL/L (ref 3.5–5.2)
RBC # BLD AUTO: 4 10*6/MM3 (ref 4.6–6)
SODIUM BLD-SCNC: 141 MMOL/L (ref 136–145)
TROPONIN T SERPL-MCNC: 1.96 NG/ML (ref 0–0.03)
WBC NRBC COR # BLD: 10.47 10*3/MM3 (ref 4.5–10.7)

## 2017-02-17 PROCEDURE — 97161 PT EVAL LOW COMPLEX 20 MIN: CPT

## 2017-02-17 PROCEDURE — 99231 SBSQ HOSP IP/OBS SF/LOW 25: CPT | Performed by: PSYCHIATRY & NEUROLOGY

## 2017-02-17 PROCEDURE — 25010000002 ONDANSETRON PER 1 MG: Performed by: INTERNAL MEDICINE

## 2017-02-17 PROCEDURE — G0009 ADMIN PNEUMOCOCCAL VACCINE: HCPCS | Performed by: INTERNAL MEDICINE

## 2017-02-17 PROCEDURE — 25010000004 PNEUMOCOCCAL VAC POLYVALENT PER 0.5 ML: Performed by: INTERNAL MEDICINE

## 2017-02-17 PROCEDURE — 90732 PPSV23 VACC 2 YRS+ SUBQ/IM: CPT | Performed by: INTERNAL MEDICINE

## 2017-02-17 PROCEDURE — 83735 ASSAY OF MAGNESIUM: CPT | Performed by: INTERNAL MEDICINE

## 2017-02-17 PROCEDURE — 92610 EVALUATE SWALLOWING FUNCTION: CPT

## 2017-02-17 PROCEDURE — 85027 COMPLETE CBC AUTOMATED: CPT | Performed by: INTERNAL MEDICINE

## 2017-02-17 PROCEDURE — 80048 BASIC METABOLIC PNL TOTAL CA: CPT | Performed by: NURSE PRACTITIONER

## 2017-02-17 PROCEDURE — 25010000002 MORPHINE PER 10 MG: Performed by: INTERNAL MEDICINE

## 2017-02-17 PROCEDURE — 99232 SBSQ HOSP IP/OBS MODERATE 35: CPT | Performed by: INTERNAL MEDICINE

## 2017-02-17 PROCEDURE — 84484 ASSAY OF TROPONIN QUANT: CPT | Performed by: NURSE PRACTITIONER

## 2017-02-17 PROCEDURE — 93005 ELECTROCARDIOGRAM TRACING: CPT | Performed by: NURSE PRACTITIONER

## 2017-02-17 PROCEDURE — 82962 GLUCOSE BLOOD TEST: CPT

## 2017-02-17 PROCEDURE — 93010 ELECTROCARDIOGRAM REPORT: CPT | Performed by: INTERNAL MEDICINE

## 2017-02-17 PROCEDURE — 97110 THERAPEUTIC EXERCISES: CPT

## 2017-02-17 RX ORDER — LISINOPRIL 2.5 MG/1
2.5 TABLET ORAL NIGHTLY
Status: DISCONTINUED | OUTPATIENT
Start: 2017-02-17 | End: 2017-02-21 | Stop reason: HOSPADM

## 2017-02-17 RX ORDER — ONDANSETRON 2 MG/ML
4 INJECTION INTRAMUSCULAR; INTRAVENOUS EVERY 4 HOURS PRN
Status: DISCONTINUED | OUTPATIENT
Start: 2017-02-17 | End: 2017-02-21 | Stop reason: HOSPADM

## 2017-02-17 RX ORDER — POTASSIUM CHLORIDE 750 MG/1
20 CAPSULE, EXTENDED RELEASE ORAL DAILY
Status: DISCONTINUED | OUTPATIENT
Start: 2017-02-17 | End: 2017-02-20

## 2017-02-17 RX ORDER — PANTOPRAZOLE SODIUM 40 MG/1
40 TABLET, DELAYED RELEASE ORAL
Status: DISCONTINUED | OUTPATIENT
Start: 2017-02-18 | End: 2017-02-20

## 2017-02-17 RX ORDER — FUROSEMIDE 40 MG/1
40 TABLET ORAL DAILY
Status: DISCONTINUED | OUTPATIENT
Start: 2017-02-17 | End: 2017-02-20

## 2017-02-17 RX ORDER — ATORVASTATIN CALCIUM 20 MG/1
20 TABLET, FILM COATED ORAL NIGHTLY
Status: DISCONTINUED | OUTPATIENT
Start: 2017-02-17 | End: 2017-02-20

## 2017-02-17 RX ADMIN — SODIUM CHLORIDE 100 ML/HR: 9 INJECTION, SOLUTION INTRAVENOUS at 08:55

## 2017-02-17 RX ADMIN — PANTOPRAZOLE SODIUM 40 MG: 40 INJECTION, POWDER, FOR SOLUTION INTRAVENOUS at 06:30

## 2017-02-17 RX ADMIN — TICAGRELOR 90 MG: 90 TABLET ORAL at 17:41

## 2017-02-17 RX ADMIN — LISINOPRIL 2.5 MG: 2.5 TABLET ORAL at 20:44

## 2017-02-17 RX ADMIN — CLINDAMYCIN IN 5 PERCENT DEXTROSE 300 MG: 6 INJECTION, SOLUTION INTRAVENOUS at 17:36

## 2017-02-17 RX ADMIN — POTASSIUM CHLORIDE 20 MEQ: 750 CAPSULE, EXTENDED RELEASE ORAL at 17:04

## 2017-02-17 RX ADMIN — CLINDAMYCIN IN 5 PERCENT DEXTROSE 300 MG: 6 INJECTION, SOLUTION INTRAVENOUS at 02:37

## 2017-02-17 RX ADMIN — TICAGRELOR 90 MG: 90 TABLET ORAL at 08:14

## 2017-02-17 RX ADMIN — ACETAMINOPHEN 650 MG: 325 TABLET ORAL at 22:11

## 2017-02-17 RX ADMIN — CLINDAMYCIN IN 5 PERCENT DEXTROSE 300 MG: 6 INJECTION, SOLUTION INTRAVENOUS at 10:20

## 2017-02-17 RX ADMIN — MINERAL OIL, PETROLATUM: 425; 568 OINTMENT OPHTHALMIC at 08:15

## 2017-02-17 RX ADMIN — PNEUMOCOCCAL VACCINE POLYVALENT 0.5 ML
25; 25; 25; 25; 25; 25; 25; 25; 25; 25; 25; 25; 25; 25; 25; 25; 25; 25; 25; 25; 25; 25; 25 INJECTION, SOLUTION INTRAMUSCULAR; SUBCUTANEOUS at 20:45

## 2017-02-17 RX ADMIN — ONDANSETRON 4 MG: 2 INJECTION INTRAMUSCULAR; INTRAVENOUS at 00:53

## 2017-02-17 RX ADMIN — MORPHINE SULFATE 3 MG: 4 INJECTION, SOLUTION INTRAMUSCULAR; INTRAVENOUS at 04:50

## 2017-02-17 RX ADMIN — ASPIRIN 81 MG: 81 TABLET, CHEWABLE ORAL at 08:14

## 2017-02-17 RX ADMIN — ALPRAZOLAM 0.25 MG: 0.25 TABLET ORAL at 20:50

## 2017-02-17 RX ADMIN — AMIODARONE HYDROCHLORIDE 200 MG: 200 TABLET ORAL at 20:44

## 2017-02-17 RX ADMIN — FUROSEMIDE 40 MG: 40 TABLET ORAL at 17:03

## 2017-02-17 RX ADMIN — ATORVASTATIN CALCIUM 20 MG: 20 TABLET, FILM COATED ORAL at 20:43

## 2017-02-17 RX ADMIN — CARVEDILOL 3.12 MG: 3.12 TABLET, FILM COATED ORAL at 08:14

## 2017-02-17 RX ADMIN — LISINOPRIL 2.5 MG: 2.5 TABLET ORAL at 08:14

## 2017-02-17 RX ADMIN — MINERAL OIL, PETROLATUM: 425; 568 OINTMENT OPHTHALMIC at 01:23

## 2017-02-17 RX ADMIN — CARVEDILOL 3.12 MG: 3.12 TABLET, FILM COATED ORAL at 20:44

## 2017-02-17 RX ADMIN — AMIODARONE HYDROCHLORIDE 400 MG: 200 TABLET ORAL at 08:14

## 2017-02-17 RX ADMIN — MINERAL OIL, PETROLATUM: 425; 568 OINTMENT OPHTHALMIC at 04:28

## 2017-02-17 NOTE — PROGRESS NOTES
[facility]     Mar/chart reviewed  F/u anoxic injury s/p STEMI. Extubated  Had some fever yesterday    Vital Sign Min/Max for last 24 hours  Temp  Min: 97.8 °F (36.6 °C)  Max: 100.6 °F (38.1 °C)   BP  Min: 91/68  Max: 131/86   Pulse  Min: 68  Max: 102   Resp  Min: 11  Max: 12   SpO2  Min: 91 %  Max: 100 %   Flow (L/min)  Min: 2  Max: 4   Weight  Min: 227 lb 1.2 oz (103 kg)  Max: 227 lb 1.2 oz (103 kg)     Nad, alert  perrl, eomi, no icterus,  mmm, no jvd, trachea midline, neck supple,  chest cta bilaterally, no crackles, no wheezes,   rrr,   soft, nt, nd +bs,  no c/c/ e    Labs:  Trop 1.96  Cr 0.9  Bicarb 23  Wbc 10.47  hgb 12.3  plts 154    CXR: rml/rll infiltrate  A/P:  1. Anoxic brain injury s/p arrest -- doing well.not at baseline, expect continued improvement  2. Acute hypoxemic respiratory failure -- extubated  3. STEMI -- s/p stent in lab  4. Acute cardiomyopathy -- ef 36% cardiology following  5. Hypokalemia --better  6. Metabolic acidosis --- resolved  7. SUSHILA -- resolved  8. Aspiration pneumonia -- present on admit -- treat with clindamycin given PCN allergy    D/c line and hutchinson, pt/ot eval  further eval etc as per cards    CC 35

## 2017-02-17 NOTE — PLAN OF CARE
Problem: Patient Care Overview (Adult)  Goal: Plan of Care Review    02/17/17 1443   Outcome Evaluation   Outcome Summary/Follow up Plan Pt presents w decreased tolerance to activity w pt reporting increased dizziness upon standing and w exercises at bedside. Pt demonstrates decreased balance leading to increased fall risk and limited independence w functional mobility. Due to the above skilled PT is indicated at this time.   Coping/Psychosocial Response Interventions   Plan Of Care Reviewed With patient         Problem: Inpatient Physical Therapy  Goal: Bed Mobility Goal LTG- PT    02/17/17 1443   Bed Mobility PT LTG   Bed Mobility PT LTG, Date Established 02/17/17   Bed Mobility PT LTG, Time to Achieve 1 wk   Bed Mobility PT LTG, Activity Type supine to sit/sit to supine   Bed Mobility PT LTG, [place] Level supervision required       Goal: Transfer Training Goal 1 LTG- PT    02/17/17 1443   Transfer Training PT LTG   Transfer Training PT LTG, Date Established 02/17/17   Transfer Training PT LTG, Time to Achieve 1 wk   Transfer Training PT LTG, Activity Type sit to stand/stand to sit   Transfer Training PT LTG, [place] Level supervision required       Goal: Gait Training Goal LTG- PT    02/17/17 1443   Gait Training PT LTG   Gait Training Goal PT LTG, Date Established 02/17/17   Gait Training Goal PT LTG, Time to Achieve 1 wk   Gait Training Goal PT LTG, [place] Level supervision required   Gait Training Goal PT LTG, Distance to Achieve 250       Goal: Stair Training Goal LTG- PT    02/17/17 1443   Stair Training PT LTG   Stair Training Goal PT LTG, Date Established 02/17/17   Stair Training Goal PT LTG, Time to Achieve 1 wk   Stair Training Goal PT LTG, Number of Steps 5   Stair Training Goal PT LTG, [place] Level contact guard assist   Stair Training Goal PT LTG, Assist Device 1 handrail

## 2017-02-17 NOTE — PROGRESS NOTES
Acute Care - Physical Therapy Initial Evaluation  [facility]     Patient Name: Sander White  : 1955  MRN: [id number]  Today's Date: 2017   Onset of Illness/Injury or Date of Surgery Date: (P) 02/15/17  Date of Referral to PT: (P) 17  Referring Physician: Resendiz (P)      Admit Date: 2/15/2017     Visit Dx:    ICD-10-CM ICD-9-CM   1. ST elevation myocardial infarction (STEMI), unspecified artery I21.3 410.90   2. Cardiac arrest with ventricular fibrillation I46.9 427.5    I49.01 427.41     Patient Active Problem List   Diagnosis   • Airway hyperreactivity   • Elevated cholesterol   • Gain of weight   • ED (erectile dysfunction)   • ST elevation myocardial infarction (STEMI)     Past Medical History   Diagnosis Date   • Arthritis    • Chronic pain in right foot    • ED (erectile dysfunction) 7/15/2016   • GERD (gastroesophageal reflux disease)    • Hypertension      Past Surgical History   Procedure Laterality Date   • Colonoscopy N/A 2015     Normal   • Appendectomy     • Hernia repair Right      Inguinal   • Cyst removal N/A      Neck   • Cholecystectomy     • Pr rt/lt heart catheters N/A 2/15/2017     Procedure: Percutaneous Coronary Intervention;  Surgeon: Kristina Billings MD;  Location: [facility] INVASIVE LOCATION;  Service: Cardiovascular   • Cardiac catheterization N/A 2/15/2017     Procedure: Left Heart Cath;  Surgeon: Kristina Billings MD;  Location: [facility] CATH INVASIVE LOCATION;  Service:    • Cardiac catheterization  2/15/2017     Procedure: Percutaneous Manual Thrombectomy;  Surgeon: Kristina Billings MD;  Location: [facility] CATH INVASIVE LOCATION;  Service:    • Cardiac catheterization N/A 2/15/2017     Procedure: Coronary angiography;  Surgeon: Kristina Billings MD;  Location: [facility] CATH INVASIVE LOCATION;  Service:    • Cardiac catheterization N/A 2/15/2017     Procedure: Left ventriculography;  Surgeon: Kristina Billings MD;  Location: [facility]  INVASIVE LOCATION;  Service:           PT ASSESSMENT (last 72 hours)      PT Evaluation       02/17/17 1430 02/17/17 1004    Rehab Evaluation    Document Type (P)  evaluation  -MS evaluation  -NB    Subjective Information (P)  agree to therapy;complains of;fatigue;weakness;pain  -MS     Patient Effort, Rehab Treatment (P)  good  -MS     Symptoms Noted During/After Treatment (P)  dizziness;fatigue;increased pain  -MS fatigue  -NB    General Information    Patient Profile Review (P)  yes  -MS     Onset of Illness/Injury or Date of Surgery Date (P)  02/15/17  -MS     Referring Physician (P)  Astrid  -MS     General Observations (P)  Pt supine in bed w no acute signs of distress.  -MS     Precautions/Limitations (P)  fall precautions  -MS     Prior Level of Function (P)  independent:  -MS     Equipment Currently Used at Home (P)  none  -MS     Living Environment    Lives With (P)  spouse  -MS     Living Arrangements (P)  house  -MS     Home Accessibility (P)  no concerns  -MS     Stair Railings at Home (P)  none  -MS     Clinical Impression    Date of Referral to PT (P)  02/17/17  -MS     PT Diagnosis (P)  Decreased endurance w impaired balance  -MS     Prognosis (P)  Good  -MS     Functional Level At Time Of Evaluation (P)  Min A X2  -MS     Criteria for Skilled Therapeutic Interventions Met (P)  yes;treatment indicated  -MS     Pathology/Pathophysiology Noted (Describe Specifically for Each System) (P)  musculoskeletal  -MS     Impairments Found (describe specific impairments) (P)  aerobic capacity/endurance;arousal, attention, and cognition;gait, locomotion, and balance;posture  -MS     Functional Limitations in Following Categories (Describe Specific Limitations) (P)  self-care;community/leisure  -MS     Disability: Inability to Perform Actions/Activities of Required Roles (describe specific disability) (P)  community/leisure  -MS     Rehab Potential (P)  good, to achieve stated therapy goals  -MS     Predicted  Duration of Therapy Intervention (days/wks) (P)  1 Week  -MS     Pain Assessment    Pain Assessment (P)  FLACC  -MS No/denies pain  -NB    Pain Score (P)  4  -MS     Pain Location (P)  Chest  -MS     Pain Intervention(s) (P)  Repositioned;Ambulation/increased activity  -MS     Cognitive Assessment/Intervention    Current Cognitive/Communication Assessment (P)  impaired  -MS impaired  -NB    Orientation Status (P)  oriented to;person;situation  -MS oriented to;person;disoriented to;place;time;situation  -NB    Follows Commands/Answers Questions (P)  100% of the time  -% of the time;able to follow single-step instructions  -NB    Personal Safety (P)  decreased insight to deficits  -MS     ROM (Range of Motion)    General ROM (P)  no range of motion deficits identified  -MS     MMT (Manual Muscle Testing)    General MMT Assessment (P)  upper extremity strength deficits identified;lower extremity strength deficits identified  -MS     Upper Extremity    Upper Ext Manual Muscle Testing (P)  left UE strength is WFL;right UE strength is WFL  -MS     Lower Extremity    Lower Ext Manual Muscle Testing (P)  left LE strength is WFL;right LE strength is WFL  -MS     Bed Mobility, Assessment/Treatment    Bed Mobility, Assistive Device (P)  head of bed elevated  -MS     Bed Mobility, Scoot/Bridge, [place] (P)  minimum assist (75% patient effort);verbal cues required  -MS     Bed Mob, Supine to Sit, [place] (P)  minimum assist (75% patient effort);verbal cues required  -MS     Bed Mob, Sit to Supine, [place] (P)  minimum assist (75% patient effort);verbal cues required  -MS     Bed Mobility, Safety Issues (P)  decreased use of arms for pushing/pulling  -MS     Bed Mobility, Impairments (P)  strength decreased;impaired balance;coordination impaired  -MS     Transfer Assessment/Treatment    Transfers, Sit-Stand [place] (P)  minimum assist (75% patient effort);2 person assist required;hand held assist  -MS      Transfers, Stand-Sit [place] (P)  minimum assist (75% patient effort);2 person assist required;hand held assist  -MS     Transfer, Safety Issues (P)  loses balance backward;weight-shifting ability decreased;step length decreased  -MS     Transfer, Impairments (P)  strength decreased;impaired balance;coordination impaired  -MS     Transfer, Comment (P)  Pt able to take 3 steps to HOB. Pt became dizzy/light-headed asking to sit.  -MS     Gait Assessment/Treatment    Gait, Comment (P)  Pt able to take 3 steps to HOB.  -MS     Stairs Assessment/Treatment    Stairs, [place] Level (P)  not tested  -MS     Therapy Exercises    Right Lower Extremity (P)  AROM:;15 reps;LAQ  -MS     Left Lower Extremity (P)  AROM:;5 reps;LAQ   Repetitions limited by increased dizziness and feeling faint  -MS     Positioning and Restraints    Pre-Treatment Position (P)  in bed  -MS     Post Treatment Position (P)  bed  -MS     In Bed (P)  supine;call light within reach;encouraged to call for assist;with family/caregiver;SCD pump applied  -MS       02/16/17 1630 02/15/17 0949    General Information    Equipment Currently Used at Home none  -SI     Living Environment    Lives With spouse  -SI spouse  -RB    Living Arrangements house  -SI house  -RB    Home Accessibility  no concerns  -RB    Stair Railings at Home  none  -RB    Type of Financial/Environmental Concern  none  -RB    Transportation Available family or friend will provide  -SI car  -RB      02/15/17 0942       General Information    Equipment Currently Used at Home none  -RB       User Key  (r) = Recorded By, (t) = Taken By, (c) = Cosigned By    Initials Name Provider Type    SAMUEL Clay MS CCC-SLP Speech and Language Pathologist    RONDA Franco, RN Case Manager    MS Christina Luciano, PT Student PT Student    RB Jose Sterling RN Registered Nurse          Physical Therapy Education     Title: PT OT SLP Therapies (Active)     Topic: Physical Therapy (Active)      Point: Precautions (Active)    Learning Progress Summary    Learner Readiness Method Response Comment Documented by Status   Patient Acceptance E NR  MS 02/17/17 1443 Active                      User Key     Initials Effective Dates Name Provider Type Discipline    MS 01/09/17 -  Christina Luciano, PT Student PT Student PT                PT Recommendation and Plan  Anticipated Discharge Disposition: (P) home with home health  Planned Therapy Interventions: (P) bed mobility training, transfer training, gait training, stair training  PT Frequency: (P) daily  Plan of Care Review  Plan Of Care Reviewed With: (P) patient  Outcome Summary/Follow up Plan: (P) Pt presents w decreased tolerance to activity w pt reporting increased dizziness upon standing and w exercises at bedside. Pt demonstrates decreased balance leading to increased fall risk and limited independence w functional mobility. Due to the above skilled PT is indicated at this time.          IP PT Goals       02/17/17 1443          Bed Mobility PT LTG    Bed Mobility PT LTG, Date Established (P)  02/17/17  -MS      Bed Mobility PT LTG, Time to Achieve (P)  1 wk  -MS      Bed Mobility PT LTG, Activity Type (P)  supine to sit/sit to supine  -MS      Bed Mobility PT LTG, [place] Level (P)  supervision required  -MS      Transfer Training PT LTG    Transfer Training PT LTG, Date Established (P)  02/17/17  -MS      Transfer Training PT LTG, Time to Achieve (P)  1 wk  -MS      Transfer Training PT LTG, Activity Type (P)  sit to stand/stand to sit  -MS      Transfer Training PT LTG, [place] Level (P)  supervision required  -MS      Gait Training PT LTG    Gait Training Goal PT LTG, Date Established (P)  02/17/17  -MS      Gait Training Goal PT LTG, Time to Achieve (P)  1 wk  -MS      Gait Training Goal PT LTG, [place] Level (P)  supervision required  -MS      Gait Training Goal PT LTG, Distance to Achieve (P)  250  -MS      Stair Training PT LTG    Stair  Training Goal PT LTG, Date Established (P)  02/17/17  -MS      Stair Training Goal PT LTG, Time to Achieve (P)  1 wk  -MS      Stair Training Goal PT LTG, Number of Steps (P)  5  -MS      Stair Training Goal PT LTG, [place] Level (P)  contact guard assist  -MS      Stair Training Goal PT LTG, Assist Device (P)  1 handrail  -MS        User Key  (r) = Recorded By, (t) = Taken By, (c) = Cosigned By    Initials Name Provider Type    MS Christina Luciano, PT Student PT Student                Outcome Measures       02/17/17 1400          How much help from another person do you currently need...    Turning from your back to your side while in flat bed without using bedrails? (P)  3  -MS      Moving from lying on back to sitting on the side of a flat bed without bedrails? (P)  3  -MS      Moving to and from a bed to a chair (including a wheelchair)? (P)  3  -MS      Standing up from a chair using your arms (e.g., wheelchair, bedside chair)? (P)  3  -MS      Climbing 3-5 steps with a railing? (P)  2  -MS      To walk in hospital room? (P)  2  -MS      AM-PAC 6 Clicks Score (P)  16  -MS      Functional Assessment    Outcome Measure Options (P)  AM-PAC 6 Clicks Basic Mobility (PT)  -MS        User Key  (r) = Recorded By, (t) = Taken By, (c) = Cosigned By    Initials Name Provider Type    MS Christina Luciano PT Student PT Student           Time Calculation:         PT Charges       02/17/17 1448          Time Calculation    Start Time (P)  1317  -MS      Stop Time (P)  1339  -MS      Time Calculation (min) (P)  22 min  -MS      PT Received On (P)  02/17/17  -MS      PT - Next Appointment (P)  02/18/17  -MS      PT Goal Re-Cert Due Date (P)  02/24/17  -MS        User Key  (r) = Recorded By, (t) = Taken By, (c) = Cosigned By    Initials Name Provider Type    MS Christina Luciano PT Student PT Student          Therapy Charges for Today     Code Description Service Date Service Provider Modifiers Qty    [id number]  PT EVAL LOW  COMPLEXITY 2 2/17/2017 Christina Luciano, PT Student GP 1    13230814334 HC PT THER SUPP EA 15 MIN 2/17/2017 Christina Luciano, PT Student GP 1    79432616167 HC PT THER PROC EA 15 MIN 2/17/2017 Christina Luciano, PT Student GP 1          PT G-Codes  Outcome Measure Options: (P) AM-PAC 6 Clicks Basic Mobility (PT)      Christina Luciano PT Student  2/17/2017

## 2017-02-17 NOTE — PROGRESS NOTES
Acute Care - Physical Therapy Initial Evaluation  [facility]     Patient Name: Sander White  : 1955  MRN: [id number]  Today's Date: 2017                Admit Date: 2/15/2017     Visit Dx:    ICD-10-CM ICD-9-CM   1. ST elevation myocardial infarction (STEMI), unspecified artery I21.3 410.90   2. Cardiac arrest with ventricular fibrillation I46.9 427.5    I49.01 427.41     Patient Active Problem List   Diagnosis   • Airway hyperreactivity   • Elevated cholesterol   • Gain of weight   • ED (erectile dysfunction)   • ST elevation myocardial infarction (STEMI)     Past Medical History   Diagnosis Date   • Arthritis    • Chronic pain in right foot    • ED (erectile dysfunction) 7/15/2016   • GERD (gastroesophageal reflux disease)    • Hypertension      Past Surgical History   Procedure Laterality Date   • Colonoscopy N/A 2015     Normal   • Appendectomy     • Hernia repair Right      Inguinal   • Cyst removal N/A      Neck   • Cholecystectomy     • Pr rt/lt heart catheters N/A 2/15/2017     Procedure: Percutaneous Coronary Intervention;  Surgeon: Kristina Billings MD;  Location: [facility] INVASIVE LOCATION;  Service: Cardiovascular   • Cardiac catheterization N/A 2/15/2017     Procedure: Left Heart Cath;  Surgeon: Kristina Billings MD;  Location: [facility] CATH INVASIVE LOCATION;  Service:    • Cardiac catheterization  2/15/2017     Procedure: Percutaneous Manual Thrombectomy;  Surgeon: Kristina Billings MD;  Location: [facility] CATH INVASIVE LOCATION;  Service:    • Cardiac catheterization N/A 2/15/2017     Procedure: Coronary angiography;  Surgeon: Kristina Billings MD;  Location: [facility] CATH INVASIVE LOCATION;  Service:    • Cardiac catheterization N/A 2/15/2017     Procedure: Left ventriculography;  Surgeon: Kristina Billings MD;  Location: [facility] CATH INVASIVE LOCATION;  Service:           PT ASSESSMENT (last 72 hours)      PT Evaluation       17 1430 17 1004     Rehab Evaluation    Document Type (P)  evaluation  -MS evaluation  -NB    Subjective Information (P)  agree to therapy;complains of;fatigue;weakness;pain  -MS     Patient Effort, Rehab Treatment (P)  good  -MS     Symptoms Noted During/After Treatment (P)  dizziness;fatigue;increased pain  -MS fatigue  -NB    General Information    Precautions/Limitations (P)  fall precautions  -MS     Pain Assessment    Pain Assessment (P)  FLACC  -MS No/denies pain  -NB    Pain Score (P)  4  -MS     Pain Location (P)  Chest  -MS     Pain Intervention(s) (P)  Repositioned;Ambulation/increased activity  -MS     Cognitive Assessment/Intervention    Current Cognitive/Communication Assessment (P)  impaired  -MS impaired  -NB    Orientation Status (P)  oriented to;person;situation  -MS oriented to;person;disoriented to;place;time;situation  -NB    Follows Commands/Answers Questions (P)  100% of the time  -% of the time;able to follow single-step instructions  -NB    Personal Safety (P)  decreased insight to deficits  -MS     ROM (Range of Motion)    General ROM (P)  no range of motion deficits identified  -MS     MMT (Manual Muscle Testing)    General MMT Assessment (P)  upper extremity strength deficits identified;lower extremity strength deficits identified  -MS     Upper Extremity    Upper Ext Manual Muscle Testing (P)  left UE strength is WFL;right UE strength is WFL  -MS     Lower Extremity    Lower Ext Manual Muscle Testing (P)  left LE strength is WFL;right LE strength is WFL  -MS     Bed Mobility, Assessment/Treatment    Bed Mobility, Assistive Device (P)  head of bed elevated  -MS     Bed Mobility, Scoot/Bridge, [place] (P)  minimum assist (75% patient effort);verbal cues required  -MS     Bed Mob, Supine to Sit, [place] (P)  minimum assist (75% patient effort);verbal cues required  -MS     Bed Mob, Sit to Supine, [place] (P)  minimum assist (75% patient effort);verbal cues required  -MS     Bed Mobility,  Safety Issues (P)  decreased use of arms for pushing/pulling  -MS     Bed Mobility, Impairments (P)  strength decreased;impaired balance;coordination impaired  -MS     Transfer Assessment/Treatment    Transfers, Sit-Stand [place] (P)  minimum assist (75% patient effort);2 person assist required;hand held assist  -MS     Transfers, Stand-Sit [place] (P)  minimum assist (75% patient effort);2 person assist required;hand held assist  -MS     Transfer, Safety Issues (P)  loses balance backward;weight-shifting ability decreased;step length decreased  -MS     Transfer, Impairments (P)  strength decreased;impaired balance;coordination impaired  -MS     Transfer, Comment (P)  Pt able to take 3 steps to HOB. Pt became dizzy/light-headed asking to sit.  -MS     Gait Assessment/Treatment    Gait, Comment (P)  Pt able to take 3 steps to HOB.  -MS     Stairs Assessment/Treatment    Stairs, [place] Level (P)  not tested  -MS     Therapy Exercises    Right Lower Extremity (P)  AROM:;15 reps;LAQ  -MS     Left Lower Extremity (P)  AROM:;5 reps;LAQ   Repetitions limited by increased dizziness and feeling faint  -MS     Positioning and Restraints    Pre-Treatment Position (P)  in bed  -MS     Post Treatment Position (P)  bed  -MS     In Bed (P)  supine;call light within reach;encouraged to call for assist;with family/caregiver;SCD pump applied  -MS       02/16/17 1630 02/15/17 0949    General Information    Equipment Currently Used at Home none  -SI     Living Environment    Lives With spouse  -SI spouse  -RB    Living Arrangements house  -SI house  -RB    Home Accessibility  no concerns  -RB    Stair Railings at Home  none  -RB    Type of Financial/Environmental Concern  none  -RB    Transportation Available family or friend will provide  -SI car  -RB      02/15/17 0942       General Information    Equipment Currently Used at Home none  -RB       User Key  (r) = Recorded By, (t) = Taken By, (c) = Cosigned By     Initials Name Provider Type    NB Dawn Dukerego, MS CCC-SLP Speech and Language Pathologist    RONDA Franco, RN Case Manager    MS Christina Ankita, PT Student PT Student    RB Jose Sterling, RN Registered Nurse          Physical Therapy Education     Title: PT OT SLP Therapies (Active)     Topic: Physical Therapy (Active)     Point: Precautions (Active)    Learning Progress Summary    Learner Readiness Method Response Comment Documented by Status   Patient Acceptance E NR  MS 02/17/17 1443 Active                      User Key     Initials Effective Dates Name Provider Type Discipline    MS 01/09/17 -  Christina Luciano, PT Student PT Student PT                PT Recommendation and Plan     Plan of Care Review  Plan Of Care Reviewed With: (P) patient  Outcome Summary/Follow up Plan: (P) Pt presents w decreased tolerance to activity w pt reporting increased dizziness upon standing and w exercises at bedside. Pt demonstrates decreased balance leading to increased fall risk and limited independence w functional mobility. Due to the above skilled PT is indicated at this time.          IP PT Goals       02/17/17 1443          Bed Mobility PT LTG    Bed Mobility PT LTG, Date Established (P)  02/17/17  -MS      Bed Mobility PT LTG, Time to Achieve (P)  1 wk  -MS      Bed Mobility PT LTG, Activity Type (P)  supine to sit/sit to supine  -MS      Bed Mobility PT LTG, [place] Level (P)  supervision required  -MS      Transfer Training PT LTG    Transfer Training PT LTG, Date Established (P)  02/17/17  -MS      Transfer Training PT LTG, Time to Achieve (P)  1 wk  -MS      Transfer Training PT LTG, Activity Type (P)  sit to stand/stand to sit  -MS      Transfer Training PT LTG, [place] Level (P)  supervision required  -MS      Gait Training PT LTG    Gait Training Goal PT LTG, Date Established (P)  02/17/17  -MS      Gait Training Goal PT LTG, Time to Achieve (P)  1 wk  -MS      Gait Training Goal PT LTG, [place]  Level (P)  supervision required  -MS      Gait Training Goal PT LTG, Distance to Achieve (P)  250  -MS      Stair Training PT LTG    Stair Training Goal PT LTG, Date Established (P)  02/17/17  -MS      Stair Training Goal PT LTG, Time to Achieve (P)  1 wk  -MS      Stair Training Goal PT LTG, Number of Steps (P)  5  -MS      Stair Training Goal PT LTG, [place] Level (P)  contact guard assist  -MS      Stair Training Goal PT LTG, Assist Device (P)  1 handrail  -MS        User Key  (r) = Recorded By, (t) = Taken By, (c) = Cosigned By    Initials Name Provider Type    MS Vasquez Ankita, PT Student PT Student                Outcome Measures       02/17/17 1400          How much help from another person do you currently need...    Turning from your back to your side while in flat bed without using bedrails? (P)  3  -MS      Moving from lying on back to sitting on the side of a flat bed without bedrails? (P)  3  -MS      Moving to and from a bed to a chair (including a wheelchair)? (P)  3  -MS      Standing up from a chair using your arms (e.g., wheelchair, bedside chair)? (P)  3  -MS      Climbing 3-5 steps with a railing? (P)  2  -MS      To walk in hospital room? (P)  2  -MS      AM-PAC 6 Clicks Score (P)  16  -MS      Functional Assessment    Outcome Measure Options (P)  AM-PAC 6 Clicks Basic Mobility (PT)  -MS        User Key  (r) = Recorded By, (t) = Taken By, (c) = Cosigned By    Initials Name Provider Type    MS Vasquez Ankita PT Student PT Student           Time Calculation:         PT Charges       02/17/17 1448          Time Calculation    Start Time (P)  1317  -MS      Stop Time (P)  1339  -MS      Time Calculation (min) (P)  22 min  -MS      PT Received On (P)  02/17/17  -MS      PT - Next Appointment (P)  02/18/17  -MS      PT Goal Re-Cert Due Date (P)  02/24/17  -MS        User Key  (r) = Recorded By, (t) = Taken By, (c) = Cosigned By    Initials Name Provider Type    MS Vasquez Ankita, PT Student PT  Student          Therapy Charges for Today     Code Description Service Date Service Provider Modifiers Qty    42999118754 HC PT EVAL LOW COMPLEXITY 2 2/17/2017 Christina Luciano, PT Student GP 1    53384184324 HC PT THER SUPP EA 15 MIN 2/17/2017 Chirstina Luciano PT Student GP 1    81907952387 HC PT THER PROC EA 15 MIN 2/17/2017 Christina Luciano PT Student GP 1          PT G-Codes  Outcome Measure Options: (P) AM-PAC 6 Clicks Basic Mobility (PT)      Christina Luciano PT Student  2/17/2017

## 2017-02-17 NOTE — PROGRESS NOTES
Patient Identification:  NAME:  Sander White  Age:  61 y.o.   Sex:  male   :  1955   MRN:  [id number]       Chief complaint: An anoxic encephalopathy    History of present illness:  Doing much better awake alert talking to everyone moving all extremities short-term memory is seems to be affected but otherwise no seizure activity and everybody is happy      Past medical history:  Past Medical History   Diagnosis Date   • Arthritis    • Chronic pain in right foot    • ED (erectile dysfunction) 7/15/2016   • GERD (gastroesophageal reflux disease)    • Hypertension        Allergies:  Amoxicillin and Penicillins    Home medications:  Prescriptions Prior to Admission   Medication Sig Dispense Refill Last Dose   • azithromycin (ZITHROMAX Z-KENDRICK) 250 MG tablet Take 1 tablet by mouth Daily. Take 2 tablets the first day, then 1 tablet daily for 4 days. 6 tablet 0    • fluticasone (FLONASE) 50 MCG/ACT nasal spray 2 sprays into each nostril Daily for 30 days. Administer 2 sprays in each nostril for each dose. 9.9 mL 1    • lisinopril (ZESTRIL) 2.5 MG tablet Take 1 tablet by mouth daily. 90 tablet 3 Taking   • Omega-3 Fatty Acids (OMEGA 3 PO) Take 1 capsule by mouth daily.   Taking   • Pyridoxine HCl (VITAMIN B-6 PO) Take 1 tablet by mouth daily.   Taking   • sildenafil (VIAGRA) 100 MG tablet Take 1 tablet by mouth Daily As Needed for erectile dysfunction. 10 tablet 2    • vitamin B-12 (CYANOCOBALAMIN) 1000 MCG tablet Take 1,000 mcg by mouth daily.   Taking   • vitamin C (ASCORBIC ACID) 500 MG tablet Take 500 mg by mouth daily.   Taking        Hospital medications:    alteplase 2 mg Intracatheter Once   amiodarone 400 mg Oral Q12H   artificial tears  Both Eyes Q4H   aspirin 81 mg Oral Daily   carvedilol 3.125 mg Oral Q12H   clindamycin 300 mg Intravenous Q8H   lisinopril 2.5 mg Oral Daily   pantoprazole 40 mg Intravenous Q AM   ticagrelor 90 mg Oral BID       dexmedetomidine 0.2-1.5 mcg/kg/hr Last Rate: Stopped  (02/16/17 1621)     •  acetaminophen **OR** acetaminophen  •  ALPRAZolam  •  dextrose  •  dextrose  •  glucagon (human recombinant)  •  influenza vaccine  •  Morphine  •  ondansetron  •  pneumococcal polysaccharide 23-valent  •  sodium chloride  •  sodium phosphate IVPB      Objective:  Vitals Ranges:   Temp:  [97.8 °F (36.6 °C)-100.6 °F (38.1 °C)] 97.8 °F (36.6 °C)  Heart Rate:  [] 84  Resp:  [12] 12  BP: ()/(62-94) 113/79  FiO2 (%):  [30 %] 30 %      Physical Exam:  Awake alert does not recall any objects after a few minutes both hands he recalls one out of 3 good cranial nerves II through VII good motor ×4 extremities no atrophy fasciculations rigidity toes downgoing bilaterally    Results review:   I reviewed the patient's new clinical results.    Data review:  Lab Results (last 24 hours)     Procedure Component Value Units Date/Time    POC Glucose Fingerstick [[id number]]  (Normal) Collected:  02/16/17 1422    Specimen:  Blood Updated:  02/16/17 1442     Glucose 81 mg/dL     Narrative:       Meter: [id number] : [id number] Garcia    Blood Gas, Arterial [[id number]]  (Abnormal) Collected:  02/16/17 1519    Specimen:  Arterial Blood Updated:  02/16/17 1523     Site Arterial: left radial      Agapito's Test Positive      pH, Arterial 7.435 pH units      pCO2, Arterial 36.3 mm Hg      pO2, Arterial 66.5 (L) mm Hg      HCO3, Arterial 24.4 mmol/L      Base Excess, Arterial 0.5 mmol/L      O2 Saturation Calculated 93.6 %      A-a Gradiant 0.4 mmHg      Barometric Pressure for Blood Gas 750.5 mmHg      Modality Adult Vent      FIO2 30 %      Ventilator Mode PS      Set [place] Volume 775      Set [facility] 14      Rate 14 Breaths/minute      PEEP 5      PSV 8 cmH2O     Narrative:       SATS 96%, Meter: [id number] : [id number] Gracie Grey    POC Glucose Fingerstick [[id number]]  (Normal) Collected:  02/16/17 1601    Specimen:  Blood Updated:  02/16/17 1602     Glucose 93 mg/dL     Narrative:        Meter: [id number] : [id number] Thomas Orta    CBC & Differential [[id number]] Collected:  02/16/17 1751    Specimen:  Blood Updated:  02/16/17 1810    Narrative:       The following orders were created for panel order CBC & Differential.  Procedure                               Abnormality         Status                     ---------                               -----------         ------                     CBC Auto Differential[[id number]]         Abnormal            Final result                 Please view results for these tests on the individual orders.    CBC Auto Differential [[id number]]  (Abnormal) Collected:  02/16/17 1751    Specimen:  Blood Updated:  02/16/17 1810     WBC 16.89 (H) 10*3/mm3      RBC 4.48 (L) 10*6/mm3      Hemoglobin 13.5 (L) g/dL      Hematocrit 40.7 %      MCV 90.8 fL      MCH 30.1 pg      MCHC 33.2 g/dL      RDW 13.2 %      RDW-SD 43.8 fl      MPV 11.3 fL      Platelets 163 10*3/mm3      Neutrophil % 84.9 (H) %      Lymphocyte % 3.9 (L) %      Monocyte % 10.5 %      Eosinophil % 0.1 (L) %      Basophil % 0.1 %      Immature Grans % 0.5 %      Neutrophils, Absolute 14.36 (H) 10*3/mm3      Lymphocytes, Absolute 0.66 (L) 10*3/mm3      Monocytes, Absolute 1.77 (H) 10*3/mm3      Eosinophils, Absolute 0.01 10*3/mm3      Basophils, Absolute 0.01 10*3/mm3      Immature Grans, Absolute 0.08 (H) 10*3/mm3     Protime-INR [[id number]]  (Normal) Collected:  02/16/17 1751    Specimen:  Blood Updated:  02/16/17 1820     Protime 13.3 Seconds      INR 1.05     aPTT [[id number]]  (Normal) Collected:  02/16/17 1751    Specimen:  Blood Updated:  02/16/17 1820     PTT 28.0 seconds     Lactic Acid, Plasma [[id number]]  (Abnormal) Collected:  02/16/17 1751    Specimen:  Blood Updated:  02/16/17 1827     Lactate 2.6 (C) mmol/L     Calcium, Ionized [[id number]]  (Normal) Collected:  02/16/17 1751    Specimen:  Blood Updated:  02/16/17 1828     Ionized Calcium 1.19 mmol/L      Ionized Calcium 4.8 mg/dL     Basic  Metabolic Panel [[id number]]  (Abnormal) Collected:  02/16/17 1751    Specimen:  Blood Updated:  02/16/17 1830     Glucose 118 (H) mg/dL      BUN 16 mg/dL      Creatinine 0.98 mg/dL      Sodium 142 mmol/L      Potassium 3.8 mmol/L      Chloride 107 mmol/L      CO2 22.1 mmol/L      Calcium 8.2 (L) mg/dL      eGFR Non African Amer 78 mL/min/1.73      BUN/Creatinine Ratio 16.3      Anion Gap 12.9 mmol/L     Narrative:       GFR Normal >60  Chronic Kidney Disease <60  Kidney Failure <15    Magnesium [[id number]]  (Normal) Collected:  02/16/17 1751    Specimen:  Blood Updated:  02/16/17 1830     Magnesium 1.8 mg/dL     Phosphorus [[id number]]  (Abnormal) Collected:  02/16/17 1751    Specimen:  Blood Updated:  02/16/17 1832     Phosphorus 1.2 (L) mg/dL     POC Glucose Fingerstick [[id number]]  (Normal) Collected:  02/16/17 1948    Specimen:  Blood Updated:  02/16/17 1950     Glucose 94 mg/dL     Narrative:       Meter: [id number] : [id number] Natan CARD    Lactate Acid, Reflex [[id number]]  (Normal) Collected:  02/16/17 2157    Specimen:  Blood Updated:  02/16/17 2300     Lactate 1.5 mmol/L     Troponin [[id number]]  (Abnormal) Collected:  02/16/17 2241    Specimen:  Blood Updated:  02/16/17 2325     Troponin T 1.900 (C) ng/mL     Narrative:       Troponin T Reference Ranges:  Less than 0.03 ng/mL:    Negative for AMI  0.03 to 0.09 ng/mL:      Indeterminant for AMI  Greater than 0.09 ng/mL: Positive for AMI    Urinalysis With / Culture If Indicated [[id number]]  (Abnormal) Collected:  02/16/17 2246    Specimen:  Urine from Urine, Catheter Updated:  02/16/17 2335     Color, UA Yellow      Appearance, UA Cloudy (A)      pH, UA 5.5      Specific Gravity, UA 1.018      Glucose, UA Negative      Ketones, UA Trace (A)      Bilirubin, UA Negative      Blood, UA Large (3+) (A)      Protein, UA Negative      Leuk Esterase, UA Negative      Nitrite, UA Negative      Urobilinogen, UA 0.2 E.U./dL     Urinalysis, Microscopic Only  [[id number]]  (Abnormal) Collected:  02/16/17 2246    Specimen:  Urine from Urine, Catheter Updated:  02/16/17 2336     RBC, UA 21-30 (A) /HPF      WBC, UA 3-5 (A) /HPF      Bacteria, UA None Seen /HPF      Squamous Epithelial Cells, UA 0-2 /HPF      Hyaline Casts, UA 0-2 /LPF      Methodology Automated Microscopy     POC Glucose Fingerstick [[id number]]  (Normal) Collected:  02/17/17 0011    Specimen:  Blood Updated:  02/17/17 0012     Glucose 100 mg/dL     Narrative:       Meter: [id number] : [id number] Isac Audrey    POC Glucose Fingerstick [[id number]]  (Normal) Collected:  02/17/17 0425    Specimen:  Blood Updated:  02/17/17 0427     Glucose 102 mg/dL     Narrative:       Meter: [id number] : [id number] Jakryemi Toya    CBC (No Diff) [[id number]]  (Abnormal) Collected:  02/17/17 0426    Specimen:  Blood Updated:  02/17/17 0455     WBC 10.47 10*3/mm3      RBC 4.00 (L) 10*6/mm3      Hemoglobin 12.3 (L) g/dL      Hematocrit 36.6 (L) %      MCV 91.5 fL      MCH 30.8 pg      MCHC 33.6 g/dL      RDW 13.3 %      RDW-SD 44.5 fl      MPV 11.5 fL      Platelets 154 10*3/mm3     Magnesium [[id number]]  (Normal) Collected:  02/17/17 0426    Specimen:  Blood Updated:  02/17/17 0519     Magnesium 1.8 mg/dL     Basic Metabolic Panel [[id number]]  (Abnormal) Collected:  02/17/17 0426    Specimen:  Blood Updated:  02/17/17 0520     Glucose 109 (H) mg/dL      BUN 14 mg/dL      Creatinine 0.91 mg/dL      Sodium 141 mmol/L      Potassium 3.5 mmol/L      Chloride 104 mmol/L      CO2 23.0 mmol/L      Calcium 8.0 (L) mg/dL      eGFR Non African Amer 85 mL/min/1.73      BUN/Creatinine Ratio 15.4      Anion Gap 14.0 mmol/L     Narrative:       GFR Normal >60  Chronic Kidney Disease <60  Kidney Failure <15    Troponin [[id number]]  (Abnormal) Collected:  02/17/17 0426    Specimen:  Blood Updated:  02/17/17 0521     Troponin T 1.960 (C) ng/mL     Narrative:       Troponin T Reference Ranges:  Less than 0.03 ng/mL:    Negative for AMI  0.03  to 0.09 ng/mL:      Indeterminant for AMI  Greater than 0.09 ng/mL: Positive for AMI    POC Glucose Fingerstick [[id number]]  (Normal) Collected:  02/17/17 0725    Specimen:  Blood Updated:  02/17/17 0727     Glucose 95 mg/dL     Narrative:       Meter: [id number] : [id number] Dawit Lai    Blood Culture [[id number]]  (Normal) Collected:  02/16/17 2007    Specimen:  Blood from Blood, Central Line Updated:  02/17/17 0901     Blood Culture No growth at less than 24 hours     Blood Culture [[id number]]  (Normal) Collected:  02/16/17 2024    Specimen:  Blood from Arm, Left Updated:  02/17/17 0901     Blood Culture No growth at less than 24 hours     POC Glucose Fingerstick [[id number]]  (Normal) Collected:  02/17/17 1108    Specimen:  Blood Updated:  02/17/17 1111     Glucose 102 mg/dL     Narrative:       Meter: [id number] : [id number] Dawit Lai           Imaging:  Imaging Results (last 24 hours)     Procedure Component Value Units Date/Time    XR Chest 1 View [[id number]] Collected:  02/16/17 1819     Updated:  02/16/17 1826    Narrative:       XR CHEST 1 VW-     HISTORY: Male who is 61 years-old,  fever     TECHNIQUE: Frontal view of the chest     COMPARISON: 02/15/2017     FINDINGS: Right IJ catheter appears stable. Endotracheal tube and NG  tube have been removed. Heart size is normal. Right lung opacity is  partially diminished, with residual right perihilar infiltrate  demonstrated. Minimal left basilar atelectasis or infiltrate is  apparent. No pleural effusion or pneumothorax. No acute osseous process.       Impression:       Partial interval improvement of right lung opacity. Minimal  left basilar atelectasis/infiltrate. Continued follow-up suggested.     This report was finalized on 2/16/2017 6:23 PM by Dr. Ld Casas MD.                Assessment and Plan:     Active Problems:    ST elevation myocardial infarction (STEMI)    Wow!!  Miraculously better today awake alert and with nonfocal  neurologic exam.  He does have some short-term memory deficit as I would expect but he looks great!!      Jeff Reyes MD  02/17/17  1:33 PM

## 2017-02-17 NOTE — PLAN OF CARE
Problem: Patient Care Overview (Adult)  Goal: Plan of Care Review  Outcome: Ongoing (interventions implemented as appropriate)  Goal: Adult Individualization and Mutuality  Outcome: Ongoing (interventions implemented as appropriate)  Goal: Discharge Needs Assessment  Outcome: Ongoing (interventions implemented as appropriate)    Problem: Fall Risk (Adult)  Goal: Identify Related Risk Factors and Signs and Symptoms  Outcome: Ongoing (interventions implemented as appropriate)  Goal: Absence of Falls  Outcome: Ongoing (interventions implemented as appropriate)    Problem: Skin Integrity Impairment, Risk/Actual (Adult)  Goal: Identify Related Risk Factors and Signs and Symptoms  Outcome: Ongoing (interventions implemented as appropriate)  Goal: Skin Integrity/Wound Healing  Outcome: Ongoing (interventions implemented as appropriate)    Problem: Cardiac Catheterization with/without PCI (Adult)  Goal: Signs and Symptoms of Listed Potential Problems Will be Absent or Manageable (Cardiac Catheterization with/without PCI)  Outcome: Ongoing (interventions implemented as appropriate)

## 2017-02-17 NOTE — PROGRESS NOTES
Continued Stay Note  [facility]     Patient Name: Sander White  MRN: [id number]  Today's Date: 2/17/2017    Admit Date: 2/15/2017          Discharge Plan       02/17/17 1537    Case Management/Social Work Plan    Plan [facility] to evaluate.    Additional Comments Soke with pt at bedside.  Discussed levels of rehab and providers.  Pt would like referral to [facility].  Will get PT/OT evals.  Referral called to Edith with [facility].              Discharge Codes     None            Alexia Franco RN

## 2017-02-17 NOTE — PLAN OF CARE
Problem: Patient Care Overview (Adult)  Goal: Plan of Care Review  Outcome: Ongoing (interventions implemented as appropriate)    02/17/17 0624   Outcome Evaluation   Outcome Summary/Follow up Plan Blood and urine cultures drawn r/t increased Lactic, WBC & temp. Morphine and Xanax PRN added r/t CP and anxiety. UO adequate.    Coping/Psychosocial Response Interventions   Plan Of Care Reviewed With patient;spouse   Patient Care Overview   Progress improving       Goal: Adult Individualization and Mutuality  Outcome: Ongoing (interventions implemented as appropriate)  Goal: Discharge Needs Assessment  Outcome: Ongoing (interventions implemented as appropriate)    Problem: Fall Risk (Adult)  Goal: Identify Related Risk Factors and Signs and Symptoms  Outcome: Ongoing (interventions implemented as appropriate)  Goal: Absence of Falls  Outcome: Ongoing (interventions implemented as appropriate)    Problem: Skin Integrity Impairment, Risk/Actual (Adult)  Goal: Identify Related Risk Factors and Signs and Symptoms  Outcome: Ongoing (interventions implemented as appropriate)  Goal: Skin Integrity/Wound Healing  Outcome: Ongoing (interventions implemented as appropriate)    Problem: Cardiac Catheterization with/without PCI (Adult)  Goal: Signs and Symptoms of Listed Potential Problems Will be Absent or Manageable (Cardiac Catheterization with/without PCI)  Outcome: Ongoing (interventions implemented as appropriate)

## 2017-02-17 NOTE — NURSING NOTE
Received referral from OLIVIA Hale. Adrien started and will follow up after therapy sessions for rehab needs. Thanks, Zoe RN rehab admission nurse [phone]

## 2017-02-17 NOTE — PROGRESS NOTES
" DAILY PROGRESS NOTE    CHIEF COMPLAINT  EXTUBATED  AWAKE   LITTLE CONFUSE  NO SPECIFIC COMPLAINTS      HISTORY: A 61-year-old white male with history of hypertension, gastroesophageal reflux disease and osteoarthritis, who had been in good health until last night when he developed severe nausea and vomiting followed by chest pressure. He ended up in the [facility] ER and was found to have acute MI. He was taken to the cardiac catheterization lab and had a stent placed and is now on the ventilator and sedated. I am asked to follow the patient for medical problems.    PHYSICAL EXAMINATION: Blood pressure 113/79, pulse 84, temperature 97.8 °F (36.6 °C), temperature source Oral, resp. rate 12, height 71\" (180.3 cm), weight 227 lb 1.2 oz (103 kg), SpO2 96 %.    GENERAL: On the ventilator, sedated.  LUNGS: He is moving air bilaterally.  HEART: S1 and S2.  ABDOMEN: Hypoactive bowel sounds.  EXTREMITIES: No edema.  NEUROLOGIC: Unresponsive.    DIAGNOSTIC DATA:   Lab Results (last 24 hours)     Procedure Component Value Units Date/Time    Blood Gas, Arterial [[id number]]  (Abnormal) Collected:  02/16/17 1519    Specimen:  Arterial Blood Updated:  02/16/17 1523     Site Arterial: left radial      Agapito's Test Positive      pH, Arterial 7.435 pH units      pCO2, Arterial 36.3 mm Hg      pO2, Arterial 66.5 (L) mm Hg      HCO3, Arterial 24.4 mmol/L      Base Excess, Arterial 0.5 mmol/L      O2 Saturation Calculated 93.6 %      A-a Gradiant 0.4 mmHg      Barometric Pressure for Blood Gas 750.5 mmHg      Modality Adult Vent      FIO2 30 %      Ventilator Mode PS      Set Tidal Volume 775      Set [facility] Resp Rate 14      Rate 14 Breaths/minute      PEEP 5      PSV 8 cmH2O     Narrative:       SATS 96%, Meter: [id number] : [id number] Gracie Grey    POC Glucose Fingerstick [[id number]]  (Normal) Collected:  02/16/17 1601    Specimen:  Blood Updated:  02/16/17 1602     Glucose 93 mg/dL     Narrative:       Meter: " [id number] : [id number] Thomas Orta    CBC & Differential [[id number]] Collected:  02/16/17 1751    Specimen:  Blood Updated:  02/16/17 1810    Narrative:       The following orders were created for panel order CBC & Differential.  Procedure                               Abnormality         Status                     ---------                               -----------         ------                     CBC Auto Differential[[id number]]         Abnormal            Final result                 Please view results for these tests on the individual orders.    CBC Auto Differential [[id number]]  (Abnormal) Collected:  02/16/17 1751    Specimen:  Blood Updated:  02/16/17 1810     WBC 16.89 (H) 10*3/mm3      RBC 4.48 (L) 10*6/mm3      Hemoglobin 13.5 (L) g/dL      Hematocrit 40.7 %      MCV 90.8 fL      MCH 30.1 pg      MCHC 33.2 g/dL      RDW 13.2 %      RDW-SD 43.8 fl      MPV 11.3 fL      Platelets 163 10*3/mm3      Neutrophil % 84.9 (H) %      Lymphocyte % 3.9 (L) %      Monocyte % 10.5 %      Eosinophil % 0.1 (L) %      Basophil % 0.1 %      Immature Grans % 0.5 %      Neutrophils, Absolute 14.36 (H) 10*3/mm3      Lymphocytes, Absolute 0.66 (L) 10*3/mm3      Monocytes, Absolute 1.77 (H) 10*3/mm3      Eosinophils, Absolute 0.01 10*3/mm3      Basophils, Absolute 0.01 10*3/mm3      Immature Grans, Absolute 0.08 (H) 10*3/mm3     Protime-INR [[id number]]  (Normal) Collected:  02/16/17 1751    Specimen:  Blood Updated:  02/16/17 1820     Protime 13.3 Seconds      INR 1.05     aPTT [[id number]]  (Normal) Collected:  02/16/17 1751    Specimen:  Blood Updated:  02/16/17 1820     PTT 28.0 seconds     Lactic Acid, Plasma [[id number]]  (Abnormal) Collected:  02/16/17 1751    Specimen:  Blood Updated:  02/16/17 1827     Lactate 2.6 (C) mmol/L     Calcium, Ionized [[id number]]  (Normal) Collected:  02/16/17 1751    Specimen:  Blood Updated:  02/16/17 1828     Ionized Calcium 1.19 mmol/L      Ionized Calcium 4.8 mg/dL     Basic Metabolic  Panel [[id number]]  (Abnormal) Collected:  02/16/17 1751    Specimen:  Blood Updated:  02/16/17 1830     Glucose 118 (H) mg/dL      BUN 16 mg/dL      Creatinine 0.98 mg/dL      Sodium 142 mmol/L      Potassium 3.8 mmol/L      Chloride 107 mmol/L      CO2 22.1 mmol/L      Calcium 8.2 (L) mg/dL      eGFR Non African Amer 78 mL/min/1.73      BUN/Creatinine Ratio 16.3      Anion Gap 12.9 mmol/L     Narrative:       GFR Normal >60  Chronic Kidney Disease <60  Kidney Failure <15    Magnesium [[id number]]  (Normal) Collected:  02/16/17 1751    Specimen:  Blood Updated:  02/16/17 1830     Magnesium 1.8 mg/dL     Phosphorus [[id number]]  (Abnormal) Collected:  02/16/17 1751    Specimen:  Blood Updated:  02/16/17 1832     Phosphorus 1.2 (L) mg/dL     POC Glucose Fingerstick [[id number]]  (Normal) Collected:  02/16/17 1948    Specimen:  Blood Updated:  02/16/17 1950     Glucose 94 mg/dL     Narrative:       Meter: [id number] : [id number] Natan CARD    Lactate Acid, Reflex [[id number]]  (Normal) Collected:  02/16/17 2157    Specimen:  Blood Updated:  02/16/17 2300     Lactate 1.5 mmol/L     Troponin [[id number]]  (Abnormal) Collected:  02/16/17 2241    Specimen:  Blood Updated:  02/16/17 2325     Troponin T 1.900 (C) ng/mL     Narrative:       Troponin T Reference Ranges:  Less than 0.03 ng/mL:    Negative for AMI  0.03 to 0.09 ng/mL:      Indeterminant for AMI  Greater than 0.09 ng/mL: Positive for AMI    Urinalysis With / Culture If Indicated [[id number]]  (Abnormal) Collected:  02/16/17 2246    Specimen:  Urine from Urine, Catheter Updated:  02/16/17 2335     Color, UA Yellow      Appearance, UA Cloudy (A)      pH, UA 5.5      Specific Gravity, UA 1.018      Glucose, UA Negative      Ketones, UA Trace (A)      Bilirubin, UA Negative      Blood, UA Large (3+) (A)      Protein, UA Negative      Leuk Esterase, UA Negative      Nitrite, UA Negative      Urobilinogen, UA 0.2 E.U./dL     Urinalysis, Microscopic Only [[id number]]   (Abnormal) Collected:  02/16/17 2246    Specimen:  Urine from Urine, Catheter Updated:  02/16/17 2336     RBC, UA 21-30 (A) /HPF      WBC, UA 3-5 (A) /HPF      Bacteria, UA None Seen /HPF      Squamous Epithelial Cells, UA 0-2 /HPF      Hyaline Casts, UA 0-2 /LPF      Methodology Automated Microscopy     POC Glucose Fingerstick [[id number]]  (Normal) Collected:  02/17/17 0011    Specimen:  Blood Updated:  02/17/17 0012     Glucose 100 mg/dL     Narrative:       Meter: [id number] : [id number] Isac Ventura    POC Glucose Fingerstick [[id number]]  (Normal) Collected:  02/17/17 0425    Specimen:  Blood Updated:  02/17/17 0427     Glucose 102 mg/dL     Narrative:       Meter: [id number] : [id number] Bola Pittman    CBC (No Diff) [[id number]]  (Abnormal) Collected:  02/17/17 0426    Specimen:  Blood Updated:  02/17/17 0455     WBC 10.47 10*3/mm3      RBC 4.00 (L) 10*6/mm3      Hemoglobin 12.3 (L) g/dL      Hematocrit 36.6 (L) %      MCV 91.5 fL      MCH 30.8 pg      MCHC 33.6 g/dL      RDW 13.3 %      RDW-SD 44.5 fl      MPV 11.5 fL      Platelets 154 10*3/mm3     Magnesium [[id number]]  (Normal) Collected:  02/17/17 0426    Specimen:  Blood Updated:  02/17/17 0519     Magnesium 1.8 mg/dL     Basic Metabolic Panel [[id number]]  (Abnormal) Collected:  02/17/17 0426    Specimen:  Blood Updated:  02/17/17 0520     Glucose 109 (H) mg/dL      BUN 14 mg/dL      Creatinine 0.91 mg/dL      Sodium 141 mmol/L      Potassium 3.5 mmol/L      Chloride 104 mmol/L      CO2 23.0 mmol/L      Calcium 8.0 (L) mg/dL      eGFR Non African Amer 85 mL/min/1.73      BUN/Creatinine Ratio 15.4      Anion Gap 14.0 mmol/L     Narrative:       GFR Normal >60  Chronic Kidney Disease <60  Kidney Failure <15    Troponin [[id number]]  (Abnormal) Collected:  02/17/17 0426    Specimen:  Blood Updated:  02/17/17 0521     Troponin T 1.960 (C) ng/mL     Narrative:       Troponin T Reference Ranges:  Less than 0.03 ng/mL:    Negative for AMI  0.03 to 0.09  ng/mL:      Indeterminant for AMI  Greater than 0.09 ng/mL: Positive for AMI    POC Glucose Fingerstick [[id number]]  (Normal) Collected:  02/17/17 0725    Specimen:  Blood Updated:  02/17/17 0727     Glucose 95 mg/dL     Narrative:       Meter: [id number] : [id number] Dawit Lai    Blood Culture [[id number]]  (Normal) Collected:  02/16/17 2007    Specimen:  Blood from Blood, Central Line Updated:  02/17/17 0901     Blood Culture No growth at less than 24 hours     Blood Culture [[id number]]  (Normal) Collected:  02/16/17 2024    Specimen:  Blood from Arm, Left Updated:  02/17/17 0901     Blood Culture No growth at less than 24 hours     POC Glucose Fingerstick [[id number]]  (Normal) Collected:  02/17/17 1108    Specimen:  Blood Updated:  02/17/17 1111     Glucose 102 mg/dL     Narrative:       Meter: [id number] : [id number] Dawit Lai        Lab Results (last 24 hours)     Procedure Component Value Units Date/Time    Blood Gas, Arterial [[id number]]  (Abnormal) Collected:  02/16/17 1519    Specimen:  Arterial Blood Updated:  02/16/17 1523     Site Arterial: left radial      Agapito's Test Positive      pH, Arterial 7.435 pH units      pCO2, Arterial 36.3 mm Hg      pO2, Arterial 66.5 (L) mm Hg      HCO3, Arterial 24.4 mmol/L      Base Excess, Arterial 0.5 mmol/L      O2 Saturation Calculated 93.6 %      A-a Gradiant 0.4 mmHg      Barometric Pressure for Blood Gas 750.5 mmHg      Modality Adult Vent      FIO2 30 %      Ventilator Mode PS      Set Tidal Volume 775      [facility]      Rate 14 Breaths/minute      PEEP 5      PSV 8 cmH2O     Narrative:       SATS 96%, Meter: [id number] : [id number] Gracie Grey    POC Glucose Fingerstick [[id number]]  (Normal) Collected:  02/16/17 1601    Specimen:  Blood Updated:  02/16/17 1602     Glucose 93 mg/dL     Narrative:       Meter: [id number] : [id number] Thomas Orta    CBC & Differential [[id number]] Collected:  02/16/17 1751    Specimen:  Blood Updated:   02/16/17 1810    Narrative:       The following orders were created for panel order CBC & Differential.  Procedure                               Abnormality         Status                     ---------                               -----------         ------                     CBC Auto Differential[[id number]]         Abnormal            Final result                 Please view results for these tests on the individual orders.    CBC Auto Differential [[id number]]  (Abnormal) Collected:  02/16/17 1751    Specimen:  Blood Updated:  02/16/17 1810     WBC 16.89 (H) 10*3/mm3      RBC 4.48 (L) 10*6/mm3      Hemoglobin 13.5 (L) g/dL      Hematocrit 40.7 %      MCV 90.8 fL      MCH 30.1 pg      MCHC 33.2 g/dL      RDW 13.2 %      RDW-SD 43.8 fl      MPV 11.3 fL      Platelets 163 10*3/mm3      Neutrophil % 84.9 (H) %      Lymphocyte % 3.9 (L) %      Monocyte % 10.5 %      Eosinophil % 0.1 (L) %      Basophil % 0.1 %      Immature Grans % 0.5 %      Neutrophils, Absolute 14.36 (H) 10*3/mm3      Lymphocytes, Absolute 0.66 (L) 10*3/mm3      Monocytes, Absolute 1.77 (H) 10*3/mm3      Eosinophils, Absolute 0.01 10*3/mm3      Basophils, Absolute 0.01 10*3/mm3      Immature Grans, Absolute 0.08 (H) 10*3/mm3     Protime-INR [[id number]]  (Normal) Collected:  02/16/17 1751    Specimen:  Blood Updated:  02/16/17 1820     Protime 13.3 Seconds      INR 1.05     aPTT [[id number]]  (Normal) Collected:  02/16/17 1751    Specimen:  Blood Updated:  02/16/17 1820     PTT 28.0 seconds     Lactic Acid, Plasma [[id number]]  (Abnormal) Collected:  02/16/17 1751    Specimen:  Blood Updated:  02/16/17 1827     Lactate 2.6 (C) mmol/L     Calcium, Ionized [[id number]]  (Normal) Collected:  02/16/17 1751    Specimen:  Blood Updated:  02/16/17 1828     Ionized Calcium 1.19 mmol/L      Ionized Calcium 4.8 mg/dL     Basic Metabolic Panel [[id number]]  (Abnormal) Collected:  02/16/17 1751    Specimen:  Blood Updated:  02/16/17 1830     Glucose 118 (H) mg/dL      BUN  16 mg/dL      Creatinine 0.98 mg/dL      Sodium 142 mmol/L      Potassium 3.8 mmol/L      Chloride 107 mmol/L      CO2 22.1 mmol/L      Calcium 8.2 (L) mg/dL      eGFR Non African Amer 78 mL/min/1.73      BUN/Creatinine Ratio 16.3      Anion Gap 12.9 mmol/L     Narrative:       GFR Normal >60  Chronic Kidney Disease <60  Kidney Failure <15    Magnesium [[id number]]  (Normal) Collected:  02/16/17 1751    Specimen:  Blood Updated:  02/16/17 1830     Magnesium 1.8 mg/dL     Phosphorus [[id number]]  (Abnormal) Collected:  02/16/17 1751    Specimen:  Blood Updated:  02/16/17 1832     Phosphorus 1.2 (L) mg/dL     POC Glucose Fingerstick [[id number]]  (Normal) Collected:  02/16/17 1948    Specimen:  Blood Updated:  02/16/17 1950     Glucose 94 mg/dL     Narrative:       Meter: [id number] : [id number] Natan CARD    Lactate Acid, Reflex [[id number]]  (Normal) Collected:  02/16/17 2157    Specimen:  Blood Updated:  02/16/17 2300     Lactate 1.5 mmol/L     Troponin [[id number]]  (Abnormal) Collected:  02/16/17 2241    Specimen:  Blood Updated:  02/16/17 2325     Troponin T 1.900 (C) ng/mL     Narrative:       Troponin T Reference Ranges:  Less than 0.03 ng/mL:    Negative for AMI  0.03 to 0.09 ng/mL:      Indeterminant for AMI  Greater than 0.09 ng/mL: Positive for AMI    Urinalysis With / Culture If Indicated [[id number]]  (Abnormal) Collected:  02/16/17 2246    Specimen:  Urine from Urine, Catheter Updated:  02/16/17 2335     Color, UA Yellow      Appearance, UA Cloudy (A)      pH, UA 5.5      Specific Gravity, UA 1.018      Glucose, UA Negative      Ketones, UA Trace (A)      Bilirubin, UA Negative      Blood, UA Large (3+) (A)      Protein, UA Negative      Leuk Esterase, UA Negative      Nitrite, UA Negative      Urobilinogen, UA 0.2 E.U./dL     Urinalysis, Microscopic Only [[id number]]  (Abnormal) Collected:  02/16/17 2246    Specimen:  Urine from Urine, Catheter Updated:  02/16/17 2336     RBC, UA 21-30 (A) /HPF      WBC,  UA 3-5 (A) /HPF      Bacteria, UA None Seen /HPF      Squamous Epithelial Cells, UA 0-2 /HPF      Hyaline Casts, UA 0-2 /LPF      Methodology Automated Microscopy     POC Glucose Fingerstick [[id number]]  (Normal) Collected:  02/17/17 0011    Specimen:  Blood Updated:  02/17/17 0012     Glucose 100 mg/dL     Narrative:       Meter: [id number] : [id number] Isac Ventura    POC Glucose Fingerstick [[id number]]  (Normal) Collected:  02/17/17 0425    Specimen:  Blood Updated:  02/17/17 0427     Glucose 102 mg/dL     Narrative:       Meter: [id number] : [id number] Bola Toya    CBC (No Diff) [[id number]]  (Abnormal) Collected:  02/17/17 0426    Specimen:  Blood Updated:  02/17/17 0455     WBC 10.47 10*3/mm3      RBC 4.00 (L) 10*6/mm3      Hemoglobin 12.3 (L) g/dL      Hematocrit 36.6 (L) %      MCV 91.5 fL      MCH 30.8 pg      MCHC 33.6 g/dL      RDW 13.3 %      RDW-SD 44.5 fl      MPV 11.5 fL      Platelets 154 10*3/mm3     Magnesium [[id number]]  (Normal) Collected:  02/17/17 0426    Specimen:  Blood Updated:  02/17/17 0519     Magnesium 1.8 mg/dL     Basic Metabolic Panel [[id number]]  (Abnormal) Collected:  02/17/17 0426    Specimen:  Blood Updated:  02/17/17 0520     Glucose 109 (H) mg/dL      BUN 14 mg/dL      Creatinine 0.91 mg/dL      Sodium 141 mmol/L      Potassium 3.5 mmol/L      Chloride 104 mmol/L      CO2 23.0 mmol/L      Calcium 8.0 (L) mg/dL      eGFR Non African Amer 85 mL/min/1.73      BUN/Creatinine Ratio 15.4      Anion Gap 14.0 mmol/L     Narrative:       GFR Normal >60  Chronic Kidney Disease <60  Kidney Failure <15    Troponin [[id number]]  (Abnormal) Collected:  02/17/17 0426    Specimen:  Blood Updated:  02/17/17 0521     Troponin T 1.960 (C) ng/mL     Narrative:       Troponin T Reference Ranges:  Less than 0.03 ng/mL:    Negative for AMI  0.03 to 0.09 ng/mL:      Indeterminant for AMI  Greater than 0.09 ng/mL: Positive for AMI    POC Glucose Fingerstick [[id number]]  (Normal) Collected:   02/17/17 0725    Specimen:  Blood Updated:  02/17/17 0727     Glucose 95 mg/dL     Narrative:       Meter: [id number] : [id number] Dawit Lai    Blood Culture [[id number]]  (Normal) Collected:  02/16/17 2007    Specimen:  Blood from Blood, Central Line Updated:  02/17/17 0901     Blood Culture No growth at less than 24 hours     Blood Culture [[id number]]  (Normal) Collected:  02/16/17 2024    Specimen:  Blood from Arm, Left Updated:  02/17/17 0901     Blood Culture No growth at less than 24 hours     POC Glucose Fingerstick [[id number]]  (Normal) Collected:  02/17/17 1108    Specimen:  Blood Updated:  02/17/17 1111     Glucose 102 mg/dL     Narrative:       Meter: [id number] : [id number] Dawit Lai      White count 18.6, hemoglobin 15.2, platelets 282,000. BUN 24, creatinine 1.0, potassium 4.2, CO2 is 18.6. Blood sugar is 123 to 138. LDL is 128. Chest x-ray is no active disease. EKG shows sinus rhythm, prolonged QT interval, nonspecific ST-T wave changes. Initial EKG showed AFib/flutter.  Imaging Results (last 72 hours)     Procedure Component Value Units Date/Time    XR Abdomen KUB [[id number]] Collected:  02/15/17 0702     Updated:  02/15/17 0708    Narrative:       EMERGENCY SUPINE ABDOMEN     HISTORY: 61-year-old male for oral gastric tube placement     FINDINGS:  1. Tip of the oral gastric tube is in good position at the distal  stomach.     This report was finalized on 2/15/2017 7:05 AM by Dr. Aidan Blackwell MD.       XR Chest Post CVA Port [[id number]] Collected:  02/15/17 0848     Updated:  02/15/17 0855    Narrative:       XR CHEST POST CVA PORT-     Clinical: Central line placement     COMPARISON 02/15/2017 at 0431 hours, current examination 0828 hours     FINDINGS: There is been interval placement of a nasogastric tube tip is  subdiaphragmatic out of the field-of-view. Endotracheal tube unchanged  in position.        Right IJ catheter tip superimposes the projected area of the superior  vena cava, no  pneumothorax.        Progression of the right lung opacity with now perihilar consolidation.  Pneumonia versus asymmetric pulmonary edema. There is however no  associated pleural effusion identified. Cardiomediastinal silhouette is  stable. Cardiac size upper limits of normal. The remainder is  unremarkable.     This report was finalized on 2/15/2017 8:52 AM by Dr. Mick Montalvo MD.       CT Head Without Contrast [[id number]] Collected:  02/15/17 0447     Updated:  02/16/17 0000    Narrative:       CT SCAN OF THE BRAIN WITHOUT CONTRAST.     TECHNIQUE: Multiple axial images of the brain were obtained from the  vertex to the base of the brain.     HISTORY:  Unresponsive, cardiac arrest.     COMPARISON:  No prior studies for comparison.     FINDINGS:   Midline structures are within normal limits, there is no hydrocephalus.  Gray-white matter differentiation is maintained.         Orbits are within normal limits. Mild mucosal disease of the paranasal  sinuses. Mastoid air cells are well aerated.             Impression:       No acute intracranial pathology.         This report was finalized on 2/15/2017 11:57 PM by Dr. Navin Thompson MD.       XR Chest 1 View [[id number]] Collected:  02/15/17 0444     Updated:  02/16/17 0000    Narrative:       X-RAY CHEST 1 VIEW.     HISTORY: Intubation.     COMPARISON: No prior studies for comparison.     FINDINGS:  Cardiomediastinal silhouette is within normal limits.         Diffuse opacity within the right hemithorax concerning for infiltrates,  no definite effusion.              Impression:       Extensive infiltrates in the right lung are suspected, clinical  correlation is recommended.         This report was finalized on 2/15/2017 11:57 PM by Dr. Navin Thompson MD.           Current Facility-Administered Medications:   •  acetaminophen (TYLENOL) tablet 650 mg, 650 mg, Oral, Q4H PRN, 650 mg at 02/16/17 2201 **OR** acetaminophen (TYLENOL) suppository 650 mg, 650 mg, Rectal, Q4H  PRN, Charles Medrano MD  •  ALPRAZolam (XANAX) tablet 0.25 mg, 0.25 mg, Oral, Q6H PRN, Kristina Billings MD, 0.25 mg at 02/16/17 2356  •  alteplase ((CATHFLO/ACTIVASE)) syringe 2 mg, 2 mg, Intracatheter, Once, Charles Medrano MD  •  amiodarone (PACERONE) tablet 400 mg, 400 mg, Oral, Q12H, ANGELICA Cedillo, 400 mg at 02/17/17 0814  •  artificial tears ophthalmic ointment, , Both Eyes, Q4H, Uli Resendiz MD  •  aspirin chewable tablet 81 mg, 81 mg, Oral, Daily, Kristina Billings MD, 81 mg at 02/17/17 0814  •  carvedilol (COREG) tablet 3.125 mg, 3.125 mg, Oral, Q12H, ANGELICA Cedillo, 3.125 mg at 02/17/17 0814  •  clindamycin (CLEOCIN) IVPB 300 mg, 300 mg, Intravenous, Q8H, Uli Resendiz MD, 300 mg at 02/17/17 1020  •  dexmedetomidine (PRECEDEX) 400 MCG/100ML infusion, 0.2-1.5 mcg/kg/hr, Intravenous, Titrated, Uli Resendiz MD, Stopped at 02/16/17 1621  •  dextrose (D50W) solution 25 g, 25 g, Intravenous, Q15 Min PRN, Uli Resendiz MD  •  dextrose (GLUTOSE) oral gel 15 g, 15 g, Oral, Q15 Min PRN, Uli Resendiz MD  •  glucagon (human recombinant) (GLUCAGEN DIAGNOSTIC) injection 1 mg, 1 mg, Subcutaneous, Q15 Min PRN, Uli Resendiz MD  •  Influenza Vac Subunit Quad (FLUCELVAX) injection 0.5 mL, 0.5 mL, Intramuscular, During Hospitalization, Uli Resendiz MD  •  lisinopril (PRINIVIL,ZESTRIL) tablet 2.5 mg, 2.5 mg, Oral, Daily, Kristina Billings MD, 2.5 mg at 02/17/17 0814  •  morphine injection 3 mg, 3 mg, Intravenous, Q4H PRN, Kristina Billings MD, 3 mg at 02/17/17 0450  •  ondansetron (ZOFRAN) injection 4 mg, 4 mg, Intravenous, Q4H PRN, Charles Medrano MD, 4 mg at 02/17/17 0053  •  pantoprazole (PROTONIX) injection 40 mg, 40 mg, Intravenous, Q AM, Uli Resendiz MD, 40 mg at 02/17/17 0630  •  pneumococcal polysaccharide 23-valent (PNEUMOVAX-23) vaccine 0.5 mL, 0.5 mL, Intramuscular, During Hospitalization, Uli Resendiz MD  •  sodium chloride 0.9 % flush 1-10 mL, 1-10 mL, Intravenous, PRN,  Kristina Billings MD  •  sodium phosphate 12 mmol in sodium chloride 0.9 % 250 mL IVPB, 12 mmol, Intravenous, Once PRN, Charles Medrano MD  •  ticagrelor (BRILINTA) tablet 90 mg, 90 mg, Oral, BID, Kristina Billings MD, 90 mg at 02/17/17 0814     ASSESSMENT:   1. Status post acute myocardial infarction.  2. Status post cardiac catheterization with stent placement.  3. Respiratory failure.  4. Decreased mental status./S/P CARDIAC ARREST/S/P CODE 300  5. Hypothermia.  6. History of hypertension.  7. History of gastroesophageal reflux disease.  8. Increased white count.IMPROVED    PLAN  1. Supportive care.  2. DIET  3. Neurology.FOLLOWING  4. D/W FAMILY  5. PT/OT  6. Further recommendations according to hospital course.    TANIA PANDA MD

## 2017-02-17 NOTE — PROGRESS NOTES
"[facility]  Cardiology Progress Note    Patient Identification:  Name: Sander White  Age: 61 y.o.  Sex: male  :  1955  MRN: [id number]                 Follow Up / Chief Complaint: resuscitated arrest,VF, STEMI->emergent PCI, LV dysfx, h/o HTN    Interval History:  Reports of chest pain with n/v. Full arrest upon EMS arrival with multiple shocks for VF, received CPR for 40-45 minutes before return of systemic circulation. Emergent thrombectomy and CAYLA-> ramus. Hypothermia protocol, resp failure with possible aspiration pneumonia and anoxic encephalopathy     Subjective:  Extubated, awake, alert oriented to person and place.  Reports reproducible diffuse anterior chest discomfort, worsened with deep inspiration and movement.  Denies shortness of breath.  Throat \"sore\" and asking for some lunch    Objective:  No further ectopy.  Blood pressure is stable    Past Medical History:  Past Medical History   Diagnosis Date   • Arthritis    • Chronic pain in right foot    • ED (erectile dysfunction) 7/15/2016   • GERD (gastroesophageal reflux disease)    • Hypertension      Past Surgical History:  Past Surgical History   Procedure Laterality Date   • Colonoscopy N/A 2015     Normal   • Appendectomy     • Hernia repair Right      Inguinal   • Cyst removal N/A      Neck   • Cholecystectomy     • Pr rt/lt heart catheters N/A 2/15/2017     Procedure: Percutaneous Coronary Intervention;  Surgeon: Kristina Billings MD;  Location:  [facility];  Service: Cardiovascular   • Cardiac catheterization N/A 2/15/2017     Procedure: Left Heart Cath;  Surgeon: Kristina Billings MD;  Location:  [facility];  Service:    • Cardiac catheterization  2/15/2017     Procedure: Percutaneous Manual Thrombectomy;  Surgeon: Kristina Billings MD;  Location: [facility] INV[facility];  Service:    • Cardiac catheterization N/A 2/15/2017     Procedure: Coronary angiography;  " Surgeon: Kristina Billings MD;  Location:  [facility];  Service:    • Cardiac catheterization N/A 2/15/2017     Procedure: Left ventriculography;  Surgeon: Kristina Billings MD;  Location:  [facility];  Service:         Social History:   Social History   Substance Use Topics   • Smoking status: Former Smoker     Packs/day: 0.50     Years: 7.00     Types: Cigarettes     Quit date: 1999   • Smokeless tobacco: Never Used   • Alcohol use Yes      Family History:  Family History   Problem Relation Age of Onset   • Diabetes Mother      Type 2   • Diabetes Father      Type 2   • Stroke Father    • Aneurysm Brother      Brain          Allergies:  Allergies   Allergen Reactions   • Amoxicillin Itching   • Penicillins      Scheduled Meds:    alteplase 2 mg Once   amiodarone 400 mg Q12H   artificial tears  Q4H   aspirin 81 mg Daily   carvedilol 3.125 mg Q12H   clindamycin 300 mg Q8H   lisinopril 2.5 mg Daily   pantoprazole 40 mg Q AM   ticagrelor 90 mg BID           INTAKE AND OUTPUT:    Intake/Output Summary (Last 24 hours) at 02/17/17 1450  Last data filed at 02/17/17 0617   Gross per 24 hour   Intake 4185.3 ml   Output   1365 ml   Net 2820.3 ml       Review of Systems:   GI: No nausea, vomiting or abdominal pain  Cardiac: Reproducible diffuse anterior chest discomfort, worsened with deep inspiration and movement  Pulmonary:  Extubated.  Maintaining adequate sats on supplemental oxygen per nasal cannula    Constitutional:  Temp:  [97.8 °F (36.6 °C)-100.6 °F (38.1 °C)] 97.8 °F (36.6 °C)  Heart Rate:  [] 84  Resp:  [12] 12  BP: ()/(62-94) 113/79  FiO2 (%):  [30 %] 30 %    Physical Exam by Kristina Billings MD  General:  Awake, alert.  Visiting with his wife.  Appears in no acute distress  HEENT: PERTL No JVD. Oral mucosa moist, no cyanosis  Respiratory: Respirations regular and unlabored on vent. BBS with good air entry in all fields.  No crackles or wheezes  auscultated  Cardiovascular: S1S2 Regular rate and rhythm. No murmur or gallop.RFA site without oozing, palpable hematoma or thrill.  RFA 2/ RDP 2  No pretibial pitting edema  Gastrointestinal: Abdomen soft, nontender. + Out sounds No ascites  Musculoskeletal: No abnormal movements  Extremities: No digital  cyanosis  Skin: Color pink. Skin warm and dry to touch.   Neuro: AAO x2. CN II-XII is intact.  Following commands. ALVAREZ x4 assess.        Cardiographics  Telemetry:  SR 70-80's, no ectopy      ECG 2-17-17:         Echocardiogram:     · Left ventricular wall segments contract abnormally. Refer to wall scoring diagram for more information.  · Left atrial cavity size is borderline dilated.  · Mild mitral valve regurgitation is present  · Mild tricuspid valve regurgitation is present.  · Left Ventricle: Calculated EF = 36.3%.  · There is no evidence of pericardial effusion.               CATH & PCI  · Successful left heart catheter  · Successful thrombectomy of the ramus branch  · Successful angioplasty and stent to the ramus branch reduced from 100% to 0% with 2.5/15 XIENCE dilated to 2.6  · Mild global hypokinetic estimated ejection fraction 40%  · LAD 20-30% diffuse irregularity  · Normal left main  · Circumflex had a ramus branch which was and plasty and otherwise was normal  · Cor dominant RCA midportion 60%    Lab Review     Results from last 7 days  Lab Units 02/17/17  0426 02/16/17  2241 02/16/17  0615   TROPONIN T ng/mL 1.960* 1.900* 2.320*       Results from last 7 days  Lab Units 02/17/17  0426   MAGNESIUM mg/dL 1.8       Results from last 7 days  Lab Units 02/17/17  0426   SODIUM mmol/L 141   POTASSIUM mmol/L 3.5   BUN mg/dL 14   CREATININE mg/dL 0.91   CALCIUM mg/dL 8.0*       Results from last 7 days  Lab Units 02/17/17  0426 02/16/17  1751 02/16/17  1155   WBC 10*3/mm3 10.47 16.89* 12.98*   HEMOGLOBIN g/dL 12.3* 13.5* 13.9   HEMATOCRIT % 36.6* 40.7 41.3   PLATELETS 10*3/mm3 154 163 153       Results  from last 7 days  Lab Units 02/16/17  1751 02/16/17  1155 02/16/17  0615   INR  1.05 1.02 0.99   APTT seconds 28.0 24.5 26.4         Assessment:  - acute lateral STEMI  - s/p resuscitated cardiac arrest  - s/p emergent with 2.5/15 XIENCE -> 100% ramus  - LV dysfx - EF 35-40%, mild MR/TR  - acute resp failure -> Pulm  - suspected right aspiration pneumonia -> Pulm  - anoxic encephalopathy -> Neuro  - hypokalemia - replace per protocol    Plan:    - acute lateral STEMI,  resuscitated cardiac arrest-> - s/p emergent with 2.5/15 XIENCE -> 100% ramus.  Awake, alert, following commands.  (Reproducible) chest soreness likely from prolonged CPR and multiple defib shocks.  No acute ischemic ST changes on this morning's EKG.  No increase in serial troponin.  Continue aspirin, Brilinta, Corag and Prinivil       - VF -   No recurrence.  On oral  Amio and Coreg      - LV dysfx - EF 35-40%, mild MR/TR.  Clinically compensated.  Continue Coreg and titrate up his blood pressure tolerates - in addition to Prinivil and Lasix    Continue dual antiplatelet therapy with asa and Brilinta  in addition to beta blocker,ACE-I and Lasix.  Start statin.  Cardiac rehabilitation, OT/PT eval.  Anticipate transfer to stepdown unit tomorrow.  BNP, BMP, Mag and CBC  ordered for am    I reviewed the patient's new clinical results and treatment plan. I personally viewed and interpreted the patient's EKG/Telemetry data    )2/17/2017  Krsitina Billings MD      EMR Dragon/Transcription disclaimer:   Much of this encounter note is an electronic transcription/translation of spoken language to printed text. The electronic translation of spoken language may permit erroneous, or at times, nonsensical words or phrases to be inadvertently transcribed; Although I have reviewed the note for such errors, some may still exist.

## 2017-02-17 NOTE — PLAN OF CARE
Problem: Patient Care Overview (Adult)  Goal: Plan of Care Review    02/17/17 1001   Outcome Evaluation   Outcome Summary/Follow up Plan BSE: slight wet voicing w/ mixed consistencies. Rec: regular & thin; no mixed consistencies   Coping/Psychosocial Response Interventions   Plan Of Care Reviewed With patient;spouse;family   Patient Care Overview   Progress improving         Problem: Inpatient SLP  Goal: Dysphagia- Patient will safely consume diet as per recommendation with no signs/symptoms of aspiration    02/17/17 1001   Safely Consume Diet   Safely Consume Diet- SLP, Date Established 02/17/17   Safely Consume Diet- SLP, Time to Achieve by discharge   Safely Consume Diet- SLP, Additional Goal regular & thin no mixed    Safely Consume Diet- SLP, Date Goal Reviewed 02/17/17

## 2017-02-18 LAB
ALBUMIN SERPL-MCNC: 3.4 G/DL (ref 3.5–5.2)
ALBUMIN/GLOB SERPL: 1.5 G/DL
ALP SERPL-CCNC: 53 U/L (ref 39–117)
ALT SERPL W P-5'-P-CCNC: 128 U/L (ref 1–41)
ANION GAP SERPL CALCULATED.3IONS-SCNC: 11.6 MMOL/L
ANION GAP SERPL CALCULATED.3IONS-SCNC: 9.2 MMOL/L
APTT PPP: 29.5 SECONDS (ref 22.7–35.4)
AST SERPL-CCNC: 104 U/L (ref 1–40)
BASOPHILS # BLD AUTO: 0.01 10*3/MM3 (ref 0–0.2)
BASOPHILS NFR BLD AUTO: 0.1 % (ref 0–1.5)
BILIRUB SERPL-MCNC: 0.7 MG/DL (ref 0.1–1.2)
BUN BLD-MCNC: 15 MG/DL (ref 8–23)
BUN BLD-MCNC: 16 MG/DL (ref 8–23)
BUN/CREAT SERPL: 14.6 (ref 7–25)
BUN/CREAT SERPL: 15 (ref 7–25)
CALCIUM SPEC-SCNC: 8.1 MG/DL (ref 8.6–10.5)
CALCIUM SPEC-SCNC: 8.1 MG/DL (ref 8.6–10.5)
CHLORIDE SERPL-SCNC: 105 MMOL/L (ref 98–107)
CHLORIDE SERPL-SCNC: 107 MMOL/L (ref 98–107)
CO2 SERPL-SCNC: 23.4 MMOL/L (ref 22–29)
CO2 SERPL-SCNC: 25.8 MMOL/L (ref 22–29)
CREAT BLD-MCNC: 1.03 MG/DL (ref 0.76–1.27)
CREAT BLD-MCNC: 1.07 MG/DL (ref 0.76–1.27)
DEPRECATED RDW RBC AUTO: 44.4 FL (ref 37–54)
EOSINOPHIL # BLD AUTO: 0.06 10*3/MM3 (ref 0–0.7)
EOSINOPHIL NFR BLD AUTO: 0.7 % (ref 0.3–6.2)
ERYTHROCYTE [DISTWIDTH] IN BLOOD BY AUTOMATED COUNT: 13.2 % (ref 11.5–14.5)
GFR SERPL CREATININE-BSD FRML MDRD: 70 ML/MIN/1.73
GFR SERPL CREATININE-BSD FRML MDRD: 74 ML/MIN/1.73
GLOBULIN UR ELPH-MCNC: 2.2 GM/DL
GLUCOSE BLD-MCNC: 105 MG/DL (ref 65–99)
GLUCOSE BLD-MCNC: 115 MG/DL (ref 65–99)
GLUCOSE BLDC GLUCOMTR-MCNC: 103 MG/DL (ref 70–130)
GLUCOSE BLDC GLUCOMTR-MCNC: 107 MG/DL (ref 70–130)
GLUCOSE BLDC GLUCOMTR-MCNC: 111 MG/DL (ref 70–130)
HCT VFR BLD AUTO: 34.8 % (ref 40.4–52.2)
HGB BLD-MCNC: 11.5 G/DL (ref 13.7–17.6)
IMM GRANULOCYTES # BLD: 0.03 10*3/MM3 (ref 0–0.03)
IMM GRANULOCYTES NFR BLD: 0.4 % (ref 0–0.5)
INR PPP: 1.1 (ref 0.9–1.1)
LYMPHOCYTES # BLD AUTO: 1.17 10*3/MM3 (ref 0.9–4.8)
LYMPHOCYTES NFR BLD AUTO: 14.5 % (ref 19.6–45.3)
MAGNESIUM SERPL-MCNC: 1.9 MG/DL (ref 1.6–2.4)
MAGNESIUM SERPL-MCNC: 2.3 MG/DL (ref 1.6–2.4)
MCH RBC QN AUTO: 30.3 PG (ref 27–32.7)
MCHC RBC AUTO-ENTMCNC: 33 G/DL (ref 32.6–36.4)
MCV RBC AUTO: 91.6 FL (ref 79.8–96.2)
MONOCYTES # BLD AUTO: 1.21 10*3/MM3 (ref 0.2–1.2)
MONOCYTES NFR BLD AUTO: 15 % (ref 5–12)
NEUTROPHILS # BLD AUTO: 5.6 10*3/MM3 (ref 1.9–8.1)
NEUTROPHILS NFR BLD AUTO: 69.3 % (ref 42.7–76)
NT-PROBNP SERPL-MCNC: 1390 PG/ML (ref 5–900)
PLATELET # BLD AUTO: 139 10*3/MM3 (ref 140–500)
PMV BLD AUTO: 11.5 FL (ref 6–12)
POTASSIUM BLD-SCNC: 3.4 MMOL/L (ref 3.5–5.2)
POTASSIUM BLD-SCNC: 3.5 MMOL/L (ref 3.5–5.2)
PROT SERPL-MCNC: 5.6 G/DL (ref 6–8.5)
PROTHROMBIN TIME: 13.8 SECONDS (ref 11.7–14.2)
RBC # BLD AUTO: 3.8 10*6/MM3 (ref 4.6–6)
SODIUM BLD-SCNC: 140 MMOL/L (ref 136–145)
SODIUM BLD-SCNC: 142 MMOL/L (ref 136–145)
TROPONIN T SERPL-MCNC: 2.79 NG/ML (ref 0–0.03)
WBC NRBC COR # BLD: 8.08 10*3/MM3 (ref 4.5–10.7)

## 2017-02-18 PROCEDURE — 85610 PROTHROMBIN TIME: CPT | Performed by: INTERNAL MEDICINE

## 2017-02-18 PROCEDURE — 85025 COMPLETE CBC W/AUTO DIFF WBC: CPT | Performed by: INTERNAL MEDICINE

## 2017-02-18 PROCEDURE — 93010 ELECTROCARDIOGRAM REPORT: CPT | Performed by: INTERNAL MEDICINE

## 2017-02-18 PROCEDURE — 85730 THROMBOPLASTIN TIME PARTIAL: CPT | Performed by: INTERNAL MEDICINE

## 2017-02-18 PROCEDURE — 84484 ASSAY OF TROPONIN QUANT: CPT | Performed by: INTERNAL MEDICINE

## 2017-02-18 PROCEDURE — 83735 ASSAY OF MAGNESIUM: CPT | Performed by: NURSE PRACTITIONER

## 2017-02-18 PROCEDURE — 92526 ORAL FUNCTION THERAPY: CPT | Performed by: SPEECH-LANGUAGE PATHOLOGIST

## 2017-02-18 PROCEDURE — 97110 THERAPEUTIC EXERCISES: CPT

## 2017-02-18 PROCEDURE — 94799 UNLISTED PULMONARY SVC/PX: CPT

## 2017-02-18 PROCEDURE — 97535 SELF CARE MNGMENT TRAINING: CPT

## 2017-02-18 PROCEDURE — 25010000002 MORPHINE PER 10 MG: Performed by: INTERNAL MEDICINE

## 2017-02-18 PROCEDURE — 99231 SBSQ HOSP IP/OBS SF/LOW 25: CPT | Performed by: PSYCHIATRY & NEUROLOGY

## 2017-02-18 PROCEDURE — 99232 SBSQ HOSP IP/OBS MODERATE 35: CPT | Performed by: INTERNAL MEDICINE

## 2017-02-18 PROCEDURE — 83735 ASSAY OF MAGNESIUM: CPT | Performed by: INTERNAL MEDICINE

## 2017-02-18 PROCEDURE — 83880 ASSAY OF NATRIURETIC PEPTIDE: CPT | Performed by: NURSE PRACTITIONER

## 2017-02-18 PROCEDURE — 93005 ELECTROCARDIOGRAM TRACING: CPT | Performed by: INTERNAL MEDICINE

## 2017-02-18 PROCEDURE — 80048 BASIC METABOLIC PNL TOTAL CA: CPT | Performed by: INTERNAL MEDICINE

## 2017-02-18 PROCEDURE — 82962 GLUCOSE BLOOD TEST: CPT

## 2017-02-18 PROCEDURE — 80053 COMPREHEN METABOLIC PANEL: CPT | Performed by: HOSPITALIST

## 2017-02-18 PROCEDURE — 97165 OT EVAL LOW COMPLEX 30 MIN: CPT

## 2017-02-18 RX ORDER — ISOSORBIDE MONONITRATE 30 MG/1
30 TABLET, EXTENDED RELEASE ORAL
Status: DISCONTINUED | OUTPATIENT
Start: 2017-02-18 | End: 2017-02-20

## 2017-02-18 RX ORDER — SODIUM CHLORIDE 9 MG/ML
9 INJECTION, SOLUTION INTRAVENOUS CONTINUOUS
Status: DISCONTINUED | OUTPATIENT
Start: 2017-02-18 | End: 2017-02-18

## 2017-02-18 RX ORDER — CLINDAMYCIN HYDROCHLORIDE 300 MG/1
300 CAPSULE ORAL EVERY 8 HOURS SCHEDULED
Status: DISCONTINUED | OUTPATIENT
Start: 2017-02-18 | End: 2017-02-21 | Stop reason: HOSPADM

## 2017-02-18 RX ADMIN — CLINDAMYCIN IN 5 PERCENT DEXTROSE 300 MG: 6 INJECTION, SOLUTION INTRAVENOUS at 09:44

## 2017-02-18 RX ADMIN — CLINDAMYCIN HYDROCHLORIDE 300 MG: 300 CAPSULE ORAL at 16:15

## 2017-02-18 RX ADMIN — ATORVASTATIN CALCIUM 20 MG: 20 TABLET, FILM COATED ORAL at 22:23

## 2017-02-18 RX ADMIN — CARVEDILOL 3.12 MG: 3.12 TABLET, FILM COATED ORAL at 07:43

## 2017-02-18 RX ADMIN — LISINOPRIL 2.5 MG: 2.5 TABLET ORAL at 22:23

## 2017-02-18 RX ADMIN — TICAGRELOR 90 MG: 90 TABLET ORAL at 07:43

## 2017-02-18 RX ADMIN — AMIODARONE HYDROCHLORIDE 400 MG: 200 TABLET ORAL at 07:43

## 2017-02-18 RX ADMIN — MORPHINE SULFATE 3 MG: 4 INJECTION, SOLUTION INTRAMUSCULAR; INTRAVENOUS at 01:00

## 2017-02-18 RX ADMIN — FUROSEMIDE 40 MG: 40 TABLET ORAL at 07:43

## 2017-02-18 RX ADMIN — MORPHINE SULFATE 3 MG: 4 INJECTION, SOLUTION INTRAMUSCULAR; INTRAVENOUS at 05:58

## 2017-02-18 RX ADMIN — NITROGLYCERIN 0.5 INCH: 20 OINTMENT TOPICAL at 12:01

## 2017-02-18 RX ADMIN — NITROGLYCERIN 0.5 INCH: 20 OINTMENT TOPICAL at 02:52

## 2017-02-18 RX ADMIN — POTASSIUM CHLORIDE 20 MEQ: 750 CAPSULE, EXTENDED RELEASE ORAL at 07:43

## 2017-02-18 RX ADMIN — TICAGRELOR 90 MG: 90 TABLET ORAL at 19:12

## 2017-02-18 RX ADMIN — CLINDAMYCIN IN 5 PERCENT DEXTROSE 300 MG: 6 INJECTION, SOLUTION INTRAVENOUS at 02:52

## 2017-02-18 RX ADMIN — CLINDAMYCIN HYDROCHLORIDE 300 MG: 300 CAPSULE ORAL at 22:24

## 2017-02-18 RX ADMIN — CARVEDILOL 3.12 MG: 3.12 TABLET, FILM COATED ORAL at 22:23

## 2017-02-18 RX ADMIN — AMIODARONE HYDROCHLORIDE 400 MG: 200 TABLET ORAL at 22:23

## 2017-02-18 RX ADMIN — ASPIRIN 81 MG: 81 TABLET, CHEWABLE ORAL at 07:42

## 2017-02-18 RX ADMIN — SODIUM CHLORIDE 9 ML/HR: 9 INJECTION, SOLUTION INTRAVENOUS at 09:08

## 2017-02-18 RX ADMIN — PANTOPRAZOLE SODIUM 40 MG: 40 TABLET, DELAYED RELEASE ORAL at 05:07

## 2017-02-18 RX ADMIN — ISOSORBIDE MONONITRATE 30 MG: 30 TABLET, EXTENDED RELEASE ORAL at 16:15

## 2017-02-18 NOTE — PROGRESS NOTES
" DAILY PROGRESS NOTE    CHIEF COMPLAINT  AWAKE AND ALERT  NO SPECIFIC COMPLAINTS      HISTORY: A 61-year-old white male with history of hypertension, gastroesophageal reflux disease and osteoarthritis, who had been in good health until last night when he developed severe nausea and vomiting followed by chest pressure. He ended up in the [facility] ER and was found to have acute MI. He was taken to the cardiac catheterization lab and had a stent placed and is now on the ventilator and sedated. I am asked to follow the patient for medical problems.    PHYSICAL EXAMINATION: Blood pressure 111/75, pulse 71, temperature 98 °F (36.7 °C), temperature source Oral, resp. rate 18, height 71.75\" (182.2 cm), weight 225 lb (102 kg), SpO2 96 %.    GENERAL: AWAKE AND ALERT  LUNGS: CLEAR B  HEART: S1 and S2.  ABDOMEN: NT BS POS  EXTREMITIES: No edema.  NEUROLOGIC: INTACT    DIAGNOSTIC DATA:   Lab Results (last 24 hours)     Procedure Component Value Units Date/Time    POC Glucose Fingerstick [[id number]]  (Normal) Collected:  02/17/17 1642    Specimen:  Blood Updated:  02/17/17 1644     Glucose 87 mg/dL     Narrative:       Meter: [id number] : [id number] Kristi Blanco    Blood Culture [[id number]]  (Normal) Collected:  02/16/17 2007    Specimen:  Blood from Blood, Central Line Updated:  02/17/17 2101     Blood Culture No growth at 24 hours     Blood Culture [[id number]]  (Normal) Collected:  02/16/17 2024    Specimen:  Blood from Arm, Left Updated:  02/17/17 2101     Blood Culture No growth at 24 hours     POC Glucose Fingerstick [[id number]]  (Normal) Collected:  02/17/17 2102    Specimen:  Blood Updated:  02/17/17 2107     Glucose 108 mg/dL     Narrative:       Meter: [id number] : [id number] Sheron Ji    POC Glucose Fingerstick [[id number]]  (Normal) Collected:  02/18/17 0011    Specimen:  Blood Updated:  02/18/17 0048     Glucose 111 mg/dL     Narrative:       Meter: [id number] : [id number] Sheron Ji    " Magnesium [[id number]]  (Normal) Collected:  02/18/17 0109    Specimen:  Blood Updated:  02/18/17 0140     Magnesium 1.9 mg/dL     BNP [[id number]]  (Abnormal) Collected:  02/18/17 0109    Specimen:  Blood Updated:  02/18/17 0141     proBNP 1390.0 (H) pg/mL     Narrative:       Among patients with dyspnea, NT-proBNP is highly sensitive for the detection of acute congestive heart failure. In addition NT-proBNP of <300 pg/ml effectively rules out acute congestive heart failure with 99% negative predictive value.    Comprehensive Metabolic Panel [[id number]]  (Abnormal) Collected:  02/18/17 0109    Specimen:  Blood Updated:  02/18/17 0143     Glucose 115 (H) mg/dL      BUN 16 mg/dL      Creatinine 1.07 mg/dL      Sodium 142 mmol/L      Potassium 3.4 (L) mmol/L      Chloride 107 mmol/L      CO2 23.4 mmol/L      Calcium 8.1 (L) mg/dL      Total Protein 5.6 (L) g/dL      Albumin 3.40 (L) g/dL      ALT (SGPT) 128 (H) U/L      AST (SGOT) 104 (H) U/L      Alkaline Phosphatase 53 U/L      Total Bilirubin 0.7 mg/dL      eGFR Non African Amer 70 mL/min/1.73      Globulin 2.2 gm/dL      A/G Ratio 1.5 g/dL      BUN/Creatinine Ratio 15.0      Anion Gap 11.6 mmol/L     Troponin [[id number]]  (Abnormal) Collected:  02/18/17 0109    Specimen:  Blood Updated:  02/18/17 0143     Troponin T 2.790 (C) ng/mL     Narrative:       Troponin T Reference Ranges:  Less than 0.03 ng/mL:    Negative for AMI  0.03 to 0.09 ng/mL:      Indeterminant for AMI  Greater than 0.09 ng/mL: Positive for AMI    POC Glucose Fingerstick [[id number]]  (Normal) Collected:  02/18/17 0349    Specimen:  Blood Updated:  02/18/17 0410     Glucose 107 mg/dL     Narrative:       Meter: [id number] : [id number] Sheron Ji    CBC & Differential [[id number]] Collected:  02/18/17 0512    Specimen:  Blood Updated:  02/18/17 0656    Narrative:       The following orders were created for panel order CBC & Differential.  Procedure                               Abnormality          Status                     ---------                               -----------         ------                     CBC Auto Differential[[id number]]         Abnormal            Final result                 Please view results for these tests on the individual orders.    CBC Auto Differential [[id number]]  (Abnormal) Collected:  02/18/17 0512    Specimen:  Blood Updated:  02/18/17 0656     WBC 8.08 10*3/mm3      RBC 3.80 (L) 10*6/mm3      Hemoglobin 11.5 (L) g/dL      Hematocrit 34.8 (L) %      MCV 91.6 fL      MCH 30.3 pg      MCHC 33.0 g/dL      RDW 13.2 %      RDW-SD 44.4 fl      MPV 11.5 fL      Platelets 139 (L) 10*3/mm3      Neutrophil % 69.3 %      Lymphocyte % 14.5 (L) %      Monocyte % 15.0 (H) %      Eosinophil % 0.7 %      Basophil % 0.1 %      Immature Grans % 0.4 %      Neutrophils, Absolute 5.60 10*3/mm3      Lymphocytes, Absolute 1.17 10*3/mm3      Monocytes, Absolute 1.21 (H) 10*3/mm3      Eosinophils, Absolute 0.06 10*3/mm3      Basophils, Absolute 0.01 10*3/mm3      Immature Grans, Absolute 0.03 10*3/mm3     aPTT [[id number]]  (Normal) Collected:  02/18/17 0645    Specimen:  Blood Updated:  02/18/17 0707     PTT 29.5 seconds     Protime-INR [[id number]]  (Normal) Collected:  02/18/17 0645    Specimen:  Blood Updated:  02/18/17 0707     Protime 13.8 Seconds      INR 1.10     Basic Metabolic Panel [[id number]]  (Abnormal) Collected:  02/18/17 0512    Specimen:  Blood Updated:  02/18/17 0721     Glucose 105 (H) mg/dL      BUN 15 mg/dL      Creatinine 1.03 mg/dL      Sodium 140 mmol/L      Potassium 3.5 mmol/L      Chloride 105 mmol/L      CO2 25.8 mmol/L      Calcium 8.1 (L) mg/dL      eGFR Non African Amer 74 mL/min/1.73      BUN/Creatinine Ratio 14.6      Anion Gap 9.2 mmol/L     Narrative:       GFR Normal >60  Chronic Kidney Disease <60  Kidney Failure <15    Magnesium [[id number]]  (Normal) Collected:  02/18/17 0512    Specimen:  Blood Updated:  02/18/17 0721     Magnesium 2.3 mg/dL     POC Glucose Fingerstick  [[id number]]  (Normal) Collected:  02/18/17 0715    Specimen:  Blood Updated:  02/18/17 0809     Glucose 103 mg/dL     Narrative:       Meter: [id number] : [id number] Carlos Alberto PERKINS        Lab Results (last 24 hours)     Procedure Component Value Units Date/Time    POC Glucose Fingerstick [[id number]]  (Normal) Collected:  02/17/17 1642    Specimen:  Blood Updated:  02/17/17 1644     Glucose 87 mg/dL     Narrative:       Meter: [id number] : [id number] Kristi Malsally    Blood Culture [[id number]]  (Normal) Collected:  02/16/17 2007    Specimen:  Blood from Blood, Central Line Updated:  02/17/17 2101     Blood Culture No growth at 24 hours     Blood Culture [[id number]]  (Normal) Collected:  02/16/17 2024    Specimen:  Blood from Arm, Left Updated:  02/17/17 2101     Blood Culture No growth at 24 hours     POC Glucose Fingerstick [[id number]]  (Normal) Collected:  02/17/17 2102    Specimen:  Blood Updated:  02/17/17 2107     Glucose 108 mg/dL     Narrative:       Meter: [id number] : [id number] Sheron Ji    POC Glucose Fingerstick [[id number]]  (Normal) Collected:  02/18/17 0011    Specimen:  Blood Updated:  02/18/17 0048     Glucose 111 mg/dL     Narrative:       Meter: [id number] : [id number] Sheron Ji    Magnesium [[id number]]  (Normal) Collected:  02/18/17 0109    Specimen:  Blood Updated:  02/18/17 0140     Magnesium 1.9 mg/dL     BNP [[id number]]  (Abnormal) Collected:  02/18/17 0109    Specimen:  Blood Updated:  02/18/17 0141     proBNP 1390.0 (H) pg/mL     Narrative:       Among patients with dyspnea, NT-proBNP is highly sensitive for the detection of acute congestive heart failure. In addition NT-proBNP of <300 pg/ml effectively rules out acute congestive heart failure with 99% negative predictive value.    Comprehensive Metabolic Panel [[id number]]  (Abnormal) Collected:  02/18/17 0109    Specimen:  Blood Updated:  02/18/17 0143     Glucose 115 (H) mg/dL      BUN 16 mg/dL      Creatinine 1.07 mg/dL       Sodium 142 mmol/L      Potassium 3.4 (L) mmol/L      Chloride 107 mmol/L      CO2 23.4 mmol/L      Calcium 8.1 (L) mg/dL      Total Protein 5.6 (L) g/dL      Albumin 3.40 (L) g/dL      ALT (SGPT) 128 (H) U/L      AST (SGOT) 104 (H) U/L      Alkaline Phosphatase 53 U/L      Total Bilirubin 0.7 mg/dL      eGFR Non African Amer 70 mL/min/1.73      Globulin 2.2 gm/dL      A/G Ratio 1.5 g/dL      BUN/Creatinine Ratio 15.0      Anion Gap 11.6 mmol/L     Troponin [[id number]]  (Abnormal) Collected:  02/18/17 0109    Specimen:  Blood Updated:  02/18/17 0143     Troponin T 2.790 (C) ng/mL     Narrative:       Troponin T Reference Ranges:  Less than 0.03 ng/mL:    Negative for AMI  0.03 to 0.09 ng/mL:      Indeterminant for AMI  Greater than 0.09 ng/mL: Positive for AMI    POC Glucose Fingerstick [[id number]]  (Normal) Collected:  02/18/17 0349    Specimen:  Blood Updated:  02/18/17 0410     Glucose 107 mg/dL     Narrative:       Meter: [id number] : [id number] Sheron Ji    CBC & Differential [[id number]] Collected:  02/18/17 0512    Specimen:  Blood Updated:  02/18/17 0656    Narrative:       The following orders were created for panel order CBC & Differential.  Procedure                               Abnormality         Status                     ---------                               -----------         ------                     CBC Auto Differential[[id number]]         Abnormal            Final result                 Please view results for these tests on the individual orders.    CBC Auto Differential [[id number]]  (Abnormal) Collected:  02/18/17 0512    Specimen:  Blood Updated:  02/18/17 0656     WBC 8.08 10*3/mm3      RBC 3.80 (L) 10*6/mm3      Hemoglobin 11.5 (L) g/dL      Hematocrit 34.8 (L) %      MCV 91.6 fL      MCH 30.3 pg      MCHC 33.0 g/dL      RDW 13.2 %      RDW-SD 44.4 fl      MPV 11.5 fL      Platelets 139 (L) 10*3/mm3      Neutrophil % 69.3 %      Lymphocyte % 14.5 (L) %      Monocyte % 15.0 (H) %       Eosinophil % 0.7 %      Basophil % 0.1 %      Immature Grans % 0.4 %      Neutrophils, Absolute 5.60 10*3/mm3      Lymphocytes, Absolute 1.17 10*3/mm3      Monocytes, Absolute 1.21 (H) 10*3/mm3      Eosinophils, Absolute 0.06 10*3/mm3      Basophils, Absolute 0.01 10*3/mm3      Immature Grans, Absolute 0.03 10*3/mm3     aPTT [[id number]]  (Normal) Collected:  02/18/17 0645    Specimen:  Blood Updated:  02/18/17 0707     PTT 29.5 seconds     Protime-INR [[id number]]  (Normal) Collected:  02/18/17 0645    Specimen:  Blood Updated:  02/18/17 0707     Protime 13.8 Seconds      INR 1.10     Basic Metabolic Panel [[id number]]  (Abnormal) Collected:  02/18/17 0512    Specimen:  Blood Updated:  02/18/17 0721     Glucose 105 (H) mg/dL      BUN 15 mg/dL      Creatinine 1.03 mg/dL      Sodium 140 mmol/L      Potassium 3.5 mmol/L      Chloride 105 mmol/L      CO2 25.8 mmol/L      Calcium 8.1 (L) mg/dL      eGFR Non African Amer 74 mL/min/1.73      BUN/Creatinine Ratio 14.6      Anion Gap 9.2 mmol/L     Narrative:       GFR Normal >60  Chronic Kidney Disease <60  Kidney Failure <15    Magnesium [[id number]]  (Normal) Collected:  02/18/17 0512    Specimen:  Blood Updated:  02/18/17 0721     Magnesium 2.3 mg/dL     POC Glucose Fingerstick [[id number]]  (Normal) Collected:  02/18/17 0715    Specimen:  Blood Updated:  02/18/17 0809     Glucose 103 mg/dL     Narrative:       Meter: [id number] : [id number] Carlos Alberto Mary L      White count 18.6, hemoglobin 15.2, platelets 282,000. BUN 24, creatinine 1.0, potassium 4.2, CO2 is 18.6. Blood sugar is 123 to 138. LDL is 128. Chest x-ray is no active disease. EKG shows sinus rhythm, prolonged QT interval, nonspecific ST-T wave changes. Initial EKG showed AFib/flutter.  Imaging Results (last 72 hours)     Procedure Component Value Units Date/Time    XR Abdomen KUB [[id number]] Collected:  02/15/17 0702     Updated:  02/15/17 0708    Narrative:       EMERGENCY SUPINE ABDOMEN     HISTORY: 61-year-old male  for oral gastric tube placement     FINDINGS:  1. Tip of the oral gastric tube is in good position at the distal  stomach.     This report was finalized on 2/15/2017 7:05 AM by Dr. Aidan Blackwell MD.       XR Chest Post CVA Port [[id number]] Collected:  02/15/17 0848     Updated:  02/15/17 0855    Narrative:       XR CHEST POST CVA PORT-     Clinical: Central line placement     COMPARISON 02/15/2017 at 0431 hours, current examination 0828 hours     FINDINGS: There is been interval placement of a nasogastric tube tip is  subdiaphragmatic out of the field-of-view. Endotracheal tube unchanged  in position.        Right IJ catheter tip superimposes the projected area of the superior  vena cava, no pneumothorax.        Progression of the right lung opacity with now perihilar consolidation.  Pneumonia versus asymmetric pulmonary edema. There is however no  associated pleural effusion identified. Cardiomediastinal silhouette is  stable. Cardiac size upper limits of normal. The remainder is  unremarkable.     This report was finalized on 2/15/2017 8:52 AM by Dr. Mick Montalvo MD.       CT Head Without Contrast [[id number]] Collected:  02/15/17 0447     Updated:  02/16/17 0000    Narrative:       CT SCAN OF THE BRAIN WITHOUT CONTRAST.     TECHNIQUE: Multiple axial images of the brain were obtained from the  vertex to the base of the brain.     HISTORY:  Unresponsive, cardiac arrest.     COMPARISON:  No prior studies for comparison.     FINDINGS:   Midline structures are within normal limits, there is no hydrocephalus.  Gray-white matter differentiation is maintained.         Orbits are within normal limits. Mild mucosal disease of the paranasal  sinuses. Mastoid air cells are well aerated.             Impression:       No acute intracranial pathology.         This report was finalized on 2/15/2017 11:57 PM by Dr. Navin Thompson MD.       XR Chest 1 View [[id number]] Collected:  02/15/17 0444     Updated:  02/16/17 0000     Narrative:       X-RAY CHEST 1 VIEW.     HISTORY: Intubation.     COMPARISON: No prior studies for comparison.     FINDINGS:  Cardiomediastinal silhouette is within normal limits.         Diffuse opacity within the right hemithorax concerning for infiltrates,  no definite effusion.              Impression:       Extensive infiltrates in the right lung are suspected, clinical  correlation is recommended.         This report was finalized on 2/15/2017 11:57 PM by Dr. Navin Thompson MD.           Current Facility-Administered Medications:   •  acetaminophen (TYLENOL) tablet 650 mg, 650 mg, Oral, Q4H PRN, 650 mg at 02/17/17 2211 **OR** acetaminophen (TYLENOL) suppository 650 mg, 650 mg, Rectal, Q4H PRN, Charles Medrano MD  •  ALPRAZolam (XANAX) tablet 0.25 mg, 0.25 mg, Oral, Q6H PRN, Kristina Billings MD, 0.25 mg at 02/17/17 2050  •  alteplase ((CATHFLO/ACTIVASE)) syringe 2 mg, 2 mg, Intracatheter, Once, Charles Medrano MD  •  amiodarone (PACERONE) tablet 400 mg, 400 mg, Oral, Q12H, Frida Parker APRN, 400 mg at 02/18/17 0743  •  aspirin chewable tablet 81 mg, 81 mg, Oral, Daily, Kristina Billings MD, 81 mg at 02/18/17 0742  •  atorvastatin (LIPITOR) tablet 20 mg, 20 mg, Oral, Nightly, Frida Parker APRN, 20 mg at 02/17/17 2043  •  carvedilol (COREG) tablet 3.125 mg, 3.125 mg, Oral, Q12H, Frida Parker APRN, 3.125 mg at 02/18/17 0743  •  clindamycin (CLEOCIN) IVPB 300 mg, 300 mg, Intravenous, Q8H, Uli Resendiz MD, 300 mg at 02/18/17 0944  •  dextrose (D50W) solution 25 g, 25 g, Intravenous, Q15 Min PRN, Uli Resendiz MD  •  dextrose (GLUTOSE) oral gel 15 g, 15 g, Oral, Q15 Min PRN, Uli Resendiz MD  •  furosemide (LASIX) tablet 40 mg, 40 mg, Oral, Daily, Frida Parker, APRN, 40 mg at 02/18/17 0743  •  glucagon (human recombinant) (GLUCAGEN DIAGNOSTIC) injection 1 mg, 1 mg, Subcutaneous, Q15 Min PRN, Uli Resendiz MD  •  Influenza Vac Subunit Quad (FLUCELVAX) injection 0.5 mL, 0.5 mL, Intramuscular, During  Hospitalization, Uli Resendiz MD, 0.5 mL at 02/17/17 2101  •  lisinopril (PRINIVIL,ZESTRIL) tablet 2.5 mg, 2.5 mg, Oral, Nightly, Frida Parker APRN, 2.5 mg at 02/17/17 2044  •  morphine injection 3 mg, 3 mg, Intravenous, Q4H PRN, Kristina Billings MD, 3 mg at 02/18/17 0558  •  nitroglycerin (NITROSTAT) ointment 0.5 inch, 0.5 inch, Topical, Q8H, Kristina Billings MD, 0.5 inch at 02/18/17 1201  •  ondansetron (ZOFRAN) injection 4 mg, 4 mg, Intravenous, Q4H PRN, Charles Medrano MD, 4 mg at 02/17/17 0053  •  pantoprazole (PROTONIX) EC tablet 40 mg, 40 mg, Oral, Q AM, Tania Rivera MD, 40 mg at 02/18/17 0507  •  potassium chloride (MICRO-K) CR capsule 20 mEq, 20 mEq, Oral, Daily, ANGELICA Cedillo, 20 mEq at 02/18/17 0743  •  sodium chloride 0.9 % flush 1-10 mL, 1-10 mL, Intravenous, PRN, Kristina Billings MD  •  sodium chloride 0.9 % infusion, 9 mL/hr, Intravenous, Continuous, Chito Garduno MD, Last Rate: 9 mL/hr at 02/18/17 0908, 9 mL/hr at 02/18/17 0908  •  sodium phosphate 12 mmol in sodium chloride 0.9 % 250 mL IVPB, 12 mmol, Intravenous, Once PRN, Charles Medrano MD  •  ticagrelor (BRILINTA) tablet 90 mg, 90 mg, Oral, BID, Kristina Billings MD, 90 mg at 02/18/17 0743     ASSESSMENT:   1. Status post acute myocardial infarction.  2. Status post cardiac catheterization with stent placement.  3. Respiratory failure. RESOLVED  4. S/P CARDIAC ARREST  5. Hypothermia. RESOLVED  6. History of hypertension.  7. History of gastroesophageal reflux disease.  8. Increased white count.IMPROVED    PLAN  1. CPM  2. DIET  3. TRANSFER OUT FROM CCU  4. D/W FAMILY  5. PT/OT  6. WILL FOLLOW    TANIA RIVERA MD

## 2017-02-18 NOTE — PROGRESS NOTES
[facility] INPATIENT PROGRESS NOTE         [facility] CORONARY CARE    2/18/2017      PATIENT IDENTIFICATION:   Name:  Sander White      MRN:  [id number]     61 y.o.  male             LOS 3    Reason for visit: Follow-up acute arrest with anoxic injury, Acute cardiomyopathy and aspiration pneumonia      SUBJECTIVE:    Has clinically improved.  Off ventilator.  Still has a cough with some mild hemoptysis.  No current chest pain.  Discussed with nursing staff on rounds.    Objective   OBJECTIVE:    Vital Sign Min/Max for last 24 hours  Temp  Min: 97.8 °F (36.6 °C)  Max: 101.3 °F (38.5 °C)   BP  Min: 103/70  Max: 122/90   Pulse  Min: 65  Max: 93   No Data Recorded   SpO2  Min: 93 %  Max: 97 %   Flow (L/min)  Min: 2  Max: 2   No Data Recorded                   FiO2 (%): 30 %     Body mass index is 31.67 kg/(m^2).    Intake/Output Summary (Last 24 hours) at 02/18/17 1005  Last data filed at 02/18/17 0513   Gross per 24 hour   Intake   1439 ml   Output   3375 ml   Net  -1936 ml         Exam:  GEN:  No distress, appears stated age  EYES:   PERRL, anicteric sclera  ENT:    External ears/nose normal, OP clear  NECK:  No adenopathy, midline trachea  LUNGS: Normal chest on inspection, palpation and auscultation  CV:  Normal S1S2, without murmur  ABD:  Non tender, non distended, no hepatosplenomegaly, +BS  EXT:  No edema, cyanosis or clubbing    Scheduled meds:    alteplase 2 mg Intracatheter Once   amiodarone 400 mg Oral Q12H   aspirin 81 mg Oral Daily   atorvastatin 20 mg Oral Nightly   carvedilol 3.125 mg Oral Q12H   clindamycin 300 mg Intravenous Q8H   furosemide 40 mg Oral Daily   lisinopril 2.5 mg Oral Nightly   nitroglycerin 0.5 inch Topical Q8H   pantoprazole 40 mg Oral Q AM   potassium chloride 20 mEq Oral Daily   ticagrelor 90 mg Oral BID     IV meds:                        sodium chloride 9 mL/hr Last Rate: 9 mL/hr (02/18/17 0908)     Data Review:    Results from last 7 days  Lab Units 02/18/17  1497  02/18/17  0109 02/17/17  0426 02/16/17  1751 02/16/17  1155   SODIUM mmol/L 140 142 141 142 142   POTASSIUM mmol/L 3.5 3.4* 3.5 3.8 4.3   CHLORIDE mmol/L 105 107 104 107 105   TOTAL CO2 mmol/L 25.8 23.4 23.0 22.1 23.5   BUN mg/dL 15 16 14 16 16   CREATININE mg/dL 1.03 1.07 0.91 0.98 0.90   GLUCOSE mg/dL 105* 115* 109* 118* 110*   CALCIUM mg/dL 8.1* 8.1* 8.0* 8.2* 8.1*         Estimated Creatinine Clearance: 93.2 mL/min (by C-G formula based on Cr of 1.03).    Results from last 7 days  Lab Units 02/18/17  0512 02/17/17  0426 02/16/17  1751 02/16/17  1155 02/16/17  0615   WBC 10*3/mm3 8.08 10.47 16.89* 12.98* 11.57*   HEMOGLOBIN g/dL 11.5* 12.3* 13.5* 13.9 14.3   PLATELETS 10*3/mm3 139* 154 163 153 162       Results from last 7 days  Lab Units 02/18/17  0645 02/16/17  1751 02/16/17  1155 02/16/17  0615 02/16/17  0004   INR  1.10 1.05 1.02 0.99 1.00       Results from last 7 days  Lab Units 02/18/17  0109 02/15/17  0632 02/15/17  0339   ALT (SGPT) U/L 128* 414* 303*   AST (SGOT) U/L 104* 443* 256*     Microbiology reviewed: No growth to date            Assessment   ASSESSMENT:   Status post arrest  Acute hypoxemic respiratory failure: Improved now off ventilator  ST elevation MI status post stent  Acute cardiomyopathy with EF 36%  Metabolic acidosis: Resolved  Aspiration pneumonia present on admission  Acute kidney injury: Improved  Hypokalemia: Replacing      PLAN:  Patient is doing very well.  Okay to transfer out of the intensive care unit from my standpoint.  Suspect his hemoptysis is associated with being intubated and recent anticoagulation.  Encourage good pulmonary toilet.  Day 3 Abx.    Chito Garduno MD  Pulmonary and Critical Care Medicine  [facility], [facility]  2/18/2017    10:05 AM

## 2017-02-18 NOTE — PLAN OF CARE
Problem: Patient Care Overview (Adult)  Goal: Plan of Care Review  Outcome: Ongoing (interventions implemented as appropriate)    02/18/17 1832   Outcome Evaluation   Outcome Summary/Follow up Plan Patient was transferred from CCU today. Cardiac arrest 2/15 at home, CPR for 45 minutes, hypothermia protocol completed and warmed completely by 2/16. EF-36%. PNA-antibiotics changed to PO. Imdur started. Discontinue nitro paste. No c/o pain while on this unit. Continue to monitor scant bleeding from nares (oxygen weaned to RA today) and small amount of blood tinged sputum upon coughing (extubated on 2/16). Pt up to chair with 1 assist. VS stable. Continue to monitor and encourage ambulation.    Coping/Psychosocial Response Interventions   Plan Of Care Reviewed With patient;family   Patient Care Overview   Progress improving       Goal: Adult Individualization and Mutuality  Outcome: Ongoing (interventions implemented as appropriate)    02/18/17 1832   Individualization   Patient Specific Preferences Patient prefers to sit up in chair.        Goal: Discharge Needs Assessment  Outcome: Ongoing (interventions implemented as appropriate)    Problem: Fall Risk (Adult)  Goal: Identify Related Risk Factors and Signs and Symptoms  Outcome: Ongoing (interventions implemented as appropriate)    02/18/17 1832   Fall Risk   Fall Risk: Related Risk Factors confusion/agitation;history of falls;gait/mobility problems;fatigue/slow reaction   Fall Risk: Signs and Symptoms presence of risk factors       Goal: Absence of Falls  Outcome: Ongoing (interventions implemented as appropriate)    Problem: Skin Integrity Impairment, Risk/Actual (Adult)  Goal: Identify Related Risk Factors and Signs and Symptoms  Outcome: Ongoing (interventions implemented as appropriate)    02/18/17 1832   Skin Integrity Impairment, Risk/Actual   Skin Integrity Impairment, Risk/Actual: Related Risk Factors edema;surgery/procedure;tissue perfusion impaired   Signs  and Symptoms (Skin Integrity Impairment) edema       Goal: Skin Integrity/Wound Healing  Outcome: Ongoing (interventions implemented as appropriate)    Problem: Cardiac Catheterization with/without PCI (Adult)  Goal: Signs and Symptoms of Listed Potential Problems Will be Absent or Manageable (Cardiac Catheterization with/without PCI)  Outcome: Ongoing (interventions implemented as appropriate)    02/18/17 1832   Cardiac Catheterization with/without PCI   Problems Assessed (Cardiac Catheterization) all   Problems Present (Cardiac Catheterization) none

## 2017-02-18 NOTE — PLAN OF CARE
Problem: Inpatient Occupational Therapy  Goal: Transfer Training Goal 1 LTG- OT  Outcome: Ongoing (interventions implemented as appropriate)  Goal: Safety Awareness Goal LTG- OT  Outcome: Ongoing (interventions implemented as appropriate)  Goal: ADL Goal LTG- OT  Outcome: Ongoing (interventions implemented as appropriate)

## 2017-02-18 NOTE — PROGRESS NOTES
Acute Care - Occupational Therapy Initial Evaluation  [facility]     Patient Name: Sander White  : 1955  MRN: [id number]  Today's Date: 2017  Onset of Illness/Injury or Date of Surgery Date: 02/15/17  Date of Referral to OT: 17  Referring Physician: Astrid    Admit Date: 2/15/2017       ICD-10-CM ICD-9-CM   1. ST elevation myocardial infarction (STEMI), unspecified artery I21.3 410.90   2. Cardiac arrest with ventricular fibrillation I46.9 427.5    I49.01 427.41     Patient Active Problem List   Diagnosis   • Airway hyperreactivity   • Elevated cholesterol   • Gain of weight   • ED (erectile dysfunction)   • ST elevation myocardial infarction (STEMI)     Past Medical History   Diagnosis Date   • Arthritis    • Chronic pain in right foot    • ED (erectile dysfunction) 7/15/2016   • GERD (gastroesophageal reflux disease)    • Hypertension      Past Surgical History   Procedure Laterality Date   • Colonoscopy N/A 2015     Normal   • Appendectomy     • Hernia repair Right      Inguinal   • Cyst removal N/A      Neck   • Cholecystectomy     • Pr rt/lt heart catheters N/A 2/15/2017     Procedure: Percutaneous Coronary Intervention;  Surgeon: Kristina Billings MD;  Location:  INVASIVE LOCATION;  Service: Cardiovascular   • Cardiac catheterization N/A 2/15/2017     Procedure: Left Heart Cath;  Surgeon: Kristina Billings MD;  Location: [facility] CATH INVASIVE LOCATION;  Service:    • Cardiac catheterization  2/15/2017     Procedure: Percutaneous Manual Thrombectomy;  Surgeon: Kristina Billings MD;  Location: [facility] CATH INVASIVE LOCATION;  Service:    • Cardiac catheterization N/A 2/15/2017     Procedure: Coronary angiography;  Surgeon: Kristina Billings MD;  Location: [facility] CATH INVASIVE LOCATION;  Service:    • Cardiac catheterization N/A 2/15/2017     Procedure: Left ventriculography;  Surgeon: Kristina Billings MD;  Location:  INVASIVE LOCATION;   Service:           OT ASSESSMENT FLOWSHEET (last 72 hours)      OT Evaluation       02/18/17 1057 02/18/17 1000 02/18/17 0909 02/17/17 1753 02/17/17 1430    Rehab Evaluation    Document Type evaluation  -AF therapy note (daily note)  -CW   evaluation  -CH (r) MS (t) CH (c)    Subjective Information agree to therapy;no complaints  -AF agree to therapy;complains of;fatigue  -CW   agree to therapy;complains of;fatigue;weakness;pain  -CH (r) MS (t) CH (c)    Evaluation Not Performed   --   pt. having increased chest pain and increased troponin levels this AM, will wait to evaluate  -AF      Patient Effort, Rehab Treatment good  -AF good  -CW   good  -CH (r) MS (t) CH (c)    Symptoms Noted During/After Treatment     dizziness;fatigue;increased pain  -CH (r) MS (t) CH (c)    General Information    Patient Profile Review yes  -AF    yes  -CH (r) MS (t) CH (c)    Onset of Illness/Injury or Date of Surgery Date 02/15/17  -AF    02/15/17  -CH (r) MS (t) CH (c)    Referring Physician Astrid  -ANTOINE Resendiz  -CH (r) MS (t) CH (c)    General Observations pt supine in bed with family and wife present  -AF    Pt supine in bed w no acute signs of distress.  -CH (r) MS (t) CH (c)    Pertinent History Of Current Problem myocardial infarction, cardiac arrest with ventricular fibrillation  -AF        Precautions/Limitations fall precautions  -AF fall precautions  -CW   fall precautions  -CH (r) MS (t) CH (c)    Prior Level of Function independent:;ADL's;work;driving  -AF    independent:  -CH (r) MS (t) CH (c)    Equipment Currently Used at Home none  -AF   none  -RB none  -CH (r) MS (t) CH (c)    Plans/Goals Discussed With patient;spouse/S.O.  -AF        Risks Reviewed patient:;spouse/S.O.:  -AF        Living Environment    Lives With spouse  -AF    spouse  -CH (r) MS (t) CH (c)    Living Arrangements house  -AF    house  -CH (r) MS (t) CH (c)    Home Accessibility no concerns  -AF    no concerns  -CH (r) MS (t) CH (c)    Stair Railings  at Home     none  -CH (r) MS (t) CH (c)    Transportation Available    car  -RB     Clinical Impression    Date of Referral to OT 02/17/17  -AF        OT Diagnosis pt. presents with deficits in overall strength, endurance, and balance which is limiting the patients independence with ADL's and functional mobility  -AF        Patient/Family Goals Statement to get stronger  -AF        Criteria for Skilled Therapeutic Interventions Met yes;treatment indicated  -AF        Rehab Potential good, to achieve stated therapy goals  -AF        Therapy Frequency 3-5 times/wk  -AF        Predicted Duration of Therapy Intervention (days/wks) 5 days  -AF        Anticipated Equipment Needs At Discharge bathing equipment  -AF        Anticipated Discharge Disposition home with assist  -AF        Functional Level Prior    Ambulation 0-->independent  -AF        Transferring 0-->independent  -AF        Toileting 0-->independent  -AF        Bathing 0-->independent  -AF        Dressing 0-->independent  -AF        Eating 0-->independent  -AF        Communication 0-->understands/communicates without difficulty  -AF        Swallowing 0-->swallows foods/liquids without difficulty  -AF        Pain Assessment    Pain Assessment No/denies pain  -AF No/denies pain  -CW   FLACC  -CH (r) MS (t) CH (c)    Pain Score     4  -CH (r) MS (t) CH (c)    Pain Location     Chest  -CH (r) MS (t) CH (c)    Pain Intervention(s)     Repositioned;Ambulation/increased activity  -CH (r) MS (t) CH (c)    Vision Assessment/Intervention    Visual Impairment WNL  -AF        Cognitive Assessment/Intervention    Current Cognitive/Communication Assessment impaired  -AF impaired  -CW   impaired  -CH (r) MS (t) CH (c)    Orientation Status oriented to;person;place  -AF oriented x 4  -CW   oriented to;person;situation  -CH (r) MS (t) CH (c)    Follows Commands/Answers Questions 100% of the time;able to follow single-step instructions;needs cueing;needs increased time  -AF  100% of the time  -CW   100% of the time  -CH (r) MS (t) CH (c)    Personal Safety mild impairment;decreased insight to deficits  -AF mild impairment;decreased awareness, need for safety;decreased insight to deficits  -CW   decreased insight to deficits  -CH (r) MS (t) CH (c)    Personal Safety Interventions fall prevention program maintained;gait belt;nonskid shoes/slippers when out of bed  -AF fall prevention program maintained;gait belt;nonskid shoes/slippers when out of bed  -CW       ROM (Range of Motion)    General ROM no range of motion deficits identified  -AF    no range of motion deficits identified  -CH (r) MS (t) CH (c)    MMT (Manual Muscle Testing)    General MMT Assessment upper extremity strength deficits identified  -AF    upper extremity strength deficits identified;lower extremity strength deficits identified  -CH (r) MS (t) CH (c)    General MMT Assessment Detail pt.s overall BUE weak and deconditioned  -AF        Upper Extremity    Upper Ext Manual Muscle Testing     left UE strength is WFL;right UE strength is WFL  -CH (r) MS (t) CH (c)    Bed Mobility, Assessment/Treatment    Bed Mobility, Assistive Device     head of bed elevated  -CH (r) MS (t) CH (c)    Bed Mobility, Scoot/Bridge, Barron     minimum assist (75% patient effort);verbal cues required  -CH (r) MS (t) CH (c)    Bed Mob, Supine to Sit, [place] contact guard assist;verbal cues required  -AF not tested  -CW   minimum assist (75% patient effort);verbal cues required  -CH (r) MS (t) CH (c)    Bed Mob, Sit to Supine, [place] contact guard assist;verbal cues required  -AF    minimum assist (75% patient effort);verbal cues required  -CH (r) MS (t) CH (c)    Bed Mobility, Safety Issues     decreased use of arms for pushing/pulling  -CH (r) MS (t) CH (c)    Bed Mobility, Impairments     strength decreased;impaired balance;coordination impaired  -CH (r) MS (t) CH (c)    Bed Mobility, Comment  Pt sitting EOB  -CW        Transfer Assessment/Treatment    Transfers, Sit-Stand Dent contact guard assist;verbal cues required  -AF verbal cues required;contact guard assist  -CW   minimum assist (75% patient effort);2 person assist required;hand held assist  -CH (r) MS (t) CH (c)    Transfers, Stand-Sit [place] contact guard assist;verbal cues required  -AF verbal cues required;contact guard assist  -CW   minimum assist (75% patient effort);2 person assist required;hand held assist  -CH (r) MS (t) CH (c)    Transfers, Sit-Stand-Sit, Assist Device  rolling walker  -CW       Toilet Transfer, Dent minimum assist (75% patient effort);verbal cues required  -AF        Toilet Transfer, Assistive Device bedside commode without drop arms  -AF        Transfer, Safety Issues     loses balance backward;weight-shifting ability decreased;step length decreased  -CH (r) MS (t) CH (c)    Transfer, Impairments     strength decreased;impaired balance;coordination impaired  -CH (r) MS (t) CH (c)    Transfer, Comment decreased endurance and balance  -AF    Pt able to take 3 steps to HOB. Pt became dizzy/light-headed asking to sit.  -CH (r) MS (t) CH (c)    Functional Mobility    Functional Mobility- Comment pt. took a couple of steps to and from EOB and BSC  -AF        Stairs Assessment/Treatment    Stairs, Dent Level     not tested  -CH (r) MS (t) CH (c)    Upper Body Dressing Assessment/Training    UB Dressing Assess/Train, Clothing Type doffing:;donning:;hospital gown  -AF        UB Dressing Assess/Train, Position long sitting  -AF        UB Dressing Assess/Train, Dent minimum assist (75% patient effort)  -AF        UB Dressing Assess/Train, Comment min to manage cords  -AF        Toileting Assessment/Training    Toileting Assess/Train, Assistive Device bedside commode  -AF        Toileting Assess/Train, Position sitting;standing  -AF        Toileting Assess/Train, Indepen Level contact guard assist  -AF        Toileting  Assess/Train, Comment pt was only wearing a hospital gown  -AF        Therapy Exercises    Right Lower Extremity     AROM:;15 reps;LAQ  -CH (r) MS (t) CH (c)    Left Lower Extremity     AROM:;5 reps;LAQ   Repetitions limited by increased dizziness and feeling faint  -CH (r) MS (t) CH (c)    Bilateral Lower Extremities  AROM:;10 reps;sitting;ankle pumps/circles;hip flexion;LAQ  -CW       General Therapy Interventions    Planned Therapy Interventions activity intolerance;ADL retraining;home exercise program;strengthening;transfer training  -AF        Positioning and Restraints    Pre-Treatment Position in bed  -AF in bed  -CW   in bed  -CH (r) MS (t) CH (c)    Post Treatment Position bed  -AF bed  -CW   bed  -CH (r) MS (t) CH (c)    In Bed sitting EOB;with PT  -AF sitting EOB;call light within reach;encouraged to call for assist;with family/caregiver;notified nsg  -CW   supine;call light within reach;encouraged to call for assist;with family/caregiver;SCD pump applied  -CH (r) MS (t) CH (c)    Lower Extremity    Lower Ext Manual Muscle Testing     left LE strength is WFL;right LE strength is WFL  -CH (r) MS (t) CH (c)      02/17/17 1004 02/16/17 1630             Rehab Evaluation    Document Type evaluation  -NB        Symptoms Noted During/After Treatment fatigue  -NB        General Information    Equipment Currently Used at Home  none  -SI       Living Environment    Lives With  spouse  -SI       Living Arrangements  house  -SI       Transportation Available  family or friend will provide  -SI       Functional Level Prior    Ambulation  0-->independent  -SI       Transferring  0-->independent  -SI       Toileting  0-->independent  -SI       Bathing  0-->independent  -SI       Dressing  0-->independent  -SI       Eating  0-->independent  -SI       Communication  0-->understands/communicates without difficulty  -SI       Swallowing  0-->swallows foods/liquids without difficulty  -SI       Pain Assessment    Pain  Assessment No/denies pain  -NB        Cognitive Assessment/Intervention    Current Cognitive/Communication Assessment impaired  -NB        Orientation Status oriented to;person;disoriented to;place;time;situation  -NB        Follows Commands/Answers Questions 100% of the time;able to follow single-step instructions  -NB          User Key  (r) = Recorded By, (t) = Taken By, (c) = Cosigned By    Initials Name Effective Dates    NB Dawn Obed, MS CCC-SLP 04/13/15 -     YOUNG Engle, PT 12/01/15 -     AF Matilda Tran, OTR 12/01/15 -     SI Alexia Franco, RN 02/18/16 -     DIANNE Amaya 12/13/16 -     MS Christina Luciano, PT Student 01/09/17 -     RB Jose Sterling, AMADOR 01/18/17 -            Occupational Therapy Education     Title: PT OT SLP Therapies (Done)     Topic: Occupational Therapy (Done)     Point: ADL training (Done)    Description: Instruct learner(s) on proper safety adaptation and remediation techniques during self care or transfers.   Instruct in proper use of assistive devices.    Learning Progress Summary    Learner Readiness Method Response Comment Documented by Status   Patient Acceptance E VU pt. and wife edcuated on purpose of OT, safety with transfers and benefits of safe activity AF 02/18/17 1103 Done               Point: Precautions (Done)    Description: Instruct learner(s) on prescribed precautions during self-care and functional transfers.    Learning Progress Summary    Learner Readiness Method Response Comment Documented by Status   Patient Acceptance E VU pt. and wife edcuated on purpose of OT, safety with transfers and benefits of safe activity AF 02/18/17 1103 Done                      User Key     Initials Effective Dates Name Provider Type Discipline    AF 12/01/15 -  Matilda Tran, OTR Occupational Therapist OT                  OT Recommendation and Plan  Anticipated Equipment Needs At Discharge: bathing equipment  Anticipated Discharge Disposition: home with  assist  Planned Therapy Interventions: activity intolerance, ADL retraining, home exercise program, strengthening, transfer training  Therapy Frequency: 3-5 times/wk             OT Goals       02/18/17 1104          Transfer Training OT LTG    Transfer Training OT LTG, Date Established 02/18/17  -AF      Transfer Training OT LTG, Time to Achieve by discharge  -AF      Transfer Training OT LTG, Activity Type toilet  -AF      Transfer Training OT LTG, [place] Level supervision required  -AF      Transfer Training OT LTG, Additional Goal with appropriate AD  -AF      Transfer Training OT LTG, Outcome goal ongoing  -AF      Safety Awareness OT LTG    Safety Awareness OT LTG, Date Established 02/18/17  -AF      Safety Awareness OT LTG, Time to Achieve by discharge  -AF      Safety Awareness OT LTG, Activity Type good safety awareness;with ADL's  -AF      Safety Awareness OT LTG, Additional Goal with transfers  -AF      Safety Awareness OT LTG, Outcome goal ongoing  -AF      ADL OT LTG    ADL OT LTG, Date Established 02/18/17  -AF      ADL OT LTG, Time to Achieve by discharge  -AF      ADL OT LTG, Activity Type ADL skills  -AF      ADL OT LTG, [place] Level standby assist;min verbal cues  -AF      ADL OT LTG, Outcome goal ongoing  -AF        User Key  (r) = Recorded By, (t) = Taken By, (c) = Cosigned By    Initials Name Provider Type    AF Matilda Tran, OTR Occupational Therapist                Outcome Measures       02/18/17 1105 02/18/17 1000 02/17/17 1400    How much help from another person do you currently need...    Turning from your back to your side while in flat bed without using bedrails?  3  -CW 3  -CH (r) MS (t) CH (c)    Moving from lying on back to sitting on the side of a flat bed without bedrails?  3  -CW 3  -CH (r) MS (t) CH (c)    Moving to and from a bed to a chair (including a wheelchair)?  3  -CW 3  -CH (r) MS (t) CH (c)    Standing up from a chair using your arms (e.g., wheelchair,  bedside chair)?  3  -CW 3  -CH (r) MS (t) CH (c)    Climbing 3-5 steps with a railing?  2  -CW 2  -CH (r) MS (t) CH (c)    To walk in hospital room?  3  -CW 2  -CH (r) MS (t) CH (c)    AM-PAC 6 Clicks Score  17  -CW 16  -CH (r) MS (t)    How much help from another is currently needed...    Putting on and taking off regular lower body clothing? 2  -AF      Bathing (including washing, rinsing, and drying) 2  -AF      Toileting (which includes using toilet bed pan or urinal) 3  -AF      Putting on and taking off regular upper body clothing 3  -AF      Taking care of personal grooming (such as brushing teeth) 3  -AF      Eating meals 3  -AF      Score 16  -AF      Functional Assessment    Outcome Measure Options AM-PAC 6 Clicks Daily Activity (OT)  -AF AM-PAC 6 Clicks Basic Mobility (PT)  -CW AM-PAC 6 Clicks Basic Mobility (PT)  -CH (r) MS (t) CH (c)      User Key  (r) = Recorded By, (t) = Taken By, (c) = Cosigned By    Initials Name Provider Type    CH Johanna Engle, PT Physical Therapist    AF Matilda Tran, OTR Occupational Therapist    CW Vaughn Amaya Physical Therapy Assistant    MS Christina Ankita, PT Student PT Student          Time Calculation:   OT Start Time: 0937  OT Stop Time: 1002  OT Time Calculation (min): 25 min    Therapy Charges for Today     Code Description Service Date Service Provider Modifiers Qty    [id number]  OT EVAL LOW COMPLEXITY 2 2/18/2017 Matilda Tran OTR GO 1    [id number]  OT SELF CARE/MGMT/TRAIN EA 15 MIN 2/18/2017 Matilda Tran OTR GO 1               Matilda Tran OTR  2/18/2017

## 2017-02-18 NOTE — PROGRESS NOTES
Acute Care - Speech Language Pathology   Swallow Re-Evaluation [facility]     Patient Name: Sander White  : 1955  MRN: [id number]  Today's Date: 2017  Onset of Illness/Injury or Date of Surgery Date: 02/15/17            Admit Date: 2/15/2017    Visit Dx:     ICD-10-CM ICD-9-CM   1. ST elevation myocardial infarction (STEMI), unspecified artery I21.3 410.90   2. Cardiac arrest with ventricular fibrillation I46.9 427.5    I49.01 427.41   3. Dysphagia, unspecified type R13.10 787.20     Patient Active Problem List   Diagnosis   • Airway hyperreactivity   • Elevated cholesterol   • Gain of weight   • ED (erectile dysfunction)   • ST elevation myocardial infarction (STEMI)     Past Medical History   Diagnosis Date   • Arthritis    • Chronic pain in right foot    • ED (erectile dysfunction) 7/15/2016   • GERD (gastroesophageal reflux disease)    • Hypertension      Past Surgical History   Procedure Laterality Date   • Colonoscopy N/A 2015     Normal   • Appendectomy     • Hernia repair Right      Inguinal   • Cyst removal N/A      Neck   • Cholecystectomy     • Pr rt/lt heart catheters N/A 2/15/2017     Procedure: Percutaneous Coronary Intervention;  Surgeon: Kristina Billings MD;  Location: [facility] CATH INVASIVE LOCATION;  Service: Cardiovascular   • Cardiac catheterization N/A 2/15/2017     Procedure: Left Heart Cath;  Surgeon: Kristina Billings MD;  Location: [facility] CATH INVASIVE LOCATION;  Service:    • Cardiac catheterization  2/15/2017     Procedure: Percutaneous Manual Thrombectomy;  Surgeon: Kristina Billings MD;  Location: [facility] CATH INVASIVE LOCATION;  Service:    • Cardiac catheterization N/A 2/15/2017     Procedure: Coronary angiography;  Surgeon: Kristina Billings MD;  Location: [facility] CATH INVASIVE LOCATION;  Service:    • Cardiac catheterization N/A 2/15/2017     Procedure: Left ventriculography;  Surgeon: Kristina Billings MD;  Location: [facility] CATH INVASIVE  "LOCATION;  Service:      SPEECH-LANGUAGE PATHOLOGY EVALUATION - SWALLOW  Subjective: The patient was seen on this date for a Clinical Swallow evaluation.  Patient was alert and cooperative.    The patient's history is significant for MI with cardiac arrest.   Objective: Textures given included thin liquid and regular consistency.  Assessment: Difficulties were noted with none of the above consistencies, characterized by safe and functional swallow.  SLP Findings:  Patient presents with functional swallow, without esophageal component.   Recommendations: Diet Textures: thin liquid, regular consistency food.  Medications should be taken whole with thin liquids. May have water and ice between meals after oral care, under staff or family supervision and with the recommended strategies for safe swallowing.   Recommended Strategies: Upright for PO. Oral care before breakfast, after all meals and PRN.  Other Recommended Evaluations: Cognitive-Linguistic Evaluation - Cognition screened at bedside today. Pt was oriented X4. He was able to answer simple questions with 100% accuracy. No word finding difficulty noted. Pt c/o \"not remembering yesterday.\" Pt was able to repeat words without difficulty. Pt only able to recall 1/3 words after a delay. Pt able to name 14 animals in one minute - the average is 15. Overall, it appears, and pt is complaining, that patient is exhibiting difficulty with memory. Pt wants to return to work. Pt would benefit from a detailed cognitive evaluation as an outpatient before returning to work. Pt has already shown improvement wit his cognition per patient report. Pt does seem to have some reduced insight into his deficits.     Dysphagia therapy is not recommended. Rationale: Swallow appears to be within normal limits.     SWALLOW EVALUATION (last 72 hours)      Swallow Evaluation       02/18/17 1107 02/17/17 1004             Rehab Evaluation    Document Type evaluation  -JJ evaluation  -NB       " Subjective Information agree to therapy;no complaints  -JJ        Patient Effort, Rehab Treatment good  -JJ        Symptoms Noted During/After Treatment  fatigue  -NB       General Information    Patient Profile Review yes  -JJ yes  -NB       Subjective Patient Observations pleasant and cooperative  -JJ        Pertinent History Of Current Problem MI, cardiac arrest  -JJ        Current Diet Limitations regular solid;thin liquids;other (see comments)   No mixed consistencies  -JJ NPO  -NB       Precautions/Limitations, Vision WFL  -JJ WFL  -NB       Precautions/Limitations, Hearing WFL  -JJ WFL  -NB       Prior Level of Function- Communication functional in all spheres  -JJ functional in all spheres  -NB       Prior Level of Function- Swallowing no diet consistency restrictions  -JJ no diet consistency restrictions  -NB       Plans/Goals Discussed With patient  -JJ patient and family;agreed upon  -NB       Barriers to Rehab none identified  -JJ cognitive status  -NB       Clinical Impression    Patient's Goals For Discharge return to all previous roles/activities  -JJ patient did not state  -NB       Family Goals For Discharge family did not state  -JJ patient able to return to all previous activities/roles  -NB       SLP Swallowing Diagnosis other (see comments)   functional swallow  -JJ mild dysphagia;oral dysfunction;pharyngeal dysfunction  -NB       Rehab Potential/Prognosis, Swallowing good, to achieve stated therapy goals  -JJ good, to achieve stated therapy goals  -NB       Criteria for Skilled Therapeutic Interventions Met skilled criteria for dysphagia intervention met  -JJ skilled criteria for dysphagia intervention met  -NB       Therapy Frequency other (see comments)   No therapy needed for swallow  -JJ PRN  -NB       Predicted Duration Therapy Interv (days) other (see comments)   No therapy needed for swallow  -JJ until discharge  -NB       Expected Duration Therapy Session (min)  15-30 minutes  -NB        SLP Diet Recommendation  regular textures;thin liquids  -NB       Recommended Diagnostics  reassess via clinical swallow (non-instrumental exam)  -NB       Recommended Feeding/Eating Techniques  small sips/bites  -NB       SLP Rec. for Method of Medication Administration meds whole with thin liquid  -JJ meds whole in pudding/applesauce  -NB       Monitor For Signs Of Aspiration cough;gurgly voice;throat clearing  -JJ cough;gurgly voice;throat clearing  -NB       Anticipated Discharge Disposition  other (see comments)   unknown  -NB       Pain Assessment    Pain Assessment  No/denies pain  -NB       Cognitive Assessment/Intervention    Current Cognitive/Communication Assessment impaired  -JJ impaired  -NB       Orientation Status oriented x 4  -JJ oriented to;person;disoriented to;place;time;situation  -NB       Follows Commands/Answers Questions 100% of the time;needs increased time;needs cueing  -% of the time;able to follow single-step instructions  -NB       Personal Safety decreased insight to deficits  -JJ        Short/Long Term Memory moderate impairment, short term memory;other (see comments)   recalled one out of three words after a delay  -JJ        Oral Motor Structure and Function    Oral Motor Anatomy and Physiology patient demonstrates anatomy that is WNL  -JJ patient demonstrates anatomy that is WNL  -NB       Dentition Assessment present and adequate  -JJ present and adequate  -NB       Secretion Management WNL/WFL  -JJ WNL/WFL  -NB       Mucosal Quality moist, healthy  -JJ moist, healthy  -NB       Velar Elevation WNL (within normal limits)  -JJ WNL (within normal limits)  -NB       Volitional Swallow no difficulties initiating volitional swallow  -JJ no difficulties initiating volitional swallow  -NB       Volitional Cough no difficulties initiating volitional cough  -JJ weak volitional cough  -NB       Oral Musculature General Assessment WNL (within normal limits)  -JJ WNL (within normal  limits)  -NB         User Key  (r) = Recorded By, (t) = Taken By, (c) = Cosigned By    Initials Name Effective Dates    NB Dawn Clay, MS CCC-SLP 04/13/15 -     LESLY Cornejo, MS CCC-SLP 04/13/15 -         EDUCATION  The patient has been educated in the following areas:   Cognitive Impairment Dysphagia (Swallowing Impairment).    SLP Recommendation and Plan  SLP Swallowing Diagnosis: other (see comments) (functional swallow)        SLP Rec. for Method of Medication Administration: meds whole with thin liquid  Monitor For Signs Of Aspiration: cough, gurgly voice, throat clearing     Criteria for Skilled Therapeutic Interventions Met: skilled criteria for dysphagia intervention met     Rehab Potential/Prognosis, Swallowing: good, to achieve stated therapy goals  Therapy Frequency: other (see comments) (No therapy needed for swallow)                        IP SLP Goals       02/17/17 1001          Safely Consume Diet    Safely Consume Diet- SLP, Date Established 02/17/17  -NB      Safely Consume Diet- SLP, Time to Achieve by discharge  -NB      Safely Consume Diet- SLP, Additional Goal regular & thin no mixed   -NB      Safely Consume Diet- SLP, Date Goal Reviewed 02/17/17  -NB        User Key  (r) = Recorded By, (t) = Taken By, (c) = Cosigned By    Initials Name Provider Type    SAMUEL Clay MS CCC-SLP Speech and Language Pathologist             SLP Outcome Measures (last 72 hours)      SLP Outcome Measures       02/18/17 1100 02/17/17 1003       SLP Outcome Measures    Outcome Measure Used? Adult NOMS  -JJ Adult NOMS  -NB     FCM Scores    FCM Chosen Swallowing  -JJ Swallowing  -NB     Swallowing FCM Score 6  -JJ 5  -NB       User Key  (r) = Recorded By, (t) = Taken By, (c) = Cosigned By    Initials Name Effective Dates    NB Dawn Clay, MS CCC-SLP 04/13/15 -     LESLY Cornejo, MS CCC-SLP 04/13/15 -            Time Calculation:         Time Calculation- SLP       02/18/17 9289           Time Calculation- SLP    SLP Received On 02/18/17  -LESLY        User Key  (r) = Recorded By, (t) = Taken By, (c) = Cosigned By    Initials Name Provider Type    LESLY Cornejo MS CCC-SLP Speech and Language Pathologist          Therapy Charges for Today     Code Description Service Date Service Provider Modifiers Qty    95578884040 HC ST TREATMENT SWALLOW 3 2/18/2017 Amee Cornejo MS CCC-SLP GN 1               Amee Cornejo MS CCC-SLP  2/18/2017

## 2017-02-18 NOTE — PROGRESS NOTES
Patient Identification:  NAME:  Sander White  Age:  61 y.o.   Sex:  male   :  1955   MRN:  [id number]       Chief complaint: An anoxic encephalopathy    History of present illness:  The patient looks great today awake alert he still asked like he's never seen me before as he does not recall me but when he is given 3 objects to recall he does recall them at 5 minutes which is a drastic improvement even from yesterday.      Past medical history:  Past Medical History   Diagnosis Date   • Arthritis    • Chronic pain in right foot    • ED (erectile dysfunction) 7/15/2016   • GERD (gastroesophageal reflux disease)    • Hypertension        Allergies:  Amoxicillin and Penicillins    Home medications:  Prescriptions Prior to Admission   Medication Sig Dispense Refill Last Dose   • fluticasone (FLONASE) 50 MCG/ACT nasal spray 2 sprays into each nostril Daily for 30 days. Administer 2 sprays in each nostril for each dose. 9.9 mL 1    • lisinopril (ZESTRIL) 2.5 MG tablet Take 1 tablet by mouth daily. 90 tablet 3 Taking   • Omega-3 Fatty Acids (OMEGA 3 PO) Take 1 capsule by mouth daily.   Taking   • Pyridoxine HCl (VITAMIN B-6 PO) Take 1 tablet by mouth daily.   Taking   • vitamin B-12 (CYANOCOBALAMIN) 1000 MCG tablet Take 1,000 mcg by mouth daily.   Taking   • vitamin C (ASCORBIC ACID) 500 MG tablet Take 500 mg by mouth daily.   Taking   • azithromycin (ZITHROMAX Z-KENDRICK) 250 MG tablet Take 1 tablet by mouth Daily. Take 2 tablets the first day, then 1 tablet daily for 4 days. 6 tablet 0    • sildenafil (VIAGRA) 100 MG tablet Take 1 tablet by mouth Daily As Needed for erectile dysfunction. 10 tablet 2         Hospital medications:    alteplase 2 mg Intracatheter Once   amiodarone 400 mg Oral Q12H   aspirin 81 mg Oral Daily   atorvastatin 20 mg Oral Nightly   carvedilol 3.125 mg Oral Q12H   clindamycin 300 mg Intravenous Q8H   furosemide 40 mg Oral Daily   lisinopril 2.5 mg Oral Nightly   nitroglycerin 0.5 inch Topical  Q8H   pantoprazole 40 mg Oral Q AM   potassium chloride 20 mEq Oral Daily   ticagrelor 90 mg Oral BID       sodium chloride 9 mL/hr Last Rate: 9 mL/hr (02/18/17 0908)     •  acetaminophen **OR** acetaminophen  •  ALPRAZolam  •  dextrose  •  dextrose  •  glucagon (human recombinant)  •  influenza vaccine  •  Morphine  •  ondansetron  •  sodium chloride  •  sodium phosphate IVPB      Objective:  Vitals Ranges:   Temp:  [97.9 °F (36.6 °C)-101.3 °F (38.5 °C)] 98 °F (36.7 °C)  Heart Rate:  [65-93] 71  Resp:  [16-18] 18  BP: (103-122)/(67-90) 111/75      Physical Exam: The patient is awake and alert he asked like he is never seen me before he does recall 3 out of 3 objects at 5 minutes today however normal cranial nerves II through VII great motor times for 70s toes downgoing bilaterally    Results review:   I reviewed the patient's new clinical results.    Data review:  Lab Results (last 24 hours)     Procedure Component Value Units Date/Time    POC Glucose Fingerstick [[id number]]  (Normal) Collected:  02/17/17 1642    Specimen:  Blood Updated:  02/17/17 1644     Glucose 87 mg/dL     Narrative:       Meter: [id number] : [id number] Kristi Blanco    Blood Culture [[id number]]  (Normal) Collected:  02/16/17 2007    Specimen:  Blood from Blood, Central Line Updated:  02/17/17 2101     Blood Culture No growth at 24 hours     Blood Culture [[id number]]  (Normal) Collected:  02/16/17 2024    Specimen:  Blood from Arm, Left Updated:  02/17/17 2101     Blood Culture No growth at 24 hours     POC Glucose Fingerstick [[id number]]  (Normal) Collected:  02/17/17 2102    Specimen:  Blood Updated:  02/17/17 2107     Glucose 108 mg/dL     Narrative:       Meter: [id number] : [id number] Sheron Ji    POC Glucose Fingerstick [[id number]]  (Normal) Collected:  02/18/17 0011    Specimen:  Blood Updated:  02/18/17 0048     Glucose 111 mg/dL     Narrative:       Meter: [id number] : [id number] Sheron Ji    Magnesium [[id number]]   (Normal) Collected:  02/18/17 0109    Specimen:  Blood Updated:  02/18/17 0140     Magnesium 1.9 mg/dL     BNP [[id number]]  (Abnormal) Collected:  02/18/17 0109    Specimen:  Blood Updated:  02/18/17 0141     proBNP 1390.0 (H) pg/mL     Narrative:       Among patients with dyspnea, NT-proBNP is highly sensitive for the detection of acute congestive heart failure. In addition NT-proBNP of <300 pg/ml effectively rules out acute congestive heart failure with 99% negative predictive value.    Comprehensive Metabolic Panel [[id number]]  (Abnormal) Collected:  02/18/17 0109    Specimen:  Blood Updated:  02/18/17 0143     Glucose 115 (H) mg/dL      BUN 16 mg/dL      Creatinine 1.07 mg/dL      Sodium 142 mmol/L      Potassium 3.4 (L) mmol/L      Chloride 107 mmol/L      CO2 23.4 mmol/L      Calcium 8.1 (L) mg/dL      Total Protein 5.6 (L) g/dL      Albumin 3.40 (L) g/dL      ALT (SGPT) 128 (H) U/L      AST (SGOT) 104 (H) U/L      Alkaline Phosphatase 53 U/L      Total Bilirubin 0.7 mg/dL      eGFR Non African Amer 70 mL/min/1.73      Globulin 2.2 gm/dL      A/G Ratio 1.5 g/dL      BUN/Creatinine Ratio 15.0      Anion Gap 11.6 mmol/L     Troponin [[id number]]  (Abnormal) Collected:  02/18/17 0109    Specimen:  Blood Updated:  02/18/17 0143     Troponin T 2.790 (C) ng/mL     Narrative:       Troponin T Reference Ranges:  Less than 0.03 ng/mL:    Negative for AMI  0.03 to 0.09 ng/mL:      Indeterminant for AMI  Greater than 0.09 ng/mL: Positive for AMI    POC Glucose Fingerstick [[id number]]  (Normal) Collected:  02/18/17 0349    Specimen:  Blood Updated:  02/18/17 0410     Glucose 107 mg/dL     Narrative:       Meter: [id number] : [id number] Sheron Ji    CBC & Differential [[id number]] Collected:  02/18/17 0512    Specimen:  Blood Updated:  02/18/17 0656    Narrative:       The following orders were created for panel order CBC & Differential.  Procedure                               Abnormality         Status                      ---------                               -----------         ------                     CBC Auto Differential[[id number]]         Abnormal            Final result                 Please view results for these tests on the individual orders.    CBC Auto Differential [[id number]]  (Abnormal) Collected:  02/18/17 0512    Specimen:  Blood Updated:  02/18/17 0656     WBC 8.08 10*3/mm3      RBC 3.80 (L) 10*6/mm3      Hemoglobin 11.5 (L) g/dL      Hematocrit 34.8 (L) %      MCV 91.6 fL      MCH 30.3 pg      MCHC 33.0 g/dL      RDW 13.2 %      RDW-SD 44.4 fl      MPV 11.5 fL      Platelets 139 (L) 10*3/mm3      Neutrophil % 69.3 %      Lymphocyte % 14.5 (L) %      Monocyte % 15.0 (H) %      Eosinophil % 0.7 %      Basophil % 0.1 %      Immature Grans % 0.4 %      Neutrophils, Absolute 5.60 10*3/mm3      Lymphocytes, Absolute 1.17 10*3/mm3      Monocytes, Absolute 1.21 (H) 10*3/mm3      Eosinophils, Absolute 0.06 10*3/mm3      Basophils, Absolute 0.01 10*3/mm3      Immature Grans, Absolute 0.03 10*3/mm3     aPTT [[id number]]  (Normal) Collected:  02/18/17 0645    Specimen:  Blood Updated:  02/18/17 0707     PTT 29.5 seconds     Protime-INR [[id number]]  (Normal) Collected:  02/18/17 0645    Specimen:  Blood Updated:  02/18/17 0707     Protime 13.8 Seconds      INR 1.10     Basic Metabolic Panel [[id number]]  (Abnormal) Collected:  02/18/17 0512    Specimen:  Blood Updated:  02/18/17 0721     Glucose 105 (H) mg/dL      BUN 15 mg/dL      Creatinine 1.03 mg/dL      Sodium 140 mmol/L      Potassium 3.5 mmol/L      Chloride 105 mmol/L      CO2 25.8 mmol/L      Calcium 8.1 (L) mg/dL      eGFR Non African Amer 74 mL/min/1.73      BUN/Creatinine Ratio 14.6      Anion Gap 9.2 mmol/L     Narrative:       GFR Normal >60  Chronic Kidney Disease <60  Kidney Failure <15    Magnesium [[id number]]  (Normal) Collected:  02/18/17 0512    Specimen:  Blood Updated:  02/18/17 0721     Magnesium 2.3 mg/dL     POC Glucose Fingerstick [[id number]]  (Normal)  Collected:  02/18/17 0715    Specimen:  Blood Updated:  02/18/17 0809     Glucose 103 mg/dL     Narrative:       Meter: [id number] : [id number] Carlos Alberto PERKINS           Imaging:  Imaging Results (last 24 hours)     ** No results found for the last 24 hours. **             Assessment and Plan:     Active Problems:    ST elevation myocardial infarction (STEMI)    He looks great.  He is short-term memory is even improving as he recalls 3 out of 3 objects at 5 minutes.  It is noted that he does not recall ever seeing me before.  At this point I will sign off and I expect him to have continued great improvement thanks      Jeff Reyes MD  02/18/17  3:08 PM

## 2017-02-18 NOTE — PLAN OF CARE
Problem: Patient Care Overview (Adult)  Goal: Plan of Care Review  Outcome: Ongoing (interventions implemented as appropriate)    02/18/17 1049   Outcome Evaluation   Outcome Summary/Follow up Plan Pt is increasing with transfers and amb with RWX   Coping/Psychosocial Response Interventions   Plan Of Care Reviewed With patient   Patient Care Overview   Progress improving

## 2017-02-18 NOTE — SIGNIFICANT NOTE
02/18/17 0909   Rehab Treatment   Discipline occupational therapist   Rehab Evaluation   Evaluation Not Performed (pt. having increased chest pain and increased troponin levels this AM, will wait to evaluate)   Recommendation   OT - Next Appointment 02/20/17

## 2017-02-18 NOTE — PROGRESS NOTES
"[facility]  Cardiology Progress Note    Patient Identification:  Name: Sander White  Age: 61 y.o.  Sex: male  :  1955  MRN: [id number]                 Follow Up / Chief Complaint: resuscitated arrest,VF, STEMI->emergent PCI, LV dysfx, h/o HTN    Interval History:  Reports of chest pain with n/v. Full arrest upon EMS arrival with multiple shocks for VF, received CPR for 40-45 minutes before return of systemic circulation. Emergent thrombectomy and CAYLA-> ramus. Hypothermia protocol, resp failure with possible aspiration pneumonia and anoxic encephalopathy     Subjective:  Extubated, awake, alert oriented to person and place.  Reports reproducible diffuse anterior chest discomfort, worsened with deep inspiration and movement.  Denies shortness of breath.  Throat \"sore\" and asking for some lunch    Objective:  No further ectopy.  Blood pressure is stable    Past Medical History:  Past Medical History   Diagnosis Date   • Arthritis    • Chronic pain in right foot    • ED (erectile dysfunction) 7/15/2016   • GERD (gastroesophageal reflux disease)    • Hypertension      Past Surgical History:  Past Surgical History   Procedure Laterality Date   • Colonoscopy N/A 2015     Normal   • Appendectomy     • Hernia repair Right      Inguinal   • Cyst removal N/A      Neck   • Cholecystectomy     • Pr rt/lt heart catheters N/A 2/15/2017     Procedure: Percutaneous Coronary Intervention;  Surgeon: Kristina Billings MD;  Location:  [facility];  Service: Cardiovascular   • Cardiac catheterization N/A 2/15/2017     Procedure: Left Heart Cath;  Surgeon: Kristina Billings MD;  Location:  [facility];  Service:    • Cardiac catheterization  2/15/2017     Procedure: Percutaneous Manual Thrombectomy;  Surgeon: Kristina Billings MD;  [facility];  Service:    • Cardiac catheterization N/A 2/15/2017     Procedure: Coronary angiography;  " Surgeon: Kristina Billings MD;  Location:  [facility];  Service:    • Cardiac catheterization N/A 2/15/2017     Procedure: Left ventriculography;  Surgeon: Kristina Billings MD;  Location:  [facility];  Service:         Social History:   Social History   Substance Use Topics   • Smoking status: Former Smoker     Packs/day: 0.50     Years: 7.00     Types: Cigarettes     Quit date: 1999   • Smokeless tobacco: Never Used   • Alcohol use Yes      Family History:  Family History   Problem Relation Age of Onset   • Diabetes Mother      Type 2   • Diabetes Father      Type 2   • Stroke Father    • Aneurysm Brother      Brain          Allergies:  Allergies   Allergen Reactions   • Amoxicillin Itching   • Penicillins      Scheduled Meds:    alteplase 2 mg Once   amiodarone 400 mg Q12H   aspirin 81 mg Daily   atorvastatin 20 mg Nightly   carvedilol 3.125 mg Q12H   clindamycin 300 mg Q8H   furosemide 40 mg Daily   lisinopril 2.5 mg Nightly   nitroglycerin 0.5 inch Q8H   pantoprazole 40 mg Q AM   potassium chloride 20 mEq Daily   ticagrelor 90 mg BID           INTAKE AND OUTPUT:    Intake/Output Summary (Last 24 hours) at 02/18/17 0932  Last data filed at 02/18/17 0513   Gross per 24 hour   Intake   1439 ml   Output   3375 ml   Net  -1936 ml       Review of Systems:   GI: No nausea, vomiting or abdominal pain  Cardiac: Reproducible diffuse anterior chest discomfort, worsened with deep inspiration and movement  Pulmonary:  Extubated.  Maintaining adequate sats on supplemental oxygen per nasal cannula    Constitutional:  Temp:  [97.8 °F (36.6 °C)-101.3 °F (38.5 °C)] 99.1 °F (37.3 °C)  Heart Rate:  [65-93] 77  BP: (103-122)/(67-90) 116/71    Physical Exam by Kristina Billings MD  General:  Awake, alert.  Visiting with his wife.  Appears in no acute distress  HEENT: PERTL No JVD. Oral mucosa moist, no cyanosis  Respiratory: Respirations regular and unlabored on vent. BBS with good air  entry in all fields.  No crackles or wheezes auscultated  Cardiovascular: S1S2 Regular rate and rhythm. No murmur or gallop.RFA site without oozing, palpable hematoma or thrill.  RFA 2/ RDP 2  No pretibial pitting edema  Gastrointestinal: Abdomen soft, nontender. + Out sounds No ascites  Musculoskeletal: No abnormal movements  Extremities: No digital  cyanosis  Skin: Color pink. Skin warm and dry to touch.   Neuro: AAO x2. CN II-XII is intact.  Following commands. ALVAREZ x4 assess.        Cardiographics  Telemetry:  SR 70-80's, no ectopy      ECG 2-17-17:         Echocardiogram:     · Left ventricular wall segments contract abnormally. Refer to wall scoring diagram for more information.  · Left atrial cavity size is borderline dilated.  · Mild mitral valve regurgitation is present  · Mild tricuspid valve regurgitation is present.  · Left Ventricle: Calculated EF = 36.3%.  · There is no evidence of pericardial effusion.               CATH & PCI  · Successful left heart catheter  · Successful thrombectomy of the ramus branch  · Successful angioplasty and stent to the ramus branch reduced from 100% to 0% with 2.5/15 XIENCE dilated to 2.6  · Mild global hypokinetic estimated ejection fraction 40%  · LAD 20-30% diffuse irregularity  · Normal left main  · Circumflex had a ramus branch which was and plasty and otherwise was normal  · Cor dominant RCA midportion 60%    Lab Review     Results from last 7 days  Lab Units 02/18/17  0109 02/17/17  0426 02/16/17  2241   TROPONIN T ng/mL 2.790* 1.960* 1.900*       Results from last 7 days  Lab Units 02/18/17  0512   MAGNESIUM mg/dL 2.3       Results from last 7 days  Lab Units 02/18/17  0512   SODIUM mmol/L 140   POTASSIUM mmol/L 3.5   BUN mg/dL 15   CREATININE mg/dL 1.03   CALCIUM mg/dL 8.1*       Results from last 7 days  Lab Units 02/18/17  0512 02/17/17  0426 02/16/17  1751   WBC 10*3/mm3 8.08 10.47 16.89*   HEMOGLOBIN g/dL 11.5* 12.3* 13.5*   HEMATOCRIT % 34.8* 36.6* 40.7    PLATELETS 10*3/mm3 139* 154 163       Results from last 7 days  Lab Units 02/18/17  0645 02/16/17  1751 02/16/17  1155   INR  1.10 1.05 1.02   APTT seconds 29.5 28.0 24.5         Assessment:  - acute lateral STEMI  - s/p resuscitated cardiac arrest  - s/p emergent with 2.5/15 XIENCE -> 100% ramus  - LV dysfx - EF 35-40%, mild MR/TR  - acute resp failure -> Pulm  - suspected right aspiration pneumonia -> Pulm  - anoxic encephalopathy -> Neuro  - hypokalemia - replace per protocol    Plan:    - acute lateral STEMI,  resuscitated cardiac arrest-> - s/p emergent with 2.5/15 XIENCE -> 100% ramus.  Awake, alert, following commands.  (Reproducible) chest soreness likely from prolonged CPR and multiple defib shocks.  No acute ischemic ST changes on this morning's EKG.  No increase in serial troponin.  Continue aspirin, Brilinta, Corag and Prinivil       - VF -   No recurrence.  On oral  Amio and Coreg      - LV dysfx - EF 35-40%, mild MR/TR.  Clinically compensated.  Continue Coreg and titrate up his blood pressure tolerates - in addition to Prinivil and Lasix    Continue dual antiplatelet therapy with asa and Brilinta  in addition to beta blocker,ACE-I and Lasix.  Start statin.  Cardiac rehabilitation, OT/PT eval.  Anticipate transfer to stepdown unit tomorrow.  BNP, BMP, Mag and CBC  ordered for am      Patient had recurrent episodes of the chest pain appears to be musculoskeletal constant due to the 45 minutes of CPR    Troponin elevated from the surgery EKG within normal limits.    Transfer to the regular floor    CBC BMP in the morning    EKG and a troponin in the morning    I reviewed the patient's new clinical results and treatment plan. I personally viewed and interpreted the patient's EKG/Telemetry data    )2/18/2017  Kristina Billings MD      EMR Dragon/Transcription disclaimer:   Much of this encounter note is an electronic transcription/translation of spoken language to printed text. The electronic  translation of spoken language may permit erroneous, or at times, nonsensical words or phrases to be inadvertently transcribed; Although I have reviewed the note for such errors, some may still exist.

## 2017-02-18 NOTE — PLAN OF CARE
Problem: Patient Care Overview (Adult)  Goal: Plan of Care Review  Outcome: Ongoing (interventions implemented as appropriate)    02/18/17 0609   Outcome Evaluation   Outcome Summary/Follow up Plan Increased chest pain and elevated Troponin, MD aware, Nitro paste ordered. Cont'd blood in sputum and from nose. CTM VS & labs.    Coping/Psychosocial Response Interventions   Plan Of Care Reviewed With patient;spouse   Patient Care Overview   Progress improving       Goal: Adult Individualization and Mutuality  Outcome: Ongoing (interventions implemented as appropriate)

## 2017-02-18 NOTE — PROGRESS NOTES
Acute Care - Physical Therapy Treatment Note  [facility]     Patient Name: Sander White  : 1955  MRN: [id number]  Today's Date: 2017  Onset of Illness/Injury or Date of Surgery Date: 02/15/17  Date of Referral to PT: 17  Referring Physician: Astrid    Admit Date: 2/15/2017    Visit Dx:    ICD-10-CM ICD-9-CM   1. ST elevation myocardial infarction (STEMI), unspecified artery I21.3 410.90   2. Cardiac arrest with ventricular fibrillation I46.9 427.5    I49.01 427.41     Patient Active Problem List   Diagnosis   • Airway hyperreactivity   • Elevated cholesterol   • Gain of weight   • ED (erectile dysfunction)   • ST elevation myocardial infarction (STEMI)               Adult Rehabilitation Note       17 1000 17 1430       Rehab Assessment/Intervention    Discipline physical therapy assistant  -CW physical therapist  -CH,MS,CH2     Document Type therapy note (daily note)  -CW evaluation  -CH,MS,CH2     Subjective Information agree to therapy;complains of;fatigue  -CW agree to therapy;complains of;fatigue;weakness;pain  -CH,MS,CH2     Patient Effort, Rehab Treatment good  -CW good  -CH,MS,CH2     Symptoms Noted During/After Treatment  dizziness;fatigue;increased pain  -CH,MS,CH2     Precautions/Limitations fall precautions  -CW fall precautions  -CH,MS,CH2     Recorded by [CW] Vaughn Amaya [CH,MS,CH2] Johanna Engle, PT (r) Christina Luciano, PT Student (t) Johanna Engle, PT (c)     Pain Assessment    Pain Assessment No/denies pain  -CW FLACC  -CH,MS,CH2     Pain Score  4  -CH,MS,CH2     Pain Location  Chest  -CH,MS,CH2     Pain Intervention(s)  Repositioned;Ambulation/increased activity  -CH,MS,CH2     Recorded by [CW] Vaughn Amaya [CH,MS,CH2] Johanna Engle, PT (r) Christina Luciano PT Student (t) Johanna Engle, PT (c)     Cognitive Assessment/Intervention    Current Cognitive/Communication Assessment impaired  -CW impaired  -CH,MS,CH2     Orientation Status oriented  x 4  -CW oriented to;person;situation  -CH,MS,CH2     Follows Commands/Answers Questions 100% of the time  -% of the time  -CH,MS,CH2     Personal Safety mild impairment;decreased awareness, need for safety;decreased insight to deficits  -CW decreased insight to deficits  -CH,MS,CH2     Personal Safety Interventions fall prevention program maintained;gait belt;nonskid shoes/slippers when out of bed  -CW      Recorded by [CW] Vaughn Amaya [CH,MS,CH2] Johanna Engle, PT (r) Christina Luciano, PT Student (t) Johanna Engle, PT (c)     ROM (Range of Motion)    General ROM  no range of motion deficits identified  -CH,MS,CH2     Recorded by  [CH,MS,CH2] Johanna Engle, PT (r) Christina Luciano, PT Student (t) Johanna Engle, PT (c)     MMT (Manual Muscle Testing)    General MMT Assessment  upper extremity strength deficits identified;lower extremity strength deficits identified  -CH,MS,CH2     Recorded by  [CH,MS,CH2] Johanna Engle, PT (r) Christina Luciano, PT Student (t) Johanna Engle, PT (c)     Upper Extremity    Upper Ext Manual Muscle Testing  left UE strength is WFL;right UE strength is WFL  -CH,MS,CH2     Recorded by  [CH,MS,CH2] Johanna Engle, PT (r) Christina Luciano PT Student (t) Johanna Engle, PT (c)     Lower Extremity    Lower Ext Manual Muscle Testing  left LE strength is WFL;right LE strength is WFL  -CH,MS,CH2     Recorded by  [CH,MS,CH2] Johanna Engle, PT (r) Christina Luciano PT Student (t) Johanna Engle, PT (c)     Bed Mobility, Assessment/Treatment    Bed Mobility, Assistive Device  head of bed elevated  -CH,MS,CH2     Bed Mobility, Scoot/Bridge, [place]  minimum assist (75% patient effort);verbal cues required  -CH,MS,CH2     Bed Mob, Supine to Sit, [place] not tested  -CW minimum assist (75% patient effort);verbal cues required  -CH,MS,CH2     Bed Mob, Sit to Supine, [place]  minimum assist (75% patient effort);verbal cues required  -CH,MS,CH2     Bed  Mobility, Safety Issues  decreased use of arms for pushing/pulling  -CH,MS,CH2     Bed Mobility, Impairments  strength decreased;impaired balance;coordination impaired  -CH,MS,CH2     Bed Mobility, Comment Pt sitting EOB  -CW      Recorded by [CW] Vaughn Amaya [CH,MS,CH2] Johanna Engle, PT (r) Christina Luciano, PT Student (t) Johanna Engle, PT (c)     Transfer Assessment/Treatment    Transfers, Sit-Stand Jay verbal cues required;contact guard assist  -CW minimum assist (75% patient effort);2 person assist required;hand held assist  -CH,MS,CH2     Transfers, Stand-Sit Jay verbal cues required;contact guard assist  -CW minimum assist (75% patient effort);2 person assist required;hand held assist  -CH,MS,CH2     Transfers, Sit-Stand-Sit, Assist Device rolling walker  -CW      Transfer, Safety Issues  loses balance backward;weight-shifting ability decreased;step length decreased  -CH,MS,CH2     Transfer, Impairments  strength decreased;impaired balance;coordination impaired  -CH,MS,CH2     Transfer, Comment  Pt able to take 3 steps to HOB. Pt became dizzy/light-headed asking to sit.  -CH,MS,CH2     Recorded by [CW] Vaughn Amaya [CH,MS,CH2] Johanna Engle, PT (r) Christina Luciano, PT Student (t) Johanna Engle PT (c)     Gait Assessment/Treatment    Gait, Jay Level contact guard assist;verbal cues required  -CW      Gait, Assistive Device rolling walker  -CW      Gait, Distance (Feet) 20  -CW      Gait, Gait Deviations jennifer decreased;step length decreased;stride length decreased  -CW      Gait, Comment  Pt able to take 3 steps to HOB.  -CH,MS,CH2     Recorded by [CW] Vaughn Amaya [CH,MS,CH2] Johanna Engle, PT (r) Christina Luciano, PT Student (t) Johanna Engle PT (c)     Stairs Assessment/Treatment    Stairs, Jay Level  not tested  -CH,MS,CH2     Recorded by  [CH,MS,CH2] Johanna Engle, PT (r) Christina Luciano, PT Student (t) Johanna Engle PT (c)      Therapy Exercises    Right Lower Extremity  AROM:;15 reps;LAQ  -CH,MS,CH2     Left Lower Extremity  AROM:;5 reps;LAQ   Repetitions limited by increased dizziness and feeling faint  -CH,MS,CH2     Bilateral Lower Extremities AROM:;10 reps;sitting;ankle pumps/circles;hip flexion;LAQ  -CW      Recorded by [CW] Vaughn Amaya [CH,MS,CH2] Johanna Engle, PT (r) Christina Luciano, PT Student (t) Johanna Engle, PT (c)     Modality Treatment    Additional Documentation  --  -CH,MS,CH2     Recorded by  [CH,MS,CH2] Johanna Engle, PT (r) Christina Luciano, PT Student (t) Johanna Engle PT (c)     Positioning and Restraints    Pre-Treatment Position in bed  -CW in bed  -CH,MS,CH2     Post Treatment Position bed  -CW bed  -CH,MS,CH2     In Bed sitting EOB;call light within reach;encouraged to call for assist;with family/caregiver;notified nsg  -CW supine;call light within reach;encouraged to call for assist;with family/caregiver;SCD pump applied  -CH,MS,CH2     Recorded by [CW] Vaughn Amaya [CH,MS,CH2] Johanna Engle, PT (r) Christina Luciano, CANDIDO Student (t) Johanna Engle PT (c)       User Key  (r) = Recorded By, (t) = Taken By, (c) = Cosigned By    Initials Name Effective Dates     Johanna Engle PT 12/01/15 -     CW Vaughn Amaya 12/13/16 -     MS Christina Luciano, PT Student 01/09/17 -                 IP PT Goals       02/17/17 1443          Bed Mobility PT LTG    Bed Mobility PT LTG, Date Established 02/17/17  -CH (r) MS (t) CH (c)      Bed Mobility PT LTG, Time to Achieve 1 wk  -CH (r) MS (t) CH (c)      Bed Mobility PT LTG, Activity Type supine to sit/sit to supine  -CH (r) MS (t) CH (c)      Bed Mobility PT LTG, [place] Level supervision required  -CH (r) MS (t) CH (c)      Transfer Training PT LTG    Transfer Training PT LTG, Date Established 02/17/17  -CH (r) MS (t) CH (c)      Transfer Training PT LTG, Time to Achieve 1 wk  -CH (r) MS (t) CH (c)      Transfer Training PT LTG, Activity Type  sit to stand/stand to sit  -CH (r) MS (t) CH (c)      Transfer Training PT LTG, [place] Level supervision required  -CH (r) MS (t) CH (c)      Gait Training PT LTG    Gait Training Goal PT LTG, Date Established 02/17/17  -CH (r) MS (t) CH (c)      Gait Training Goal PT LTG, Time to Achieve 1 wk  -CH (r) MS (t) CH (c)      Gait Training Goal PT LTG, [place] Level supervision required  -CH (r) MS (t) CH (c)      Gait Training Goal PT LTG, Distance to Achieve 250  -CH (r) MS (t) CH (c)      Stair Training PT LTG    Stair Training Goal PT LTG, Date Established 02/17/17  -CH (r) MS (t) CH (c)      Stair Training Goal PT LTG, Time to Achieve 1 wk  -CH (r) MS (t) CH (c)      Stair Training Goal PT LTG, Number of Steps 5  -CH (r) MS (t) CH (c)      Stair Training Goal PT LTG, [place] Level contact guard assist  -CH (r) MS (t) CH (c)      Stair Training Goal PT LTG, Assist Device 1 handrail  -CH (r) MS (t) CH (c)        User Key  (r) = Recorded By, (t) = Taken By, (c) = Cosigned By    Initials Name Provider Type    CH Johanna Engle, PT Physical Therapist    MS Christina Luciano, PT Student PT Student          Physical Therapy Education     Title: PT OT SLP Therapies (Done)     Topic: Physical Therapy (Done)     Point: Precautions (Done)    Learning Progress Summary    Learner Readiness Method Response Comment Documented by Status   Patient Acceptance E,TB DU,VU   02/18/17 1049 Done    Acceptance E NR  MS 02/17/17 1443 Active                      User Key     Initials Effective Dates Name Provider Type Discipline     12/13/16 -  Vaughn Amaya Physical Therapy Assistant PT    MS 01/09/17 -  Christina Luciano, PT Student PT Student PT                    PT Recommendation and Plan  Anticipated Discharge Disposition: home with home health  Planned Therapy Interventions: bed mobility training, transfer training, gait training, stair training  PT Frequency: daily  Plan of Care Review  Plan Of Care Reviewed With:  patient  Progress: improving  Outcome Summary/Follow up Plan: Pt is increasing with transfers and amb with RWX          Outcome Measures       02/18/17 1000 02/17/17 1400       How much help from another person do you currently need...    Turning from your back to your side while in flat bed without using bedrails? 3  -CW 3  -CH (r) MS (t) CH (c)     Moving from lying on back to sitting on the side of a flat bed without bedrails? 3  -CW 3  -CH (r) MS (t) CH (c)     Moving to and from a bed to a chair (including a wheelchair)? 3  -CW 3  -CH (r) MS (t) CH (c)     Standing up from a chair using your arms (e.g., wheelchair, bedside chair)? 3  -CW 3  -CH (r) MS (t) CH (c)     Climbing 3-5 steps with a railing? 2  -CW 2  -CH (r) MS (t) CH (c)     To walk in hospital room? 3  -CW 2  -CH (r) MS (t) CH (c)     AM-PAC 6 Clicks Score 17  -CW 16  -CH (r) MS (t)     Functional Assessment    Outcome Measure Options AM-PAC 6 Clicks Basic Mobility (PT)  -CW AM-PAC 6 Clicks Basic Mobility (PT)  -CH (r) MS (t) CH (c)       User Key  (r) = Recorded By, (t) = Taken By, (c) = Cosigned By    Initials Name Provider Type     Johanna Engle, PT Physical Therapist    CW Vaughn Amaya Physical Therapy Assistant    MS Christina Luciano, PT Student PT Student           Time Calculation:         PT Charges       02/18/17 1050          Time Calculation    Start Time 1004  -CW      Stop Time 1022  -CW      Time Calculation (min) 18 min  -CW      PT Received On 02/18/17  -CW      PT - Next Appointment 02/19/17  -CW        User Key  (r) = Recorded By, (t) = Taken By, (c) = Cosigned By    Initials Name Provider Type    CW Vaughn Amaya Physical Therapy Assistant          Therapy Charges for Today     Code Description Service Date Service Provider Modifiers Qty    [id number] HC PT THER PROC EA 15 MIN 2/18/2017 Vaughn Amaya GP 1          PT G-Codes  Outcome Measure Options: AM-PAC 6 Clicks Basic Mobility (PT)    Vaughn TROTTER  Jose Luis  2/18/2017

## 2017-02-19 LAB
ANION GAP SERPL CALCULATED.3IONS-SCNC: 12.5 MMOL/L
BASOPHILS # BLD AUTO: 0.02 10*3/MM3 (ref 0–0.2)
BASOPHILS NFR BLD AUTO: 0.3 % (ref 0–1.5)
BUN BLD-MCNC: 16 MG/DL (ref 8–23)
BUN/CREAT SERPL: 16.2 (ref 7–25)
CALCIUM SPEC-SCNC: 8.5 MG/DL (ref 8.6–10.5)
CHLORIDE SERPL-SCNC: 106 MMOL/L (ref 98–107)
CO2 SERPL-SCNC: 20.5 MMOL/L (ref 22–29)
CREAT BLD-MCNC: 0.99 MG/DL (ref 0.76–1.27)
DEPRECATED RDW RBC AUTO: 42.7 FL (ref 37–54)
EOSINOPHIL # BLD AUTO: 0.2 10*3/MM3 (ref 0–0.7)
EOSINOPHIL NFR BLD AUTO: 2.5 % (ref 0.3–6.2)
ERYTHROCYTE [DISTWIDTH] IN BLOOD BY AUTOMATED COUNT: 12.9 % (ref 11.5–14.5)
GFR SERPL CREATININE-BSD FRML MDRD: 77 ML/MIN/1.73
GLUCOSE BLD-MCNC: 102 MG/DL (ref 65–99)
HCT VFR BLD AUTO: 35.7 % (ref 40.4–52.2)
HGB BLD-MCNC: 11.9 G/DL (ref 13.7–17.6)
IMM GRANULOCYTES # BLD: 0.04 10*3/MM3 (ref 0–0.03)
IMM GRANULOCYTES NFR BLD: 0.5 % (ref 0–0.5)
LYMPHOCYTES # BLD AUTO: 1.12 10*3/MM3 (ref 0.9–4.8)
LYMPHOCYTES NFR BLD AUTO: 14 % (ref 19.6–45.3)
MCH RBC QN AUTO: 30.4 PG (ref 27–32.7)
MCHC RBC AUTO-ENTMCNC: 33.3 G/DL (ref 32.6–36.4)
MCV RBC AUTO: 91.1 FL (ref 79.8–96.2)
MONOCYTES # BLD AUTO: 1.25 10*3/MM3 (ref 0.2–1.2)
MONOCYTES NFR BLD AUTO: 15.6 % (ref 5–12)
NEUTROPHILS # BLD AUTO: 5.37 10*3/MM3 (ref 1.9–8.1)
NEUTROPHILS NFR BLD AUTO: 67.1 % (ref 42.7–76)
NT-PROBNP SERPL-MCNC: 879.7 PG/ML (ref 5–900)
PLATELET # BLD AUTO: 163 10*3/MM3 (ref 140–500)
PMV BLD AUTO: 11.3 FL (ref 6–12)
POTASSIUM BLD-SCNC: 3.6 MMOL/L (ref 3.5–5.2)
RBC # BLD AUTO: 3.92 10*6/MM3 (ref 4.6–6)
SODIUM BLD-SCNC: 139 MMOL/L (ref 136–145)
TROPONIN T SERPL-MCNC: 2.36 NG/ML (ref 0–0.03)
WBC NRBC COR # BLD: 8 10*3/MM3 (ref 4.5–10.7)

## 2017-02-19 PROCEDURE — 99231 SBSQ HOSP IP/OBS SF/LOW 25: CPT | Performed by: INTERNAL MEDICINE

## 2017-02-19 PROCEDURE — 25010000002 ONDANSETRON PER 1 MG: Performed by: INTERNAL MEDICINE

## 2017-02-19 PROCEDURE — 83880 ASSAY OF NATRIURETIC PEPTIDE: CPT | Performed by: HOSPITALIST

## 2017-02-19 PROCEDURE — 84484 ASSAY OF TROPONIN QUANT: CPT | Performed by: INTERNAL MEDICINE

## 2017-02-19 PROCEDURE — 80048 BASIC METABOLIC PNL TOTAL CA: CPT | Performed by: INTERNAL MEDICINE

## 2017-02-19 PROCEDURE — 97110 THERAPEUTIC EXERCISES: CPT

## 2017-02-19 PROCEDURE — 25010000002 MORPHINE PER 10 MG: Performed by: INTERNAL MEDICINE

## 2017-02-19 PROCEDURE — 85025 COMPLETE CBC W/AUTO DIFF WBC: CPT | Performed by: HOSPITALIST

## 2017-02-19 RX ORDER — HYDROCODONE BITARTRATE AND ACETAMINOPHEN 5; 325 MG/1; MG/1
1 TABLET ORAL EVERY 6 HOURS PRN
Status: DISCONTINUED | OUTPATIENT
Start: 2017-02-19 | End: 2017-02-21 | Stop reason: HOSPADM

## 2017-02-19 RX ADMIN — POTASSIUM CHLORIDE 20 MEQ: 750 CAPSULE, EXTENDED RELEASE ORAL at 08:22

## 2017-02-19 RX ADMIN — LISINOPRIL 2.5 MG: 2.5 TABLET ORAL at 20:29

## 2017-02-19 RX ADMIN — TICAGRELOR 90 MG: 90 TABLET ORAL at 17:07

## 2017-02-19 RX ADMIN — CARVEDILOL 3.12 MG: 3.12 TABLET, FILM COATED ORAL at 08:21

## 2017-02-19 RX ADMIN — CLINDAMYCIN HYDROCHLORIDE 300 MG: 300 CAPSULE ORAL at 20:30

## 2017-02-19 RX ADMIN — MORPHINE SULFATE 3 MG: 4 INJECTION, SOLUTION INTRAMUSCULAR; INTRAVENOUS at 05:38

## 2017-02-19 RX ADMIN — PANTOPRAZOLE SODIUM 40 MG: 40 TABLET, DELAYED RELEASE ORAL at 06:08

## 2017-02-19 RX ADMIN — TICAGRELOR 90 MG: 90 TABLET ORAL at 08:22

## 2017-02-19 RX ADMIN — ATORVASTATIN CALCIUM 20 MG: 20 TABLET, FILM COATED ORAL at 20:29

## 2017-02-19 RX ADMIN — ISOSORBIDE MONONITRATE 30 MG: 30 TABLET, EXTENDED RELEASE ORAL at 08:22

## 2017-02-19 RX ADMIN — ONDANSETRON 4 MG: 2 INJECTION INTRAMUSCULAR; INTRAVENOUS at 07:29

## 2017-02-19 RX ADMIN — AMIODARONE HYDROCHLORIDE 400 MG: 200 TABLET ORAL at 20:29

## 2017-02-19 RX ADMIN — AMIODARONE HYDROCHLORIDE 400 MG: 200 TABLET ORAL at 08:21

## 2017-02-19 RX ADMIN — CARVEDILOL 3.12 MG: 3.12 TABLET, FILM COATED ORAL at 20:29

## 2017-02-19 RX ADMIN — MORPHINE SULFATE 3 MG: 4 INJECTION, SOLUTION INTRAMUSCULAR; INTRAVENOUS at 00:17

## 2017-02-19 RX ADMIN — FUROSEMIDE 40 MG: 40 TABLET ORAL at 08:22

## 2017-02-19 RX ADMIN — CLINDAMYCIN HYDROCHLORIDE 300 MG: 300 CAPSULE ORAL at 06:08

## 2017-02-19 RX ADMIN — ASPIRIN 81 MG: 81 TABLET, CHEWABLE ORAL at 08:22

## 2017-02-19 RX ADMIN — CLINDAMYCIN HYDROCHLORIDE 300 MG: 300 CAPSULE ORAL at 17:07

## 2017-02-19 NOTE — PLAN OF CARE
Problem: Patient Care Overview (Adult)  Goal: Plan of Care Review    02/19/17 1014   Outcome Evaluation   Outcome Summary/Follow up Plan Pt progressing w ambulation and transfers this am.   Coping/Psychosocial Response Interventions   Plan Of Care Reviewed With patient   Patient Care Overview   Progress improving

## 2017-02-19 NOTE — PROGRESS NOTES
Acute Care - Physical Therapy Treatment Note  [facility]     Patient Name: Sander White  : 1955  MRN: [id number]  Today's Date: 2017  Onset of Illness/Injury or Date of Surgery Date: 02/15/17  Date of Referral to PT: 17  Referring Physician: Astrid    Admit Date: 2/15/2017    Visit Dx:    ICD-10-CM ICD-9-CM   1. ST elevation myocardial infarction (STEMI), unspecified artery I21.3 410.90   2. Cardiac arrest with ventricular fibrillation I46.9 427.5    I49.01 427.41   3. Dysphagia, unspecified type R13.10 787.20     Patient Active Problem List   Diagnosis   • Airway hyperreactivity   • Elevated cholesterol   • Gain of weight   • ED (erectile dysfunction)   • ST elevation myocardial infarction (STEMI)               Adult Rehabilitation Note       17 1009 17 1000 17 1430    Rehab Assessment/Intervention    Discipline physical therapist  -MD physical therapy assistant  -CW physical therapist  -CH,MS,CH2    Document Type therapy note (daily note)  -MD therapy note (daily note)  -CW evaluation  -CH,MS,CH2    Subjective Information agree to therapy;no complaints  -MD agree to therapy;complains of;fatigue  -CW agree to therapy;complains of;fatigue;weakness;pain  -CH,MS,CH2    Patient Effort, Rehab Treatment good  -MD good  -CW good  -CH,MS,CH2    Symptoms Noted During/After Treatment none  -MD  dizziness;fatigue;increased pain  -CH,MS,CH2    Precautions/Limitations fall precautions  -MD fall precautions  -CW fall precautions  -CH,MS,CH2    Recorded by [MD] Pura Javier, PT [CW] Vaughn Amaya [CH,MS,CH2] Johanna Engle, PT (r) Christina Luciano, PT Student (t) Johanna Engle, PT (c)    Pain Assessment    Pain Assessment No/denies pain  -MD No/denies pain  -CW FLACC  -CH,MS,CH2    Pain Score   4  -CH,MS,CH2    Pain Location   Chest  -CH,MS,CH2    Pain Intervention(s)   Repositioned;Ambulation/increased activity  -CH,MS,CH2    Recorded by [MD] Pura Javier, PT [CW] Vaughn Amaya  [CH,MS,CH2] Johanna Engle PT (r) Christina Luciano PT Student (t) Johanna Engle PT (c)    Cognitive Assessment/Intervention    Current Cognitive/Communication Assessment impaired  -MD impaired  -CW impaired  -CH,MS,CH2    Orientation Status oriented x 4  -MD oriented x 4  -CW oriented to;person;situation  -CH,MS,CH2    Follows Commands/Answers Questions 100% of the time;needs cueing  -% of the time  -% of the time  -CH,MS,CH2    Personal Safety decreased insight to deficits  -MD mild impairment;decreased awareness, need for safety;decreased insight to deficits  -CW decreased insight to deficits  -CH,MS,CH2    Personal Safety Interventions gait belt;muscle strengthening facilitated;nonskid shoes/slippers when out of bed;supervised activity  -MD fall prevention program maintained;gait belt;nonskid shoes/slippers when out of bed  -CW     Recorded by [MD] Pura Javier, PT [CW] Vaughn Amaya [CH,MS,CH2] Johanna Engle, PT (r) Christina Luciano PT Student (t) Johanna Engle PT (c)    ROM (Range of Motion)    General ROM   no range of motion deficits identified  -CH,MS,CH2    Recorded by   [CH,MS,CH2] Johanna Engle PT (r) Christina Luciano PT Student (t) Johanna Engle PT (c)    MMT (Manual Muscle Testing)    General MMT Assessment   upper extremity strength deficits identified;lower extremity strength deficits identified  -CH,MS,CH2    Recorded by   [CH,MS,CH2] Johanna Engle PT (r) Christina Luciano PT Student (t) Johanna Engle PT (c)    Upper Extremity    Upper Ext Manual Muscle Testing   left UE strength is WFL;right UE strength is WFL  -CH,MS,CH2    Recorded by   [CH,MS,CH2] Johanna Engle PT (r) Christina Luciano PT Student (t) Johanna Engle PT (c)    Lower Extremity    Lower Ext Manual Muscle Testing   left LE strength is WFL;right LE strength is WFL  -CH,MS,CH2    Recorded by   [CH,MS,CH2] Johanna Engle PT (r) Christina Ankita, PT Student (t) Johanna Engle PT (c)    Bed  Mobility, Assessment/Treatment    Bed Mobility, Assistive Device   head of bed elevated  -CH,MS,CH2    Bed Mobility, Scoot/Bridge, [place]   minimum assist (75% patient effort);verbal cues required  -CH,MS,CH2    Bed Mob, Supine to Sit, [place] not tested  -MD not tested  -CW minimum assist (75% patient effort);verbal cues required  -CH,MS,CH2    Bed Mob, Sit to Supine, [place]   minimum assist (75% patient effort);verbal cues required  -CH,MS,CH2    Bed Mobility, Safety Issues   decreased use of arms for pushing/pulling  -CH,MS,CH2    Bed Mobility, Impairments   strength decreased;impaired balance;coordination impaired  -CH,MS,CH2    Bed Mobility, Comment  Pt sitting EOB  -CW     Recorded by [MD] Pura Javier, PT [CW] Vaughn Amaya [CH,MS,CH2] Johanna Engle PT (r) Christina Luciano, PT Student (t) Johanna Engle, PT (c)    Transfer Assessment/Treatment    Transfers, Sit-Stand [place] independent   pt up in BR independently  -MD verbal cues required;contact guard assist  -CW minimum assist (75% patient effort);2 person assist required;hand held assist  -CH,MS,CH2    Transfers, Stand-Sit [place] supervision required  -MD verbal cues required;contact guard assist  -CW minimum assist (75% patient effort);2 person assist required;hand held assist  -CH,MS,CH2    Transfers, Sit-Stand-Sit, Assist Device  rolling walker  -CW     Transfer, Safety Issues   loses balance backward;weight-shifting ability decreased;step length decreased  -CH,MS,CH2    Transfer, Impairments impaired balance  -MD  strength decreased;impaired balance;coordination impaired  -CH,MS,CH2    Transfer, Comment   Pt able to take 3 steps to HOB. Pt became dizzy/light-headed asking to sit.  -CH,MS,CH2    Recorded by [MD] Pura Javier, PT [CW] Vaughn Amaya [CH,MS,CH2] Johanna Engle PT (r) Christina Luciano, PT Student (t) Johanna Engle PT (c)    Gait Assessment/Treatment    Gait, [place] Level verbal cues  required;contact guard assist  -MD contact guard assist;verbal cues required  -CW     Gait, Assistive Device  rolling walker  -CW     Gait, Distance (Feet) 240  -MD 20  -CW     Gait, Gait Deviations bilateral:;jennifer decreased;forward flexed posture  -MD jennifer decreased;step length decreased;stride length decreased  -CW     Gait, Safety Issues step length decreased  -MD      Gait, Impairments impaired balance  -MD      Gait, Comment   Pt able to take 3 steps to HOB.  -CH,MS,CH2    Recorded by [MD] Pura Javier, PT [CW] Vaughn Amaya [CH,MS,CH2] Johanna Engle PT (r) Christina Luciano PT Student (t) Johanna Engle PT (c)    Stairs Assessment/Treatment    Stairs, [place] Level   not tested  -CH,MS,CH2    Recorded by   [CH,MS,CH2] Johanna Engle PT (r) Christina Luciano PT Student (t) Johanna Engle PT (c)    Therapy Exercises    Right Lower Extremity   AROM:;15 reps;LAQ  -CH,MS,CH2    Left Lower Extremity   AROM:;5 reps;LAQ   Repetitions limited by increased dizziness and feeling faint  -CH,MS,CH2    Bilateral Lower Extremities  AROM:;10 reps;sitting;ankle pumps/circles;hip flexion;LAQ  -CW     Recorded by  [CW] Vaughn Amaya [CH,MS,CH2] Johanna Engle PT (r) Christina Luciano PT Student (t) Johanna Engle PT (c)    Modality Treatment    Additional Documentation   --  -CH,MS,CH2    Recorded by   [CH,MS,CH2] Johanna Engle PT (r) Christina Luciano PT Student (t) Johanna Engle PT (c)    Positioning and Restraints    Pre-Treatment Position other (comment)  -MD in bed  -CW in bed  -CH,MS,CH2    Post Treatment Position chair  -MD bed  -CW bed  -CH,MS,CH2    In Bed  sitting EOB;call light within reach;encouraged to call for assist;with family/caregiver;notified nsg  -CW supine;call light within reach;encouraged to call for assist;with family/caregiver;SCD pump applied  -CH,MS,CH2    In Chair sitting;call light within reach;exit alarm on  -MD      Recorded by [MD] Pura Javier, PT [CW] Vaughn TROTTER  Jose Luis [CH,MS,CH2] Johanna Engle, PT (r) Christina Luciano, PT Student (t) Johanna Engle, PT (c)      User Key  (r) = Recorded By, (t) = Taken By, (c) = Cosigned By    Initials Name Effective Dates    CH Johanna TEJA De JesusOniel, PT 12/01/15 -     MD Pura Javier, PT 12/01/15 -     CW Vaughn Amaya 12/13/16 -     MS Christina Luciano, PT Student 01/09/17 -                 IP PT Goals       02/17/17 1443          Bed Mobility PT LTG    Bed Mobility PT LTG, Date Established 02/17/17  -CH (r) MS (t) CH (c)      Bed Mobility PT LTG, Time to Achieve 1 wk  -CH (r) MS (t) CH (c)      Bed Mobility PT LTG, Activity Type supine to sit/sit to supine  -CH (r) MS (t) CH (c)      Bed Mobility PT LTG, [place] Level supervision required  -CH (r) MS (t) CH (c)      Transfer Training PT LTG    Transfer Training PT LTG, Date Established 02/17/17  -CH (r) MS (t) CH (c)      Transfer Training PT LTG, Time to Achieve 1 wk  -CH (r) MS (t) CH (c)      Transfer Training PT LTG, Activity Type sit to stand/stand to sit  -CH (r) MS (t) CH (c)      Transfer Training PT LTG, [place] Level supervision required  -CH (r) MS (t) CH (c)      Gait Training PT LTG    Gait Training Goal PT LTG, Date Established 02/17/17  -CH (r) MS (t) CH (c)      Gait Training Goal PT LTG, Time to Achieve 1 wk  -CH (r) MS (t) CH (c)      Gait Training Goal PT LTG, [place] Level supervision required  -CH (r) MS (t) CH (c)      Gait Training Goal PT LTG, Distance to Achieve 250  -CH (r) MS (t) CH (c)      Stair Training PT LTG    Stair Training Goal PT LTG, Date Established 02/17/17  -CH (r) MS (t) CH (c)      Stair Training Goal PT LTG, Time to Achieve 1 wk  -CH (r) MS (t) CH (c)      Stair Training Goal PT LTG, Number of Steps 5  -CH (r) MS (t) CH (c)      Stair Training Goal PT LTG, [place] Level contact guard assist  -CH (r) MS (t) CH (c)      Stair Training Goal PT LTG, Assist Device 1 handrail  -CH (r) MS (t) CH (c)        User Key  (r) = Recorded  By, (t) = Taken By, (c) = Cosigned By    Initials Name Provider Type    YOUNG Engle, PT Physical Therapist    MS Christina Luciano, PT Student PT Student          Physical Therapy Education     Title: PT OT SLP Therapies (Active)     Topic: Physical Therapy (Active)     Point: Precautions (Active)    Learning Progress Summary    Learner Readiness Method Response Comment Documented by Status   Patient Acceptance E,D NR  MD 02/19/17 1015 Active    Acceptance E,TB DU,VU  CW 02/18/17 1049 Done    Acceptance E NR  MS 02/17/17 1443 Active                      User Key     Initials Effective Dates Name Provider Type Discipline    MD 12/01/15 -  Pura Javier, PT Physical Therapist PT    CW 12/13/16 -  Vaughn Amaya Physical Therapy Assistant PT    MS 01/09/17 -  Christina Luciano, PT Student PT Student PT                    PT Recommendation and Plan  Anticipated Discharge Disposition: home with home health  Planned Therapy Interventions: bed mobility training, transfer training, gait training, stair training  PT Frequency: daily  Plan of Care Review  Plan Of Care Reviewed With: patient  Progress: improving  Outcome Summary/Follow up Plan: Pt progressing w ambulation and transfers this am.          Outcome Measures       02/19/17 1000 02/18/17 1105 02/18/17 1000    How much help from another person do you currently need...    Turning from your back to your side while in flat bed without using bedrails? 3  -MD  3  -CW    Moving from lying on back to sitting on the side of a flat bed without bedrails? 3  -MD  3  -CW    Moving to and from a bed to a chair (including a wheelchair)? 3  -MD  3  -CW    Standing up from a chair using your arms (e.g., wheelchair, bedside chair)? 3  -MD  3  -CW    Climbing 3-5 steps with a railing? 3  -MD  2  -CW    To walk in hospital room? 3  -MD  3  -CW    AM-PAC 6 Clicks Score 18  -MD  17  -CW    How much help from another is currently needed...    Putting on and taking off regular lower body  clothing?  2  -AF     Bathing (including washing, rinsing, and drying)  2  -AF     Toileting (which includes using toilet bed pan or urinal)  3  -AF     Putting on and taking off regular upper body clothing  3  -AF     Taking care of personal grooming (such as brushing teeth)  3  -AF     Eating meals  3  -AF     Score  16  -AF     Functional Assessment    Outcome Measure Options AM-PAC 6 Clicks Daily Activity (OT);AM-PAC 6 Clicks Basic Mobility (PT)  -MD AM-PAC 6 Clicks Daily Activity (OT)  -AF AM-PAC 6 Clicks Basic Mobility (PT)  -CW      02/17/17 1400          How much help from another person do you currently need...    Turning from your back to your side while in flat bed without using bedrails? 3  -CH (r) MS (t) CH (c)      Moving from lying on back to sitting on the side of a flat bed without bedrails? 3  -CH (r) MS (t) CH (c)      Moving to and from a bed to a chair (including a wheelchair)? 3  -CH (r) MS (t) CH (c)      Standing up from a chair using your arms (e.g., wheelchair, bedside chair)? 3  -CH (r) MS (t) CH (c)      Climbing 3-5 steps with a railing? 2  -CH (r) MS (t) CH (c)      To walk in hospital room? 2  -CH (r) MS (t) CH (c)      AM-PAC 6 Clicks Score 16  -CH (r) MS (t)      Functional Assessment    Outcome Measure Options AM-PAC 6 Clicks Basic Mobility (PT)  -CH (r) MS (t) CH (c)        User Key  (r) = Recorded By, (t) = Taken By, (c) = Cosigned By    Initials Name Provider Type    YOUNG Engle, PT Physical Therapist    AF Matilda Tran, OTR Occupational Therapist    MD Pura Javier, PT Physical Therapist    CW Vaughn Amaya Physical Therapy Assistant    MS Christina Luciano, PT Student PT Student           Time Calculation:         PT Charges       02/19/17 1009          Time Calculation    Start Time 1005  -MD      Stop Time 1016  -MD      Time Calculation (min) 11 min  -MD      PT Received On 02/19/17  -MD      PT - Next Appointment 02/20/17  -MD        User Key  (r) = Recorded By,  (t) = Taken By, (c) = Cosigned By    Initials Name Provider Type    MD Pura Javier, PT Physical Therapist          Therapy Charges for Today     Code Description Service Date Service Provider Modifiers Qty    53037916946 HC PT THER PROC EA 15 MIN 2/19/2017 Pura Javier, PT GP 1          PT G-Codes  Outcome Measure Options: AM-PAC 6 Clicks Daily Activity (OT), AM-PAC 6 Clicks Basic Mobility (PT)    Pura Javier, PT  2/19/2017

## 2017-02-19 NOTE — NURSING NOTE
Met with patient to discuss acute rehab needs. PT in the room. Patient indicates he is doing well enough to return home with home health or outpatient therapy, and PT was in agreement. Will sign off.

## 2017-02-19 NOTE — PROGRESS NOTES
"[facility]  Cardiology Progress Note    Patient Identification:  Name: Sander White  Age: 61 y.o.  Sex: male  :  1955  MRN: [id number]                 Follow Up / Chief Complaint: resuscitated arrest,VF, STEMI->emergent PCI, LV dysfx, h/o HTN    Interval History:  Reports of chest pain with n/v. Full arrest upon EMS arrival with multiple shocks for VF, received CPR for 40-45 minutes before return of systemic circulation. Emergent thrombectomy and CAYLA-> ramus. Hypothermia protocol, resp failure with possible aspiration pneumonia and anoxic encephalopathy     Subjective:  Extubated, awake, alert oriented to person and place.  Reports reproducible diffuse anterior chest discomfort, worsened with deep inspiration and movement.  Denies shortness of breath.  Throat \"sore\" and asking for some lunch    Objective:  No further ectopy.  Blood pressure is stable    Past Medical History:  Past Medical History   Diagnosis Date   • Arthritis    • Chronic pain in right foot    • ED (erectile dysfunction) 7/15/2016   • GERD (gastroesophageal reflux disease)    • Hypertension      Past Surgical History:  Past Surgical History   Procedure Laterality Date   • Colonoscopy N/A 2015     Normal   • Appendectomy     • Hernia repair Right      Inguinal   • Cyst removal N/A      Neck   • Cholecystectomy     • Pr rt/lt heart catheters N/A 2/15/2017     Procedure: Percutaneous Coronary Intervention;  Surgeon: Kristina Billings MD;  Location:  [facility];  Service: Cardiovascular   • Cardiac catheterization N/A 2/15/2017     Procedure: Left Heart Cath;  Surgeon: Kristina Billings MD;  Location:  [facility];  Service:    • Cardiac catheterization  2/15/2017     Procedure: Percutaneous Manual Thrombectomy;  Surgeon: Kristina Billings MD;  [facility];  Service:    • Cardiac catheterization N/A 2/15/2017     Procedure: Coronary angiography;  " Surgeon: Kristina Billings MD;  Location:  [facility];  Service:    • Cardiac catheterization N/A 2/15/2017     Procedure: Left ventriculography;  Surgeon: Kristina Billings MD;  Location:  [facility] IN[facility];  Service:         Social History:   Social History   Substance Use Topics   • Smoking status: Former Smoker     Packs/day: 0.50     Years: 7.00     Types: Cigarettes     Quit date: 1999   • Smokeless tobacco: Never Used   • Alcohol use Yes      Family History:  Family History   Problem Relation Age of Onset   • Diabetes Mother      Type 2   • Diabetes Father      Type 2   • Stroke Father    • Aneurysm Brother      Brain          Allergies:  Allergies   Allergen Reactions   • Amoxicillin Itching   • Penicillins      Scheduled Meds:    amiodarone 400 mg Q12H   aspirin 81 mg Daily   atorvastatin 20 mg Nightly   carvedilol 3.125 mg Q12H   clindamycin 300 mg Q8H   furosemide 40 mg Daily   isosorbide mononitrate 30 mg Q24H   lisinopril 2.5 mg Nightly   pantoprazole 40 mg Q AM   potassium chloride 20 mEq Daily   ticagrelor 90 mg BID           INTAKE AND OUTPUT:  No intake or output data in the 24 hours ending 02/19/17 5047    Review of Systems:   GI: No nausea, vomiting or abdominal pain  Cardiac: Reproducible diffuse anterior chest discomfort, worsened with deep inspiration and movement  Pulmonary:  Extubated.  Maintaining adequate sats on supplemental oxygen per nasal cannula    Constitutional:  Temp:  [97.7 °F (36.5 °C)-98 °F (36.7 °C)] 97.7 °F (36.5 °C)  Heart Rate:  [67-78] 73  Resp:  [18-20] 18  BP: (103-123)/(71-84) 103/71    Physical Exam by Kristina Billings MD  General:  Awake, alert.  Visiting with his wife.  Appears in no acute distress  HEENT: PERTL No JVD. Oral mucosa moist, no cyanosis  Respiratory: Respirations regular and unlabored on vent. BBS with good air entry in all fields.  No crackles or wheezes auscultated  Cardiovascular: S1S2 Regular rate and rhythm.  No murmur or gallop.RFA site without oozing, palpable hematoma or thrill.  RFA 2/ RDP 2  No pretibial pitting edema  Gastrointestinal: Abdomen soft, nontender. + Out sounds No ascites  Musculoskeletal: No abnormal movements  Extremities: No digital  cyanosis  Skin: Color pink. Skin warm and dry to touch.   Neuro: AAO x2. CN II-XII is intact.  Following commands. ALVAREZ x4 assess.        Cardiographics  Telemetry:  SR 70-80's, no ectopy      ECG 2-17-17:         Echocardiogram:     · Left ventricular wall segments contract abnormally. Refer to wall scoring diagram for more information.  · Left atrial cavity size is borderline dilated.  · Mild mitral valve regurgitation is present  · Mild tricuspid valve regurgitation is present.  · Left Ventricle: Calculated EF = 36.3%.  · There is no evidence of pericardial effusion.               CATH & PCI  · Successful left heart catheter  · Successful thrombectomy of the ramus branch  · Successful angioplasty and stent to the ramus branch reduced from 100% to 0% with 2.5/15 XIENCE dilated to 2.6  · Mild global hypokinetic estimated ejection fraction 40%  · LAD 20-30% diffuse irregularity  · Normal left main  · Circumflex had a ramus branch which was and plasty and otherwise was normal  · Cor dominant RCA midportion 60%    Lab Review     Results from last 7 days  Lab Units 02/19/17  0531 02/18/17  0109 02/17/17  0426   TROPONIN T ng/mL 2.360* 2.790* 1.960*       Results from last 7 days  Lab Units 02/18/17  0512   MAGNESIUM mg/dL 2.3       Results from last 7 days  Lab Units 02/19/17  0531   SODIUM mmol/L 139   POTASSIUM mmol/L 3.6   BUN mg/dL 16   CREATININE mg/dL 0.99   CALCIUM mg/dL 8.5*       Results from last 7 days  Lab Units 02/19/17  0531 02/18/17  0512 02/17/17  0426   WBC 10*3/mm3 8.00 8.08 10.47   HEMOGLOBIN g/dL 11.9* 11.5* 12.3*   HEMATOCRIT % 35.7* 34.8* 36.6*   PLATELETS 10*3/mm3 163 139* 154       Results from last 7 days  Lab Units 02/18/17  0645 02/16/17  7401  02/16/17  1155   INR  1.10 1.05 1.02   APTT seconds 29.5 28.0 24.5         Assessment:  - acute lateral STEMI  - s/p resuscitated cardiac arrest  - s/p emergent with 2.5/15 XIENCE -> 100% ramus  - LV dysfx - EF 35-40%, mild MR/TR  - acute resp failure -> Pulm  - suspected right aspiration pneumonia -> Pulm  - anoxic encephalopathy -> Neuro  - hypokalemia - replace per protocol    Plan:    - acute lateral STEMI,  resuscitated cardiac arrest-> - s/p emergent with 2.5/15 XIENCE -> 100% ramus.  Awake, alert, following commands.  (Reproducible) chest soreness likely from prolonged CPR and multiple defib shocks.  No acute ischemic ST changes on this morning's EKG.  No increase in serial troponin.  Continue aspirin, Brilinta, Corag and Prinivil       - VF -   No recurrence.  On oral  Amio and Coreg      - LV dysfx - EF 35-40%, mild MR/TR.  Clinically compensated.  Continue Coreg and titrate up his blood pressure tolerates - in addition to Prinivil and Lasix    Continue dual antiplatelet therapy with asa and Brilinta  in addition to beta blocker,ACE-I and Lasix.  Start statin.  Cardiac rehabilitation, OT/PT eval.  Anticipate transfer to stepdown unit tomorrow.  BNP, BMP, Mag and CBC  ordered for am      Patient had recurrent episodes of the chest pain appears to be musculoskeletal constant due to the 45 minutes of CPR    Troponin elevated from the surgery EKG within normal limits.    Transfer to the regular floor    CBC BMP in the morning    EKG and a troponin in the morning    I reviewed the patient's new clinical results and treatment plan. I personally viewed and interpreted the patient's EKG/Telemetry data    )2/19/2017  Kristina Billings MD      EMR Dragon/Transcription disclaimer:   Much of this encounter note is an electronic transcription/translation of spoken language to printed text. The electronic translation of spoken language may permit erroneous, or at times, nonsensical words or phrases to be  inadvertently transcribed; Although I have reviewed the note for such errors, some may still exist.
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
## 2017-02-19 NOTE — PROGRESS NOTES
[facility] INPATIENT PROGRESS NOTE         [address] [facility]    2/19/2017      PATIENT IDENTIFICATION:   Name:  Sander White      MRN:  [id number]     61 y.o.  male             LOS 4    Reason for visit: Follow-up acute arrest with anoxic injury, Acute cardiomyopathy and aspiration pneumonia      SUBJECTIVE:    Has clinically improved.  No further hemoptysis.  No current chest pain.        Objective   OBJECTIVE:    Vital Sign Min/Max for last 24 hours  Temp  Min: 97.6 °F (36.4 °C)  Max: 98 °F (36.7 °C)   BP  Min: 110/73  Max: 123/83   Pulse  Min: 71  Max: 78   Resp  Min: 18  Max: 20   SpO2  Min: 92 %  Max: 97 %   No Data Recorded   No Data Recorded                   FiO2 (%): 30 %     Body mass index is 30.73 kg/(m^2).    Intake/Output Summary (Last 24 hours) at 02/19/17 1204  Last data filed at 02/18/17 1616   Gross per 24 hour   Intake    109 ml   Output      0 ml   Net    109 ml         Exam:  GEN:  No distress, appears stated age  EYES:   PERRL, anicteric sclera  ENT:    External ears/nose normal, OP clear  NECK:  No adenopathy, midline trachea  LUNGS: Normal chest on inspection, palpation and auscultation  CV:  Normal S1S2, without murmur  ABD:  Non tender, non distended, no hepatosplenomegaly, +BS  EXT:  No edema, cyanosis or clubbing    Scheduled meds:      alteplase 2 mg Intracatheter Once   amiodarone 400 mg Oral Q12H   aspirin 81 mg Oral Daily   atorvastatin 20 mg Oral Nightly   carvedilol 3.125 mg Oral Q12H   clindamycin 300 mg Oral Q8H   furosemide 40 mg Oral Daily   isosorbide mononitrate 30 mg Oral Q24H   lisinopril 2.5 mg Oral Nightly   pantoprazole 40 mg Oral Q AM   potassium chloride 20 mEq Oral Daily   ticagrelor 90 mg Oral BID     IV meds:                         Data Review:    Results from last 7 days  Lab Units 02/19/17  0531 02/18/17  0512 02/18/17  0109 02/17/17  0426 02/16/17  1751   SODIUM mmol/L 139 140 142 141 142   POTASSIUM mmol/L 3.6 3.5 3.4* 3.5 3.8   CHLORIDE  mmol/L 106 105 107 104 107   TOTAL CO2 mmol/L 20.5* 25.8 23.4 23.0 22.1   BUN mg/dL 16 15 16 14 16   CREATININE mg/dL 0.99 1.03 1.07 0.91 0.98   GLUCOSE mg/dL 102* 105* 115* 109* 118*   CALCIUM mg/dL 8.5* 8.1* 8.1* 8.0* 8.2*         Estimated Creatinine Clearance: 97.6 mL/min (by C-G formula based on Cr of 0.99).    Results from last 7 days  Lab Units 02/19/17  0531 02/18/17  0512 02/17/17  0426 02/16/17  1751 02/16/17  1155   WBC 10*3/mm3 8.00 8.08 10.47 16.89* 12.98*   HEMOGLOBIN g/dL 11.9* 11.5* 12.3* 13.5* 13.9   PLATELETS 10*3/mm3 163 139* 154 163 153       Results from last 7 days  Lab Units 02/18/17  0645 02/16/17  1751 02/16/17  1155 02/16/17  0615 02/16/17  0004   INR  1.10 1.05 1.02 0.99 1.00       Results from last 7 days  Lab Units 02/18/17  0109 02/15/17  0632 02/15/17  0339   ALT (SGPT) U/L 128* 414* 303*   AST (SGOT) U/L 104* 443* 256*     Microbiology reviewed: No growth to date            Assessment   ASSESSMENT:   Status post arrest  Acute hypoxemic respiratory failure: Improved now off ventilator  ST elevation MI status post stent  Acute cardiomyopathy with EF 36%  Metabolic acidosis: Resolved  Aspiration pneumonia present on admission  Acute kidney injury: Improved  Hypokalemia: Replacing      PLAN:  Patient is doing very well out of ICU.  Suspect his hemoptysis is associated with being intubated and recent anticoagulation.  Improved. Encourage good pulmonary toilet.  Day 4 Abx.    Chito Garduno MD  Pulmonary and Critical Care Medicine  [facility], [facility]  2/19/2017    12:04 PM

## 2017-02-19 NOTE — PROGRESS NOTES
" DAILY PROGRESS NOTE    CHIEF COMPLAINT  DOING BETTER  AWAKE AND ALERT  NO SPECIFIC COMPLAINTS  OCC MEMORY LOSS      HISTORY: A 61-year-old white male with history of hypertension, gastroesophageal reflux disease and osteoarthritis, who had been in good health until last night when he developed severe nausea and vomiting followed by chest pressure. He ended up in the [facility] ER and was found to have acute MI. He was taken to the cardiac catheterization lab and had a stent placed and is now on the ventilator and sedated. I am asked to follow the patient for medical problems.    PHYSICAL EXAMINATION: Blood pressure 121/84, pulse 71, temperature 97.9 °F (36.6 °C), temperature source Oral, resp. rate 18, height 71.75\" (182.2 cm), weight 225 lb (102 kg), SpO2 93 %.    GENERAL: AWAKE AND ALERT  LUNGS: CLEAR B  HEART: S1 and S2.  ABDOMEN: NT BS POS  EXTREMITIES: No edema.  NEUROLOGIC: INTACT    DIAGNOSTIC DATA:   Lab Results (last 24 hours)     Procedure Component Value Units Date/Time    Blood Culture [[id number]]  (Normal) Collected:  02/16/17 2007    Specimen:  Blood from Blood, Central Line Updated:  02/18/17 2101     Blood Culture No growth at 2 days     Blood Culture [[id number]]  (Normal) Collected:  02/16/17 2024    Specimen:  Blood from Arm, Left Updated:  02/18/17 2101     Blood Culture No growth at 2 days     CBC & Differential [[id number]] Collected:  02/19/17 0531    Specimen:  Blood Updated:  02/19/17 0614    Narrative:       The following orders were created for panel order CBC & Differential.  Procedure                               Abnormality         Status                     ---------                               -----------         ------                     CBC Auto Differential[[id number]]         Abnormal            Final result                 Please view results for these tests on the individual orders.    CBC Auto Differential [[id number]]  (Abnormal) Collected:  02/19/17 0531    " Specimen:  Blood Updated:  02/19/17 0614     WBC 8.00 10*3/mm3      RBC 3.92 (L) 10*6/mm3      Hemoglobin 11.9 (L) g/dL      Hematocrit 35.7 (L) %      MCV 91.1 fL      MCH 30.4 pg      MCHC 33.3 g/dL      RDW 12.9 %      RDW-SD 42.7 fl      MPV 11.3 fL      Platelets 163 10*3/mm3      Neutrophil % 67.1 %      Lymphocyte % 14.0 (L) %      Monocyte % 15.6 (H) %      Eosinophil % 2.5 %      Basophil % 0.3 %      Immature Grans % 0.5 %      Neutrophils, Absolute 5.37 10*3/mm3      Lymphocytes, Absolute 1.12 10*3/mm3      Monocytes, Absolute 1.25 (H) 10*3/mm3      Eosinophils, Absolute 0.20 10*3/mm3      Basophils, Absolute 0.02 10*3/mm3      Immature Grans, Absolute 0.04 (H) 10*3/mm3     BNP [[id number]]  (Normal) Collected:  02/19/17 0531    Specimen:  Blood Updated:  02/19/17 0631     proBNP 879.7 pg/mL     Narrative:       Among patients with dyspnea, NT-proBNP is highly sensitive for the detection of acute congestive heart failure. In addition NT-proBNP of <300 pg/ml effectively rules out acute congestive heart failure with 99% negative predictive value.    Basic Metabolic Panel [[id number]]  (Abnormal) Collected:  02/19/17 0531    Specimen:  Blood Updated:  02/19/17 0634     Glucose 102 (H) mg/dL      BUN 16 mg/dL      Creatinine 0.99 mg/dL      Sodium 139 mmol/L      Potassium 3.6 mmol/L      Chloride 106 mmol/L      CO2 20.5 (L) mmol/L      Calcium 8.5 (L) mg/dL      eGFR Non African Amer 77 mL/min/1.73      BUN/Creatinine Ratio 16.2      Anion Gap 12.5 mmol/L     Narrative:       GFR Normal >60  Chronic Kidney Disease <60  Kidney Failure <15    Troponin [[id number]]  (Abnormal) Collected:  02/19/17 0531    Specimen:  Blood Updated:  02/19/17 0637     Troponin T 2.360 (C) ng/mL     Narrative:       Troponin T Reference Ranges:  Less than 0.03 ng/mL:    Negative for AMI  0.03 to 0.09 ng/mL:      Indeterminant for AMI  Greater than 0.09 ng/mL: Positive for AMI        Lab Results (last 24 hours)     Procedure Component  Value Units Date/Time    Blood Culture [[id number]]  (Normal) Collected:  02/16/17 2007    Specimen:  Blood from Blood, Central Line Updated:  02/18/17 2101     Blood Culture No growth at 2 days     Blood Culture [[id number]]  (Normal) Collected:  02/16/17 2024    Specimen:  Blood from Arm, Left Updated:  02/18/17 2101     Blood Culture No growth at 2 days     CBC & Differential [[id number]] Collected:  02/19/17 0531    Specimen:  Blood Updated:  02/19/17 0614    Narrative:       The following orders were created for panel order CBC & Differential.  Procedure                               Abnormality         Status                     ---------                               -----------         ------                     CBC Auto Differential[[id number]]         Abnormal            Final result                 Please view results for these tests on the individual orders.    CBC Auto Differential [[id number]]  (Abnormal) Collected:  02/19/17 0531    Specimen:  Blood Updated:  02/19/17 0614     WBC 8.00 10*3/mm3      RBC 3.92 (L) 10*6/mm3      Hemoglobin 11.9 (L) g/dL      Hematocrit 35.7 (L) %      MCV 91.1 fL      MCH 30.4 pg      MCHC 33.3 g/dL      RDW 12.9 %      RDW-SD 42.7 fl      MPV 11.3 fL      Platelets 163 10*3/mm3      Neutrophil % 67.1 %      Lymphocyte % 14.0 (L) %      Monocyte % 15.6 (H) %      Eosinophil % 2.5 %      Basophil % 0.3 %      Immature Grans % 0.5 %      Neutrophils, Absolute 5.37 10*3/mm3      Lymphocytes, Absolute 1.12 10*3/mm3      Monocytes, Absolute 1.25 (H) 10*3/mm3      Eosinophils, Absolute 0.20 10*3/mm3      Basophils, Absolute 0.02 10*3/mm3      Immature Grans, Absolute 0.04 (H) 10*3/mm3     BNP [[id number]]  (Normal) Collected:  02/19/17 0531    Specimen:  Blood Updated:  02/19/17 0631     proBNP 879.7 pg/mL     Narrative:       Among patients with dyspnea, NT-proBNP is highly sensitive for the detection of acute congestive heart failure. In addition NT-proBNP of <300 pg/ml effectively  rules out acute congestive heart failure with 99% negative predictive value.    Basic Metabolic Panel [[id number]]  (Abnormal) Collected:  02/19/17 0531    Specimen:  Blood Updated:  02/19/17 0634     Glucose 102 (H) mg/dL      BUN 16 mg/dL      Creatinine 0.99 mg/dL      Sodium 139 mmol/L      Potassium 3.6 mmol/L      Chloride 106 mmol/L      CO2 20.5 (L) mmol/L      Calcium 8.5 (L) mg/dL      eGFR Non African Amer 77 mL/min/1.73      BUN/Creatinine Ratio 16.2      Anion Gap 12.5 mmol/L     Narrative:       GFR Normal >60  Chronic Kidney Disease <60  Kidney Failure <15    Troponin [[id number]]  (Abnormal) Collected:  02/19/17 0531    Specimen:  Blood Updated:  02/19/17 0637     Troponin T 2.360 (C) ng/mL     Narrative:       Troponin T Reference Ranges:  Less than 0.03 ng/mL:    Negative for AMI  0.03 to 0.09 ng/mL:      Indeterminant for AMI  Greater than 0.09 ng/mL: Positive for AMI        Lab Results (last 24 hours)     Procedure Component Value Units Date/Time    Blood Culture [[id number]]  (Normal) Collected:  02/16/17 2007    Specimen:  Blood from Blood, Central Line Updated:  02/18/17 2101     Blood Culture No growth at 2 days     Blood Culture [[id number]]  (Normal) Collected:  02/16/17 2024    Specimen:  Blood from Arm, Left Updated:  02/18/17 2101     Blood Culture No growth at 2 days     CBC & Differential [[id number]] Collected:  02/19/17 0531    Specimen:  Blood Updated:  02/19/17 0614    Narrative:       The following orders were created for panel order CBC & Differential.  Procedure                               Abnormality         Status                     ---------                               -----------         ------                     CBC Auto Differential[[id number]]         Abnormal            Final result                 Please view results for these tests on the individual orders.    CBC Auto Differential [[id number]]  (Abnormal) Collected:  02/19/17 0531    Specimen:  Blood Updated:  02/19/17 0614      WBC 8.00 10*3/mm3      RBC 3.92 (L) 10*6/mm3      Hemoglobin 11.9 (L) g/dL      Hematocrit 35.7 (L) %      MCV 91.1 fL      MCH 30.4 pg      MCHC 33.3 g/dL      RDW 12.9 %      RDW-SD 42.7 fl      MPV 11.3 fL      Platelets 163 10*3/mm3      Neutrophil % 67.1 %      Lymphocyte % 14.0 (L) %      Monocyte % 15.6 (H) %      Eosinophil % 2.5 %      Basophil % 0.3 %      Immature Grans % 0.5 %      Neutrophils, Absolute 5.37 10*3/mm3      Lymphocytes, Absolute 1.12 10*3/mm3      Monocytes, Absolute 1.25 (H) 10*3/mm3      Eosinophils, Absolute 0.20 10*3/mm3      Basophils, Absolute 0.02 10*3/mm3      Immature Grans, Absolute 0.04 (H) 10*3/mm3     BNP [[id number]]  (Normal) Collected:  02/19/17 0531    Specimen:  Blood Updated:  02/19/17 0631     proBNP 879.7 pg/mL     Narrative:       Among patients with dyspnea, NT-proBNP is highly sensitive for the detection of acute congestive heart failure. In addition NT-proBNP of <300 pg/ml effectively rules out acute congestive heart failure with 99% negative predictive value.    Basic Metabolic Panel [[id number]]  (Abnormal) Collected:  02/19/17 0531    Specimen:  Blood Updated:  02/19/17 0634     Glucose 102 (H) mg/dL      BUN 16 mg/dL      Creatinine 0.99 mg/dL      Sodium 139 mmol/L      Potassium 3.6 mmol/L      Chloride 106 mmol/L      CO2 20.5 (L) mmol/L      Calcium 8.5 (L) mg/dL      eGFR Non African Amer 77 mL/min/1.73      BUN/Creatinine Ratio 16.2      Anion Gap 12.5 mmol/L     Narrative:       GFR Normal >60  Chronic Kidney Disease <60  Kidney Failure <15    Troponin [[id number]]  (Abnormal) Collected:  02/19/17 0531    Specimen:  Blood Updated:  02/19/17 0637     Troponin T 2.360 (C) ng/mL     Narrative:       Troponin T Reference Ranges:  Less than 0.03 ng/mL:    Negative for AMI  0.03 to 0.09 ng/mL:      Indeterminant for AMI  Greater than 0.09 ng/mL: Positive for AMI      White count 18.6, hemoglobin 15.2, platelets 282,000. BUN 24, creatinine 1.0, potassium 4.2, CO2  is 18.6. Blood sugar is 123 to 138. LDL is 128. Chest x-ray is no active disease. EKG shows sinus rhythm, prolonged QT interval, nonspecific ST-T wave changes. Initial EKG showed AFib/flutter.  Imaging Results (last 72 hours)     Procedure Component Value Units Date/Time    XR Abdomen KUB [[id number]] Collected:  02/15/17 0702     Updated:  02/15/17 0708    Narrative:       EMERGENCY SUPINE ABDOMEN     HISTORY: 61-year-old male for oral gastric tube placement     FINDINGS:  1. Tip of the oral gastric tube is in good position at the distal  stomach.     This report was finalized on 2/15/2017 7:05 AM by Dr. Aidan Blackwell MD.       XR Chest Post CVA Port [[id number]] Collected:  02/15/17 0848     Updated:  02/15/17 0855    Narrative:       XR CHEST POST CVA PORT-     Clinical: Central line placement     COMPARISON 02/15/2017 at 0431 hours, current examination 0828 hours     FINDINGS: There is been interval placement of a nasogastric tube tip is  subdiaphragmatic out of the field-of-view. Endotracheal tube unchanged  in position.        Right IJ catheter tip superimposes the projected area of the superior  vena cava, no pneumothorax.        Progression of the right lung opacity with now perihilar consolidation.  Pneumonia versus asymmetric pulmonary edema. There is however no  associated pleural effusion identified. Cardiomediastinal silhouette is  stable. Cardiac size upper limits of normal. The remainder is  unremarkable.     This report was finalized on 2/15/2017 8:52 AM by Dr. Mick Montalvo MD.       CT Head Without Contrast [[id number]] Collected:  02/15/17 0447     Updated:  02/16/17 0000    Narrative:       CT SCAN OF THE BRAIN WITHOUT CONTRAST.     TECHNIQUE: Multiple axial images of the brain were obtained from the  vertex to the base of the brain.     HISTORY:  Unresponsive, cardiac arrest.     COMPARISON:  No prior studies for comparison.     FINDINGS:   Midline structures are within normal limits, there is no  hydrocephalus.  Gray-white matter differentiation is maintained.         Orbits are within normal limits. Mild mucosal disease of the paranasal  sinuses. Mastoid air cells are well aerated.             Impression:       No acute intracranial pathology.         This report was finalized on 2/15/2017 11:57 PM by Dr. Navin Thompson MD.       XR Chest 1 View [[id number]] Collected:  02/15/17 0444     Updated:  02/16/17 0000    Narrative:       X-RAY CHEST 1 VIEW.     HISTORY: Intubation.     COMPARISON: No prior studies for comparison.     FINDINGS:  Cardiomediastinal silhouette is within normal limits.         Diffuse opacity within the right hemithorax concerning for infiltrates,  no definite effusion.              Impression:       Extensive infiltrates in the right lung are suspected, clinical  correlation is recommended.         This report was finalized on 2/15/2017 11:57 PM by Dr. Navin Thompson MD.           Current Facility-Administered Medications:   •  acetaminophen (TYLENOL) tablet 650 mg, 650 mg, Oral, Q4H PRN, 650 mg at 02/17/17 2211 **OR** acetaminophen (TYLENOL) suppository 650 mg, 650 mg, Rectal, Q4H PRN, Charles Medrano MD  •  ALPRAZolam (XANAX) tablet 0.25 mg, 0.25 mg, Oral, Q6H PRN, Kristina Billings MD, 0.25 mg at 02/17/17 2050  •  alteplase ((CATHFLO/ACTIVASE)) syringe 2 mg, 2 mg, Intracatheter, Once, Charles Medrano MD  •  amiodarone (PACERONE) tablet 400 mg, 400 mg, Oral, Q12H, Frida Parker APRN, 400 mg at 02/19/17 0821  •  aspirin chewable tablet 81 mg, 81 mg, Oral, Daily, Kristina Billings MD, 81 mg at 02/19/17 0822  •  atorvastatin (LIPITOR) tablet 20 mg, 20 mg, Oral, Nightly, ANGELICA Cedillo, 20 mg at 02/18/17 2223  •  carvedilol (COREG) tablet 3.125 mg, 3.125 mg, Oral, Q12H, ANGELICA Cedillo, 3.125 mg at 02/19/17 0821  •  clindamycin (CLEOCIN) capsule 300 mg, 300 mg, Oral, Q8H, Latrelldeniz Rivera MD, 300 mg at 02/19/17 0608  •  dextrose (D50W) solution 25 g, 25 g, Intravenous, Q15 Min PRN,  Uli Resendiz MD  •  dextrose (GLUTOSE) oral gel 15 g, 15 g, Oral, Q15 Min PRN, Uli Resendiz MD  •  furosemide (LASIX) tablet 40 mg, 40 mg, Oral, Daily, Frida Parker APRN, 40 mg at 02/19/17 0822  •  glucagon (human recombinant) (GLUCAGEN DIAGNOSTIC) injection 1 mg, 1 mg, Subcutaneous, Q15 Min PRN, Uli Resendiz MD  •  HYDROcodone-acetaminophen (NORCO) 5-325 MG per tablet 1 tablet, 1 tablet, Oral, Q6H PRN, Kristina Billings MD  •  Influenza Vac Subunit Quad (FLUCELVAX) injection 0.5 mL, 0.5 mL, Intramuscular, During Hospitalization, Uli Resendiz MD, 0.5 mL at 02/17/17 2101  •  isosorbide mononitrate (IMDUR) 24 hr tablet 30 mg, 30 mg, Oral, Q24H, Latrell Rivera MD, 30 mg at 02/19/17 0822  •  lisinopril (PRINIVIL,ZESTRIL) tablet 2.5 mg, 2.5 mg, Oral, Nightly, Frida Parker APRN, 2.5 mg at 02/18/17 2223  •  morphine injection 3 mg, 3 mg, Intravenous, Q4H PRN, Kristina Billings MD, 3 mg at 02/19/17 0538  •  ondansetron (ZOFRAN) injection 4 mg, 4 mg, Intravenous, Q4H PRN, Charles Medrano MD, 4 mg at 02/19/17 0729  •  pantoprazole (PROTONIX) EC tablet 40 mg, 40 mg, Oral, Q AM, Latrell Rivera MD, 40 mg at 02/19/17 0608  •  potassium chloride (MICRO-K) CR capsule 20 mEq, 20 mEq, Oral, Daily, ANGELICA Cedillo, 20 mEq at 02/19/17 0822  •  sodium chloride 0.9 % flush 1-10 mL, 1-10 mL, Intravenous, PRN, Kristina Billings MD  •  sodium phosphate 12 mmol in sodium chloride 0.9 % 250 mL IVPB, 12 mmol, Intravenous, Once PRN, Charles Medrano MD  •  ticagrelor (BRILINTA) tablet 90 mg, 90 mg, Oral, BID, Kristina Billings MD, 90 mg at 02/19/17 0879     ASSESSMENT:   1. Status post acute myocardial infarction.  2. Status post cardiac catheterization with stent placement.  3. Respiratory failure. RESOLVED  4. S/P CARDIAC ARREST  5. Hypothermia. RESOLVED  6. History of hypertension.  7. History of gastroesophageal reflux disease.  8. Increased white count.IMPROVED    PLAN  1. CPM  2. DIET AS TOLERATED  3. CHANGE MEDS TO  PO  4. SUPPORTIVE CARE  5. PT/OT  6. D/C MICHAEL PANDA MD

## 2017-02-20 PROCEDURE — 25010000002 DIPHENHYDRAMINE PER 50 MG: Performed by: NURSE PRACTITIONER

## 2017-02-20 PROCEDURE — 94799 UNLISTED PULMONARY SVC/PX: CPT

## 2017-02-20 PROCEDURE — 99232 SBSQ HOSP IP/OBS MODERATE 35: CPT | Performed by: INTERNAL MEDICINE

## 2017-02-20 RX ORDER — DIPHENHYDRAMINE HYDROCHLORIDE 50 MG/ML
25 INJECTION INTRAMUSCULAR; INTRAVENOUS EVERY 6 HOURS PRN
Status: DISCONTINUED | OUTPATIENT
Start: 2017-02-20 | End: 2017-02-21 | Stop reason: HOSPADM

## 2017-02-20 RX ORDER — PRAVASTATIN SODIUM 10 MG
5 TABLET ORAL NIGHTLY
Status: DISCONTINUED | OUTPATIENT
Start: 2017-02-20 | End: 2017-02-21 | Stop reason: HOSPADM

## 2017-02-20 RX ORDER — PANTOPRAZOLE SODIUM 40 MG/1
40 TABLET, DELAYED RELEASE ORAL
Status: DISCONTINUED | OUTPATIENT
Start: 2017-02-21 | End: 2017-02-21 | Stop reason: HOSPADM

## 2017-02-20 RX ORDER — CARVEDILOL 6.25 MG/1
6.25 TABLET ORAL EVERY 12 HOURS SCHEDULED
Status: DISCONTINUED | OUTPATIENT
Start: 2017-02-20 | End: 2017-02-21 | Stop reason: HOSPADM

## 2017-02-20 RX ADMIN — HYDROCODONE BITARTRATE AND ACETAMINOPHEN 1 TABLET: 5; 325 TABLET ORAL at 01:18

## 2017-02-20 RX ADMIN — TICAGRELOR 90 MG: 90 TABLET ORAL at 17:08

## 2017-02-20 RX ADMIN — PRAVASTATIN SODIUM 5 MG: 10 TABLET ORAL at 20:10

## 2017-02-20 RX ADMIN — ISOSORBIDE MONONITRATE 30 MG: 30 TABLET, EXTENDED RELEASE ORAL at 08:53

## 2017-02-20 RX ADMIN — POTASSIUM CHLORIDE 20 MEQ: 750 CAPSULE, EXTENDED RELEASE ORAL at 08:52

## 2017-02-20 RX ADMIN — CLINDAMYCIN HYDROCHLORIDE 300 MG: 300 CAPSULE ORAL at 01:18

## 2017-02-20 RX ADMIN — CARVEDILOL 6.25 MG: 6.25 TABLET, FILM COATED ORAL at 20:14

## 2017-02-20 RX ADMIN — CLINDAMYCIN HYDROCHLORIDE 300 MG: 300 CAPSULE ORAL at 20:14

## 2017-02-20 RX ADMIN — TICAGRELOR 90 MG: 90 TABLET ORAL at 08:52

## 2017-02-20 RX ADMIN — DIPHENHYDRAMINE HYDROCHLORIDE 25 MG: 50 INJECTION, SOLUTION INTRAMUSCULAR; INTRAVENOUS at 13:10

## 2017-02-20 RX ADMIN — PANTOPRAZOLE SODIUM 40 MG: 40 TABLET, DELAYED RELEASE ORAL at 08:53

## 2017-02-20 RX ADMIN — ASPIRIN 81 MG: 81 TABLET, CHEWABLE ORAL at 08:52

## 2017-02-20 RX ADMIN — FUROSEMIDE 40 MG: 40 TABLET ORAL at 08:52

## 2017-02-20 RX ADMIN — LISINOPRIL 2.5 MG: 2.5 TABLET ORAL at 20:10

## 2017-02-20 RX ADMIN — DIPHENHYDRAMINE HYDROCHLORIDE 25 MG: 50 INJECTION, SOLUTION INTRAMUSCULAR; INTRAVENOUS at 20:07

## 2017-02-20 RX ADMIN — CARVEDILOL 3.12 MG: 3.12 TABLET, FILM COATED ORAL at 08:52

## 2017-02-20 RX ADMIN — CLINDAMYCIN HYDROCHLORIDE 300 MG: 300 CAPSULE ORAL at 13:11

## 2017-02-20 RX ADMIN — AMIODARONE HYDROCHLORIDE 400 MG: 200 TABLET ORAL at 08:52

## 2017-02-20 RX ADMIN — CLINDAMYCIN HYDROCHLORIDE 300 MG: 300 CAPSULE ORAL at 08:54

## 2017-02-20 NOTE — PROGRESS NOTES
[facility] INPATIENT PROGRESS NOTE         [address] CVI    2/20/2017      PATIENT IDENTIFICATION:   Name:  Sander White      MRN:  [id number]     61 y.o.  male             LOS 5    Reason for visit: Follow-up acute arrest with anoxic injury, Acute cardiomyopathy and aspiration pneumonia      SUBJECTIVE:     No further hemoptysis.  No current chest pain.        Objective   OBJECTIVE:    Vital Sign Min/Max for last 24 hours  Temp  Min: 97.4 °F (36.3 °C)  Max: 98.2 °F (36.8 °C)   BP  Min: 103/71  Max: 126/86   Pulse  Min: 69  Max: 88   Resp  Min: 18  Max: 18   SpO2  Min: 95 %  Max: 95 %   No Data Recorded   No Data Recorded                   FiO2 (%): 30 %     Body mass index is 30.73 kg/(m^2).    Intake/Output Summary (Last 24 hours) at 02/20/17 1252  Last data filed at 02/20/17 0942   Gross per 24 hour   Intake    480 ml   Output      0 ml   Net    480 ml         Exam:  GEN:  No distress, appears stated age  EYES:   PERRL, anicteric sclera  ENT:    External ears/nose normal, OP clear  NECK:  No adenopathy, midline trachea  LUNGS: Normal chest on inspection, palpation and auscultation  CV:  Normal S1S2, without murmur  ABD:  Non tender, non distended, no hepatosplenomegaly, +BS  EXT:  No edema, cyanosis or clubbing    Scheduled meds:      aspirin 81 mg Oral Daily   carvedilol 6.25 mg Oral Q12H   clindamycin 300 mg Oral Q8H   lisinopril 2.5 mg Oral Nightly   pravastatin 5 mg Oral Nightly   ticagrelor 90 mg Oral BID     IV meds:                         Data Review:    Results from last 7 days  Lab Units 02/19/17  0531 02/18/17  0512 02/18/17  0109 02/17/17  0426 02/16/17  1751   SODIUM mmol/L 139 140 142 141 142   POTASSIUM mmol/L 3.6 3.5 3.4* 3.5 3.8   CHLORIDE mmol/L 106 105 107 104 107   TOTAL CO2 mmol/L 20.5* 25.8 23.4 23.0 22.1   BUN mg/dL 16 15 16 14 16   CREATININE mg/dL 0.99 1.03 1.07 0.91 0.98   GLUCOSE mg/dL 102* 105* 115* 109* 118*   CALCIUM mg/dL 8.5* 8.1* 8.1* 8.0* 8.2*          Estimated Creatinine Clearance: 97.6 mL/min (by C-G formula based on Cr of 0.99).    Results from last 7 days  Lab Units 02/19/17  0531 02/18/17  0512 02/17/17  0426 02/16/17  1751 02/16/17  1155   WBC 10*3/mm3 8.00 8.08 10.47 16.89* 12.98*   HEMOGLOBIN g/dL 11.9* 11.5* 12.3* 13.5* 13.9   PLATELETS 10*3/mm3 163 139* 154 163 153       Results from last 7 days  Lab Units 02/18/17  0645 02/16/17  1751 02/16/17  1155 02/16/17  0615 02/16/17  0004   INR  1.10 1.05 1.02 0.99 1.00       Results from last 7 days  Lab Units 02/18/17  0109 02/15/17  0632 02/15/17  0339   ALT (SGPT) U/L 128* 414* 303*   AST (SGOT) U/L 104* 443* 256*     Microbiology reviewed: No growth to date            Assessment   ASSESSMENT:   Status post arrest  Acute hypoxemic respiratory failure: Improved now off ventilator  ST elevation MI status post stent  Acute cardiomyopathy with EF 36%  Metabolic acidosis: Resolved  Aspiration pneumonia present on admission  Acute kidney injury: Improved  Hypokalemia: Replacing      PLAN:  Patient is doing very well out of ICU.  Suspect his hemoptysis is associated with being intubated and recent anticoagulation.  Improved. Encourage good pulmonary toilet.  Day 5 of 7 Abx.    Chito Garduno MD  Pulmonary and Critical Care Medicine  [facility], [facility]  2/20/2017    12:52 PM

## 2017-02-20 NOTE — NURSING NOTE
Provided & reviewed MI folder with pt & wife.  Reviewed risk factors, stent placement, and discussed benefits of cardiac rehab. We had a long visit and good discussion about importance of stress management.  Pt not sure about signing up for cardiac rehab at this time, but I did provide contact information for [facility] location and [facility] which may be closer to his home. Encouraged to call me if any questions later.

## 2017-02-20 NOTE — PLAN OF CARE
Problem: Patient Care Overview (Adult)  Goal: Plan of Care Review  Outcome: Ongoing (interventions implemented as appropriate)    02/19/17 1934   Outcome Evaluation   Outcome Summary/Follow up Plan Pt ambulation improving. Pt c/o nausea this am-relieved with zofran. No pain this shift. Possible discharge tomorrow per Dr. Rivera. VS stable. Continue to monitor.    Coping/Psychosocial Response Interventions   Plan Of Care Reviewed With patient;spouse   Patient Care Overview   Progress improving       Goal: Adult Individualization and Mutuality  Outcome: Ongoing (interventions implemented as appropriate)    02/19/17 1934   Individualization   Patient Specific Preferences Pt prefers to keep door closed.        Goal: Discharge Needs Assessment  Outcome: Ongoing (interventions implemented as appropriate)    02/19/17 1934   Discharge Needs Assessment   Discharge Planning Comments Pt to return home with home health, no acute rehab needed per physical therapy         Problem: Fall Risk (Adult)  Goal: Identify Related Risk Factors and Signs and Symptoms  Outcome: Ongoing (interventions implemented as appropriate)    02/19/17 1934   Fall Risk   Fall Risk: Related Risk Factors confusion/agitation;history of falls;neuro disease/injury   Fall Risk: Signs and Symptoms presence of risk factors       Goal: Absence of Falls  Outcome: Ongoing (interventions implemented as appropriate)    Problem: Skin Integrity Impairment, Risk/Actual (Adult)  Goal: Identify Related Risk Factors and Signs and Symptoms  Outcome: Ongoing (interventions implemented as appropriate)    02/19/17 1934   Skin Integrity Impairment, Risk/Actual   Skin Integrity Impairment, Risk/Actual: Related Risk Factors traumatic injury       Goal: Skin Integrity/Wound Healing  Outcome: Ongoing (interventions implemented as appropriate)    Problem: Cardiac Catheterization with/without PCI (Adult)  Goal: Signs and Symptoms of Listed Potential Problems Will be Absent or  Manageable (Cardiac Catheterization with/without PCI)  Outcome: Ongoing (interventions implemented as appropriate)    02/19/17 1934   Cardiac Catheterization with/without PCI   Problems Assessed (Cardiac Catheterization) all   Problems Present (Cardiac Catheterization) none

## 2017-02-20 NOTE — PLAN OF CARE
Problem: Patient Care Overview (Adult)  Goal: Plan of Care Review    02/20/17 1127   Outcome Evaluation   Outcome Summary/Follow up Plan Pt states he was doing fine with ambulation and is just waiting to go home.          Problem: Inpatient Physical Therapy  Goal: Bed Mobility Goal LTG- PT  Outcome: Outcome(s) achieved Date Met:  02/20/17  Goal: Transfer Training Goal 1 LTG- PT  Outcome: Outcome(s) achieved Date Met:  02/20/17  Goal: Gait Training Goal LTG- PT  Outcome: Revised    02/20/17 1127   Gait Training PT LTG   Gait Training Goal PT LTG, Outcome (per pt report, indep with ambulation)       Goal: Stair Training Goal LTG- PT  Outcome: Unable to achieve outcome(s) by discharge Date Met:  02/20/17

## 2017-02-20 NOTE — PLAN OF CARE
Problem: Patient Care Overview (Adult)  Goal: Plan of Care Review  Outcome: Ongoing (interventions implemented as appropriate)    02/20/17 6819   Outcome Evaluation   Outcome Summary/Follow up Plan pt feeling better today. pt staying today to be monitored for a new onset rash on his back. plan to dc home tomorrow if stable.    Coping/Psychosocial Response Interventions   Plan Of Care Reviewed With patient;spouse   Patient Care Overview   Progress improving       Goal: Adult Individualization and Mutuality  Outcome: Ongoing (interventions implemented as appropriate)  Goal: Discharge Needs Assessment  Outcome: Ongoing (interventions implemented as appropriate)    Problem: Fall Risk (Adult)  Goal: Absence of Falls  Outcome: Ongoing (interventions implemented as appropriate)    Problem: Skin Integrity Impairment, Risk/Actual (Adult)  Goal: Skin Integrity/Wound Healing  Outcome: Ongoing (interventions implemented as appropriate)    Problem: Acute Coronary Syndrome (ACS) (Adult)  Goal: Signs and Symptoms of Listed Potential Problems Will be Absent or Manageable (Acute Coronary Syndrome)  Outcome: Ongoing (interventions implemented as appropriate)

## 2017-02-20 NOTE — PROGRESS NOTES
[facility]  Cardiology Progress Note    Patient Identification:  Name: Sander White  Age: 61 y.o.  Sex: male  :  1955  MRN: [id number]                 Follow Up / Chief Complaint: resuscitated arrest,VF, STEMI->emergent PCI, LV dysfx, h/o HTN    Interval History:  Reports of chest pain with n/v. Full arrest upon EMS arrival with multiple shocks for VF, received CPR for 40-45 minutes before return of systemic circulation. Emergent thrombectomy and CAYLA-> ramus. Hypothermia protocol, resp failure with possible aspiration pneumonia and s/p anoxic encephalopathy     Subjective:  Denies palpitations, dizziness, lightheadedness, nausea or vomiting.  Reports diffuse anterior chest tenderness now only reproducible with palpation to the anterior chest.  Reports pruritic rash over his back.  Denies shortness of breath    Objective:  No ectopy.  Blood pressure stable.    Past Medical History:  Past Medical History   Diagnosis Date   • Arthritis    • Chronic pain in right foot    • ED (erectile dysfunction) 7/15/2016   • GERD (gastroesophageal reflux disease)    • Hypertension      Past Surgical History:  Past Surgical History   Procedure Laterality Date   • Colonoscopy N/A 2015     Normal   • Appendectomy     • Hernia repair Right      Inguinal   • Cyst removal N/A      Neck   • Cholecystectomy     • Pr rt/lt heart catheters N/A 2/15/2017     Procedure: Percutaneous Coronary Intervention;  Surgeon: Kristina Billings MD;  Location: [facility] CATH INVASIVE LOCATION;  Service: Cardiovascular   • Cardiac catheterization N/A 2/15/2017     Procedure: Left Heart Cath;  Surgeon: Kristina Billings MD;  Location: [facility] CATH [facility]AS[facility] LOC[facility];  Service:    • Cardiac catheterization  2/15/2017     Procedure: Percutaneous Manual Thrombectomy;  Surgeon: Kristina Billings MD;  Location: [facility] CATH INVASIVE LOCATION;  Service:    • Cardiac catheterization N/A 2/15/2017     Procedure: Coronary  angiography;  Surgeon: Kristina Billings MD;  Location:  [facility] CATH INVASIVE LOCATION;  Service:    • Cardiac catheterization N/A 2/15/2017     Procedure: Left ventriculography;  Surgeon: Kristina Billings MD;  Location:  [facility] CATH INVASIVE LOCATION;  Service:         Social History:   Social History   Substance Use Topics   • Smoking status: Former Smoker     Packs/day: 0.50     Years: 7.00     Types: Cigarettes     Quit date: 1999   • Smokeless tobacco: Never Used   • Alcohol use Yes      Family History:  Family History   Problem Relation Age of Onset   • Diabetes Mother      Type 2   • Diabetes Father      Type 2   • Stroke Father    • Aneurysm Brother      Brain          Allergies:  Allergies   Allergen Reactions   • Amoxicillin Itching   • Penicillins      Scheduled Meds:    aspirin 81 mg Daily   carvedilol 3.125 mg Q12H   clindamycin 300 mg Q8H   lisinopril 2.5 mg Nightly   pravastatin 5 mg Nightly   ticagrelor 90 mg BID           INTAKE AND OUTPUT:    Intake/Output Summary (Last 24 hours) at 02/20/17 1215  Last data filed at 02/20/17 0942   Gross per 24 hour   Intake    480 ml   Output      0 ml   Net    480 ml       Review of Systems:   GI: No nausea, vomiting   Cardiac:  (Improving) diffuse anterior chest discomfort now mild and only reproducible with palpation of the anterior chest  Pulmonary:  No shortness of breath, ADAMS on room air    Constitutional:  Temp:  [97.4 °F (36.3 °C)-98.2 °F (36.8 °C)] 97.5 °F (36.4 °C)  Heart Rate:  [69-88] 88  Resp:  [18] 18  BP: (103-126)/(71-90) 126/86    Physical Exam by Kristina Billings MD  General:  Awake, alert and ambulating in the room ad rere. on room air.  Appears in no acute distress  HEENT: PERTL No JVD. Oral mucosa moist, no cyanosis.  No angioedema  Respiratory: Respirations regular and unlabored on room air BBS with good air entry in all fields.  No crackles, stridor or wheezes auscultated  Cardiovascular: S1S2 Regular rate and rhythm. No murmur or  gallop.RFA site without oozing or hematoma  No pretibial pitting edema  Gastrointestinal: Abdomen soft, nontender. + bowels sounds No ascites  Musculoskeletal: No abnormal movements  Extremities: No digital  cyanosis  Skin: Color pink. Skin warm and dry to touch.  Erythematous macular and pruritic rash noted over back.  No hives  Neuro: AAO x3. CN II-XII is intact. . ALVAREZ x4 without deficit           Cardiographics  Telemetry:  SR 70-80's, no ectopy          Echocardiogram:     · Left ventricular wall segments contract abnormally. Refer to wall scoring diagram for more information.  · Left atrial cavity size is borderline dilated.  · Mild mitral valve regurgitation is present  · Mild tricuspid valve regurgitation is present.  · Left Ventricle: Calculated EF = 36.3%.  · There is no evidence of pericardial effusion.               CATH & PCI  · Successful left heart catheter  · Successful thrombectomy of the ramus branch  · Successful angioplasty and stent to the ramus branch reduced from 100% to 0% with 2.5/15 XIENCE dilated to 2.6  · Mild global hypokinetic estimated ejection fraction 40%  · LAD 20-30% diffuse irregularity  · Normal left main  · Circumflex had a ramus branch which was and plasty and otherwise was normal  · Cor dominant RCA midportion 60%    Lab Review     Results from last 7 days  Lab Units 02/19/17  0531 02/18/17  0109 02/17/17  0426   TROPONIN T ng/mL 2.360* 2.790* 1.960*       Results from last 7 days  Lab Units 02/19/17  0531   SODIUM mmol/L 139   POTASSIUM mmol/L 3.6   BUN mg/dL 16   CREATININE mg/dL 0.99   CALCIUM mg/dL 8.5*       Results from last 7 days  Lab Units 02/19/17  0531 02/18/17  0512 02/17/17  0426   WBC 10*3/mm3 8.00 8.08 10.47   HEMOGLOBIN g/dL 11.9* 11.5* 12.3*   HEMATOCRIT % 35.7* 34.8* 36.6*   PLATELETS 10*3/mm3 163 139* 154         Assessment:  - acute lateral STEMI  - s/p resuscitated cardiac arrest  - s/p emergent with 2.5/15 XIENCE -> 100% ramus  - LV dysfx - EF 35-40%, mild  MR/TR  - acute resp failure -> Pulm  - suspected right aspiration pneumonia -> Pulm  - s/p anoxic encephalopathy -> Neuro      Plan:    - acute lateral STEMI,  resuscitated cardiac arrest-> - s/p emergent with 2.5/15 XIENCE -> 100% ramus.  Chest soreness is gradually improving and now only reproducible with pressing on chest.  (Llikely from prolonged CPR and multiple defib shocks.)  Continue aspirin, Brilinta, Coreg and Prinivil     - LV dysfx - EF 35-40%, mild MR/TR.  Clinically compensated.  Continue Coreg and titrate up as blood pressure tolerates .  Continue Prinivil     Needs continued dual antiplatelet therapy with asa and Brilinta for at least 1 year.  Patient has been started on multiple pharmacologic agents since admission any one of which may be cause of pruritic rash.  Will stop amiodarone since he has had no recurrent arrhythmias since initial resuscitation, change statin to protocol and start on Benadryl and BNP, BMP, CBC and magnesium level ordered for tomorrow      I reviewed the patient's new clinical results and treatment plan. I personally viewed and interpreted the patient's EKG/Telemetry data    )2/20/2017  Kristina Billings MD      EMR Dragon/Transcription disclaimer:   Much of this encounter note is an electronic transcription/translation of spoken language to printed text. The electronic translation of spoken language may permit erroneous, or at times, nonsensical words or phrases to be inadvertently transcribed; Although I have reviewed the note for such errors, some may still exist.

## 2017-02-20 NOTE — THERAPY DISCHARGE NOTE
Acute Care - Physical Therapy Discharge Summary  [facility]       Patient Name: Sander White  : 1955  MRN: [id number]    Today's Date: 2017  Onset of Illness/Injury or Date of Surgery Date: 02/15/17    Date of Referral to PT: 17  Referring Physician: Astrid      Admit Date: 2/15/2017      PT Recommendation and Plan    Visit Dx:    ICD-10-CM ICD-9-CM   1. ST elevation myocardial infarction (STEMI), unspecified artery I21.3 410.90   2. Cardiac arrest with ventricular fibrillation I46.9 427.5    I49.01 427.41   3. Dysphagia, unspecified type R13.10 787.20             Outcome Measures       17 1000 17 1105 17 1000    How much help from another person do you currently need...    Turning from your back to your side while in flat bed without using bedrails? 3  -MD  3  -CW    Moving from lying on back to sitting on the side of a flat bed without bedrails? 3  -MD  3  -CW    Moving to and from a bed to a chair (including a wheelchair)? 3  -MD  3  -CW    Standing up from a chair using your arms (e.g., wheelchair, bedside chair)? 3  -MD  3  -CW    Climbing 3-5 steps with a railing? 3  -MD  2  -CW    To walk in hospital room? 3  -MD  3  -CW    AM-PAC 6 Clicks Score 18  -MD  17  -CW    How much help from another is currently needed...    Putting on and taking off regular lower body clothing?  2  -AF     Bathing (including washing, rinsing, and drying)  2  -AF     Toileting (which includes using toilet bed pan or urinal)  3  -AF     Putting on and taking off regular upper body clothing  3  -AF     Taking care of personal grooming (such as brushing teeth)  3  -AF     Eating meals  3  -AF     Score  16  -AF     Functional Assessment    Outcome Measure Options AM-PAC 6 Clicks Daily Activity (OT);AM-PAC 6 Clicks Basic Mobility (PT)  -MD AM-PAC 6 Clicks Daily Activity (OT)  -AF AM-PAC 6 Clicks Basic Mobility (PT)  -CW      17 1400          How much help from another person do you  currently need...    Turning from your back to your side while in flat bed without using bedrails? 3  -CH (r) MS (t) CH (c)      Moving from lying on back to sitting on the side of a flat bed without bedrails? 3  -CH (r) MS (t) CH (c)      Moving to and from a bed to a chair (including a wheelchair)? 3  -CH (r) MS (t) CH (c)      Standing up from a chair using your arms (e.g., wheelchair, bedside chair)? 3  -CH (r) MS (t) CH (c)      Climbing 3-5 steps with a railing? 2  -CH (r) MS (t) CH (c)      To walk in hospital room? 2  -CH (r) MS (t) CH (c)      AM-PAC 6 Clicks Score 16  -CH (r) MS (t)      Functional Assessment    Outcome Measure Options AM-PAC 6 Clicks Basic Mobility (PT)  -CH (r) MS (t) CH (c)        User Key  (r) = Recorded By, (t) = Taken By, (c) = Cosigned By    Initials Name Provider Type    CH Johanna Engle, PT Physical Therapist    AF Matilda Tran, OTR Occupational Therapist    MD Pura Javier, PT Physical Therapist    DIANNE Amaya Physical Therapy Assistant    MS Christina Luciano, PT Student PT Student                      IP PT Goals       02/20/17 1127 02/17/17 1443       Bed Mobility PT LTG    Bed Mobility PT LTG, Date Established  02/17/17  -CH (r) MS (t) CH (c)     Bed Mobility PT LTG, Time to Achieve  1 wk  -CH (r) MS (t) CH (c)     Bed Mobility PT LTG, Activity Type  supine to sit/sit to supine  -CH (r) MS (t) CH (c)     Bed Mobility PT LTG, [place] Level  supervision required  -CH (r) MS (t) CH (c)     Transfer Training PT LTG    Transfer Training PT LTG, Date Established  02/17/17  -CH (r) MS (t) CH (c)     Transfer Training PT LTG, Time to Achieve  1 wk  -CH (r) MS (t) CH (c)     Transfer Training PT LTG, Activity Type  sit to stand/stand to sit  -CH (r) MS (t) CH (c)     Transfer Training PT LTG, [place] Level  supervision required  -CH (r) MS (t) CH (c)     Gait Training PT LTG    Gait Training Goal PT LTG, Date Established  02/17/17  -YOUNG (r) MS (t) CH (c)     Gait  Training Goal PT LTG, Time to Achieve  1 wk  -CH (r) MS (t) CH (c)     Gait Training Goal PT LTG, [place] Level  supervision required  -CH (r) MS (t) CH (c)     Gait Training Goal PT LTG, Distance to Achieve  250  -CH (r) MS (t) CH (c)     Gait Training Goal PT LTG, Outcome (P)  --   per pt report, indep with ambulation  -LB      Stair Training PT LTG    Stair Training Goal PT LTG, Date Established  02/17/17  -CH (r) MS (t) CH (c)     Stair Training Goal PT LTG, Time to Achieve  1 wk  -CH (r) MS (t) CH (c)     Stair Training Goal PT LTG, Number of Steps  5  -CH (r) MS (t) CH (c)     Stair Training Goal PT LTG, [place] Level  contact guard assist  -CH (r) MS (t) CH (c)     Stair Training Goal PT LTG, Assist Device  1 handrail  -CH (r) MS (t) CH (c)       User Key  (r) = Recorded By, (t) = Taken By, (c) = Cosigned By    Initials Name Provider Type    LB Akilah Egan, PT Physical Therapist    YOUNG Engle, PT Physical Therapist    MS Christina Luciano, PT Student PT Student              PT Discharge Summary  Reason for Discharge: At baseline function  Outcomes Achieved: Refer to plan of care for updates on goals achieved  Discharge Destination: Home      Akilah Egan, PT   2/20/2017

## 2017-02-20 NOTE — PROGRESS NOTES
" DAILY PROGRESS NOTE    CHIEF COMPLAINT  DOING BETTER  NO SPECIFIC COMPLAINTS  OCC MEMORY LOSS PER NURSING STAFF      HISTORY: A 61-year-old white male with history of hypertension, gastroesophageal reflux disease and osteoarthritis, who had been in good health until last night when he developed severe nausea and vomiting followed by chest pressure. He ended up in the [facility] ER and was found to have acute MI. He was taken to the cardiac catheterization lab and had a stent placed and is now on the ventilator and sedated. I am asked to follow the patient for medical problems.    PHYSICAL EXAMINATION: Blood pressure 126/86, pulse 88, temperature 97.5 °F (36.4 °C), temperature source Oral, resp. rate 18, height 71.75\" (182.2 cm), weight 225 lb (102 kg), SpO2 95 %.    GENERAL: AWAKE AND ALERT  LUNGS: CLEAR B  HEART: S1 and S2.  ABDOMEN: NT BS POS  EXTREMITIES: No edema.  NEUROLOGIC: INTACT    DIAGNOSTIC DATA:   Lab Results (last 24 hours)     Procedure Component Value Units Date/Time    Blood Culture [[id number]]  (Normal) Collected:  02/16/17 2007    Specimen:  Blood from Blood, Central Line Updated:  02/19/17 2101     Blood Culture No growth at 3 days     Blood Culture [[id number]]  (Normal) Collected:  02/16/17 2024    Specimen:  Blood from Arm, Left Updated:  02/19/17 2101     Blood Culture No growth at 3 days         Lab Results (last 24 hours)     Procedure Component Value Units Date/Time    Blood Culture [[id number]]  (Normal) Collected:  02/16/17 2007    Specimen:  Blood from Blood, Central Line Updated:  02/19/17 2101     Blood Culture No growth at 3 days     Blood Culture [[id number]]  (Normal) Collected:  02/16/17 2024    Specimen:  Blood from Arm, Left Updated:  02/19/17 2101     Blood Culture No growth at 3 days         Lab Results (last 24 hours)     Procedure Component Value Units Date/Time    Blood Culture [[id number]]  (Normal) Collected:  02/16/17 2007    Specimen:  Blood from Blood, Central " Line Updated:  02/19/17 2101     Blood Culture No growth at 3 days     Blood Culture [[id number]]  (Normal) Collected:  02/16/17 2024    Specimen:  Blood from Arm, Left Updated:  02/19/17 2101     Blood Culture No growth at 3 days         Lab Results (last 24 hours)     Procedure Component Value Units Date/Time    Blood Culture [[id number]]  (Normal) Collected:  02/16/17 2007    Specimen:  Blood from Blood, Central Line Updated:  02/19/17 2101     Blood Culture No growth at 3 days     Blood Culture [[id number]]  (Normal) Collected:  02/16/17 2024    Specimen:  Blood from Arm, Left Updated:  02/19/17 2101     Blood Culture No growth at 3 days       White count 18.6, hemoglobin 15.2, platelets 282,000. BUN 24, creatinine 1.0, potassium 4.2, CO2 is 18.6. Blood sugar is 123 to 138. LDL is 128. Chest x-ray is no active disease. EKG shows sinus rhythm, prolonged QT interval, nonspecific ST-T wave changes. Initial EKG showed AFib/flutter.  Imaging Results (last 72 hours)     Procedure Component Value Units Date/Time    XR Abdomen KUB [[id number]] Collected:  02/15/17 0702     Updated:  02/15/17 0708    Narrative:       EMERGENCY SUPINE ABDOMEN     HISTORY: 61-year-old male for oral gastric tube placement     FINDINGS:  1. Tip of the oral gastric tube is in good position at the distal  stomach.     This report was finalized on 2/15/2017 7:05 AM by Dr. Aidan Blackwell MD.       XR Chest Post CVA Port [[id number]] Collected:  02/15/17 0848     Updated:  02/15/17 0855    Narrative:       XR CHEST POST CVA PORT-     Clinical: Central line placement     COMPARISON 02/15/2017 at 0431 hours, current examination 0828 hours     FINDINGS: There is been interval placement of a nasogastric tube tip is  subdiaphragmatic out of the field-of-view. Endotracheal tube unchanged  in position.        Right IJ catheter tip superimposes the projected area of the superior  vena cava, no pneumothorax.        Progression of the right lung opacity with now  perihilar consolidation.  Pneumonia versus asymmetric pulmonary edema. There is however no  associated pleural effusion identified. Cardiomediastinal silhouette is  stable. Cardiac size upper limits of normal. The remainder is  unremarkable.     This report was finalized on 2/15/2017 8:52 AM by Dr. Mick Montalvo MD.       CT Head Without Contrast [[id number]] Collected:  02/15/17 0447     Updated:  02/16/17 0000    Narrative:       CT SCAN OF THE BRAIN WITHOUT CONTRAST.     TECHNIQUE: Multiple axial images of the brain were obtained from the  vertex to the base of the brain.     HISTORY:  Unresponsive, cardiac arrest.     COMPARISON:  No prior studies for comparison.     FINDINGS:   Midline structures are within normal limits, there is no hydrocephalus.  Gray-white matter differentiation is maintained.         Orbits are within normal limits. Mild mucosal disease of the paranasal  sinuses. Mastoid air cells are well aerated.             Impression:       No acute intracranial pathology.         This report was finalized on 2/15/2017 11:57 PM by Dr. Navin Thompson MD.       XR Chest 1 View [[id number]] Collected:  02/15/17 0444     Updated:  02/16/17 0000    Narrative:       X-RAY CHEST 1 VIEW.     HISTORY: Intubation.     COMPARISON: No prior studies for comparison.     FINDINGS:  Cardiomediastinal silhouette is within normal limits.         Diffuse opacity within the right hemithorax concerning for infiltrates,  no definite effusion.              Impression:       Extensive infiltrates in the right lung are suspected, clinical  correlation is recommended.         This report was finalized on 2/15/2017 11:57 PM by Dr. Navin Thompson MD.           Current Facility-Administered Medications:   •  acetaminophen (TYLENOL) tablet 650 mg, 650 mg, Oral, Q4H PRN, 650 mg at 02/17/17 2211 **OR** acetaminophen (TYLENOL) suppository 650 mg, 650 mg, Rectal, Q4H PRN, Charles Medrano MD  •  ALPRAZolam (XANAX) tablet 0.25 mg, 0.25 mg,  Oral, Q6H PRN, Kristina Billings MD, 0.25 mg at 02/17/17 2050  •  aspirin chewable tablet 81 mg, 81 mg, Oral, Daily, Kristina Billings MD, 81 mg at 02/20/17 0852  •  carvedilol (COREG) tablet 6.25 mg, 6.25 mg, Oral, Q12H, Frida Parker, APRN  •  clindamycin (CLEOCIN) capsule 300 mg, 300 mg, Oral, Q8H, Latrell Rivera MD, 300 mg at 02/20/17 1311  •  dextrose (D50W) solution 25 g, 25 g, Intravenous, Q15 Min PRN, Uli Resendiz MD  •  dextrose (GLUTOSE) oral gel 15 g, 15 g, Oral, Q15 Min PRN, Uli Resendiz MD  •  diphenhydrAMINE (BENADRYL) injection 25 mg, 25 mg, Intravenous, Q6H PRN, Frida Parker APRN, 25 mg at 02/20/17 1310  •  glucagon (human recombinant) (GLUCAGEN DIAGNOSTIC) injection 1 mg, 1 mg, Subcutaneous, Q15 Min PRN, Uli Resendiz MD  •  HYDROcodone-acetaminophen (NORCO) 5-325 MG per tablet 1 tablet, 1 tablet, Oral, Q6H PRN, Kristina Billings MD, 1 tablet at 02/20/17 0118  •  Influenza Vac Subunit Quad (FLUCELVAX) injection 0.5 mL, 0.5 mL, Intramuscular, During Hospitalization, Uli Resendiz MD, 0.5 mL at 02/17/17 2101  •  lisinopril (PRINIVIL,ZESTRIL) tablet 2.5 mg, 2.5 mg, Oral, Nightly, Frida Parker APRN, 2.5 mg at 02/19/17 2029  •  morphine injection 3 mg, 3 mg, Intravenous, Q4H PRN, Kristina Billings MD, 3 mg at 02/19/17 0538  •  ondansetron (ZOFRAN) injection 4 mg, 4 mg, Intravenous, Q4H PRN, Charles Medrano MD, 4 mg at 02/19/17 0729  •  pravastatin (PRAVACHOL) tablet 5 mg, 5 mg, Oral, Nightly, ANGELICA Cedillo  •  sodium chloride 0.9 % flush 1-10 mL, 1-10 mL, Intravenous, PRN, Kristina Billings MD  •  sodium phosphate 12 mmol in sodium chloride 0.9 % 250 mL IVPB, 12 mmol, Intravenous, Once PRN, Charles Medrano MD  •  ticagrelor (BRILINTA) tablet 90 mg, 90 mg, Oral, BID, Kristina Billings MD, 90 mg at 02/20/17 0852     ASSESSMENT:   1. Status post acute myocardial infarction.  2. Status post cardiac catheterization with stent placement.  3. Respiratory failure. RESOLVED  4. S/P  CARDIAC ARREST  5. Hypothermia. RESOLVED  6. History of hypertension.  7. History of gastroesophageal reflux disease.  8. Increased white count.IMPROVED    PLAN  1. CPM  2. DIET AS TOLERATED  3. CHANGE MEDS TO PO  4. SUPPORTIVE CARE  5. PT/OT  6. D/C SOON    TANIA PANDA MD

## 2017-02-21 VITALS
HEIGHT: 72 IN | SYSTOLIC BLOOD PRESSURE: 105 MMHG | HEART RATE: 66 BPM | DIASTOLIC BLOOD PRESSURE: 71 MMHG | TEMPERATURE: 97.8 F | RESPIRATION RATE: 18 BRPM | BODY MASS INDEX: 30.48 KG/M2 | WEIGHT: 225 LBS | OXYGEN SATURATION: 95 %

## 2017-02-21 LAB
ANION GAP SERPL CALCULATED.3IONS-SCNC: 15.2 MMOL/L
BACTERIA SPEC AEROBE CULT: NORMAL
BACTERIA SPEC AEROBE CULT: NORMAL
BUN BLD-MCNC: 21 MG/DL (ref 8–23)
BUN/CREAT SERPL: 17.9 (ref 7–25)
CALCIUM SPEC-SCNC: 8.7 MG/DL (ref 8.6–10.5)
CHLORIDE SERPL-SCNC: 103 MMOL/L (ref 98–107)
CO2 SERPL-SCNC: 21.8 MMOL/L (ref 22–29)
CREAT BLD-MCNC: 1.17 MG/DL (ref 0.76–1.27)
DEPRECATED RDW RBC AUTO: 41.1 FL (ref 37–54)
ERYTHROCYTE [DISTWIDTH] IN BLOOD BY AUTOMATED COUNT: 12.9 % (ref 11.5–14.5)
GFR SERPL CREATININE-BSD FRML MDRD: 64 ML/MIN/1.73
GLUCOSE BLD-MCNC: 135 MG/DL (ref 65–99)
HCT VFR BLD AUTO: 39.4 % (ref 40.4–52.2)
HGB BLD-MCNC: 13.5 G/DL (ref 13.7–17.6)
MAGNESIUM SERPL-MCNC: 2.3 MG/DL (ref 1.6–2.4)
MCH RBC QN AUTO: 30.5 PG (ref 27–32.7)
MCHC RBC AUTO-ENTMCNC: 34.3 G/DL (ref 32.6–36.4)
MCV RBC AUTO: 89.1 FL (ref 79.8–96.2)
NT-PROBNP SERPL-MCNC: 454 PG/ML (ref 5–900)
PLATELET # BLD AUTO: 226 10*3/MM3 (ref 140–500)
PMV BLD AUTO: 11.3 FL (ref 6–12)
POTASSIUM BLD-SCNC: 3.6 MMOL/L (ref 3.5–5.2)
RBC # BLD AUTO: 4.42 10*6/MM3 (ref 4.6–6)
SODIUM BLD-SCNC: 140 MMOL/L (ref 136–145)
WBC NRBC COR # BLD: 7.3 10*3/MM3 (ref 4.5–10.7)

## 2017-02-21 PROCEDURE — 97110 THERAPEUTIC EXERCISES: CPT

## 2017-02-21 PROCEDURE — 99238 HOSP IP/OBS DSCHRG MGMT 30/<: CPT | Performed by: INTERNAL MEDICINE

## 2017-02-21 PROCEDURE — 83735 ASSAY OF MAGNESIUM: CPT | Performed by: NURSE PRACTITIONER

## 2017-02-21 PROCEDURE — 85027 COMPLETE CBC AUTOMATED: CPT | Performed by: NURSE PRACTITIONER

## 2017-02-21 PROCEDURE — 83880 ASSAY OF NATRIURETIC PEPTIDE: CPT | Performed by: NURSE PRACTITIONER

## 2017-02-21 PROCEDURE — 80048 BASIC METABOLIC PNL TOTAL CA: CPT | Performed by: NURSE PRACTITIONER

## 2017-02-21 PROCEDURE — 25010000002 DIPHENHYDRAMINE PER 50 MG: Performed by: NURSE PRACTITIONER

## 2017-02-21 RX ORDER — CARVEDILOL 6.25 MG/1
6.25 TABLET ORAL EVERY 12 HOURS SCHEDULED
Qty: 60 TABLET | Refills: 2 | Status: SHIPPED | OUTPATIENT
Start: 2017-02-21 | End: 2017-04-20 | Stop reason: SDUPTHER

## 2017-02-21 RX ORDER — PRAVASTATIN SODIUM 10 MG
5 TABLET ORAL NIGHTLY
Qty: 30 TABLET | Refills: 2 | Status: SHIPPED | OUTPATIENT
Start: 2017-02-21 | End: 2017-04-20 | Stop reason: SDUPTHER

## 2017-02-21 RX ORDER — CLINDAMYCIN HYDROCHLORIDE 300 MG/1
300 CAPSULE ORAL EVERY 8 HOURS SCHEDULED
Qty: 5 CAPSULE | Refills: 0 | Status: SHIPPED | OUTPATIENT
Start: 2017-02-21 | End: 2017-02-23

## 2017-02-21 RX ORDER — NITROGLYCERIN 0.4 MG/1
TABLET SUBLINGUAL
Qty: 25 TABLET | Refills: 0 | Status: SHIPPED | OUTPATIENT
Start: 2017-02-21 | End: 2017-11-17

## 2017-02-21 RX ORDER — FAMOTIDINE 20 MG/1
20 TABLET, FILM COATED ORAL DAILY
Qty: 30 TABLET | Refills: 0
Start: 2017-02-21 | End: 2017-06-27

## 2017-02-21 RX ORDER — ASPIRIN 81 MG/1
81 TABLET, CHEWABLE ORAL DAILY
Start: 2017-02-21

## 2017-02-21 RX ADMIN — CLINDAMYCIN HYDROCHLORIDE 300 MG: 300 CAPSULE ORAL at 08:03

## 2017-02-21 RX ADMIN — ASPIRIN 81 MG: 81 TABLET, CHEWABLE ORAL at 08:04

## 2017-02-21 RX ADMIN — DIPHENHYDRAMINE HYDROCHLORIDE 25 MG: 50 INJECTION, SOLUTION INTRAMUSCULAR; INTRAVENOUS at 06:43

## 2017-02-21 RX ADMIN — HYDROCODONE BITARTRATE AND ACETAMINOPHEN 1 TABLET: 5; 325 TABLET ORAL at 00:23

## 2017-02-21 RX ADMIN — TICAGRELOR 90 MG: 90 TABLET ORAL at 08:04

## 2017-02-21 RX ADMIN — CARVEDILOL 6.25 MG: 6.25 TABLET, FILM COATED ORAL at 08:04

## 2017-02-21 RX ADMIN — PANTOPRAZOLE SODIUM 40 MG: 40 TABLET, DELAYED RELEASE ORAL at 08:04

## 2017-02-21 RX ADMIN — Medication 10 ML: at 08:04

## 2017-02-21 RX ADMIN — HYDROCODONE BITARTRATE AND ACETAMINOPHEN 1 TABLET: 5; 325 TABLET ORAL at 08:04

## 2017-02-21 NOTE — PLAN OF CARE
Problem: Patient Care Overview (Adult)  Goal: Plan of Care Review  Outcome: Outcome(s) achieved Date Met:  02/21/17  To be discharged 2/21.

## 2017-02-21 NOTE — THERAPY DISCHARGE NOTE
Acute Care - Occupational Therapy Treatment Note/Discharge  [facility]     Patient Name: Sander White  : 1955  MRN: [id number]  Today's Date: 2017  Onset of Illness/Injury or Date of Surgery Date: 02/15/17  Date of Referral to OT: 17  Referring Physician: Astrid      Admit Date: 2/15/2017    Visit Dx:     ICD-10-CM ICD-9-CM   1. ST elevation myocardial infarction (STEMI), unspecified artery I21.3 410.90   2. Cardiac arrest with ventricular fibrillation I46.9 427.5    I49.01 427.41   3. Dysphagia, unspecified type R13.10 787.20     Patient Active Problem List   Diagnosis   • Airway hyperreactivity   • Elevated cholesterol   • Gain of weight   • ED (erectile dysfunction)   • ST elevation myocardial infarction (STEMI)             Adult Rehabilitation Note       17 0927 17 1123 17 1009    Rehab Assessment/Intervention    Discipline occupational therapist  -CC physical therapist  -LB physical therapist  -MD    Document Type discharge summary;therapy note (daily note)  -CC discharge summary  -LB therapy note (daily note)  -MD    Subjective Information no complaints;agree to therapy  -CC --   pt states he has been up no issues.  Ed to walk in hallway  -LB agree to therapy;no complaints  -MD    Patient Effort, Rehab Treatment good  -CC  good  -MD    Treatment Not Performed, Comment   Pt's spouse was present and agreed pt is doing well with mobility.  Encourged pt to amb on unit   -LB    Symptoms Noted During/After Treatment none  -CC  none  -MD    Precautions/Limitations fall precautions  -CC  fall precautions  -MD    Precautions/Limitations, Vision WFL  -CC      Recorded by [CC] Amanda Ovalle OTR [LB] Akilah Egan PT [LB] Akilah Egan, PT  [MD] Pura Javier, PT    Pain Assessment    Pain Assessment No/denies pain  -CC  No/denies pain  -MD    Recorded by [CC] Amanda Ovalle OTR  [MD] Pura Javier, PT    Cognitive Assessment/Intervention    Current Cognitive/Communication Assessment  impaired  -CC  impaired  -MD    Orientation Status oriented x 4  -CC  oriented x 4  -MD    Follows Commands/Answers Questions 100% of the time;able to follow single-step instructions  -CC  100% of the time;needs cueing  -MD    Personal Safety decreased insight to deficits  -CC  decreased insight to deficits  -MD    Personal Safety Interventions fall prevention program maintained;gait belt  -CC  gait belt;muscle strengthening facilitated;nonskid shoes/slippers when out of bed;supervised activity  -MD    Short/Long Term Memory --   pt reports now being to recall events from the day before  -CC      Recorded by [CC] KAYLYNN Barrera  [MD] Pura Javier, PT    Bed Mobility, Assessment/Treatment    Bed Mobility, Roll Left, [place] independent  -CC      Bed Mobility, Scoot/Bridge, [place] independent  -CC      Bed Mob, Supine to Sit, [place] independent  -CC  not tested  -MD    Recorded by [CC] KAYLYNN Barrera  [MD] Pura Javier, CANDIDO    Transfer Assessment/Treatment    Transfers, Sit-Stand [place] independent  -CC  independent   pt up in BR independently  -MD    Transfers, Stand-Sit [place] independent  -CC  supervision required  -MD    Toilet Transfer, [place] independent  -CC      Transfer, Impairments   impaired balance  -MD    Recorded by [CC] KAYLYNN Barrera  [MD] Pura Javier, CANDIDO    Gait Assessment/Treatment    Gait, [place] Level   verbal cues required;contact guard assist  -MD    Gait, Distance (Feet)   240  -MD    Gait, Gait Deviations   bilateral:;jennifer decreased;forward flexed posture  -MD    Gait, Safety Issues   step length decreased  -MD    Gait, Impairments strength decreased  -CC  impaired balance  -MD    Recorded by [CC] KAYLYNN Barrera  [MD] Pura Javier, CANDIDO    ADL Assessment/Intervention    Additional Documentation --   pt completing self care skills on his own  -CC      Recorded by [CC] KAYLYNN Barrera      Toileting Assessment/Training    Toileting  Assess/Train, Position sitting;standing  -CC      Toileting Assess/Train, Indepen Level independent  -CC      Recorded by [CC] KAYLYNN Barrera      Therapy Exercises    Bilateral Upper Extremity AROM:;15 reps;supine;sitting   2# hand wt 15 reps x 3 sets with wrist and elbows.   -CC      Recorded by [CC] KAYLYNN Barrera      Positioning and Restraints    Pre-Treatment Position in bed  -CC  other (comment)  -MD    Post Treatment Position bed  -CC  chair  -MD    In Bed supine;call light within reach;encouraged to call for assist;exit alarm on  -CC      In Chair   sitting;call light within reach;exit alarm on  -MD    Recorded by [CC] Amanda Ovalle OTR  [MD] Pura Javier, PT      02/18/17 1000          Rehab Assessment/Intervention    Discipline physical therapy assistant  -CW      Document Type therapy note (daily note)  -CW      Subjective Information agree to therapy;complains of;fatigue  -CW      Patient Effort, Rehab Treatment good  -CW      Precautions/Limitations fall precautions  -CW      Recorded by [CW] Vaughn Amaya      Pain Assessment    Pain Assessment No/denies pain  -CW      Recorded by [CW] Vaughn Amaya      Cognitive Assessment/Intervention    Current Cognitive/Communication Assessment impaired  -CW      Orientation Status oriented x 4  -CW      Follows Commands/Answers Questions 100% of the time  -CW      Personal Safety mild impairment;decreased awareness, need for safety;decreased insight to deficits  -CW      Personal Safety Interventions fall prevention program maintained;gait belt;nonskid shoes/slippers when out of bed  -CW      Recorded by [CW] Vaughn Amaya      Bed Mobility, Assessment/Treatment    Bed Mob, Supine to Sit, [place] not tested  -CW      Bed Mobility, Comment Pt sitting EOB  -CW      Recorded by [CW] Vaughn Amaya      Transfer Assessment/Treatment    Transfers, Sit-Stand [place] verbal cues required;contact guard assist  -CW      Transfers,  Stand-Sit [place] verbal cues required;contact guard assist  -CW      Transfers, Sit-Stand-Sit, Assist Device rolling walker  -CW      Recorded by [CW] Vaughn Amaya      Gait Assessment/Treatment    Gait, [place] Level contact guard assist;verbal cues required  -CW      Gait, Assistive Device rolling walker  -CW      Gait, Distance (Feet) 20  -CW      Gait, Gait Deviations jennifer decreased;step length decreased;stride length decreased  -CW      Recorded by [CW] Vaughn Amaya      Therapy Exercises    Bilateral Lower Extremities AROM:;10 reps;sitting;ankle pumps/circles;hip flexion;LAQ  -CW      Recorded by [CW] Vaughn Amaya      Positioning and Restraints    Pre-Treatment Position in bed  -CW      Post Treatment Position bed  -CW      In Bed sitting EOB;call light within reach;encouraged to call for assist;with family/caregiver;notified nsg  -CW      Recorded by [CW] Vaughn Amaya        User Key  (r) = Recorded By, (t) = Taken By, (c) = Cosigned By    Initials Name Effective Dates    CC Amanda Ovalle, OTR 04/13/15 -     LB Akilah Egan, PT 10/06/15 -     MD Pura Javier, PT 12/01/15 -     CW Vaughn Amaya 12/13/16 -                 OT Goals       02/18/17 1104          Transfer Training OT LTG    Transfer Training OT LTG, Date Established 02/18/17  -AF      Transfer Training OT LTG, Time to Achieve by discharge  -AF      Transfer Training OT LTG, Activity Type toilet  -AF      Transfer Training OT LTG, [place] Level supervision required  -AF      Transfer Training OT LTG, Additional Goal with appropriate AD  -AF      Transfer Training OT LTG, Outcome goal ongoing  -AF      Safety Awareness OT LTG    Safety Awareness OT LTG, Date Established 02/18/17  -AF      Safety Awareness OT LTG, Time to Achieve by discharge  -AF      Safety Awareness OT LTG, Activity Type good safety awareness;with ADL's  -AF      Safety Awareness OT LTG, Additional Goal with transfers  -AF      Safety  Awareness OT LTG, Outcome goal ongoing  -AF      ADL OT LTG    ADL OT LTG, Date Established 02/18/17  -AF      ADL OT LTG, Time to Achieve by discharge  -AF      ADL OT LTG, Activity Type ADL skills  -AF      ADL OT LTG, [place] Level standby assist;min verbal cues  -AF      ADL OT LTG, Outcome goal ongoing  -AF        User Key  (r) = Recorded By, (t) = Taken By, (c) = Cosigned By    Initials Name Provider Type    AF Matilda Tran OTAMBER Occupational Therapist          Occupational Therapy Education     Title: PT OT SLP Therapies (Active)     Topic: Occupational Therapy (Resolved)     Point: ADL training (Resolved)    Description: Instruct learner(s) on proper safety adaptation and remediation techniques during self care or transfers.   Instruct in proper use of assistive devices.    Learning Progress Summary    Learner Readiness Method Response Comment Documented by Status   Patient Acceptance E VU pt. and wife edcuated on purpose of OT, safety with transfers and benefits of safe activity AF 02/18/17 1103 Done               Point: Precautions (Resolved)    Description: Instruct learner(s) on prescribed precautions during self-care and functional transfers.    Learning Progress Summary    Learner Readiness Method Response Comment Documented by Status   Patient Acceptance E VU pt. and wife edcuated on purpose of OT, safety with transfers and benefits of safe activity AF 02/18/17 1103 Done                      User Key     Initials Effective Dates Name Provider Type Discipline    AF 12/01/15 -  KAYLYNN Cruz Occupational Therapist OT                OT Recommendation and Plan  Anticipated Equipment Needs At Discharge: bathing equipment  Anticipated Discharge Disposition: home with assist  Planned Therapy Interventions: activity intolerance, ADL retraining, home exercise program, strengthening, transfer training  Therapy Frequency: 3-5 times/wk             Outcome Measures       02/19/17 1000 02/18/17 1105  02/18/17 1000    How much help from another person do you currently need...    Turning from your back to your side while in flat bed without using bedrails? 3  -MD  3  -CW    Moving from lying on back to sitting on the side of a flat bed without bedrails? 3  -MD  3  -CW    Moving to and from a bed to a chair (including a wheelchair)? 3  -MD  3  -CW    Standing up from a chair using your arms (e.g., wheelchair, bedside chair)? 3  -MD  3  -CW    Climbing 3-5 steps with a railing? 3  -MD  2  -CW    To walk in hospital room? 3  -MD  3  -CW    AM-PAC 6 Clicks Score 18  -MD  17  -CW    How much help from another is currently needed...    Putting on and taking off regular lower body clothing?  2  -AF     Bathing (including washing, rinsing, and drying)  2  -AF     Toileting (which includes using toilet bed pan or urinal)  3  -AF     Putting on and taking off regular upper body clothing  3  -AF     Taking care of personal grooming (such as brushing teeth)  3  -AF     Eating meals  3  -AF     Score  16  -AF     Functional Assessment    Outcome Measure Options AM-PAC 6 Clicks Daily Activity (OT);AM-PAC 6 Clicks Basic Mobility (PT)  -MD AM-PAC 6 Clicks Daily Activity (OT)  -AF AM-PAC 6 Clicks Basic Mobility (PT)  -CW      User Key  (r) = Recorded By, (t) = Taken By, (c) = Cosigned By    Initials Name Provider Type    AF Matilda Tran OTR Occupational Therapist    MD Pura Javier, PT Physical Therapist    CW Vaughn Amaya Physical Therapy Assistant           Time Calculation:          Time Calculation- OT       02/21/17 0937          Time Calculation- OT    OT Start Time 0900  -CC      OT Stop Time 0926  -CC      OT Time Calculation (min) 26 min  -CC        User Key  (r) = Recorded By, (t) = Taken By, (c) = Cosigned By    Initials Name Provider Type    KAYLYNN May Occupational Therapist          Therapy Charges for Today     Code Description Service Date Service Provider Modifiers Qty    [id number]  OT THER  PROC EA 15 MIN 2/21/2017 KAYLYNN Barrera GO 2               OT Discharge Summary  Anticipated Discharge Disposition: home with assist  Reason for Discharge: All goals achieved, Discharge from facility  Outcomes Achieved: Able to achieve all goals within established timeline  Discharge Destination: Home with assist    KAYLYNN Barrera  2/21/2017

## 2017-02-21 NOTE — DISCHARGE SUMMARY
[facility]  Physician Discharge Summary    Patient Identification:  Name: Sander White  Age: 61 y.o.  Sex: male  :  1955  MRN: [id number]    Admit date: 2/15/2017    Discharge date and time:  2017   5:19 PM      Admitting Physician: Kristina Billings MD     Discharge Physician: Dr. Billings    Disch chasitye Diagnoses:     - acute lateral STEMI  - s/p resuscitated cardiac arrest  - s/p emergent with .5/15 XIENCE -> 100% ramus  - LV dysfx - EF 35-40%, mild MR/TR  - acute resp failure -> Pulm  - suspected right aspiration pneumonia -> Pulm  - s/p anoxic encephalopathy -> Neuro      Discharged Condition: stable    Hospital Course: 61-year-old white male, was admitted to the hospital, after artery of the hospital cardiac arrest, with ventricular fibrillation requiring 3 defibrillation, with CPR continued for 45 minutes, and the emergency room patient was found to have lateral wall myocardial infarction    Patient was intubated, cooling  process was started, patient was taken to the emergency cardiac catheterization lab, where the patient underwent angioplasty and stent to the ramus branch of the circumflex artery without any function complications    Postoperatively patient was kept in ICU, had a neurological consultation obtained, once patient significantly improved patient was transferred to the regular    Patient had developed rash responded well to the change in medications and Benadryl.     Patient is being discharged to be followed up as an outpatient. A Brilinta assistance card was provided. He was instructed to return to ER via EMS for any recurrent symptoms, otherwise we will see him in office as scheduled. Advised to follow up with PCP 7-10 days. Activity restrictions, site care, importance of smoking cessation and compliance with all meds - specifically Brilinta and asprin were reviewed and all questions were answered. He voiced understanding and agreement    Consults:   KAROLYN  CONSULT TO PULMONOLOGY  IP CONSULT TO PULMONOLOGY  IP CONSULT TO INTERNAL MEDICINE  IP CONSULT TO NEUROLOGY  CARDIAC REHAB EVALUATION AND ENROLLMENT    Discharge Exam:  General: No acute distress   Skin: Warm and dry, no diaphoresis noted   EYES: PERTL   HEENT: external ear and nose normal; oral mucosa moist   Neck: Supple; no carotid bruits; no JVD   Heart: S1S2 regular rate and rhythm; no murmurs; no gallop or rub appreciated   Pulmonary: Respirations regular, unlabored at rest, bilateral breath sounds have good air entry throughout all lung fields; no crackles, rubs or wheezes auscultated.     GI: Soft, non-tender, non-distended, positive bowel sounds  No hepatosplenomegaly   Extremities: Bilateral lower extremities have no pre-tibial pitting edema; DP/PT pulses are palpable   Neurological: Alert and oriented x 3; no neuro deficits        LABS:    Results from last 7 days  Lab Units 02/21/17  0448   MAGNESIUM mg/dL 2.3       Results from last 7 days  Lab Units 02/21/17  0448   SODIUM mmol/L 140   POTASSIUM mmol/L 3.6   BUN mg/dL 21   CREATININE mg/dL 1.17   CALCIUM mg/dL 8.7         Results from last 7 days  Lab Units 02/21/17  0448 02/19/17  0531 02/18/17  0512   WBC 10*3/mm3 7.30 8.00 8.08   HEMOGLOBIN g/dL 13.5* 11.9* 11.5*   HEMATOCRIT % 39.4* 35.7* 34.8*   PLATELETS 10*3/mm3 226 163 139*     Disposition:  home    Discharge Medications:    Sander White   Home Medication Instructions [place]:[id number]    Printed on:02/21/17 0958   Medication Information                      aspirin 81 MG chewable tablet  Chew 1 tablet Daily.             carvedilol (COREG) 6.25 MG tablet  Take 1 tablet by mouth Every 12 (Twelve) Hours.             clindamycin (CLEOCIN) 300 MG capsule  Take 1 capsule by mouth Every 8 (Eight) Hours for 5 doses. Indications: Pneumonia             famotidine (PEPCID) 20 MG tablet  Take 1 tablet by mouth Daily.             fluticasone (FLONASE) 50 MCG/ACT nasal spray  2 sprays into each nostril  Daily for 30 days. Administer 2 sprays in each nostril for each dose.             lisinopril (ZESTRIL) 2.5 MG tablet  Take 1 tablet by mouth daily.             nitroglycerin (NITROSTAT) 0.4 MG SL tablet  1 under the tongue as needed for angina, may repeat q5mins for up three doses             Omega-3 Fatty Acids (OMEGA 3 PO)  Take 1 capsule by mouth daily.             pravastatin (PRAVACHOL) 10 MG tablet  Take 0.5 tablets by mouth Every Night.             Pyridoxine HCl (VITAMIN B-6 PO)  Take 1 tablet by mouth daily.             ticagrelor (BRILINTA) 90 MG tablet tablet  Take 1 tablet by mouth 2 (Two) Times a Day.             vitamin B-12 (CYANOCOBALAMIN) 1000 MCG tablet  Take 1,000 mcg by mouth daily.             vitamin C (ASCORBIC ACID) 500 MG tablet  Take 500 mg by mouth daily.                   Discharge Home Instructions:  1.   Routine post cardiac catheterization/PCI discharge home care instructions:     No submerging procedure site below water for 7-10 days.   No lifting objects greater than 1 lbs for 3 days.   If groin site used, avoid climbing several flights of stairs or sitting for longer than 2 hours at a time for the next 24 hours.    Monitor puncture site for bleeding and/or knots;. If bleeding should occur at the groin site: lie flat, apply pressure and return to the ER    You may apply a DRY Band-Aid over the puncture site if needed. Do not apply any lotions, salves or ointments to site.   No driving for 3 days.   Return to ER per EMS for recurrent symptoms.   No smoking.   Take all medications as prescribed.  May not return to work until cleared by cardiology    2.  Follow-up with Dr. Billings on March 16 at 3 PM at the [facility] office  3.  Follow-up with PCP 7-10 days for ongoing medical care.  Repeat LFTs and lipid panel recommended in 8-12 weeks since statin has been started  4.  Complete an additional 5 doses clindamycin    I reviewed the patient's new clinical results, discharge  treatments, medications and follow up with Dr Billings. I personally viewed and interpreted the patient's EKG/Telemetry data  Signed:  Kristina Billings MD  2/21/2017  9:51 AM      EMR Dragon/Transcription disclaimer:   Much of this encounter note is an electronic transcription/translation of spoken language to printed text. The electronic translation of spoken language may permit erroneous, or at times, nonsensical words or phrases to be inadvertently transcribed; Although I have reviewed the note for such errors, some may still exist.

## 2017-02-21 NOTE — PROGRESS NOTES
" DAILY PROGRESS NOTE    CHIEF COMPLAINT  DOING BETTER  NO SPECIFIC COMPLAINTS        HISTORY: A 61-year-old white male with history of hypertension, gastroesophageal reflux disease and osteoarthritis, who had been in good health until last night when he developed severe nausea and vomiting followed by chest pressure. He ended up in the [facility] ER and was found to have acute MI. He was taken to the cardiac catheterization lab and had a stent placed and is now on the ventilator and sedated. I am asked to follow the patient for medical problems.    PHYSICAL EXAMINATION: Blood pressure 105/71, pulse 66, temperature 97.8 °F (36.6 °C), temperature source Oral, resp. rate 18, height 71.75\" (182.2 cm), weight 225 lb (102 kg), SpO2 95 %.    GENERAL: AWAKE AND ALERT  LUNGS: CLEAR B  HEART: S1 and S2.  ABDOMEN: NT BS POS  EXTREMITIES: No edema.  NEUROLOGIC: INTACT    DIAGNOSTIC DATA:   Lab Results (last 24 hours)     Procedure Component Value Units Date/Time    Blood Culture [[id number]]  (Normal) Collected:  02/16/17 2007    Specimen:  Blood from Blood, Central Line Updated:  02/20/17 2102     Blood Culture No growth at 4 days     Blood Culture [[id number]]  (Normal) Collected:  02/16/17 2024    Specimen:  Blood from Arm, Left Updated:  02/20/17 2102     Blood Culture No growth at 4 days     BNP [[id number]]  (Normal) Collected:  02/21/17 0448    Specimen:  Blood Updated:  02/21/17 0602     proBNP 454.0 pg/mL     Narrative:       Among patients with dyspnea, NT-proBNP is highly sensitive for the detection of acute congestive heart failure. In addition NT-proBNP of <300 pg/ml effectively rules out acute congestive heart failure with 99% negative predictive value.    Basic Metabolic Panel [[id number]]  (Abnormal) Collected:  02/21/17 0448    Specimen:  Blood Updated:  02/21/17 0605     Glucose 135 (H) mg/dL      BUN 21 mg/dL      Creatinine 1.17 mg/dL      Sodium 140 mmol/L      Potassium 3.6 mmol/L      Chloride 103 " mmol/L      CO2 21.8 (L) mmol/L      Calcium 8.7 mg/dL      eGFR Non African Amer 64 mL/min/1.73      BUN/Creatinine Ratio 17.9      Anion Gap 15.2 mmol/L     Narrative:       GFR Normal >60  Chronic Kidney Disease <60  Kidney Failure <15    Magnesium [[id number]]  (Normal) Collected:  02/21/17 0448    Specimen:  Blood Updated:  02/21/17 0605     Magnesium 2.3 mg/dL     CBC (No Diff) [[id number]]  (Abnormal) Collected:  02/21/17 0448    Specimen:  Blood Updated:  02/21/17 0736     WBC 7.30 10*3/mm3      RBC 4.42 (L) 10*6/mm3      Hemoglobin 13.5 (L) g/dL      Hematocrit 39.4 (L) %      MCV 89.1 fL      MCH 30.5 pg      MCHC 34.3 g/dL      RDW 12.9 %      RDW-SD 41.1 fl      MPV 11.3 fL      Platelets 226 10*3/mm3         Lab Results (last 24 hours)     Procedure Component Value Units Date/Time    Blood Culture [[id number]]  (Normal) Collected:  02/16/17 2007    Specimen:  Blood from Blood, Central Line Updated:  02/20/17 2102     Blood Culture No growth at 4 days     Blood Culture [[id number]]  (Normal) Collected:  02/16/17 2024    Specimen:  Blood from Arm, Left Updated:  02/20/17 2102     Blood Culture No growth at 4 days     BNP [[id number]]  (Normal) Collected:  02/21/17 0448    Specimen:  Blood Updated:  02/21/17 0602     proBNP 454.0 pg/mL     Narrative:       Among patients with dyspnea, NT-proBNP is highly sensitive for the detection of acute congestive heart failure. In addition NT-proBNP of <300 pg/ml effectively rules out acute congestive heart failure with 99% negative predictive value.    Basic Metabolic Panel [[id number]]  (Abnormal) Collected:  02/21/17 0448    Specimen:  Blood Updated:  02/21/17 0605     Glucose 135 (H) mg/dL      BUN 21 mg/dL      Creatinine 1.17 mg/dL      Sodium 140 mmol/L      Potassium 3.6 mmol/L      Chloride 103 mmol/L      CO2 21.8 (L) mmol/L      Calcium 8.7 mg/dL      eGFR Non African Amer 64 mL/min/1.73      BUN/Creatinine Ratio 17.9      Anion Gap 15.2 mmol/L     Narrative:       GFR  Normal >60  Chronic Kidney Disease <60  Kidney Failure <15    Magnesium [[id number]]  (Normal) Collected:  02/21/17 0448    Specimen:  Blood Updated:  02/21/17 0605     Magnesium 2.3 mg/dL     CBC (No Diff) [[id number]]  (Abnormal) Collected:  02/21/17 0448    Specimen:  Blood Updated:  02/21/17 0736     WBC 7.30 10*3/mm3      RBC 4.42 (L) 10*6/mm3      Hemoglobin 13.5 (L) g/dL      Hematocrit 39.4 (L) %      MCV 89.1 fL      MCH 30.5 pg      MCHC 34.3 g/dL      RDW 12.9 %      RDW-SD 41.1 fl      MPV 11.3 fL      Platelets 226 10*3/mm3         Lab Results (last 24 hours)     Procedure Component Value Units Date/Time    Blood Culture [[id number]]  (Normal) Collected:  02/16/17 2007    Specimen:  Blood from Blood, Central Line Updated:  02/20/17 2102     Blood Culture No growth at 4 days     Blood Culture [[id number]]  (Normal) Collected:  02/16/17 2024    Specimen:  Blood from Arm, Left Updated:  02/20/17 2102     Blood Culture No growth at 4 days     BNP [[id number]]  (Normal) Collected:  02/21/17 0448    Specimen:  Blood Updated:  02/21/17 0602     proBNP 454.0 pg/mL     Narrative:       Among patients with dyspnea, NT-proBNP is highly sensitive for the detection of acute congestive heart failure. In addition NT-proBNP of <300 pg/ml effectively rules out acute congestive heart failure with 99% negative predictive value.    Basic Metabolic Panel [[id number]]  (Abnormal) Collected:  02/21/17 0448    Specimen:  Blood Updated:  02/21/17 0605     Glucose 135 (H) mg/dL      BUN 21 mg/dL      Creatinine 1.17 mg/dL      Sodium 140 mmol/L      Potassium 3.6 mmol/L      Chloride 103 mmol/L      CO2 21.8 (L) mmol/L      Calcium 8.7 mg/dL      eGFR Non African Amer 64 mL/min/1.73      BUN/Creatinine Ratio 17.9      Anion Gap 15.2 mmol/L     Narrative:       GFR Normal >60  Chronic Kidney Disease <60  Kidney Failure <15    Magnesium [[id number]]  (Normal) Collected:  02/21/17 0448    Specimen:  Blood Updated:  02/21/17 0605     Magnesium  2.3 mg/dL     CBC (No Diff) [[id number]]  (Abnormal) Collected:  02/21/17 0448    Specimen:  Blood Updated:  02/21/17 0736     WBC 7.30 10*3/mm3      RBC 4.42 (L) 10*6/mm3      Hemoglobin 13.5 (L) g/dL      Hematocrit 39.4 (L) %      MCV 89.1 fL      MCH 30.5 pg      MCHC 34.3 g/dL      RDW 12.9 %      RDW-SD 41.1 fl      MPV 11.3 fL      Platelets 226 10*3/mm3         Lab Results (last 24 hours)     Procedure Component Value Units Date/Time    Blood Culture [[id number]]  (Normal) Collected:  02/16/17 2007    Specimen:  Blood from Blood, Central Line Updated:  02/20/17 2102     Blood Culture No growth at 4 days     Blood Culture [[id number]]  (Normal) Collected:  02/16/17 2024    Specimen:  Blood from Arm, Left Updated:  02/20/17 2102     Blood Culture No growth at 4 days     BNP [[id number]]  (Normal) Collected:  02/21/17 0448    Specimen:  Blood Updated:  02/21/17 0602     proBNP 454.0 pg/mL     Narrative:       Among patients with dyspnea, NT-proBNP is highly sensitive for the detection of acute congestive heart failure. In addition NT-proBNP of <300 pg/ml effectively rules out acute congestive heart failure with 99% negative predictive value.    Basic Metabolic Panel [[id number]]  (Abnormal) Collected:  02/21/17 0448    Specimen:  Blood Updated:  02/21/17 0605     Glucose 135 (H) mg/dL      BUN 21 mg/dL      Creatinine 1.17 mg/dL      Sodium 140 mmol/L      Potassium 3.6 mmol/L      Chloride 103 mmol/L      CO2 21.8 (L) mmol/L      Calcium 8.7 mg/dL      eGFR Non African Amer 64 mL/min/1.73      BUN/Creatinine Ratio 17.9      Anion Gap 15.2 mmol/L     Narrative:       GFR Normal >60  Chronic Kidney Disease <60  Kidney Failure <15    Magnesium [[id number]]  (Normal) Collected:  02/21/17 0448    Specimen:  Blood Updated:  02/21/17 0605     Magnesium 2.3 mg/dL     CBC (No Diff) [[id number]]  (Abnormal) Collected:  02/21/17 0448    Specimen:  Blood Updated:  02/21/17 0736     WBC 7.30 10*3/mm3      RBC 4.42 (L) 10*6/mm3       Hemoglobin 13.5 (L) g/dL      Hematocrit 39.4 (L) %      MCV 89.1 fL      MCH 30.5 pg      MCHC 34.3 g/dL      RDW 12.9 %      RDW-SD 41.1 fl      MPV 11.3 fL      Platelets 226 10*3/mm3         Lab Results (last 24 hours)     Procedure Component Value Units Date/Time    Blood Culture [[id number]]  (Normal) Collected:  02/16/17 2007    Specimen:  Blood from Blood, Central Line Updated:  02/20/17 2102     Blood Culture No growth at 4 days     Blood Culture [[id number]]  (Normal) Collected:  02/16/17 2024    Specimen:  Blood from Arm, Left Updated:  02/20/17 2102     Blood Culture No growth at 4 days     BNP [[id number]]  (Normal) Collected:  02/21/17 0448    Specimen:  Blood Updated:  02/21/17 0602     proBNP 454.0 pg/mL     Narrative:       Among patients with dyspnea, NT-proBNP is highly sensitive for the detection of acute congestive heart failure. In addition NT-proBNP of <300 pg/ml effectively rules out acute congestive heart failure with 99% negative predictive value.    Basic Metabolic Panel [[id number]]  (Abnormal) Collected:  02/21/17 0448    Specimen:  Blood Updated:  02/21/17 0605     Glucose 135 (H) mg/dL      BUN 21 mg/dL      Creatinine 1.17 mg/dL      Sodium 140 mmol/L      Potassium 3.6 mmol/L      Chloride 103 mmol/L      CO2 21.8 (L) mmol/L      Calcium 8.7 mg/dL      eGFR Non African Amer 64 mL/min/1.73      BUN/Creatinine Ratio 17.9      Anion Gap 15.2 mmol/L     Narrative:       GFR Normal >60  Chronic Kidney Disease <60  Kidney Failure <15    Magnesium [[id number]]  (Normal) Collected:  02/21/17 0448    Specimen:  Blood Updated:  02/21/17 0605     Magnesium 2.3 mg/dL     CBC (No Diff) [[id number]]  (Abnormal) Collected:  02/21/17 0448    Specimen:  Blood Updated:  02/21/17 0736     WBC 7.30 10*3/mm3      RBC 4.42 (L) 10*6/mm3      Hemoglobin 13.5 (L) g/dL      Hematocrit 39.4 (L) %      MCV 89.1 fL      MCH 30.5 pg      MCHC 34.3 g/dL      RDW 12.9 %      RDW-SD 41.1 fl      MPV 11.3 fL      Platelets 226  10*3/mm3       White count 18.6, hemoglobin 15.2, platelets 282,000. BUN 24, creatinine 1.0, potassium 4.2, CO2 is 18.6. Blood sugar is 123 to 138. LDL is 128. Chest x-ray is no active disease. EKG shows sinus rhythm, prolonged QT interval, nonspecific ST-T wave changes. Initial EKG showed AFib/flutter.  Imaging Results (last 72 hours)     Procedure Component Value Units Date/Time    XR Abdomen KUB [[id number]] Collected:  02/15/17 0702     Updated:  02/15/17 0708    Narrative:       EMERGENCY SUPINE ABDOMEN     HISTORY: 61-year-old male for oral gastric tube placement     FINDINGS:  1. Tip of the oral gastric tube is in good position at the distal  stomach.     This report was finalized on 2/15/2017 7:05 AM by Dr. Aidan Blackwell MD.       XR Chest Post CVA Port [[id number]] Collected:  02/15/17 0848     Updated:  02/15/17 0855    Narrative:       XR CHEST POST CVA PORT-     Clinical: Central line placement     COMPARISON 02/15/2017 at 0431 hours, current examination 0828 hours     FINDINGS: There is been interval placement of a nasogastric tube tip is  subdiaphragmatic out of the field-of-view. Endotracheal tube unchanged  in position.        Right IJ catheter tip superimposes the projected area of the superior  vena cava, no pneumothorax.        Progression of the right lung opacity with now perihilar consolidation.  Pneumonia versus asymmetric pulmonary edema. There is however no  associated pleural effusion identified. Cardiomediastinal silhouette is  stable. Cardiac size upper limits of normal. The remainder is  unremarkable.     This report was finalized on 2/15/2017 8:52 AM by Dr. Mick Montalvo MD.       CT Head Without Contrast [[id number]] Collected:  02/15/17 0447     Updated:  02/16/17 0000    Narrative:       CT SCAN OF THE BRAIN WITHOUT CONTRAST.     TECHNIQUE: Multiple axial images of the brain were obtained from the  vertex to the base of the brain.     HISTORY:  Unresponsive, cardiac arrest.      COMPARISON:  No prior studies for comparison.     FINDINGS:   Midline structures are within normal limits, there is no hydrocephalus.  Gray-white matter differentiation is maintained.         Orbits are within normal limits. Mild mucosal disease of the paranasal  sinuses. Mastoid air cells are well aerated.             Impression:       No acute intracranial pathology.         This report was finalized on 2/15/2017 11:57 PM by Dr. Navin Thompson MD.       XR Chest 1 View [[id number]] Collected:  02/15/17 0444     Updated:  02/16/17 0000    Narrative:       X-RAY CHEST 1 VIEW.     HISTORY: Intubation.     COMPARISON: No prior studies for comparison.     FINDINGS:  Cardiomediastinal silhouette is within normal limits.         Diffuse opacity within the right hemithorax concerning for infiltrates,  no definite effusion.              Impression:       Extensive infiltrates in the right lung are suspected, clinical  correlation is recommended.         This report was finalized on 2/15/2017 11:57 PM by Dr. Navin Thompson MD.           Current Facility-Administered Medications:   •  acetaminophen (TYLENOL) tablet 650 mg, 650 mg, Oral, Q4H PRN, 650 mg at 02/17/17 2211 **OR** acetaminophen (TYLENOL) suppository 650 mg, 650 mg, Rectal, Q4H PRN, Charles Medrano MD  •  ALPRAZolam (XANAX) tablet 0.25 mg, 0.25 mg, Oral, Q6H PRN, Kristina Billings MD, 0.25 mg at 02/17/17 2050  •  aspirin chewable tablet 81 mg, 81 mg, Oral, Daily, Kristina Billings MD, 81 mg at 02/21/17 0804  •  carvedilol (COREG) tablet 6.25 mg, 6.25 mg, Oral, Q12H, Frida Parker APRN, 6.25 mg at 02/21/17 0804  •  clindamycin (CLEOCIN) capsule 300 mg, 300 mg, Oral, Q8H, Latrell Rivera MD, 300 mg at 02/21/17 0803  •  dextrose (D50W) solution 25 g, 25 g, Intravenous, Q15 Min PRN, Uli Resendiz MD  •  dextrose (GLUTOSE) oral gel 15 g, 15 g, Oral, Q15 Min PRN, Uli Resendiz MD  •  diphenhydrAMINE (BENADRYL) injection 25 mg, 25 mg, Intravenous, Q6H PRN, Frida ISLAS  Strain, APRN, 25 mg at 02/21/17 0643  •  glucagon (human recombinant) (GLUCAGEN DIAGNOSTIC) injection 1 mg, 1 mg, Subcutaneous, Q15 Min PRN, Uli Resendiz MD  •  HYDROcodone-acetaminophen (NORCO) 5-325 MG per tablet 1 tablet, 1 tablet, Oral, Q6H PRN, Kristina Billings MD, 1 tablet at 02/21/17 0804  •  Influenza Vac Subunit Quad (FLUCELVAX) injection 0.5 mL, 0.5 mL, Intramuscular, During Hospitalization, Uli Resendiz MD, 0.5 mL at 02/17/17 2101  •  lisinopril (PRINIVIL,ZESTRIL) tablet 2.5 mg, 2.5 mg, Oral, Nightly, ANGELICA Cedillo, 2.5 mg at 02/20/17 2010  •  morphine injection 3 mg, 3 mg, Intravenous, Q4H PRN, Kristina Billings MD, 3 mg at 02/19/17 0538  •  ondansetron (ZOFRAN) injection 4 mg, 4 mg, Intravenous, Q4H PRN, Charles Medrano MD, 4 mg at 02/19/17 0729  •  pantoprazole (PROTONIX) EC tablet 40 mg, 40 mg, Oral, Q AM, Tania Rivera MD, 40 mg at 02/21/17 0804  •  pravastatin (PRAVACHOL) tablet 5 mg, 5 mg, Oral, Nightly, ANGELICA Cedillo, 5 mg at 02/20/17 2010  •  sodium chloride 0.9 % flush 1-10 mL, 1-10 mL, Intravenous, PRN, Kristina Billings MD, 10 mL at 02/21/17 0804  •  sodium phosphate 12 mmol in sodium chloride 0.9 % 250 mL IVPB, 12 mmol, Intravenous, Once PRN, Charles Medrano MD  •  ticagrelor (BRILINTA) tablet 90 mg, 90 mg, Oral, BID, Kristina Billings MD, 90 mg at 02/21/17 0804     ASSESSMENT:   1. Status post acute myocardial infarction.  2. Status post cardiac catheterization with stent placement.  3. Respiratory failure. RESOLVED  4. S/P CARDIAC ARREST  5. Hypothermia. RESOLVED  6. History of hypertension.  7. History of gastroesophageal reflux disease.  8. Increased white count.IMPROVED    PLAN  1. STABLE  2. DIET AS TOLERATED  3. CHANGE MEDS TO PO  4. SUPPORTIVE CARE  5. PT/OT  6. OK TO DISCHARGE    TANIA RIVERA MD

## 2017-02-21 NOTE — PROGRESS NOTES
[facility] INPATIENT PROGRESS NOTE         [address]    2/21/2017      PATIENT IDENTIFICATION:   Name:  Sander White      MRN:  [id number]     61 y.o.  male             LOS 6    Reason for visit: Follow-up acute arrest with anoxic injury, Acute cardiomyopathy and aspiration pneumonia      SUBJECTIVE:     No further hemoptysis.  No current chest pain.        Objective   OBJECTIVE:    Vital Sign Min/Max for last 24 hours  Temp  Min: 97.5 °F (36.4 °C)  Max: 97.8 °F (36.6 °C)   BP  Min: 105/71  Max: 134/88   Pulse  Min: 65  Max: 84   Resp  Min: 18  Max: 18   No Data Recorded   No Data Recorded   No Data Recorded                   FiO2 (%): 30 %     Body mass index is 30.73 kg/(m^2).    Intake/Output Summary (Last 24 hours) at 02/21/17 1227  Last data filed at 02/20/17 1838   Gross per 24 hour   Intake    720 ml   Output      0 ml   Net    720 ml         Exam:  GEN:  No distress, appears stated age  EYES:   PERRL, anicteric sclera  ENT:    External ears/nose normal, OP clear  NECK:  No adenopathy, midline trachea  LUNGS: Normal chest on inspection, palpation and auscultation  CV:  Normal S1S2, without murmur  ABD:  Non tender, non distended, no hepatosplenomegaly, +BS  EXT:  No edema, cyanosis or clubbing    Scheduled meds:      aspirin 81 mg Oral Daily   carvedilol 6.25 mg Oral Q12H   clindamycin 300 mg Oral Q8H   lisinopril 2.5 mg Oral Nightly   pantoprazole 40 mg Oral Q AM   pravastatin 5 mg Oral Nightly   ticagrelor 90 mg Oral BID     IV meds:                         Data Review:    Results from last 7 days  Lab Units 02/21/17  0448 02/19/17  0531 02/18/17  0512 02/18/17  0109 02/17/17  0426   SODIUM mmol/L 140 139 140 142 141   POTASSIUM mmol/L 3.6 3.6 3.5 3.4* 3.5   CHLORIDE mmol/L 103 106 105 107 104   TOTAL CO2 mmol/L 21.8* 20.5* 25.8 23.4 23.0   BUN mg/dL 21 16 15 16 14   CREATININE mg/dL 1.17 0.99 1.03 1.07 0.91   GLUCOSE mg/dL 135* 102* 105* 115* 109*   CALCIUM mg/dL 8.7 8.5* 8.1*  8.1* 8.0*         Estimated Creatinine Clearance: 82.6 mL/min (by C-G formula based on Cr of 1.17).    Results from last 7 days  Lab Units 02/21/17  0448 02/19/17  0531 02/18/17  0512 02/17/17  0426 02/16/17  1751   WBC 10*3/mm3 7.30 8.00 8.08 10.47 16.89*   HEMOGLOBIN g/dL 13.5* 11.9* 11.5* 12.3* 13.5*   PLATELETS 10*3/mm3 226 163 139* 154 163       Results from last 7 days  Lab Units 02/18/17  0645 02/16/17  1751 02/16/17  1155 02/16/17  0615 02/16/17  0004   INR  1.10 1.05 1.02 0.99 1.00       Results from last 7 days  Lab Units 02/18/17  0109 02/15/17  0632 02/15/17  0339   ALT (SGPT) U/L 128* 414* 303*   AST (SGOT) U/L 104* 443* 256*     Microbiology reviewed: No growth to date            Assessment   ASSESSMENT:   Status post arrest  Acute hypoxemic respiratory failure: Improved now off ventilator  ST elevation MI status post stent  Acute cardiomyopathy with EF 36%  Metabolic acidosis: Resolved  Aspiration pneumonia present on admission  Acute kidney injury: Improved  Hypokalemia: Replacing      PLAN:    Suspect his hemoptysis is associated with being intubated and recent anticoagulation.  Improved. Encourage good pulmonary toilet.  Day 6 of 7 Abx.  Okay for discharge from my standpoint.    Chito Garduno MD  Pulmonary and Critical Care Medicine  [facility], [facility]  2/21/2017    12:27 PM

## 2017-02-27 ENCOUNTER — OFFICE VISIT (OUTPATIENT)
Dept: INTERNAL MEDICINE | Facility: CLINIC | Age: 62
End: 2017-02-27

## 2017-02-27 VITALS
SYSTOLIC BLOOD PRESSURE: 118 MMHG | DIASTOLIC BLOOD PRESSURE: 70 MMHG | BODY MASS INDEX: 28.17 KG/M2 | WEIGHT: 208 LBS | HEIGHT: 72 IN

## 2017-02-27 DIAGNOSIS — I21.21 ST ELEVATION MYOCARDIAL INFARCTION INVOLVING LEFT CIRCUMFLEX CORONARY ARTERY (HCC): Primary | ICD-10-CM

## 2017-02-27 DIAGNOSIS — I10 ESSENTIAL HYPERTENSION, BENIGN: Chronic | ICD-10-CM

## 2017-02-27 PROCEDURE — 99214 OFFICE O/P EST MOD 30 MIN: CPT

## 2017-02-27 NOTE — PROGRESS NOTES
"Elle White is a 61 y.o. male. Presnts today in follow up of recent Acute STEMI that occurred2/15/17 presented to [facility] and was found in full cardiac arrest/vent fib requiring defib x 3. He subseq had cardiac cath and stent placement. Condition was complicated by aspiration pneumonia, resp failure that required vent support. There was some transient \"hypoxic encephalopathy\". At present he is doing well. His only symptom is chest soreness. Meds were reviewed.    Hosp records from 2/15/17 to 2/21/17 reviewed.  Hypertension   This is a chronic problem. The current episode started more than 1 year ago. The problem is controlled. Associated symptoms include chest pain (chest wall). Pertinent negatives include no anxiety, blurred vision, headaches, palpitations, peripheral edema, PND or shortness of breath. There are no associated agents to hypertension. Risk factors for coronary artery disease include dyslipidemia and male gender. Past treatments include ACE inhibitors and beta blockers. The current treatment provides significant improvement. Hypertensive end-organ damage includes CAD/MI. There is no history of angina, kidney disease, CVA or heart failure.        The following portions of the patient's history were reviewed and updated as appropriate: allergies, current medications, past family history, past medical history, past social history, past surgical history and problem list.    Review of Systems   Constitutional: Negative for chills, fatigue, fever and unexpected weight change.   HENT: Negative for congestion, ear pain, postnasal drip, sinus pressure, sore throat and trouble swallowing.    Eyes: Negative for blurred vision and visual disturbance.   Respiratory: Negative for cough, chest tightness, shortness of breath and wheezing.    Cardiovascular: Positive for chest pain (chest wall). Negative for palpitations, leg swelling and PND.   Gastrointestinal: Negative for abdominal pain, blood in " stool, nausea and vomiting.   Endocrine: Negative for cold intolerance and heat intolerance.   Genitourinary: Negative for dysuria, frequency and urgency.   Musculoskeletal: Negative for arthralgias and joint swelling.   Skin: Negative for color change.   Allergic/Immunologic: Negative for immunocompromised state.   Neurological: Negative for syncope, weakness and headaches.   Hematological: Does not bruise/bleed easily.       Objective   Physical Exam   Constitutional: He is oriented to person, place, and time. He appears well-developed and well-nourished. No distress.   HENT:   Head: Normocephalic and atraumatic.   Right Ear: External ear normal.   Left Ear: External ear normal.   Eyes: Conjunctivae and EOM are normal. Pupils are equal, round, and reactive to light. No scleral icterus.   Neck: Normal range of motion. Neck supple. No thyromegaly present.   Cardiovascular: Normal rate, regular rhythm, normal heart sounds and intact distal pulses.  Exam reveals no gallop and no friction rub.    No murmur heard.  Pulmonary/Chest: Effort normal and breath sounds normal. No respiratory distress.   Lymphadenopathy:     He has no cervical adenopathy.   Neurological: He is alert and oriented to person, place, and time.   Skin: Skin is warm and dry. No rash noted. No erythema.   Psychiatric: He has a normal mood and affect. His behavior is normal.   Nursing note and vitals reviewed.      Assessment/Plan   Sander was seen today for hypertension and hospital follow up.    Diagnoses and all orders for this visit:    ST elevation myocardial infarction involving left circumflex coronary artery  Comments:  Recovering.    Essential hypertension, benign  Comments:  Stable    Patient is to continue on all meds, diet and activity.

## 2017-03-16 ENCOUNTER — OFFICE VISIT (OUTPATIENT)
Dept: CARDIOLOGY | Facility: CLINIC | Age: 62
End: 2017-03-16

## 2017-03-16 VITALS
SYSTOLIC BLOOD PRESSURE: 110 MMHG | BODY MASS INDEX: 29.12 KG/M2 | DIASTOLIC BLOOD PRESSURE: 84 MMHG | HEIGHT: 71 IN | WEIGHT: 208 LBS | HEART RATE: 83 BPM

## 2017-03-16 DIAGNOSIS — I25.10 CORONARY ARTERY DISEASE INVOLVING NATIVE CORONARY ARTERY OF NATIVE HEART WITHOUT ANGINA PECTORIS: ICD-10-CM

## 2017-03-16 DIAGNOSIS — E78.00 ELEVATED CHOLESTEROL: Primary | ICD-10-CM

## 2017-03-16 PROCEDURE — 99213 OFFICE O/P EST LOW 20 MIN: CPT | Performed by: INTERNAL MEDICINE

## 2017-03-16 RX ORDER — CLOPIDOGREL BISULFATE 75 MG/1
75 TABLET ORAL DAILY
Qty: 30 TABLET | Refills: 11 | Status: SHIPPED | OUTPATIENT
Start: 2017-03-16 | End: 2017-04-15

## 2017-03-16 NOTE — PROGRESS NOTES
Subjective:       Sander White is a 61 y.o. male who here for follow up    CC  Follow-up after the recent hospitalization  HPI  61-year-old male was admitted to the hospital with acute MI cardiac arrest respiratory insufficiency last month has remarkably done well, had angioplasty and stent still complains of chest soreness but no tightness in chest no heaviness with a pressure sensation     Problem List Items Addressed This Visit        Cardiovascular and Mediastinum    Elevated cholesterol - Primary      Other Visit Diagnoses     Coronary artery disease involving native coronary artery of native heart without angina pectoris        Relevant Medications    clopidogrel (PLAVIX) 75 MG tablet        Previous treatments/evaluations include: ASA. Cardiac risk factors: advanced age (older than 55 for men, 65 for women).    The following portions of the patient's history were reviewed and updated as appropriate: allergies, current medications, past family history, past medical history, past social history, past surgical history and problem list.    Past Medical History   Diagnosis Date   • Arthritis    • Chronic pain in right foot    • ED (erectile dysfunction) 7/15/2016   • GERD (gastroesophageal reflux disease)    • Hypertension    • Myocardial infarction     reports that he quit smoking about 18 years ago. His smoking use included Cigarettes. He has a 3.50 pack-year smoking history. He has never used smokeless tobacco. He reports that he drinks alcohol. He reports that he does not use illicit drugs.  Family History   Problem Relation Age of Onset   • Diabetes Mother      Type 2   • Diabetes Father      Type 2   • Stroke Father    • Aneurysm Brother      Brain       Review of Systems  Constitutional: No wt loss, fever, fatigue  Gastrointestinal: No nausea, abdominal pain  Behavioral/Psych: No insomnia or anxiety   Cardiovascular no chest pains or tightness in the chest  Objective:       Physical Exam              "Physical Exam  Visit Vitals   • /84   • Pulse 83   • Ht 71\" (180.3 cm)   • Wt 208 lb (94.3 kg)   • BMI 29.01 kg/m2       General appearance: NAD, conversant   Eyes: anicteric sclerae, moist conjunctivae; no lid-lag; PERRLA   HENT: Atraumatic; oropharynx clear with moist mucous membranes and no mucosal ulcerations;  normal hard and soft palate   Neck: Trachea midline; FROM, supple, no thyromegaly or lymphadenopathy   Lungs: CTA, with normal respiratory effort and no intercostal retractions   CV: S1-S2 regular, no murmurs, no rub, no gallop   Abdomen: Soft, non-tender; no masses or HSM   Extremities: No peripheral edema or extremity lymphadenopathy  Skin: Normal temperature, turgor and texture; no rash, ulcers or subcutaneous nodules   Psych: Appropriate affect, alert and oriented to person, place and time           Cardiographics  @Procedures    Echocardiogram:        Current Outpatient Prescriptions:   •  aspirin 81 MG chewable tablet, Chew 1 tablet Daily., Disp: , Rfl:   •  carvedilol (COREG) 6.25 MG tablet, Take 1 tablet by mouth Every 12 (Twelve) Hours., Disp: 60 tablet, Rfl: 2  •  famotidine (PEPCID) 20 MG tablet, Take 1 tablet by mouth Daily., Disp: 30 tablet, Rfl: 0  •  lisinopril (ZESTRIL) 2.5 MG tablet, Take 1 tablet by mouth daily., Disp: 90 tablet, Rfl: 3  •  nitroglycerin (NITROSTAT) 0.4 MG SL tablet, 1 under the tongue as needed for angina, may repeat q5mins for up three doses, Disp: 25 tablet, Rfl: 0  •  Omega-3 Fatty Acids (OMEGA 3 PO), Take 1 capsule by mouth daily., Disp: , Rfl:   •  pravastatin (PRAVACHOL) 10 MG tablet, Take 0.5 tablets by mouth Every Night., Disp: 30 tablet, Rfl: 2  •  Pyridoxine HCl (VITAMIN B-6 PO), Take 1 tablet by mouth daily., Disp: , Rfl:   •  ticagrelor (BRILINTA) 90 MG tablet tablet, Take 1 tablet by mouth 2 (Two) Times a Day., Disp: 60 tablet, Rfl: 11  •  vitamin B-12 (CYANOCOBALAMIN) 1000 MCG tablet, Take 1,000 mcg by mouth daily., Disp: , Rfl:   •  vitamin C " (ASCORBIC ACID) 500 MG tablet, Take 500 mg by mouth daily., Disp: , Rfl:    Assessment:        Patient Active Problem List   Diagnosis   • Airway hyperreactivity   • Elevated cholesterol   • Gain of weight   • ED (erectile dysfunction)   • ST elevation myocardial infarction (STEMI)     acute lateral STEMI  - s/p resuscitated cardiac arrest  - s/p emergent with 2.5/15 XIENCE -> 100% ramus  - LV dysfx - EF 35-40%, mild MR/TR  - acute resp failure -> Pulm  - suspected right aspiration pneumonia -> Pulm  - s/p anoxic encephalopathy -> Neuro             Plan:            ICD-10-CM ICD-9-CM   1. Elevated cholesterol E78.00 272.0   2. Coronary artery disease involving native coronary artery of native heart without angina pectoris I25.10 414.01     61-year-old male with recent hospitalization for the acute myocardial infarction will need the further evaluation before 1 to work  ETT/ ECHO    FLP/ CMP  Pt post PCI/STENT    Vascular site has been examined has no complications, patient has been advised to call us if there is significant pain with the swelling at the site of entry for the procedure    Sander White has been advised to look for signs of stent thrombosis and stent restenosis with chest pain, sob     Cardiac risk reduction for reducing progression of disease has been explained    Importance of taking dual antiplatelets for one year  has been explained, risk of stent thrombosis leading to the acute MI, which carries high morbidity and mortality has been explained    Discontinuation or interruptions of these medications should be under the strict guidance of appropriate health professional    SEE US 1 MONTH    NO WORK AT THIS STAGE    OK DENTAL CLEANING  COUNSELING:    Sander Moore was given to patient for the following topics: diagnostic results, risk factor reductions, impressions, risks and benefits of treatment options and importance of treatment compliance .       SMOKING COUNSELING:    Counseling  given: Not Answered      EMR Dragon/Transcription disclaimer:   Much of this encounter note is an electronic transcription/translation of spoken language to printed text. The electronic translation of spoken language may permit erroneous, or at times, nonsensical words or phrases to be inadvertently transcribed; Although I have reviewed the note for such errors, some may still exist.

## 2017-03-20 DIAGNOSIS — R06.02 SOB (SHORTNESS OF BREATH): ICD-10-CM

## 2017-03-20 DIAGNOSIS — I21.21 ST ELEVATION MYOCARDIAL INFARCTION INVOLVING LEFT CIRCUMFLEX CORONARY ARTERY (HCC): Primary | ICD-10-CM

## 2017-03-20 DIAGNOSIS — J45.20 MILD INTERMITTENT ASTHMA WITHOUT COMPLICATION: ICD-10-CM

## 2017-03-22 DIAGNOSIS — E78.00 ELEVATED CHOLESTEROL: Primary | ICD-10-CM

## 2017-03-22 DIAGNOSIS — I21.21 ST ELEVATION MYOCARDIAL INFARCTION INVOLVING LEFT CIRCUMFLEX CORONARY ARTERY (HCC): ICD-10-CM

## 2017-04-04 ENCOUNTER — OFFICE VISIT (OUTPATIENT)
Dept: INTERNAL MEDICINE | Facility: CLINIC | Age: 62
End: 2017-04-04

## 2017-04-04 VITALS
WEIGHT: 218 LBS | HEART RATE: 82 BPM | DIASTOLIC BLOOD PRESSURE: 80 MMHG | BODY MASS INDEX: 30.52 KG/M2 | HEIGHT: 71 IN | SYSTOLIC BLOOD PRESSURE: 118 MMHG | OXYGEN SATURATION: 97 %

## 2017-04-04 DIAGNOSIS — I21.21 ST ELEVATION MYOCARDIAL INFARCTION INVOLVING LEFT CIRCUMFLEX CORONARY ARTERY (HCC): ICD-10-CM

## 2017-04-04 DIAGNOSIS — N48.30 PAINFUL ERECTION: Primary | ICD-10-CM

## 2017-04-04 LAB
BILIRUB UR QL STRIP: NEGATIVE
CLARITY UR: CLEAR
COLOR UR: YELLOW
GLUCOSE UR STRIP-MCNC: NEGATIVE MG/DL
HGB UR QL STRIP.AUTO: NEGATIVE
KETONES UR QL STRIP: NEGATIVE
LEUKOCYTE ESTERASE UR QL STRIP.AUTO: NEGATIVE
NITRITE UR QL STRIP: NEGATIVE
PH UR STRIP.AUTO: <=5 [PH] (ref 5–8)
PROT UR QL STRIP: NEGATIVE
SP GR UR STRIP: 1.02 (ref 1–1.03)
UROBILINOGEN UR QL STRIP: NORMAL

## 2017-04-04 PROCEDURE — 81003 URINALYSIS AUTO W/O SCOPE: CPT | Performed by: NURSE PRACTITIONER

## 2017-04-04 PROCEDURE — 99213 OFFICE O/P EST LOW 20 MIN: CPT | Performed by: NURSE PRACTITIONER

## 2017-04-04 RX ORDER — MULTIVIT WITH MINERALS/LUTEIN
1000 TABLET ORAL DAILY
COMMUNITY
End: 2020-04-10

## 2017-04-05 NOTE — PROGRESS NOTES
Subjective   Sander White is a 61 y.o. male who presents due to pain with an erection.    HPI Comments: He was hospitalized 2/15 after an acute MI where CPR was performed for 45 minutes by EMS and debrillation performed 3 times. He is feeling well (c/o mild shortness of breath, scheduled for echo 4/20) but c/o severe pain with onset of erection. Pain persists during intercourse and resolves with orgasm. He denies irritation of the penis, no change in urinary stream or discomfort.    Erectile Dysfunction   This is a new problem. The current episode started more than 1 month ago (6 weeks ago, since discharge from hospital). The problem is unchanged. Nature of difficulty: pain with an erection. Irritative symptoms do not include frequency, nocturia or urgency. Obstructive symptoms do not include dribbling, an intermittent stream or a slower stream. Pertinent negatives include no chills, dysuria or hematuria.        The following portions of the patient's history were reviewed and updated as appropriate: allergies, current medications, past social history and problem list.    Past Medical History:   Diagnosis Date   • Arthritis    • Chronic pain in right foot    • ED (erectile dysfunction) 7/15/2016   • GERD (gastroesophageal reflux disease)    • Hypertension    • Myocardial infarction          Current Outpatient Prescriptions:   •  aspirin 81 MG chewable tablet, Chew 1 tablet Daily., Disp: , Rfl:   •  carvedilol (COREG) 6.25 MG tablet, Take 1 tablet by mouth Every 12 (Twelve) Hours., Disp: 60 tablet, Rfl: 2  •  clopidogrel (PLAVIX) 75 MG tablet, Take 1 tablet by mouth Daily for 30 days., Disp: 30 tablet, Rfl: 11  •  famotidine (PEPCID) 20 MG tablet, Take 1 tablet by mouth Daily., Disp: 30 tablet, Rfl: 0  •  lisinopril (ZESTRIL) 2.5 MG tablet, Take 1 tablet by mouth daily., Disp: 90 tablet, Rfl: 3  •  nitroglycerin (NITROSTAT) 0.4 MG SL tablet, 1 under the tongue as needed for angina, may repeat q5mins for up three  "doses, Disp: 25 tablet, Rfl: 0  •  Omega-3 Fatty Acids (OMEGA 3 PO), Take 1 capsule by mouth daily., Disp: , Rfl:   •  pravastatin (PRAVACHOL) 10 MG tablet, Take 0.5 tablets by mouth Every Night., Disp: 30 tablet, Rfl: 2  •  Pyridoxine HCl (VITAMIN B-6 PO), Take 1 tablet by mouth daily., Disp: , Rfl:   •  vitamin B-12 (CYANOCOBALAMIN) 1000 MCG tablet, Take 1,000 mcg by mouth daily., Disp: , Rfl:   •  vitamin C (ASCORBIC ACID) 500 MG tablet, Take 500 mg by mouth daily., Disp: , Rfl:   •  vitamin E 1000 UNIT capsule, Take 1,000 Units by mouth Daily. Taking 400 mg daily, Disp: , Rfl:     Allergies   Allergen Reactions   • Amoxicillin Itching   • Penicillins        Review of Systems   Constitutional: Negative for chills, fatigue, fever and unexpected weight change.   HENT: Negative for congestion, ear pain, postnasal drip, sinus pressure, sore throat and trouble swallowing.    Eyes: Negative for visual disturbance.   Respiratory: Positive for shortness of breath. Negative for cough, chest tightness and wheezing.    Cardiovascular: Negative for chest pain, palpitations and leg swelling.   Gastrointestinal: Negative for abdominal pain, blood in stool, nausea and vomiting.   Endocrine: Negative for cold intolerance and heat intolerance.   Genitourinary: Positive for penile pain. Negative for decreased urine volume, discharge, dysuria, frequency, hematuria, nocturia, penile swelling, scrotal swelling, testicular pain and urgency.   Musculoskeletal: Negative for arthralgias and joint swelling.   Skin: Negative for color change.   Allergic/Immunologic: Negative for immunocompromised state.   Neurological: Negative for syncope, weakness and headaches.   Hematological: Does not bruise/bleed easily.       Objective   Vitals:    04/04/17 1250   BP: 118/80   BP Location: Left arm   Patient Position: Sitting   Cuff Size: Adult   Pulse: 82   SpO2: 97%   Weight: 218 lb (98.9 kg)   Height: 71\" (180.3 cm)     Physical Exam "   Constitutional: He is oriented to person, place, and time. He appears well-developed and well-nourished. No distress.   HENT:   Head: Normocephalic and atraumatic.   Right Ear: External ear normal.   Left Ear: External ear normal.   Eyes: Conjunctivae and EOM are normal. Pupils are equal, round, and reactive to light. No scleral icterus.   Neck: Normal range of motion. Neck supple. No thyromegaly present.   Cardiovascular: Normal rate, regular rhythm, normal heart sounds and intact distal pulses.  Exam reveals no gallop and no friction rub.    No murmur heard.  Pulmonary/Chest: Effort normal and breath sounds normal. No respiratory distress.   Genitourinary: Testes normal and penis normal. No penile tenderness.   Genitourinary Comments: No rashes or localized irritation   Lymphadenopathy:     He has no cervical adenopathy.   Neurological: He is alert and oriented to person, place, and time.   Skin: Skin is warm and dry. No rash noted. No erythema.   Psychiatric: He has a normal mood and affect. His behavior is normal.   Nursing note and vitals reviewed.      Assessment/Plan   Sander was seen today for personal issues.    Diagnoses and all orders for this visit:    Painful erection  Comments:  no evidence of infection (UA nl), due to trauma/catherization during hospital stay? will refer to urology for further evaluation  Orders:  -     Urinalysis With / Microscopic If Indicated  -     Ambulatory Referral to Urology    ST elevation myocardial infarction involving left circumflex coronary artery  Comments:  c/o mild shortness of breath with recent weight gain, he will continue to monitor & f/u with cardiology (scheduled for echo 4/20)

## 2017-04-14 ENCOUNTER — HOSPITAL ENCOUNTER (OUTPATIENT)
Dept: CARDIOLOGY | Facility: HOSPITAL | Age: 62
Discharge: HOME | End: 2017-04-14

## 2017-04-14 LAB
ALBUMIN SERPL-MCNC: 4.6 G/DL (ref 3.5–5.2)
ALBUMIN/GLOB SERPL: 1.6 G/DL
ALP SERPL-CCNC: 82 U/L (ref 39–117)
ALT SERPL W P-5'-P-CCNC: 34 U/L (ref 1–41)
ANION GAP SERPL CALCULATED.3IONS-SCNC: 12.4 MMOL/L
AST SERPL-CCNC: 29 U/L (ref 1–40)
BILIRUB SERPL-MCNC: 0.5 MG/DL (ref 0.1–1.2)
BUN BLD-MCNC: 16 MG/DL (ref 8–23)
BUN/CREAT SERPL: 13.1 (ref 7–25)
CALCIUM SPEC-SCNC: 9.8 MG/DL (ref 8.6–10.5)
CHLORIDE SERPL-SCNC: 103 MMOL/L (ref 98–107)
CHOLEST SERPL-MCNC: 219 MG/DL (ref 0–200)
CO2 SERPL-SCNC: 25.6 MMOL/L (ref 22–29)
CREAT BLD-MCNC: 1.22 MG/DL (ref 0.76–1.27)
GFR SERPL CREATININE-BSD FRML MDRD: 61 ML/MIN/1.73
GLOBULIN UR ELPH-MCNC: 2.9 GM/DL
GLUCOSE BLD-MCNC: 102 MG/DL (ref 65–99)
HDLC SERPL-MCNC: 53 MG/DL (ref 40–60)
LDLC SERPL CALC-MCNC: 139 MG/DL (ref 0–100)
LDLC/HDLC SERPL: 2.63 {RATIO}
POTASSIUM BLD-SCNC: 4.9 MMOL/L (ref 3.5–5.2)
PROT SERPL-MCNC: 7.5 G/DL (ref 6–8.5)
SODIUM BLD-SCNC: 141 MMOL/L (ref 136–145)
TRIGL SERPL-MCNC: 134 MG/DL (ref 0–150)
VLDLC SERPL-MCNC: 26.8 MG/DL (ref 5–40)

## 2017-04-14 PROCEDURE — 80061 LIPID PANEL: CPT | Performed by: INTERNAL MEDICINE

## 2017-04-14 PROCEDURE — 36415 COLL VENOUS BLD VENIPUNCTURE: CPT | Performed by: INTERNAL MEDICINE

## 2017-04-14 PROCEDURE — 80053 COMPREHEN METABOLIC PANEL: CPT | Performed by: INTERNAL MEDICINE

## 2017-04-20 ENCOUNTER — HOSPITAL ENCOUNTER (OUTPATIENT)
Dept: CARDIOLOGY | Facility: HOSPITAL | Age: 62
Discharge: HOME | End: 2017-04-20
Attending: INTERNAL MEDICINE | Admitting: INTERNAL MEDICINE

## 2017-04-20 ENCOUNTER — HOSPITAL ENCOUNTER (OUTPATIENT)
Dept: CARDIOLOGY | Facility: HOSPITAL | Age: 62
Discharge: HOME | End: 2017-04-20
Attending: INTERNAL MEDICINE

## 2017-04-20 ENCOUNTER — OFFICE VISIT (OUTPATIENT)
Dept: CARDIOLOGY | Facility: CLINIC | Age: 62
End: 2017-04-20

## 2017-04-20 VITALS
BODY MASS INDEX: 30.69 KG/M2 | SYSTOLIC BLOOD PRESSURE: 130 MMHG | HEART RATE: 74 BPM | HEIGHT: 71 IN | DIASTOLIC BLOOD PRESSURE: 92 MMHG | WEIGHT: 219.2 LBS

## 2017-04-20 VITALS
SYSTOLIC BLOOD PRESSURE: 108 MMHG | DIASTOLIC BLOOD PRESSURE: 72 MMHG | RESPIRATION RATE: 16 BRPM | HEART RATE: 82 BPM | BODY MASS INDEX: 29.73 KG/M2 | OXYGEN SATURATION: 97 % | HEIGHT: 72 IN

## 2017-04-20 VITALS
HEART RATE: 74 BPM | SYSTOLIC BLOOD PRESSURE: 130 MMHG | WEIGHT: 219 LBS | HEIGHT: 71 IN | BODY MASS INDEX: 30.66 KG/M2 | DIASTOLIC BLOOD PRESSURE: 92 MMHG

## 2017-04-20 DIAGNOSIS — J45.20 MILD INTERMITTENT ASTHMA WITHOUT COMPLICATION: ICD-10-CM

## 2017-04-20 DIAGNOSIS — R06.02 SOB (SHORTNESS OF BREATH): ICD-10-CM

## 2017-04-20 DIAGNOSIS — I21.21 ST ELEVATION MYOCARDIAL INFARCTION INVOLVING LEFT CIRCUMFLEX CORONARY ARTERY (HCC): ICD-10-CM

## 2017-04-20 DIAGNOSIS — I10 ESSENTIAL HYPERTENSION: ICD-10-CM

## 2017-04-20 DIAGNOSIS — E78.00 ELEVATED CHOLESTEROL: Primary | ICD-10-CM

## 2017-04-20 DIAGNOSIS — I25.10 CORONARY ARTERY DISEASE INVOLVING NATIVE CORONARY ARTERY OF NATIVE HEART WITHOUT ANGINA PECTORIS: ICD-10-CM

## 2017-04-20 LAB
AORTIC DIMENSIONLESS INDEX: 0.9 CM2
BH CV ECHO MEAS - ACS: 1.9 CM
BH CV ECHO MEAS - AO MAX PG (FULL): 1 MMHG
BH CV ECHO MEAS - AO MAX PG: 3 MMHG
BH CV ECHO MEAS - AO MEAN PG (FULL): 1 MMHG
BH CV ECHO MEAS - AO MEAN PG: 2 MMHG
BH CV ECHO MEAS - AO ROOT AREA (BSA CORRECTED): 1.5
BH CV ECHO MEAS - AO ROOT AREA: 8 CM^2
BH CV ECHO MEAS - AO ROOT DIAM: 3.2 CM
BH CV ECHO MEAS - AO V2 MAX: 86.9 CM/SEC
BH CV ECHO MEAS - AO V2 MEAN: 65.4 CM/SEC
BH CV ECHO MEAS - AO V2 VTI: 18.5 CM
BH CV ECHO MEAS - AVA(I,A): 3.3 CM^2
BH CV ECHO MEAS - AVA(I,D): 3.3 CM^2
BH CV ECHO MEAS - AVA(V,A): 3.1 CM^2
BH CV ECHO MEAS - AVA(V,D): 3.1 CM^2
BH CV ECHO MEAS - BSA(HAYCOCK): 2.3 M^2
BH CV ECHO MEAS - BSA: 2.2 M^2
BH CV ECHO MEAS - BZI_BMI: 30.6 KILOGRAMS/M^2
BH CV ECHO MEAS - BZI_DIASTOLIC_PRESSURE: 92 MMHG
BH CV ECHO MEAS - BZI_HEART_RATE: 74 BPM
BH CV ECHO MEAS - BZI_METRIC_HEIGHT: 180.3 CM
BH CV ECHO MEAS - BZI_METRIC_WEIGHT: 99.4 KG
BH CV ECHO MEAS - BZI_SYSTOLIC_PRESSURE: 130 MMHG
BH CV ECHO MEAS - CONTRAST EF 4CH: 52.9 ML/M^2
BH CV ECHO MEAS - EDV(CUBED): 97.3 ML
BH CV ECHO MEAS - EDV(MOD-SP4): 70 ML
BH CV ECHO MEAS - EDV(TEICH): 97.3 ML
BH CV ECHO MEAS - EF(CUBED): 69.1 %
BH CV ECHO MEAS - EF(MOD-SP4): 52.9 %
BH CV ECHO MEAS - EF(TEICH): 60.7 %
BH CV ECHO MEAS - ESV(CUBED): 30.1 ML
BH CV ECHO MEAS - ESV(MOD-SP4): 33 ML
BH CV ECHO MEAS - ESV(TEICH): 38.2 ML
BH CV ECHO MEAS - FS: 32.4 %
BH CV ECHO MEAS - IVS/LVPW: 0.91
BH CV ECHO MEAS - IVSD: 1 CM
BH CV ECHO MEAS - LA DIMENSION: 3 CM
BH CV ECHO MEAS - LA/AO: 0.94
BH CV ECHO MEAS - LAT PEAK E' VEL: 7 CM/SEC
BH CV ECHO MEAS - LV DIASTOLIC VOL/BSA (35-75): 31.9 ML/M^2
BH CV ECHO MEAS - LV MASS(C)D: 169.9 GRAMS
BH CV ECHO MEAS - LV MASS(C)DI: 77.5 GRAMS/M^2
BH CV ECHO MEAS - LV MAX PG: 2 MMHG
BH CV ECHO MEAS - LV MEAN PG: 1 MMHG
BH CV ECHO MEAS - LV SYSTOLIC VOL/BSA (12-30): 15.1 ML/M^2
BH CV ECHO MEAS - LV V1 MAX: 70.7 CM/SEC
BH CV ECHO MEAS - LV V1 MEAN: 54.8 CM/SEC
BH CV ECHO MEAS - LV V1 VTI: 16 CM
BH CV ECHO MEAS - LVIDD: 4.6 CM
BH CV ECHO MEAS - LVIDS: 3.1 CM
BH CV ECHO MEAS - LVLD AP4: 8 CM
BH CV ECHO MEAS - LVLS AP4: 7 CM
BH CV ECHO MEAS - LVOT AREA (M): 3.8 CM^2
BH CV ECHO MEAS - LVOT AREA: 3.8 CM^2
BH CV ECHO MEAS - LVOT DIAM: 2.2 CM
BH CV ECHO MEAS - LVPWD: 1.1 CM
BH CV ECHO MEAS - MED PEAK E' VEL: 6 CM/SEC
BH CV ECHO MEAS - MV A DUR: 0.11 SEC
BH CV ECHO MEAS - MV A MAX VEL: 57.8 CM/SEC
BH CV ECHO MEAS - MV DEC SLOPE: 244 CM/SEC^2
BH CV ECHO MEAS - MV DEC TIME: 0.22 SEC
BH CV ECHO MEAS - MV E MAX VEL: 48.4 CM/SEC
BH CV ECHO MEAS - MV E/A: 0.84
BH CV ECHO MEAS - MV MAX PG: 1.4 MMHG
BH CV ECHO MEAS - MV MEAN PG: 0 MMHG
BH CV ECHO MEAS - MV P1/2T MAX VEL: 58.7 CM/SEC
BH CV ECHO MEAS - MV P1/2T: 70.5 MSEC
BH CV ECHO MEAS - MV V2 MAX: 59.4 CM/SEC
BH CV ECHO MEAS - MV V2 MEAN: 25 CM/SEC
BH CV ECHO MEAS - MV V2 VTI: 13.5 CM
BH CV ECHO MEAS - MVA P1/2T LCG: 3.7 CM^2
BH CV ECHO MEAS - MVA(P1/2T): 3.1 CM^2
BH CV ECHO MEAS - MVA(VTI): 4.5 CM^2
BH CV ECHO MEAS - PA MAX PG: 2.9 MMHG
BH CV ECHO MEAS - PA MEAN PG: 1 MMHG
BH CV ECHO MEAS - PA V2 MAX: 82.2 CM/SEC
BH CV ECHO MEAS - PA V2 MEAN: 44.8 CM/SEC
BH CV ECHO MEAS - PA V2 VTI: 13.8 CM
BH CV ECHO MEAS - PULM A REVS DUR: 0.1 SEC
BH CV ECHO MEAS - PULM A REVS VEL: 34.2 CM/SEC
BH CV ECHO MEAS - PULM DIAS VEL: 41.6 CM/SEC
BH CV ECHO MEAS - PULM S/D: 1.4
BH CV ECHO MEAS - PULM SYS VEL: 58.5 CM/SEC
BH CV ECHO MEAS - RAP SYSTOLE: 10 MMHG
BH CV ECHO MEAS - RVSP: 23.7 MMHG
BH CV ECHO MEAS - SI(AO): 67.9 ML/M^2
BH CV ECHO MEAS - SI(CUBED): 30.7 ML/M^2
BH CV ECHO MEAS - SI(LVOT): 27.7 ML/M^2
BH CV ECHO MEAS - SI(MOD-SP4): 16.9 ML/M^2
BH CV ECHO MEAS - SI(TEICH): 27 ML/M^2
BH CV ECHO MEAS - SV(AO): 148.8 ML
BH CV ECHO MEAS - SV(CUBED): 67.3 ML
BH CV ECHO MEAS - SV(LVOT): 60.8 ML
BH CV ECHO MEAS - SV(MOD-SP4): 37 ML
BH CV ECHO MEAS - SV(TEICH): 59.1 ML
BH CV ECHO MEAS - TAPSE (>1.6): 1.8 CM2
BH CV ECHO MEAS - TR MAX VEL: 185 CM/SEC
BH CV STRESS BP STAGE 1: NORMAL
BH CV STRESS BP STAGE 2: NORMAL
BH CV STRESS BP STAGE 3: NORMAL
BH CV STRESS DURATION MIN STAGE 1: 3
BH CV STRESS DURATION MIN STAGE 2: 3
BH CV STRESS DURATION MIN STAGE 3: 1
BH CV STRESS DURATION SEC STAGE 1: 0
BH CV STRESS DURATION SEC STAGE 2: 0
BH CV STRESS DURATION SEC STAGE 3: 12
BH CV STRESS GRADE STAGE 1: 10
BH CV STRESS GRADE STAGE 2: 12
BH CV STRESS GRADE STAGE 3: 14
BH CV STRESS HR STAGE 1: 102
BH CV STRESS HR STAGE 2: 125
BH CV STRESS HR STAGE 3: 139
BH CV STRESS METS STAGE 1: 4.6
BH CV STRESS METS STAGE 2: 7
BH CV STRESS METS STAGE 3: 8.8
BH CV STRESS PROTOCOL 1: NORMAL
BH CV STRESS RECOVERY BP: NORMAL MMHG
BH CV STRESS RECOVERY HR: 85 BPM
BH CV STRESS RECOVERY O2: 98 %
BH CV STRESS SPEED STAGE 1: 1.7
BH CV STRESS SPEED STAGE 2: 2.5
BH CV STRESS SPEED STAGE 3: 3.4
BH CV STRESS STAGE 1: 1
BH CV STRESS STAGE 2: 2
BH CV STRESS STAGE 3: 3
BH CV XLRA - RV BASE: 3.2 CM
BH CV XLRA - RV LENGTH: 7.3 CM
BH CV XLRA - RV MID: 2.9 CM
E/E' RATIO: 8
LEFT ATRIUM VOLUME INDEX: 11 ML/M2
MAXIMAL PREDICTED HEART RATE: 160 BPM
PERCENT MAX PREDICTED HR: 86.88 %
STRESS BASELINE BP: NORMAL MMHG
STRESS BASELINE HR: 84 BPM
STRESS O2 SAT REST: 98 %
STRESS PERCENT HR: 102 %
STRESS POST ESTIMATED WORKLOAD: 8.8 METS
STRESS POST EXERCISE DUR MIN: 7 MIN
STRESS POST EXERCISE DUR SEC: 12 SEC
STRESS POST O2 SAT PEAK: 98 %
STRESS POST PEAK BP: NORMAL MMHG
STRESS POST PEAK HR: 139 BPM
STRESS TARGET HR: 136 BPM

## 2017-04-20 PROCEDURE — 99213 OFFICE O/P EST LOW 20 MIN: CPT | Performed by: INTERNAL MEDICINE

## 2017-04-20 PROCEDURE — 93018 CV STRESS TEST I&R ONLY: CPT | Performed by: INTERNAL MEDICINE

## 2017-04-20 PROCEDURE — 93017 CV STRESS TEST TRACING ONLY: CPT

## 2017-04-20 PROCEDURE — 93306 TTE W/DOPPLER COMPLETE: CPT | Performed by: INTERNAL MEDICINE

## 2017-04-20 PROCEDURE — 93016 CV STRESS TEST SUPVJ ONLY: CPT | Performed by: INTERNAL MEDICINE

## 2017-04-20 PROCEDURE — 93000 ELECTROCARDIOGRAM COMPLETE: CPT | Performed by: INTERNAL MEDICINE

## 2017-04-20 PROCEDURE — 93306 TTE W/DOPPLER COMPLETE: CPT

## 2017-04-20 RX ORDER — CLOPIDOGREL BISULFATE 75 MG/1
75 TABLET ORAL DAILY
COMMUNITY
End: 2018-03-05 | Stop reason: ALTCHOICE

## 2017-04-20 RX ORDER — PRAVASTATIN SODIUM 10 MG
5 TABLET ORAL NIGHTLY
Qty: 90 TABLET | Refills: 3 | Status: SHIPPED | OUTPATIENT
Start: 2017-04-20 | End: 2017-06-14 | Stop reason: SDUPTHER

## 2017-04-20 RX ORDER — CARVEDILOL 6.25 MG/1
6.25 TABLET ORAL EVERY 12 HOURS SCHEDULED
Qty: 180 TABLET | Refills: 3 | Status: SHIPPED | OUTPATIENT
Start: 2017-04-20 | End: 2018-02-09 | Stop reason: SDUPTHER

## 2017-04-20 NOTE — PROGRESS NOTES
Subjective:       Sander White is a 61 y.o. male who here for follow up    CC  follow-up for the coronary artery disease and myocardial infarction  HPI  61 years old white male with known history of coronary artery disease with myocardial infarction couple months ago, underwent angioplasty and stent has been well also have benign essential Arterial hypertension, has been doing well with no complaints of chest pains or tightness in chest no heaviness or pressure sensation with no syncopal near-syncopal episode         Problem List Items Addressed This Visit        Cardiovascular and Mediastinum    Elevated cholesterol - Primary    Essential hypertension    Relevant Orders    ECG 12 Lead    Coronary artery disease involving native coronary artery of native heart without angina pectoris    Relevant Medications    clopidogrel (PLAVIX) 75 MG tablet        Previous treatments/evaluations include: ASA and beta blocker. Cardiac risk factors: advanced age (older than 55 for men, 65 for women).    The following portions of the patient's history were reviewed and updated as appropriate: allergies, current medications, past family history, past medical history, past social history, past surgical history and problem list.    Past Medical History:   Diagnosis Date   • Arthritis    • Chronic pain in right foot    • Coronary artery disease    • ED (erectile dysfunction) 7/15/2016   • GERD (gastroesophageal reflux disease)    • Hypertension    • Myocardial infarction     reports that he quit smoking about 18 years ago. His smoking use included Cigarettes. He has a 3.50 pack-year smoking history. He has never used smokeless tobacco. He reports that he drinks alcohol. He reports that he does not use illicit drugs.  Family History   Problem Relation Age of Onset   • Diabetes Mother      Type 2   • Diabetes Father      Type 2   • Stroke Father    • Aneurysm Brother      Brain       Review of Systems  Constitutional: No wt loss,  "fever, fatigue  Gastrointestinal: No nausea, abdominal pain  Behavioral/Psych: No insomnia or anxiety   Cardiovascular no chest pains or tightness in chest  Objective:       Physical Exam             Physical Exam  /92 (BP Location: Left arm, Patient Position: Sitting)  Pulse 74  Ht 71\" (180.3 cm)  Wt 219 lb (99.3 kg)  BMI 30.54 kg/m2    General appearance: NAD, conversant   Eyes: anicteric sclerae, moist conjunctivae; no lid-lag; PERRLA   HENT: Atraumatic; oropharynx clear with moist mucous membranes and no mucosal ulcerations;  normal hard and soft palate   Neck: Trachea midline; FROM, supple, no thyromegaly or lymphadenopathy   Lungs: CTA, with normal respiratory effort and no intercostal retractions   CV: S1-S2 regular, no murmurs, no rub, no gallop   Abdomen: Soft, non-tender; no masses or HSM   Extremities: No peripheral edema or extremity lymphadenopathy  Skin: Normal temperature, turgor and texture; no rash, ulcers or subcutaneous nodules   Psych: Appropriate affect, alert and oriented to person, place and time           Cardiographics  @  ECG 12 Lead  Date/Time: 4/20/2017 3:22 PM  Performed by: YADY DAVIS  Authorized by: YADY DAVIS   Comparison: not compared with previous ECG   Previous ECG: no previous ECG available  Rhythm: sinus rhythm  ST Segments: ST segments normal  T Waves: T waves normal  Clinical impression: normal ECG            Echocardiogram:        Current Outpatient Prescriptions:   •  aspirin 81 MG chewable tablet, Chew 1 tablet Daily., Disp: , Rfl:   •  carvedilol (COREG) 6.25 MG tablet, Take 1 tablet by mouth Every 12 (Twelve) Hours., Disp: 60 tablet, Rfl: 2  •  famotidine (PEPCID) 20 MG tablet, Take 1 tablet by mouth Daily., Disp: 30 tablet, Rfl: 0  •  lisinopril (ZESTRIL) 2.5 MG tablet, Take 1 tablet by mouth daily., Disp: 90 tablet, Rfl: 3  •  nitroglycerin (NITROSTAT) 0.4 MG SL tablet, 1 under the tongue as needed for angina, may repeat q5mins for up " three doses, Disp: 25 tablet, Rfl: 0  •  Omega-3 Fatty Acids (OMEGA 3 PO), Take 1 capsule by mouth daily., Disp: , Rfl:   •  pravastatin (PRAVACHOL) 10 MG tablet, Take 0.5 tablets by mouth Every Night., Disp: 30 tablet, Rfl: 2  •  Pyridoxine HCl (VITAMIN B-6 PO), Take 1 tablet by mouth daily., Disp: , Rfl:   •  vitamin B-12 (CYANOCOBALAMIN) 1000 MCG tablet, Take 1,000 mcg by mouth daily., Disp: , Rfl:   •  vitamin C (ASCORBIC ACID) 500 MG tablet, Take 500 mg by mouth daily., Disp: , Rfl:   •  vitamin E 1000 UNIT capsule, Take 1,000 Units by mouth Daily. Taking 400 mg daily, Disp: , Rfl:    Assessment:        Patient Active Problem List   Diagnosis   • Airway hyperreactivity   • Elevated cholesterol   • Gain of weight   • ED (erectile dysfunction)   • ST elevation myocardial infarction (STEMI)   • Mallet finger   • Sprain of carpometacarpal joint               Plan:            ICD-10-CM ICD-9-CM   1. Elevated cholesterol E78.00 272.0   2. Essential hypertension I10 401.9     1. Elevated cholesterol  Well-controlled    2. Essential hypertension  ) well-controlled patient will be checking the blood pressure standpoint  - ECG 12 Lead    3. Coronary artery disease involving native coronary artery of native heart without angina pectoris  Treadmill stress shows significant PVCs    Patient will work and patient has been advised on the normal working conditions    COUNSELING:    Sander Moore was given to patient for the following topics: diagnostic results, risk factor reductions, impressions, risks and benefits of treatment options and importance of treatment compliance .       SMOKING COUNSELING:    Counseling given: Not Answered      EMR Dragon/Transcription disclaimer:   Much of this encounter note is an electronic transcription/translation of spoken language to printed text. The electronic translation of spoken language may permit erroneous, or at times, nonsensical words or phrases to be inadvertently  transcribed; Although I have reviewed the note for such errors, some may still exist.

## 2017-04-27 ENCOUNTER — TELEPHONE (OUTPATIENT)
Dept: CARDIOLOGY | Facility: CLINIC | Age: 62
End: 2017-04-27

## 2017-05-16 ENCOUNTER — OFFICE VISIT (OUTPATIENT)
Dept: INTERNAL MEDICINE | Facility: CLINIC | Age: 62
End: 2017-05-16

## 2017-05-16 VITALS
HEIGHT: 71 IN | SYSTOLIC BLOOD PRESSURE: 114 MMHG | WEIGHT: 221 LBS | BODY MASS INDEX: 30.94 KG/M2 | DIASTOLIC BLOOD PRESSURE: 84 MMHG

## 2017-05-16 DIAGNOSIS — B35.1 ONYCHOMYCOSIS: ICD-10-CM

## 2017-05-16 DIAGNOSIS — E78.00 ELEVATED CHOLESTEROL: ICD-10-CM

## 2017-05-16 DIAGNOSIS — I10 ESSENTIAL HYPERTENSION: Primary | ICD-10-CM

## 2017-05-16 DIAGNOSIS — F32.1 MODERATE SINGLE CURRENT EPISODE OF MAJOR DEPRESSIVE DISORDER (HCC): ICD-10-CM

## 2017-05-16 LAB
ALBUMIN SERPL-MCNC: 4.23 G/DL (ref 3.4–4.6)
ALBUMIN/GLOB SERPL: 1.4 G/DL
ALP SERPL-CCNC: 92 U/L (ref 46–116)
ALT SERPL W P-5'-P-CCNC: 38 U/L (ref 16–63)
ANION GAP SERPL CALCULATED.3IONS-SCNC: 12 MMOL/L
AST SERPL-CCNC: 24 U/L (ref 7–37)
BILIRUB SERPL-MCNC: 0.8 MG/DL (ref 0.2–1)
BUN BLD-MCNC: 16 MG/DL (ref 6–22)
BUN/CREAT SERPL: 15.5 (ref 7–25)
CALCIUM SPEC-SCNC: 9.1 MG/DL (ref 8.6–10.5)
CHLORIDE SERPL-SCNC: 102 MMOL/L (ref 95–107)
CHOLEST SERPL-MCNC: 209 MG/DL (ref 0–200)
CO2 SERPL-SCNC: 26 MMOL/L (ref 23–32)
CREAT BLD-MCNC: 1.03 MG/DL (ref 0.7–1.3)
ERYTHROCYTE [DISTWIDTH] IN BLOOD BY AUTOMATED COUNT: 12.9 % (ref 11.5–14.5)
GFR SERPL CREATININE-BSD FRML MDRD: 74 ML/MIN/1.73
GLOBULIN UR ELPH-MCNC: 3.1 GM/DL
GLUCOSE BLD-MCNC: 99 MG/DL (ref 70–100)
HCT VFR BLD AUTO: 46.5 % (ref 40.4–52.2)
HDLC SERPL-MCNC: 46 MG/DL (ref 40–81)
HGB BLD-MCNC: 15.7 G/DL (ref 13.7–17.6)
LDLC SERPL CALC-MCNC: 134 MG/DL (ref 0–100)
LDLC/HDLC SERPL: 2.91 {RATIO}
MCH RBC QN AUTO: 30.8 PG (ref 27–32.7)
MCHC RBC AUTO-ENTMCNC: 33.8 G/DL (ref 32.6–36.4)
MCV RBC AUTO: 91.4 FL (ref 79.8–96.2)
PLATELET # BLD AUTO: 222 10*3/MM3 (ref 140–500)
POTASSIUM BLD-SCNC: 4.1 MMOL/L (ref 3.3–5.3)
PROT SERPL-MCNC: 7.3 G/DL (ref 6.3–8.4)
RBC # BLD AUTO: 5.09 10*6/MM3 (ref 4.6–6)
SODIUM BLD-SCNC: 140 MMOL/L (ref 136–145)
TRIGL SERPL-MCNC: 146 MG/DL (ref 0–150)
VLDLC SERPL-MCNC: 29.2 MG/DL
WBC # BLD AUTO: 6.99 10*3/MM3 (ref 4.5–10.7)

## 2017-05-16 PROCEDURE — 80061 LIPID PANEL: CPT

## 2017-05-16 PROCEDURE — 80053 COMPREHEN METABOLIC PANEL: CPT

## 2017-05-16 PROCEDURE — 99214 OFFICE O/P EST MOD 30 MIN: CPT

## 2017-05-16 RX ORDER — ESCITALOPRAM OXALATE 10 MG/1
10 TABLET ORAL DAILY
Qty: 30 TABLET | Refills: 11 | Status: SHIPPED | OUTPATIENT
Start: 2017-05-16 | End: 2017-10-31

## 2017-06-14 ENCOUNTER — TELEPHONE (OUTPATIENT)
Dept: INTERNAL MEDICINE | Facility: CLINIC | Age: 62
End: 2017-06-14

## 2017-06-14 DIAGNOSIS — E78.00 ELEVATED CHOLESTEROL: Primary | ICD-10-CM

## 2017-06-14 RX ORDER — PRAVASTATIN SODIUM 10 MG
20 TABLET ORAL NIGHTLY
Qty: 30 TABLET | Refills: 5 | Status: SHIPPED | OUTPATIENT
Start: 2017-06-14 | End: 2017-06-14 | Stop reason: DRUGHIGH

## 2017-06-14 RX ORDER — PRAVASTATIN SODIUM 20 MG
20 TABLET ORAL DAILY
Qty: 30 TABLET | Refills: 5 | Status: SHIPPED | OUTPATIENT
Start: 2017-06-14 | End: 2017-12-16 | Stop reason: SDUPTHER

## 2017-06-14 NOTE — TELEPHONE ENCOUNTER
----- Message from Lavinia Amezcua sent at 6/14/2017  9:31 AM EDT -----  Contact: pt - Dr Gomez' pt - RE: new Rx refill  Pt calling and would like a refill on Rx. Pt informs that lab results informed pt to increased med's. Could you please call Rx to pt's pharmacy w/ new dosage? Please advise. Thanks      Normal chemistries. 2. Cholesterol is high and needs to be better.I suggest diet and/or meds to treat. His LDL goal is 70 or less. This will require a higher dose of Pravastatin or change to a different medication. Please call to discuss    pravastatin (PRAVACHOL) 10 MG tablet 90 tablet       Sig - Route: Take 0.5 tablets by mouth Every Night. - Oral    [facility] [address] [place] [address] AT [facility] - [phone]  - [phone] (Phone)  [phone] (Fax)    Pt # [phone]

## 2017-06-14 NOTE — TELEPHONE ENCOUNTER
Discussed with patient-he was previously on Pravastatin 5mg (taking 1/2 of 10mg dose) which he increased to 1 tablet (10mg) after last labs. Will increase to Pravastatin 20mg daily (Rx sent to pharmacy) and he will monitor for myalgias.

## 2017-06-27 ENCOUNTER — OFFICE VISIT (OUTPATIENT)
Dept: INTERNAL MEDICINE | Facility: CLINIC | Age: 62
End: 2017-06-27

## 2017-06-27 VITALS
HEIGHT: 71 IN | SYSTOLIC BLOOD PRESSURE: 112 MMHG | DIASTOLIC BLOOD PRESSURE: 78 MMHG | OXYGEN SATURATION: 96 % | BODY MASS INDEX: 30.8 KG/M2 | HEART RATE: 67 BPM | WEIGHT: 220 LBS

## 2017-06-27 DIAGNOSIS — E78.00 ELEVATED CHOLESTEROL: ICD-10-CM

## 2017-06-27 DIAGNOSIS — F32.1 MODERATE SINGLE CURRENT EPISODE OF MAJOR DEPRESSIVE DISORDER (HCC): ICD-10-CM

## 2017-06-27 DIAGNOSIS — I10 ESSENTIAL HYPERTENSION: Primary | ICD-10-CM

## 2017-06-27 DIAGNOSIS — B35.1 ONYCHOMYCOSIS: ICD-10-CM

## 2017-06-27 PROCEDURE — 99214 OFFICE O/P EST MOD 30 MIN: CPT | Performed by: NURSE PRACTITIONER

## 2017-06-27 RX ORDER — OMEPRAZOLE 20 MG/1
20 CAPSULE, DELAYED RELEASE ORAL DAILY
COMMUNITY

## 2017-06-27 RX ORDER — MELOXICAM 7.5 MG/1
1 TABLET ORAL DAILY
COMMUNITY
Start: 2017-06-24 | End: 2017-09-28

## 2017-06-27 NOTE — PROGRESS NOTES
Subjective   Sander White is a 61 y.o. male.     HPI Comments: Recent echo and stress test ok. He returned back to work on May 3, 2017. He had follow up with  cardiologist in April 2017. He has had intermittent chest discomfort since stent placement.     Hypertension   This is a chronic problem. The current episode started more than 1 year ago. The problem is controlled. Associated symptoms include anxiety (sometimes ) and chest pain (intermittent ). Pertinent negatives include no blurred vision, headaches, palpitations, peripheral edema, PND or shortness of breath. Past treatments include calcium channel blockers and ACE inhibitors. The current treatment provides significant improvement. Hypertensive end-organ damage includes angina and CAD/MI.   Chest Pain    This is a new problem. The current episode started more than 1 month ago. The problem occurs intermittently. The problem has been waxing and waning. The pain is present in the lateral region. The pain is at a severity of 0/10 (worst 2/10; last 1-2 minutes ). The patient is experiencing no pain. The quality of the pain is described as sharp. The pain does not radiate. Associated symptoms include dizziness (sometimes ). Pertinent negatives include no back pain, cough, fever, headaches, nausea, palpitations, PND, shortness of breath, syncope or vomiting. The pain is aggravated by nothing. He has tried nothing for the symptoms.   His past medical history is significant for anxiety/panic attacks and MI. Prior diagnostic workup includes exercise treadmill test and echocardiogram.        The following portions of the patient's history were reviewed and updated as appropriate: allergies, current medications and problem list.    Review of Systems   Constitutional: Negative for activity change, appetite change, fatigue and fever.   Eyes: Negative for blurred vision.   Respiratory: Negative for cough, shortness of breath and wheezing.    Cardiovascular: Positive for  chest pain (intermittent ). Negative for palpitations, leg swelling, syncope and PND.   Gastrointestinal: Negative for nausea and vomiting.   Musculoskeletal: Negative for back pain.   Neurological: Positive for dizziness (sometimes ). Negative for headaches.   Psychiatric/Behavioral: Positive for dysphoric mood. Negative for sleep disturbance and suicidal ideas. The patient is nervous/anxious.        Objective   Physical Exam   Constitutional: He is oriented to person, place, and time. He appears well-developed and well-nourished.   HENT:   Head: Normocephalic.   Nose: Nose normal.   Neck: Carotid bruit is not present. No thyroid mass and no thyromegaly present.   Cardiovascular: Regular rhythm and normal heart sounds.  Exam reveals no S3 and no S4.    No murmur heard.  Pulses:       Dorsalis pedis pulses are 2+ on the right side, and 2+ on the left side.        Posterior tibial pulses are 2+ on the right side, and 2+ on the left side.   Repeat bp left arm 112/76  No pedal edema    Pulmonary/Chest: Effort normal and breath sounds normal. He has no decreased breath sounds. He has no wheezes. He has no rhonchi. He has no rales.   Musculoskeletal: He exhibits no edema.   Neurological: He is alert and oriented to person, place, and time. Gait normal.   Skin: Skin is warm and dry.   oncoymycosis to bilateral great toes    Psychiatric: He has a normal mood and affect.       Assessment/Plan   Sander was seen today for hypertension and chest pain.    Diagnoses and all orders for this visit:    Essential hypertension  Comments:  goal of bp less than 140/90    Onychomycosis  -     ciclopirox (PENLAC) 8 % solution; Apply  topically Every Night. Apply to affected toenail    Elevated cholesterol  Comments:  recheck lipids at next visit; recently increased pravastatin to 20 mg daily     Moderate single current episode of major depressive disorder  Comments:  tolerating lexapro well; discussed counseling but declines at this  time    He will need fasting labs at next office visit.

## 2017-07-22 ENCOUNTER — APPOINTMENT (OUTPATIENT)
Dept: GENERAL RADIOLOGY | Facility: HOSPITAL | Age: 62
End: 2017-07-22

## 2017-07-22 ENCOUNTER — HOSPITAL ENCOUNTER (OUTPATIENT)
Facility: HOSPITAL | Age: 62
Setting detail: OBSERVATION
Discharge: HOME | End: 2017-07-24
Attending: FAMILY MEDICINE | Admitting: INTERNAL MEDICINE

## 2017-07-22 DIAGNOSIS — I25.10 CORONARY ARTERY DISEASE INVOLVING NATIVE CORONARY ARTERY OF NATIVE HEART WITHOUT ANGINA PECTORIS: ICD-10-CM

## 2017-07-22 DIAGNOSIS — R07.9 CHEST PAIN, UNSPECIFIED TYPE: Primary | ICD-10-CM

## 2017-07-22 DIAGNOSIS — R07.2 PRECORDIAL PAIN: ICD-10-CM

## 2017-07-22 LAB
ALBUMIN SERPL-MCNC: 4.4 G/DL (ref 3.5–5.2)
ALBUMIN/GLOB SERPL: 1.6 G/DL
ALP SERPL-CCNC: 69 U/L (ref 39–117)
ALT SERPL W P-5'-P-CCNC: 25 U/L (ref 1–41)
AMYLASE SERPL-CCNC: 34 U/L (ref 28–100)
ANION GAP SERPL CALCULATED.3IONS-SCNC: 11.2 MMOL/L
APTT PPP: 24.9 SECONDS (ref 22.7–35.4)
AST SERPL-CCNC: 21 U/L (ref 1–40)
BASOPHILS # BLD AUTO: 0.03 10*3/MM3 (ref 0–0.2)
BASOPHILS NFR BLD AUTO: 0.6 % (ref 0–1.5)
BILIRUB SERPL-MCNC: 0.5 MG/DL (ref 0.1–1.2)
BUN BLD-MCNC: 11 MG/DL (ref 8–23)
BUN/CREAT SERPL: 12.1 (ref 7–25)
CALCIUM SPEC-SCNC: 9.4 MG/DL (ref 8.6–10.5)
CHLORIDE SERPL-SCNC: 107 MMOL/L (ref 98–107)
CK MB SERPL-CCNC: 2.4 NG/ML
CK SERPL-CCNC: 120 U/L (ref 20–200)
CO2 SERPL-SCNC: 21.8 MMOL/L (ref 22–29)
CREAT BLD-MCNC: 0.91 MG/DL (ref 0.76–1.27)
D DIMER PPP FEU-MCNC: <0.27 MCGFEU/ML (ref 0–0.49)
DEPRECATED RDW RBC AUTO: 41.6 FL (ref 37–54)
EOSINOPHIL # BLD AUTO: 0.43 10*3/MM3 (ref 0–0.7)
EOSINOPHIL NFR BLD AUTO: 8.1 % (ref 0.3–6.2)
ERYTHROCYTE [DISTWIDTH] IN BLOOD BY AUTOMATED COUNT: 12.6 % (ref 11.5–14.5)
GFR SERPL CREATININE-BSD FRML MDRD: 85 ML/MIN/1.73
GLOBULIN UR ELPH-MCNC: 2.7 GM/DL
GLUCOSE BLD-MCNC: 89 MG/DL (ref 65–99)
GLUCOSE BLDC GLUCOMTR-MCNC: 82 MG/DL (ref 70–130)
HBA1C MFR BLD: 5.5 % (ref 4.8–5.6)
HCT VFR BLD AUTO: 43.1 % (ref 40.4–52.2)
HGB BLD-MCNC: 14.9 G/DL (ref 13.7–17.6)
HOLD SPECIMEN: NORMAL
HOLD SPECIMEN: NORMAL
IMM GRANULOCYTES # BLD: 0 10*3/MM3 (ref 0–0.03)
IMM GRANULOCYTES NFR BLD: 0 % (ref 0–0.5)
INR PPP: 1.04 (ref 0.9–1.1)
LIPASE SERPL-CCNC: 45 U/L (ref 13–60)
LYMPHOCYTES # BLD AUTO: 1.28 10*3/MM3 (ref 0.9–4.8)
LYMPHOCYTES NFR BLD AUTO: 24.1 % (ref 19.6–45.3)
MAGNESIUM SERPL-MCNC: 2.3 MG/DL (ref 1.6–2.4)
MCH RBC QN AUTO: 31.2 PG (ref 27–32.7)
MCHC RBC AUTO-ENTMCNC: 34.6 G/DL (ref 32.6–36.4)
MCV RBC AUTO: 90.2 FL (ref 79.8–96.2)
MONOCYTES # BLD AUTO: 1.04 10*3/MM3 (ref 0.2–1.2)
MONOCYTES NFR BLD AUTO: 19.5 % (ref 5–12)
NEUTROPHILS # BLD AUTO: 2.54 10*3/MM3 (ref 1.9–8.1)
NEUTROPHILS NFR BLD AUTO: 47.7 % (ref 42.7–76)
NT-PROBNP SERPL-MCNC: 187.4 PG/ML (ref 5–900)
PHOSPHATE SERPL-MCNC: 2.1 MG/DL (ref 2.5–4.5)
PLATELET # BLD AUTO: 194 10*3/MM3 (ref 140–500)
PMV BLD AUTO: 11 FL (ref 6–12)
POTASSIUM BLD-SCNC: 3.9 MMOL/L (ref 3.5–5.2)
PROT SERPL-MCNC: 7.1 G/DL (ref 6–8.5)
PROTHROMBIN TIME: 13.2 SECONDS (ref 11.7–14.2)
RBC # BLD AUTO: 4.78 10*6/MM3 (ref 4.6–6)
SODIUM BLD-SCNC: 140 MMOL/L (ref 136–145)
TROPONIN T SERPL-MCNC: <0.01 NG/ML (ref 0–0.03)
TROPONIN T SERPL-MCNC: <0.01 NG/ML (ref 0–0.03)
TSH SERPL DL<=0.05 MIU/L-ACNC: 0.53 MIU/ML (ref 0.27–4.2)
WBC NRBC COR # BLD: 5.32 10*3/MM3 (ref 4.5–10.7)
WHOLE BLOOD HOLD SPECIMEN: NORMAL
WHOLE BLOOD HOLD SPECIMEN: NORMAL

## 2017-07-22 PROCEDURE — 84484 ASSAY OF TROPONIN QUANT: CPT | Performed by: FAMILY MEDICINE

## 2017-07-22 PROCEDURE — 84443 ASSAY THYROID STIM HORMONE: CPT | Performed by: NURSE PRACTITIONER

## 2017-07-22 PROCEDURE — 83735 ASSAY OF MAGNESIUM: CPT | Performed by: NURSE PRACTITIONER

## 2017-07-22 PROCEDURE — 83880 ASSAY OF NATRIURETIC PEPTIDE: CPT | Performed by: NURSE PRACTITIONER

## 2017-07-22 PROCEDURE — 83036 HEMOGLOBIN GLYCOSYLATED A1C: CPT | Performed by: NURSE PRACTITIONER

## 2017-07-22 PROCEDURE — 82150 ASSAY OF AMYLASE: CPT | Performed by: NURSE PRACTITIONER

## 2017-07-22 PROCEDURE — 82962 GLUCOSE BLOOD TEST: CPT

## 2017-07-22 PROCEDURE — 85379 FIBRIN DEGRADATION QUANT: CPT | Performed by: NURSE PRACTITIONER

## 2017-07-22 PROCEDURE — 93010 ELECTROCARDIOGRAM REPORT: CPT | Performed by: INTERNAL MEDICINE

## 2017-07-22 PROCEDURE — 82550 ASSAY OF CK (CPK): CPT | Performed by: NURSE PRACTITIONER

## 2017-07-22 PROCEDURE — 99220 PR INITIAL OBSERVATION CARE/DAY 70 MINUTES: CPT | Performed by: INTERNAL MEDICINE

## 2017-07-22 PROCEDURE — 93005 ELECTROCARDIOGRAM TRACING: CPT | Performed by: FAMILY MEDICINE

## 2017-07-22 PROCEDURE — 84100 ASSAY OF PHOSPHORUS: CPT | Performed by: NURSE PRACTITIONER

## 2017-07-22 PROCEDURE — 36415 COLL VENOUS BLD VENIPUNCTURE: CPT

## 2017-07-22 PROCEDURE — 80053 COMPREHEN METABOLIC PANEL: CPT | Performed by: FAMILY MEDICINE

## 2017-07-22 PROCEDURE — 99284 EMERGENCY DEPT VISIT MOD MDM: CPT

## 2017-07-22 PROCEDURE — 93005 ELECTROCARDIOGRAM TRACING: CPT | Performed by: NURSE PRACTITIONER

## 2017-07-22 PROCEDURE — G0378 HOSPITAL OBSERVATION PER HR: HCPCS

## 2017-07-22 PROCEDURE — 85610 PROTHROMBIN TIME: CPT | Performed by: NURSE PRACTITIONER

## 2017-07-22 PROCEDURE — 84484 ASSAY OF TROPONIN QUANT: CPT | Performed by: NURSE PRACTITIONER

## 2017-07-22 PROCEDURE — 83690 ASSAY OF LIPASE: CPT | Performed by: NURSE PRACTITIONER

## 2017-07-22 PROCEDURE — 71020 HC CHEST PA AND LATERAL: CPT

## 2017-07-22 PROCEDURE — 82553 CREATINE MB FRACTION: CPT | Performed by: NURSE PRACTITIONER

## 2017-07-22 PROCEDURE — 85025 COMPLETE CBC W/AUTO DIFF WBC: CPT | Performed by: FAMILY MEDICINE

## 2017-07-22 PROCEDURE — 85730 THROMBOPLASTIN TIME PARTIAL: CPT | Performed by: NURSE PRACTITIONER

## 2017-07-22 RX ORDER — ASPIRIN 81 MG/1
81 TABLET, CHEWABLE ORAL DAILY
Status: DISCONTINUED | OUTPATIENT
Start: 2017-07-22 | End: 2017-07-22

## 2017-07-22 RX ORDER — ASPIRIN 81 MG/1
81 TABLET, CHEWABLE ORAL DAILY
Status: DISCONTINUED | OUTPATIENT
Start: 2017-07-23 | End: 2017-07-23

## 2017-07-22 RX ORDER — CHOLECALCIFEROL (VITAMIN D3) 125 MCG
1000 CAPSULE ORAL DAILY
Status: DISCONTINUED | OUTPATIENT
Start: 2017-07-22 | End: 2017-07-24 | Stop reason: HOSPADM

## 2017-07-22 RX ORDER — CLOPIDOGREL BISULFATE 75 MG/1
75 TABLET ORAL DAILY
Status: DISCONTINUED | OUTPATIENT
Start: 2017-07-22 | End: 2017-07-24 | Stop reason: HOSPADM

## 2017-07-22 RX ORDER — LISINOPRIL 2.5 MG/1
2.5 TABLET ORAL DAILY
Status: DISCONTINUED | OUTPATIENT
Start: 2017-07-22 | End: 2017-07-24 | Stop reason: HOSPADM

## 2017-07-22 RX ORDER — SODIUM CHLORIDE 0.9 % (FLUSH) 0.9 %
1-10 SYRINGE (ML) INJECTION AS NEEDED
Status: DISCONTINUED | OUTPATIENT
Start: 2017-07-22 | End: 2017-07-24 | Stop reason: HOSPADM

## 2017-07-22 RX ORDER — CARVEDILOL 6.25 MG/1
6.25 TABLET ORAL EVERY 12 HOURS SCHEDULED
Status: DISCONTINUED | OUTPATIENT
Start: 2017-07-22 | End: 2017-07-24 | Stop reason: HOSPADM

## 2017-07-22 RX ORDER — ACETAMINOPHEN 325 MG/1
650 TABLET ORAL EVERY 6 HOURS PRN
Status: DISCONTINUED | OUTPATIENT
Start: 2017-07-22 | End: 2017-07-24 | Stop reason: HOSPADM

## 2017-07-22 RX ORDER — SODIUM CHLORIDE 0.9 % (FLUSH) 0.9 %
10 SYRINGE (ML) INJECTION AS NEEDED
Status: DISCONTINUED | OUTPATIENT
Start: 2017-07-22 | End: 2017-07-24 | Stop reason: HOSPADM

## 2017-07-22 RX ORDER — NITROGLYCERIN 0.4 MG/1
0.4 TABLET SUBLINGUAL
Status: DISCONTINUED | OUTPATIENT
Start: 2017-07-22 | End: 2017-07-24 | Stop reason: HOSPADM

## 2017-07-22 RX ORDER — ATORVASTATIN CALCIUM 10 MG/1
10 TABLET, FILM COATED ORAL DAILY
Status: DISCONTINUED | OUTPATIENT
Start: 2017-07-22 | End: 2017-07-22

## 2017-07-22 RX ORDER — ESCITALOPRAM OXALATE 10 MG/1
10 TABLET ORAL DAILY
Status: DISCONTINUED | OUTPATIENT
Start: 2017-07-22 | End: 2017-07-24 | Stop reason: HOSPADM

## 2017-07-22 RX ORDER — ASPIRIN 325 MG
325 TABLET ORAL ONCE
Status: COMPLETED | OUTPATIENT
Start: 2017-07-22 | End: 2017-07-22

## 2017-07-22 RX ORDER — PANTOPRAZOLE SODIUM 40 MG/1
40 TABLET, DELAYED RELEASE ORAL EVERY MORNING
Status: DISCONTINUED | OUTPATIENT
Start: 2017-07-23 | End: 2017-07-24 | Stop reason: HOSPADM

## 2017-07-22 RX ADMIN — ATORVASTATIN CALCIUM 10 MG: 10 TABLET, FILM COATED ORAL at 21:02

## 2017-07-22 RX ADMIN — CYANOCOBALAMIN TAB 500 MCG 1000 MCG: 500 TAB at 21:03

## 2017-07-22 RX ADMIN — NITROGLYCERIN 1 INCH: 20 OINTMENT TOPICAL at 15:29

## 2017-07-22 RX ADMIN — ESCITALOPRAM 10 MG: 10 TABLET, FILM COATED ORAL at 21:02

## 2017-07-22 RX ADMIN — ASPIRIN 325 MG: 325 TABLET ORAL at 12:07

## 2017-07-22 RX ADMIN — CARVEDILOL 6.25 MG: 6.25 TABLET, FILM COATED ORAL at 21:02

## 2017-07-22 NOTE — ED PROVIDER NOTES
EMERGENCY DEPARTMENT ENCOUNTER    CHIEF COMPLAINT  Chief Complaint: chest pain  History given by: patient, family  History limited by: N/A  Room Number: 09/09  PMD: Husam Gomez MD  Cardiologist- Dr Billings    HPI:  Pt states that he had MI and underwent coronary stent placement in 2/2017 (on plavix and 81mg ASA). He c/o intermittent episodes of sharp left chest pain radiating to the central chest that started about 3 weeks ago. During these episodes, he also has dyspnea, sweating, and intermittent left arm numbness. The chest pain occurs at rest, lasts for about 2 to 3 minutes, and is not exacerbated by movement, exertion, or taking deep breaths. Most recent chest pain episode occurred this morning and has currently resolved. He denies recent injury or trauma, nausea, vomiting, fevers, chills, weakness, dizziness, abd pain, diarrhea, cough, sore throat, and pain and difficulty with urination. He states that he took 81mg ASA and plavix earlier today. Pt has no other complaints at this time.     Location: left chest  Radiation: central chest  Quality: sharp  Intensity/Severity: moderate  Duration: started about 3 weeks ago  Onset quality: gradual  Timing: intermittent  Progression: waxing and waning, currently resolved  Aggravating Factors: none  Alleviating Factors: none  Previous Episodes: none  Treatment before arrival: Pt states that he took 81mg ASA earlier today.   Associated Symptoms: dyspnea, sweating, intermittent left arm numbness      PAST MEDICAL HISTORY  Active Ambulatory Problems     Diagnosis Date Noted   • Airway hyperreactivity 05/03/2016   • Elevated cholesterol 05/03/2016   • Gain of weight 05/03/2016   • ED (erectile dysfunction) 07/15/2016   • ST elevation myocardial infarction (STEMI) 02/15/2017   • Mallet finger 10/20/2014   • Sprain of carpometacarpal joint 10/20/2014   • Essential hypertension 04/20/2017   • Coronary artery disease involving native coronary artery of native heart  without angina pectoris 04/20/2017     Resolved Ambulatory Problems     Diagnosis Date Noted   • No Resolved Ambulatory Problems     Past Medical History:   Diagnosis Date   • Arthritis    • Chronic pain in right foot    • Coronary artery disease    • ED (erectile dysfunction) 7/15/2016   • GERD (gastroesophageal reflux disease)    • Hypertension    • Myocardial infarction          PAST SURGICAL HISTORY  Past Surgical History:   Procedure Laterality Date   • APPENDECTOMY     • CARDIAC CATHETERIZATION N/A 2/15/2017    Procedure: Left Heart Cath;  Surgeon: Kristina Billings MD;  Location:  [facility];  Service:    • CARDIAC CATHETERIZATION  2/15/2017    Procedure: Percutaneous Manual Thrombectomy;  Surgeon: Kristina Billings MD;  Location:  [facility];  Service:    • CARDIAC CATHETERIZATION N/A 2/15/2017    Procedure: Coronary angiography;  Surgeon: Kristina Billings MD;  Location:  [facility];  Service:    • CARDIAC CATHETERIZATION N/A 2/15/2017    Procedure: Left ventriculography;  Surgeon: Kristina Billings MD;  [facility];  Service:    • CHOLECYSTECTOMY     • COLONOSCOPY N/A 02/27/2015    Normal   • CYST REMOVAL N/A     Neck   • HERNIA REPAIR Right     Inguinal   • PERIPHERAL ARTERIAL STENT GRAFT     • TN RT/LT HEART CATHETERS N/A 2/15/2017    Procedure: Percutaneous Coronary Intervention;  Surgeon: Kristina Billings MD;  Location:  [facility];  Service: Cardiovascular         FAMILY HISTORY  Family History   Problem Relation Age of Onset   • Diabetes Mother      Type 2   • Diabetes Father      Type 2   • Stroke Father    • Aneurysm Brother      Brain         SOCIAL HISTORY  Social History     Social History   • Marital status:      Spouse name: N/A   • Number of children: N/A   • Years of education: N/A     Occupational History   • Not on file.     Social History Main Topics   • Smoking  status: Former Smoker     Packs/day: 0.50     Years: 7.00     Types: Cigarettes     Quit date: 1999   • Smokeless tobacco: Never Used   • Alcohol use Yes   • Drug use: No   • Sexual activity: Defer     Other Topics Concern   • Not on file     Social History Narrative         ALLERGIES  Amoxicillin and Penicillins        REVIEW OF SYSTEMS  Review of Systems   Constitutional: Positive for diaphoresis. Negative for chills and fever.   HENT: Negative for congestion, rhinorrhea and sore throat.    Eyes: Negative for pain.   Respiratory: Positive for shortness of breath. Negative for cough.    Cardiovascular: Positive for chest pain (left chest pain radiating to central chest). Negative for palpitations.   Gastrointestinal: Negative for abdominal pain, diarrhea, nausea and vomiting.   Endocrine: Negative.    Genitourinary: Negative for difficulty urinating and dysuria.   Musculoskeletal: Negative for back pain and myalgias.   Skin: Negative.  Negative for rash.   Neurological: Positive for numbness (intermittent left arm numbness). Negative for dizziness, speech difficulty, weakness and headaches.   Psychiatric/Behavioral: Negative.  The patient is not nervous/anxious.    All other systems reviewed and are negative.           PHYSICAL EXAM  ED Triage Vitals   Temp Heart Rate Resp BP SpO2   07/22/17 1120 07/22/17 1120 07/22/17 1120 07/22/17 1128 07/22/17 1120   97.8 °F (36.6 °C) 71 16 122/93 98 % WNL      Temp src Heart Rate Source Patient Position BP Location FiO2 (%)   07/22/17 1120 -- 07/22/17 1128 07/22/17 1128 --   Tympanic  Lying Right arm        Physical Exam   Constitutional: He is oriented to person, place, and time. No distress.   HENT:   Head: Normocephalic.   Mouth/Throat: Mucous membranes are normal.   Eyes: EOM are normal. Pupils are equal, round, and reactive to light.   Neck: Normal range of motion. Neck supple.   Cardiovascular: Normal rate, regular rhythm and normal heart sounds.    Pulmonary/Chest:  Effort normal and breath sounds normal. No respiratory distress. He has no decreased breath sounds. He has no wheezes. He has no rhonchi. He has no rales. He exhibits tenderness (parasternal chest tenderness).   Abdominal: Soft. There is no tenderness. There is no rebound and no guarding.   Musculoskeletal: Normal range of motion.   Neurological: He is alert and oriented to person, place, and time. He has normal motor skills and normal sensation.   Skin: Skin is warm and dry.   Psychiatric: Mood and affect normal.   Nursing note and vitals reviewed.          LAB RESULTS  Recent Results (from the past 24 hour(s))   Comprehensive Metabolic Panel    Collection Time: 07/22/17 11:40 AM   Result Value Ref Range    Glucose 89 65 - 99 mg/dL    BUN 11 8 - 23 mg/dL    Creatinine 0.91 0.76 - 1.27 mg/dL    Sodium 140 136 - 145 mmol/L    Potassium 3.9 3.5 - 5.2 mmol/L    Chloride 107 98 - 107 mmol/L    CO2 21.8 (L) 22.0 - 29.0 mmol/L    Calcium 9.4 8.6 - 10.5 mg/dL    Total Protein 7.1 6.0 - 8.5 g/dL    Albumin 4.40 3.50 - 5.20 g/dL    ALT (SGPT) 25 1 - 41 U/L    AST (SGOT) 21 1 - 40 U/L    Alkaline Phosphatase 69 39 - 117 U/L    Total Bilirubin 0.5 0.1 - 1.2 mg/dL    eGFR Non African Amer 85 >60 mL/min/1.73    Globulin 2.7 gm/dL    A/G Ratio 1.6 g/dL    BUN/Creatinine Ratio 12.1 7.0 - 25.0    Anion Gap 11.2 mmol/L   Troponin    Collection Time: 07/22/17 11:40 AM   Result Value Ref Range    Troponin T <0.010 0.000 - 0.030 ng/mL   Light Blue Top    Collection Time: 07/22/17 11:40 AM   Result Value Ref Range    Extra Tube hold for add-on    Green Top (Gel)    Collection Time: 07/22/17 11:40 AM   Result Value Ref Range    Extra Tube Hold for add-ons.    Lavender Top    Collection Time: 07/22/17 11:40 AM   Result Value Ref Range    Extra Tube hold for add-on    Gold Top - SST    Collection Time: 07/22/17 11:40 AM   Result Value Ref Range    Extra Tube Hold for add-ons.    CBC Auto Differential    Collection Time: 07/22/17 11:40 AM    Result Value Ref Range    WBC 5.32 4.50 - 10.70 10*3/mm3    RBC 4.78 4.60 - 6.00 10*6/mm3    Hemoglobin 14.9 13.7 - 17.6 g/dL    Hematocrit 43.1 40.4 - 52.2 %    MCV 90.2 79.8 - 96.2 fL    MCH 31.2 27.0 - 32.7 pg    MCHC 34.6 32.6 - 36.4 g/dL    RDW 12.6 11.5 - 14.5 %    RDW-SD 41.6 37.0 - 54.0 fl    MPV 11.0 6.0 - 12.0 fL    Platelets 194 140 - 500 10*3/mm3    Neutrophil % 47.7 42.7 - 76.0 %    Lymphocyte % 24.1 19.6 - 45.3 %    Monocyte % 19.5 (H) 5.0 - 12.0 %    Eosinophil % 8.1 (H) 0.3 - 6.2 %    Basophil % 0.6 0.0 - 1.5 %    Immature Grans % 0.0 0.0 - 0.5 %    Neutrophils, Absolute 2.54 1.90 - 8.10 10*3/mm3    Lymphocytes, Absolute 1.28 0.90 - 4.80 10*3/mm3    Monocytes, Absolute 1.04 0.20 - 1.20 10*3/mm3    Eosinophils, Absolute 0.43 0.00 - 0.70 10*3/mm3    Basophils, Absolute 0.03 0.00 - 0.20 10*3/mm3    Immature Grans, Absolute 0.00 0.00 - 0.03 10*3/mm3       Ordered the above labs and reviewed the results.        RADIOLOGY  XR Chest 2 View   Final Result   No focal pulmonary consolidation. Follow-up as clinical   indications persist.       This report was finalized on 7/22/2017 11:56 AM by Dr. Ld Casas MD.             CXR (independently viewed by me, interpreted by radiologist)- no acute process      Ordered the above noted radiological studies. Reviewed by me in PACS.       PROCEDURES  Procedures    EKG           EKG time: 1125  Rhythm/Rate: sinus rhythm, rate= 55  P waves and [place]: normal  QRS, axis: normal   ST and T waves: normal     Interpreted Contemporaneously by me, independently viewed  unchanged compared to prior 2/18/17      PROGRESS AND CONSULTS  ED Course     11:25 AM- Ordered 325mg ASA per chest pain protocol. Ordered CXR, blood work, troponin, and EKG for further evaluation.     12:46 PM- HEART score is 4. Sent call out to [facility] for admission. Admission decision time= 1246    1:42 PM- Call not returned from [facility]. Repaged [facility].     3:11 PM- Discussed case with Dr Ochoa (on call  cardiologist) for Dr Billings (cardiologist)  Reviewed history, exam, results and treatments.  Discussed concerns and plan of care. Dr Ochoa accepts pt to be admitted to telemetry to Dr Billings.    3:14 PM- Rechecked pt. He is resting comfortably and is in no acute distress. Discussed with pt about all pertinent results including nothing acute indicated on CXR, negative troponin, and unchanged EKG compared to previous. Discussed pt admission for further care, further evaluation, and observation. Pt verbalizes understanding and agrees with plan. Pt is ready for admission.      MEDICAL DECISION MAKING  Results were reviewed/discussed with the patient.     MDM  Number of Diagnoses or Management Options  Chest pain, unspecified type:      Amount and/or Complexity of Data Reviewed  Clinical lab tests: ordered and reviewed (Troponin <0.010, WBC= 5.32)  Tests in the radiology section of CPT®: reviewed and ordered (CXR- no acute process)  Tests in the medicine section of CPT®: ordered and reviewed (EKG)  Decide to obtain previous medical records or to obtain history from someone other than the patient: yes  Review and summarize past medical records: yes (Treadmill stress test report from 4/20/17- Arrhythmias were significant. Equivocal ECG evidence of myocardial ischemia. Negative clinical evidence of myocardial ischemia. Findings consistent with an abnormal ECG stress test.    ECHO report from 4/20/17- Left Ventricle: Calculated EF = 52.9%. Trace-to-mild mitral valve regurgitation. Trace to mild tricuspid valve regurgitation is present. There is no evidence of pericardial effusion.     )  Discuss the patient with other providers: yes (Dr Ochoa (on call cardiologist) for Dr Billings (cardiologist) )  Independent visualization of images, tracings, or specimens: yes    Patient Progress  Patient progress: stable    HEART Score  History: Moderately suspicious (+1)  ECG: Normal (+0)  Age: 45 through 65 (+1)  Risk  Factors: 3 or more risk factors OR history of atherosclerotic disease (+2)  Troponin: Normal limit or lower (+0)  Total: 4          DIAGNOSIS  Final diagnoses:   Chest pain, unspecified type         DISPOSITION  Admitted- telemetry    Discussed treatment plan and reason for admission with pt and admitting physician.  Pt voiced understanding of the plan for admission for further testing/treatment as needed.       Latest Documented Vital Signs:  As of 3:24 PM  BP- 115/82 HR- 58 Temp- 97.8 °F (36.6 °C) (Tympanic) O2 sat- 96%        --  Documentation assistance provided by nahed Martinez for Dr Flores.  Information recorded by the scribe was done at my direction and has been verified and validated by me.       Vickie Martinez  07/22/17 1902       Jeremiah Flores MD  07/22/17 1912

## 2017-07-22 NOTE — ED NOTES
Attempted to call and give report unsuccessful will attempt to call again, admitting room not clean.     Regina Rose RN  07/22/17 [id number]

## 2017-07-22 NOTE — ED NOTES
Report  Called and given to Kesha ENGLISH on 2s 2210, updated on pt current status, discussed completed ER orders and admitting diagnosis. RN had no further questions at this time.   Pt had no questions at this time.     Regina Rose RN  07/22/17 6854

## 2017-07-22 NOTE — H&P
[facility]      Patient ID: Sander White   Age: 61 y.o.  Sex: male  :  1955  MRN: [id number]                LOS: 0 days   Patient Care Team:  Husam Gomez MD as PCP - General (Internal Medicine)      Referring MD:  Kristina Billings MD    .  Chief Complaint   Patient presents with   • Chest Pain     x 2 weeks       Admitting Diagnosis (chief complaint):  <principal problem not specified>    HPI       Subjective     Review of Systems   Constitution: Positive for diaphoresis, malaise/fatigue and weight gain. Negative for chills, decreased appetite, fever, weakness and night sweats.   HENT: Negative for headaches.    Eyes: Negative for double vision.   Cardiovascular: Positive for chest pain and dyspnea on exertion. Negative for claudication, cyanosis, irregular heartbeat, leg swelling, near-syncope, orthopnea, palpitations, paroxysmal nocturnal dyspnea and syncope.        No cp now   Respiratory: Positive for cough and snoring. Negative for wheezing.         Nuisance, nonproductive cough.  Has never undergone sleep study in the past   Endocrine: Positive for heat intolerance. Negative for cold intolerance.   Hematologic/Lymphatic: Negative for adenopathy and bleeding problem. Does not bruise/bleed easily.   Skin: Negative for itching.   Musculoskeletal: Negative for back pain and joint swelling.   Gastrointestinal: Positive for heartburn and nausea. Negative for bloating, abdominal pain, constipation, diarrhea, jaundice, melena and vomiting.        Dark stools due to rich foods   Genitourinary: Negative for dysuria, flank pain and hematuria.   Neurological: Positive for light-headedness.   Psychiatric/Behavioral: The patient is not nervous/anxious.    Allergic/Immunologic: Negative for hives.       Past Medical History:   Diagnosis Date   • Arthritis    • Chronic pain in right foot    • Coronary artery disease    • ED (erectile dysfunction) 7/15/2016   • GERD (gastroesophageal  reflux disease)    • Hypertension    • Myocardial infarction        Past Surgical History:   Procedure Laterality Date   • APPENDECTOMY     • CARDIAC CATHETERIZATION N/A 2/15/2017    Procedure: Left Heart Cath;  Surgeon: Kristina Billings MD;  Location: [facility] CATH [facility]VASIVE LOCATION;  Service:    • CARDIAC CATHETERIZATION  2/15/2017    Procedure: Percutaneous Manual Thrombectomy;  Surgeon: Kristina Billings MD;  Location:  [facility] CATH INVASIVE LOCATION;  Service:    • CARDIAC CATHETERIZATION N/A 2/15/2017    Procedure: Coronary angiography;  Surgeon: Kristina Billings MD;  Location: [facility] CATH [facility] LOC[facility];  Service:    • CARDIAC CATHETERIZATION N/A 2/15/2017    Procedure: Left ventriculography;  Surgeon: Kristina Billings MD;  Location: [facility] CATH INVASIVE LOCATION;  Service:    • CHOLECYSTECTOMY     • COLONOSCOPY N/A 02/27/2015    Normal   • CYST REMOVAL N/A     Neck   • HERNIA REPAIR Right     Inguinal   • PERIPHERAL ARTERIAL STENT GRAFT     • SD RT/LT HEART CATHETERS N/A 2/15/2017    Procedure: Percutaneous Coronary Intervention;  Surgeon: Kristina Billings MD;  Location: [facility] CATH INVASIVE LOCATION;  Service: Cardiovascular       Social History     Social History   • Marital status:      Spouse name: N/A   • Number of children: N/A   • Years of education: N/A     Occupational History   • Not on file.     Social History Main Topics   • Smoking status: Former Smoker     Packs/day: 0.50     Years: 7.00     Types: Cigarettes     Quit date: 1999   • Smokeless tobacco: Never Used   • Alcohol use Yes   • Drug use: No   • Sexual activity: Defer     Other Topics Concern   • Not on file     Social History Narrative       Family History   Problem Relation Age of Onset   • Diabetes Mother      Type 2   • Diabetes Father      Type 2   • Stroke Father    • Aneurysm Brother      Brain       History of Present Illness  This is a [age]-year-old white male, known by Dr. Kristina Billings, the  history for coronary artery disease with myocardial infarction February 14, 2017, underwent angioplasty and stent, hypertension, rhinitis, GERD, presents for complaint of chest pain.  Information obtained from medical record and patient.  No family present.  He states having chest pain off and on for approximately 2 months but didn't think much of it.  He thinks his chest pain is similar to when he had heart attack back in February however is not as intense.  He states his chest pain is similar to his history of reflux with indigestion.  He states his chest pains worsen lately and presented to this hospital emergency room to seek evaluation treatment.  He describes chest pain as substernal, stabbing, sharp, pain level 5/10, radiation to left chest, left arm numbness, lasting approximately 2-3 minutes.  No chest pain now.  Associated diaphoresis, feeling hot, sweaty and hard to take a deep breath.  No nausea.  No worse with movement, position, deep breaths..  Slight chest soreness.  No recent falls.  Sleeps on left side wonders if that was causing his chest discomfort.  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  No heavy lifting.  The pain can worsen upon exertion and can occur at rest.  No palpitations.  No orthopnea or PND.  Gets lightheadedness sometimes.  No blackout spell.  No swelling.  No pain in leg or leg redness.  Thinks has some progressive weight gain 2 pounds due to good appetite.  Takes medicines.  His wife brought him to the ER.  In ER underwent extensive laboratory studies and testing.  Initial troponin negative.  Vital signs: Pulse rate 71 /93 RR 16 afebrile O2 sat 98% physical exam: Cardiac normal rate and rhythm normal heart sounds respiratory normal breath sounds and effort.  No respiratory distress.  No wheezes.  No rales.  Tenderness parasternal chest tenderness.  Chest x-ray no acute.  EKG at 1125 sinus rhythm, pulse rate 55, no ST-T abnormality; no change in comparison to prior  "2/18/17.  CBC 5.32 Ordered aspirin 325 mg oral, patient resting comfortably.  No acute distress.  Admitted for observation status.  At some point repeat vital signs: BP 1:15/82 pulse rate 58 afebrile O2 sat 96%.  Patient denies chest pain during interview and asking and wanting to eat because feels fine.  No chest pain now.    The patient underwent a Treadmill stress test report from 4/20/17- Arrhythmias were significant. Equivocal ECG evidence of myocardial ischemia. Negative clinical evidence of myocardial ischemia. Findings consistent with an abnormal ECG stress test.  ECHO report from 4/20/17- Left Ventricle: Calculated EF = 52.9%. Trace-to-mild mitral valve regurgitation. Trace to mild tricuspid valve regurgitation is present. There is no evidence of pericardial effusion.       Cardiac risk factors: No diabetes.  Positive hypertension.  Positive for hyperlipidemia Advanced age former smoker, quit about 18 years ago, smoking included 3.5 pack year smoking history.  No smokeless tobacco.      Allergies   Allergen Reactions   • Amoxicillin Itching   • Penicillins             Current Meds:   Current Facility-Administered Medications   Medication Dose Route Frequency Provider Last Rate Last Dose   • sodium chloride 0.9 % flush 10 mL  10 mL Intravenous PRN Jeremiah Flores MD       • sodium chloride 0.9 % flush 10 mL  10 mL Intravenous PRN Jeremiah Flores MD             Medication Review:          Objective     Vital Signs  Temp:  [97.6 °F (36.4 °C)-97.8 °F (36.6 °C)] 97.6 °F (36.4 °C)  Heart Rate:  [51-75] 72  Resp:  [16-18] 18  BP: (108-122)/(69-93) 116/78    /78 (BP Location: Left arm, Patient Position: Lying)  Pulse 72  Temp 97.6 °F (36.4 °C) (Oral)   Resp 18  Ht 72\" (182.9 cm)  Wt 220 lb (99.8 kg)  SpO2 96%  BMI 29.84 kg/m2    OBJNOHEADERBEGIN@ Physical Exam   Constitutional: He is oriented to person, place, and time. He appears well-developed and well-nourished. No distress.   HENT:   Head: " Normocephalic and atraumatic.   Right Ear: External ear normal.   Left Ear: External ear normal.   Mouth/Throat: Oropharynx is clear and moist. No oropharyngeal exudate.   Eyes: Conjunctivae and EOM are normal. Pupils are equal, round, and reactive to light. No scleral icterus.   Neck: Normal range of motion. Neck supple. No JVD present. No tracheal deviation present. No thyromegaly present.   Cardiovascular: Normal rate, regular rhythm, S1 normal, S2 normal, normal heart sounds and intact distal pulses.  PMI is not displaced.  Exam reveals no gallop, no distant heart sounds, no friction rub and no decreased pulses.    No murmur heard.  Pulmonary/Chest: Effort normal and breath sounds normal. No accessory muscle usage. No respiratory distress. He has no wheezes. He has no rales. He exhibits no tenderness.   Abdominal: Soft. Bowel sounds are normal. He exhibits no distension and no mass. There is no tenderness. There is no rebound and no guarding.   Musculoskeletal: Normal range of motion. He exhibits no edema, tenderness or deformity.   Lymphadenopathy:     He has no cervical adenopathy.   Neurological: He is alert and oriented to person, place, and time. He has normal reflexes. No cranial nerve deficit. Coordination normal.   Skin: Skin is dry. No rash noted. He is not diaphoretic. No erythema. No pallor.   Psychiatric: He has a normal mood and affect.         WEIGHTS:     Wt Readings from Last 1 Encounters:   07/22/17 1119 220 lb (99.8 kg)       Results Review:     I reviewed the patient's new clinical results    LABS:  Lab Results   Component Value Date    CALCIUM 9.4 07/22/2017    PHOS 1.2 (L) 02/16/2017   @LABRCNTIP(MG:3,NA:3,K:3,CL:3,co2:3,bun:3,  creatinine:3,labglom:3,glucose,calcium:3,WBC:3,HGB:3,PLT:3,ALT:3,AST:3)@  Lab Results   Component Value Date    TROPONINT <0.010 07/22/2017     Estimated Creatinine Clearance: 105.6 mL/min (by C-G formula based on Cr of 0.91).  Lab Results   Component Value Date     CHOL 209 (H) 05/16/2017    CHOL 219 (H) 04/14/2017    CHOL 202 (H) 02/15/2017     Lab Results   Component Value Date    HDL 46 05/16/2017    HDL 53 04/14/2017    HDL 42 02/15/2017     Lab Results   Component Value Date     (H) 08/17/2016     Lab Results   Component Value Date    TRIG 146 05/16/2017    TRIG 134 04/14/2017    TRIG 161 (H) 02/15/2017     No components found for: CHOLHDL  @RESUFAST(GLUCOSE,BUN,CREATININE,EGFRIFNONA,EGFRIFAFRI,BCR,  SODIUM,POTASSIUM,CHLORIDE,CO2,CALCIUM,PROTENTOTREF,ALBUMIN,  GLOBULIN,LABIL2,BILIRUBIN,ALK PHOS,AST,ALT)@  @RESUFAST(GLUCOSE,CALCIUM,NA,K,CO2,CL,BUN,CREATININE,EGFRIFAFRI,  EGFRIFNONA,BCR,ANIONGAP)@  Lab Results   Component Value Date    WBC 5.32 07/22/2017    HGB 14.9 07/22/2017    HCT 43.1 07/22/2017    MCV 90.2 07/22/2017     07/22/2017     No results found for: DDIMER  Lab Results   Component Value Date    TSH 0.283 02/16/2017     No results found for: CKTOTAL  No results found for: DIGOXIN  Lab Results   Component Value Date    INR 1.10 02/18/2017    INR 1.05 02/16/2017    INR 1.02 02/16/2017    PROTIME 13.8 02/18/2017    PROTIME 13.3 02/16/2017    PROTIME 13.0 02/16/2017     Estimated Creatinine Clearance: 105.6 mL/min (by C-G formula based on Cr of 0.91).      Results from last 7 days  Lab Units 07/22/17  1140   TROPONIN T ng/mL <0.010           Results from last 7 days  Lab Units 07/22/17  1140   SODIUM mmol/L 140   POTASSIUM mmol/L 3.9   BUN mg/dL 11   CREATININE mg/dL 0.91   CALCIUM mg/dL 9.4     @LABRCNTIPbnp@    Results from last 7 days  Lab Units 07/22/17  1140   WBC 10*3/mm3 5.32   HEMOGLOBIN g/dL 14.9   HEMATOCRIT % 43.1   PLATELETS 10*3/mm3 194           Lab Results (last 24 hours)     Procedure Component Value Units Date/Time    CBC & Differential [[id number]] Collected:  07/22/17 1140    Specimen:  Blood Updated:  07/22/17 1156    Narrative:       The following orders were created for panel order CBC & Differential.  Procedure                                Abnormality         Status                     ---------                               -----------         ------                     CBC Auto Differential[[id number]]        Abnormal            Final result                 Please view results for these tests on the individual orders.    CBC Auto Differential [[id number]]  (Abnormal) Collected:  07/22/17 1140    Specimen:  Blood Updated:  07/22/17 1156     WBC 5.32 10*3/mm3      RBC 4.78 10*6/mm3      Hemoglobin 14.9 g/dL      Hematocrit 43.1 %      MCV 90.2 fL      MCH 31.2 pg      MCHC 34.6 g/dL      RDW 12.6 %      RDW-SD 41.6 fl      MPV 11.0 fL      Platelets 194 10*3/mm3      Neutrophil % 47.7 %      Lymphocyte % 24.1 %      Monocyte % 19.5 (H) %      Eosinophil % 8.1 (H) %      Basophil % 0.6 %      Immature Grans % 0.0 %      Neutrophils, Absolute 2.54 10*3/mm3      Lymphocytes, Absolute 1.28 10*3/mm3      Monocytes, Absolute 1.04 10*3/mm3      Eosinophils, Absolute 0.43 10*3/mm3      Basophils, Absolute 0.03 10*3/mm3      Immature Grans, Absolute 0.00 10*3/mm3     Comprehensive Metabolic Panel [[id number]]  (Abnormal) Collected:  07/22/17 1140    Specimen:  Blood Updated:  07/22/17 1215     Glucose 89 mg/dL      BUN 11 mg/dL      Creatinine 0.91 mg/dL      Sodium 140 mmol/L      Potassium 3.9 mmol/L      Chloride 107 mmol/L      CO2 21.8 (L) mmol/L      Calcium 9.4 mg/dL      Total Protein 7.1 g/dL      Albumin 4.40 g/dL      ALT (SGPT) 25 U/L      AST (SGOT) 21 U/L      Alkaline Phosphatase 69 U/L      Total Bilirubin 0.5 mg/dL      eGFR Non African Amer 85 mL/min/1.73      Globulin 2.7 gm/dL      A/G Ratio 1.6 g/dL      BUN/Creatinine Ratio 12.1     Anion Gap 11.2 mmol/L     Troponin [[id number]]  (Normal) Collected:  07/22/17 1140    Specimen:  Blood Updated:  07/22/17 1218     Troponin T <0.010 ng/mL     Narrative:       Troponin T Reference Ranges:  Less than 0.03 ng/mL:    Negative for AMI  0.03 to 0.09 ng/mL:      Indeterminant for  AMI  Greater than 0.09 ng/mL: Positive for AMI    [facility] Draw [[id number]] Collected:  07/22/17 1140    Specimen:  Blood Updated:  07/22/17 1301    Narrative:       The following orders were created for panel order [facility] Draw.  Procedure                               Abnormality         Status                     ---------                               -----------         ------                     Light Blue Top[[id number]]                                   Final result               Green Top (Gel)[[id number]]                                  Final result               Lavender Top[[id number]]                                     Final result               Gold Top - SST[[id number]]                                   Final result                 Please view results for these tests on the individual orders.    Light Blue Top [[id number]] Collected:  07/22/17 1140    Specimen:  Blood Updated:  07/22/17 1301     Extra Tube hold for add-on      Auto resulted       Green Top (Gel) [[id number]] Collected:  07/22/17 1140    Specimen:  Blood Updated:  07/22/17 1301     Extra Tube Hold for add-ons.      Auto resulted.       Lavender Top [[id number]] Collected:  07/22/17 1140    Specimen:  Blood Updated:  07/22/17 1301     Extra Tube hold for add-on      Auto resulted       Gold Top - SST [[id number]] Collected:  07/22/17 1140    Specimen:  Blood Updated:  07/22/17 1301     Extra Tube Hold for add-ons.      Auto resulted.             Imaging Results (last 72 hours)     Procedure Component Value Units Date/Time    XR Chest 2 View [[id number]] Collected:  07/22/17 1155     Updated:  07/22/17 1159    Narrative:       XR CHEST 2 VW-     HISTORY: Male who is 61 years-old,  chest pain     TECHNIQUE: Frontal and lateral views of the chest     COMPARISON: 2/16/2017     FINDINGS: Heart, mediastinum and pulmonary vasculature are unremarkable.  No focal pulmonary consolidation, pleural effusion, or pneumothorax. No  acute osseous process.        Impression:       No focal pulmonary consolidation. Follow-up as clinical  indications persist.     This report was finalized on 7/22/2017 11:56 AM by Dr. Ld Casas MD.             Imaging Results (all)     Procedure Component Value Units Date/Time    XR Chest 2 View [[id number]] Collected:  07/22/17 1155     Updated:  07/22/17 1159    Narrative:       XR CHEST 2 VW-     HISTORY: Male who is 61 years-old,  chest pain     TECHNIQUE: Frontal and lateral views of the chest     COMPARISON: 2/16/2017     FINDINGS: Heart, mediastinum and pulmonary vasculature are unremarkable.  No focal pulmonary consolidation, pleural effusion, or pneumothorax. No  acute osseous process.       Impression:       No focal pulmonary consolidation. Follow-up as clinical  indications persist.     This report was finalized on 7/22/2017 11:56 AM by Dr. Ld Casas MD.             ECG/EMG Results (last 24 hours)     Procedure Component Value Units Date/Time    ECG 12 Lead [[id number]] Collected:  07/22/17 1125     Updated:  07/22/17 1128        Cardiac catheterization 2/15/17  Conclusion         · Successful left heart catheter  · Successful thrombectomy of the ramus branch  · Successful angioplasty and stent to the ramus branch reduced from 100% to 0% with 2.5/15 XIENCE dilated to 2.6  · Mild global hypokinetic estimated ejection fraction 40%  · LAD 20-30% diffuse irregularity  · Normal left main  · Circumflex had a ramus branch which was and plasty and otherwise was normal  · Cor dominant RCA midportion 60%      INDICATIONS:  The patient is a acute lateral wall myocardial infarction     PROCEDURE:  Left heart cath, coronary angiography, left ventriculography and coronary stenting.                         Successful thrombectomy of the ramus branch     Treadmill stress test 4/20/17  · Arrhythmias were significant.  · Equivocal ECG evidence of myocardial ischemia.Negative clinical evidence of myocardial ischemia. Findings  consistent with an abnormal ECG stress test      Transthoracic echocardiogram 4/20/17  Interpretation Summary      · Left Ventricle: Calculated EF = 52.9%.  · Trace-to-mild mitral valve regurgitation  · Trace to mild tricuspid valve regurgitation is present  · There is no evidence of pericardial effusion                 Assessment:         [unfilled]    Active Problems:    Chest pain      Assessment/Plan   -Chest pain  -Coronary artery disease with stenting-Successful thrombectomy of the ramus branch, Successful angioplasty and stent to the ramus branch reduced from 100% to 0% with 2.5/15 XIENCE dilated to 2.6, Mild global hypokinetic estimated ejection fraction 40%,  LAD 20-30% diffuse irregularity, Normal left main, circumflex had a ramus branch which was and plasty and otherwise was normal, Cor dominant RCA midportion 60% on 2/15/17  -Parasternal chest tenderness  -Hypertension- controlled  -Hyperlipidemia  - Cough, dry, nonproductive, occasional  -GERD    Is chest pain free now.  Initial troponin negative.  Will obtain laboratory studies repeat troponin, obtain cardiac enzyme, TSH, mag, proBNP, lipase,.  Lipid profile in a.m.  EKG and in a.m.  Monitor troponin, cardiac enzyme and EKG.  Consider Cardiolite stress test to evaluate for myocardial ischemia  Has a cough, consider possibly ACE inhibitor cough    I not only counseled the patient today on the significant factors noted in the assessment and plan, but I also recommended that the patient reduce salt and saturated animal fat intake in diet, as well as to perform scheduled exercise on a regular basis.       management by ANGELICA Parker  07/22/17  5:33 PM     I agree with above note.  I personally carried out physical exam, reviewed all pertinent clinical data, discussed assessment with APRN, then determined optimal plan.    I have also discussed the assessment and plan with family.    61-year-old white male,  patient of Dr. Billings, history of MI in February 2017, undergoing stent at the time, history of hypertension, GERD, who presents with chest pain.  Chest pain is similar to the pain he had with his heart attack.  He states that after the stent he didn't have any pain for a few months but then it started back up a couple months ago.  The pains is in his substernal area, starting with a brief stabbing pain and then turning into a dull pain in the last about 5 minutes subsiding spontaneously.  The pain will go to the left lateral chest, associated with numbness in his left arm and tingling and his last 2 fingers of his left hand.  He feels hot and sweaty and short of breath but no nausea.  He also has noted that he is taking more and acids which she had been doing prior to his heart attack as well.  His EKGs are completely normal as are his cardiac enzymes.  However, given the circumstances and cardiac risk factors, we'll proceed with exercise Cardiolite stress test.  Is any abnormality on that test, then cardiac catheterization will be indicated.    Consider Toradol or GI cocktail if stress test is normal.    The chest pain is atypical for angina though in some ways as it seemed to worsen with movement or palpation.  There is a possibility that he may be having statin-induced myalgia.  I will hold his pravastatin.    He does snore and has never had a sleep study.  Echocardiogram will be done.  It was right ventricle is enlarged are hypokinetic, will recommend a sleep study as an outpatient.    Ld Ochoa M.D.  July 22, 2017      [employer] Dragon/Transcription disclaimer:   Much of this encounter note is an electronic transcription/translation of spoken language to printed text. The electronic translation of spoken language may permit erroneous, or at times, nonsensical words or phrases to be inadvertently transcribed; Although I have reviewed the note for such errors, some may still exist.

## 2017-07-22 NOTE — PLAN OF CARE
"Problem: Patient Care Overview (Adult)  Goal: Plan of Care Review  Outcome: Ongoing (interventions implemented as appropriate)    07/22/17 5593   Coping/Psychosocial Response Interventions   Plan Of Care Reviewed With patient   Patient Care Overview   Progress no change   Outcome Evaluation   Outcome Summary/Follow up Plan pt admitted from ER with c/o chest pain & left arm \"heaviness\". pt reports chest pain at a 2/10 right now denies SOA. VSS. Will continue to monitor. NPO after midnight       Goal: Adult Individualization and Mutuality  Outcome: Ongoing (interventions implemented as appropriate)  Goal: Discharge Needs Assessment  Outcome: Ongoing (interventions implemented as appropriate)    Problem: Acute Coronary Syndrome (ACS) (Adult)  Goal: Signs and Symptoms of Listed Potential Problems Will be Absent or Manageable (Acute Coronary Syndrome)  Outcome: Ongoing (interventions implemented as appropriate)      "

## 2017-07-23 ENCOUNTER — APPOINTMENT (OUTPATIENT)
Dept: NUCLEAR MEDICINE | Facility: HOSPITAL | Age: 62
End: 2017-07-23

## 2017-07-23 LAB
ANION GAP SERPL CALCULATED.3IONS-SCNC: 14.9 MMOL/L
BH CV NUCLEAR PRIOR STUDY: 3
BH CV STRESS COMMENTS STAGE 1: NORMAL
BH CV STRESS DOSE REGADENOSON STAGE 1: 0.4
BH CV STRESS DURATION MIN STAGE 1: 0
BH CV STRESS DURATION SEC STAGE 1: 15
BH CV STRESS PROTOCOL 1: NORMAL
BH CV STRESS PROTOCOL 2 COMMENTS STAGE 1: NORMAL
BH CV STRESS PROTOCOL 2 DOSE REGADENOSON STAGE 1: 0.4
BH CV STRESS PROTOCOL 2 DURATION MIN STAGE 1: 0
BH CV STRESS PROTOCOL 2 DURATION SEC STAGE 1: 15
BH CV STRESS PROTOCOL 2 STAGE 1: 1
BH CV STRESS PROTOCOL 2: NORMAL
BH CV STRESS RECOVERY BP: NORMAL MMHG
BH CV STRESS RECOVERY HR: 88 BPM
BH CV STRESS STAGE 1: 1
BUN BLD-MCNC: 13 MG/DL (ref 8–23)
BUN/CREAT SERPL: 12.3 (ref 7–25)
CALCIUM SPEC-SCNC: 9 MG/DL (ref 8.6–10.5)
CHLORIDE SERPL-SCNC: 104 MMOL/L (ref 98–107)
CK MB SERPL-CCNC: 2.08 NG/ML
CK SERPL-CCNC: 111 U/L (ref 20–200)
CO2 SERPL-SCNC: 24.1 MMOL/L (ref 22–29)
CREAT BLD-MCNC: 1.06 MG/DL (ref 0.76–1.27)
DEPRECATED RDW RBC AUTO: 43.6 FL (ref 37–54)
ERYTHROCYTE [DISTWIDTH] IN BLOOD BY AUTOMATED COUNT: 13 % (ref 11.5–14.5)
GFR SERPL CREATININE-BSD FRML MDRD: 71 ML/MIN/1.73
GLUCOSE BLD-MCNC: 103 MG/DL (ref 65–99)
HCT VFR BLD AUTO: 42.1 % (ref 40.4–52.2)
HGB BLD-MCNC: 14.2 G/DL (ref 13.7–17.6)
LV EF NUC BP: 64 %
MAGNESIUM SERPL-MCNC: 2.3 MG/DL (ref 1.6–2.4)
MAXIMAL PREDICTED HEART RATE: 160 BPM
MCH RBC QN AUTO: 31.3 PG (ref 27–32.7)
MCHC RBC AUTO-ENTMCNC: 33.7 G/DL (ref 32.6–36.4)
MCV RBC AUTO: 92.7 FL (ref 79.8–96.2)
NT-PROBNP SERPL-MCNC: 118 PG/ML (ref 5–900)
PERCENT MAX PREDICTED HR: 77.5 %
PLATELET # BLD AUTO: 177 10*3/MM3 (ref 140–500)
PMV BLD AUTO: 11.2 FL (ref 6–12)
POTASSIUM BLD-SCNC: 4.8 MMOL/L (ref 3.5–5.2)
RBC # BLD AUTO: 4.54 10*6/MM3 (ref 4.6–6)
SODIUM BLD-SCNC: 143 MMOL/L (ref 136–145)
STRESS BASELINE BP: NORMAL MMHG
STRESS BASELINE HR: 68 BPM
STRESS PERCENT HR: 91 %
STRESS POST ESTIMATED WORKLOAD: 7.1 METS
STRESS POST EXERCISE DUR MIN: 7 MIN
STRESS POST EXERCISE DUR SEC: 20 SEC
STRESS POST PEAK BP: NORMAL MMHG
STRESS POST PEAK HR: 124 BPM
STRESS TARGET HR: 136 BPM
TROPONIN T SERPL-MCNC: <0.01 NG/ML (ref 0–0.03)
TROPONIN T SERPL-MCNC: <0.01 NG/ML (ref 0–0.03)
WBC NRBC COR # BLD: 7.4 10*3/MM3 (ref 4.5–10.7)

## 2017-07-23 PROCEDURE — 25010000002 REGADENOSON 0.4 MG/5ML SOLUTION: Performed by: INTERNAL MEDICINE

## 2017-07-23 PROCEDURE — 82550 ASSAY OF CK (CPK): CPT | Performed by: INTERNAL MEDICINE

## 2017-07-23 PROCEDURE — 85027 COMPLETE CBC AUTOMATED: CPT | Performed by: NURSE PRACTITIONER

## 2017-07-23 PROCEDURE — 93010 ELECTROCARDIOGRAM REPORT: CPT | Performed by: INTERNAL MEDICINE

## 2017-07-23 PROCEDURE — 84484 ASSAY OF TROPONIN QUANT: CPT | Performed by: NURSE PRACTITIONER

## 2017-07-23 PROCEDURE — 82553 CREATINE MB FRACTION: CPT | Performed by: INTERNAL MEDICINE

## 2017-07-23 PROCEDURE — 83880 ASSAY OF NATRIURETIC PEPTIDE: CPT | Performed by: INTERNAL MEDICINE

## 2017-07-23 PROCEDURE — 93017 CV STRESS TEST TRACING ONLY: CPT

## 2017-07-23 PROCEDURE — 83735 ASSAY OF MAGNESIUM: CPT | Performed by: NURSE PRACTITIONER

## 2017-07-23 PROCEDURE — 0 TECHNETIUM SESTAMIBI: Performed by: INTERNAL MEDICINE

## 2017-07-23 PROCEDURE — 93016 CV STRESS TEST SUPVJ ONLY: CPT | Performed by: INTERNAL MEDICINE

## 2017-07-23 PROCEDURE — 78452 HT MUSCLE IMAGE SPECT MULT: CPT | Performed by: INTERNAL MEDICINE

## 2017-07-23 PROCEDURE — G0378 HOSPITAL OBSERVATION PER HR: HCPCS

## 2017-07-23 PROCEDURE — A9500 TC99M SESTAMIBI: HCPCS | Performed by: INTERNAL MEDICINE

## 2017-07-23 PROCEDURE — 93018 CV STRESS TEST I&R ONLY: CPT | Performed by: INTERNAL MEDICINE

## 2017-07-23 PROCEDURE — 80048 BASIC METABOLIC PNL TOTAL CA: CPT | Performed by: NURSE PRACTITIONER

## 2017-07-23 PROCEDURE — 93005 ELECTROCARDIOGRAM TRACING: CPT | Performed by: INTERNAL MEDICINE

## 2017-07-23 PROCEDURE — 99226 PR SBSQ OBSERVATION CARE/DAY 35 MINUTES: CPT | Performed by: INTERNAL MEDICINE

## 2017-07-23 PROCEDURE — 78452 HT MUSCLE IMAGE SPECT MULT: CPT

## 2017-07-23 RX ORDER — SODIUM CHLORIDE 9 MG/ML
75 INJECTION, SOLUTION INTRAVENOUS CONTINUOUS
Status: DISCONTINUED | OUTPATIENT
Start: 2017-07-24 | End: 2017-07-24 | Stop reason: HOSPADM

## 2017-07-23 RX ORDER — ASPIRIN 81 MG/1
324 TABLET, CHEWABLE ORAL DAILY
Status: DISCONTINUED | OUTPATIENT
Start: 2017-07-24 | End: 2017-07-24 | Stop reason: HOSPADM

## 2017-07-23 RX ORDER — ISOSORBIDE MONONITRATE 30 MG/1
30 TABLET, EXTENDED RELEASE ORAL
Status: DISCONTINUED | OUTPATIENT
Start: 2017-07-23 | End: 2017-07-24 | Stop reason: HOSPADM

## 2017-07-23 RX ADMIN — ISOSORBIDE MONONITRATE 30 MG: 30 TABLET ORAL at 21:19

## 2017-07-23 RX ADMIN — ASPIRIN 81 MG: 81 TABLET, CHEWABLE ORAL at 12:08

## 2017-07-23 RX ADMIN — Medication 1 DOSE: at 10:15

## 2017-07-23 RX ADMIN — CARVEDILOL 6.25 MG: 6.25 TABLET, FILM COATED ORAL at 21:19

## 2017-07-23 RX ADMIN — LISINOPRIL 2.5 MG: 2.5 TABLET ORAL at 12:08

## 2017-07-23 RX ADMIN — CLOPIDOGREL 75 MG: 75 TABLET, FILM COATED ORAL at 12:08

## 2017-07-23 RX ADMIN — ESCITALOPRAM 10 MG: 10 TABLET, FILM COATED ORAL at 12:08

## 2017-07-23 RX ADMIN — Medication 1 DOSE: at 07:15

## 2017-07-23 RX ADMIN — CARVEDILOL 6.25 MG: 6.25 TABLET, FILM COATED ORAL at 12:08

## 2017-07-23 RX ADMIN — CYANOCOBALAMIN TAB 500 MCG 1000 MCG: 500 TAB at 12:09

## 2017-07-23 RX ADMIN — REGADENOSON 0.4 MG: 0.08 INJECTION, SOLUTION INTRAVENOUS at 10:15

## 2017-07-23 NOTE — PLAN OF CARE
Problem: Patient Care Overview (Adult)  Goal: Plan of Care Review  Outcome: Ongoing (interventions implemented as appropriate)    07/23/17 1514   Coping/Psychosocial Response Interventions   Plan Of Care Reviewed With patient   Patient Care Overview   Progress no change   Outcome Evaluation   Outcome Summary/Follow up Plan pt had stress test today. still awaiting results. pain has had no complaints of chest pain since arrival back to the floor. VSS. will continue to monitor.        Goal: Adult Individualization and Mutuality  Outcome: Ongoing (interventions implemented as appropriate)  Goal: Discharge Needs Assessment  Outcome: Ongoing (interventions implemented as appropriate)    Problem: Acute Coronary Syndrome (ACS) (Adult)  Goal: Signs and Symptoms of Listed Potential Problems Will be Absent or Manageable (Acute Coronary Syndrome)  Outcome: Ongoing (interventions implemented as appropriate)

## 2017-07-23 NOTE — PROGRESS NOTES
[facility]      Patient ID: Sander White   Age: 61 y.o.  Sex: male  :  1955  MRN: [id number]                LOS: 0 days   Patient Care Team:  Husam Gomez MD as PCP - General (Internal Medicine)      Referring MD:  Kristina Billings MD    .  Chief Complaint   Patient presents with   • Chest Pain     x 2 weeks       Admitting Diagnosis (chief complaint):  <principal problem not specified>    HPI       Subjective     ROS    Past Medical History:   Diagnosis Date   • Arthritis    • Chronic pain in right foot    • Coronary artery disease    • ED (erectile dysfunction) 7/15/2016   • GERD (gastroesophageal reflux disease)    • Hypertension    • Myocardial infarction        Past Surgical History:   Procedure Laterality Date   • APPENDECTOMY     • CARDIAC CATHETERIZATION N/A 2/15/2017    Procedure: Left Heart Cath;  Surgeon: Kristina Billings MD;  Location: [facility] CATH INVASIVE LOCATION;  Service:    • CARDIAC CATHETERIZATION  2/15/2017    Procedure: Percutaneous Manual Thrombectomy;  Surgeon: Kristina Billings MD;  [facility];  Service:    • CARDIAC CATHETERIZATION N/A 2/15/2017    Procedure: Coronary angiography;  Surgeon: Kristina Billings MD;  [facility];  Service:    • CARDIAC CATHETERIZATION N/A 2/15/2017    Procedure: Left ventriculography;  Surgeon: Kristina Billings MD;  [facility];  Service:    • CHOLECYSTECTOMY     • COLONOSCOPY N/A 2015    Normal   • CYST REMOVAL N/A     Neck   • HERNIA REPAIR Right     Inguinal   • PERIPHERAL ARTERIAL STENT GRAFT     • MS RT/LT HEART CATHETERS N/A 2/15/2017    Procedure: Percutaneous Coronary Intervention;  Surgeon: Kristina Billings MD;  Location: [facility] CATH INVASIVE LOCATION;  Service: Cardiovascular       Social History     Social History   • Marital status:      Spouse name: N/A   • Number of children: N/A   • Years of  education: N/A     Occupational History   • Not on file.     Social History Main Topics   • Smoking status: Former Smoker     Packs/day: 0.50     Years: 7.00     Types: Cigarettes     Quit date: 1999   • Smokeless tobacco: Never Used   • Alcohol use Yes   • Drug use: No   • Sexual activity: Defer     Other Topics Concern   • Not on file     Social History Narrative       Family History   Problem Relation Age of Onset   • Diabetes Mother      Type 2   • Diabetes Father      Type 2   • Stroke Father    • Aneurysm Brother      Brain       History of Present Illness  This is a [age]-year-old white male, known by Dr. Kristina Billings, the history for coronary artery disease with myocardial infarction February 14, 2017, underwent angioplasty and stent, hypertension, rhinitis, GERD, presents for complaint of chest pain.  Information obtained from medical record and patient.  No family present.  He states having chest pain off and on for approximately 2 months but didn't think much of it.  He thinks his chest pain is similar to when he had heart attack back in February however is not as intense.  He states his chest pain is similar to his history of reflux with indigestion.  He states his chest pains worsen lately and presented to this hospital emergency room to seek evaluation treatment.  He describes chest pain as substernal, stabbing, sharp, pain level 5/10, radiation to left chest, left arm numbness, lasting approximately 2-3 minutes.  No chest pain now.  Associated diaphoresis, feeling hot, sweaty and hard to take a deep breath.  No nausea.  No worse with movement, position, deep breaths..  Slight chest soreness.  No recent falls.  Sleeps on left side wonders if that was causing his chest discomfort.  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  No heavy lifting.  The pain can worsen upon exertion and can occur at rest.  No palpitations.  No orthopnea or PND.  Gets lightheadedness sometimes.  No blackout  spell.  No swelling.  No pain in leg or leg redness.  Thinks has some progressive weight gain 2 pounds due to good appetite.  Takes medicines.  His wife brought him to the ER.  In ER underwent extensive laboratory studies and testing.  Initial troponin negative.  Vital signs: Pulse rate 71 /93 RR 16 afebrile O2 sat 98% physical exam: Cardiac normal rate and rhythm normal heart sounds respiratory normal breath sounds and effort.  No respiratory distress.  No wheezes.  No rales.  Tenderness parasternal chest tenderness.  Chest x-ray no acute.  EKG at 1125 sinus rhythm, pulse rate 55, no ST-T abnormality; no change in comparison to prior 2/18/17.  CBC 5.32 Ordered aspirin 325 mg oral, patient resting comfortably.  No acute distress.  Admitted for observation status.  At some point repeat vital signs: BP 1:15/82 pulse rate 58 afebrile O2 sat 96%.  Patient denies chest pain during interview and asking and wanting to eat because feels fine.  No chest pain now.     The patient underwent a Treadmill stress test report from 4/20/17- Arrhythmias were significant. Equivocal ECG evidence of myocardial ischemia. Negative clinical evidence of myocardial ischemia. Findings consistent with an abnormal ECG stress test.  ECHO report from 4/20/17- Left Ventricle: Calculated EF = 52.9%. Trace-to-mild mitral valve regurgitation. Trace to mild tricuspid valve regurgitation is present. There is no evidence of pericardial effusion.      Allergies   Allergen Reactions   • Amoxicillin Itching   • Penicillins             Current Meds:   Current Facility-Administered Medications   Medication Dose Route Frequency Provider Last Rate Last Dose   • acetaminophen (TYLENOL) tablet 650 mg  650 mg Oral Q6H PRN ANGELICA Amin       • aspirin chewable tablet 81 mg  81 mg Oral Daily ANGELICA Amin       • carvedilol (COREG) tablet 6.25 mg  6.25 mg Oral Q12H ANGELICA Amin   6.25 mg at 07/22/17 2102   • clopidogrel (PLAVIX)  "tablet 75 mg  75 mg Oral Daily ANGELICA Amin       • escitalopram (LEXAPRO) tablet 10 mg  10 mg Oral Daily Hamida Mckee APRN   10 mg at 07/22/17 2102   • lisinopril (PRINIVIL,ZESTRIL) tablet 2.5 mg  2.5 mg Oral Daily ANGELICA Amin       • nitroglycerin (NITROSTAT) SL tablet 0.4 mg  0.4 mg Sublingual Q5 Min PRN ANGELICA Amin       • pantoprazole (PROTONIX) EC tablet 40 mg  40 mg Oral QAM ANGELICA Amin       • sodium chloride 0.9 % flush 1-10 mL  1-10 mL Intravenous PRN ANGELICA Amin       • sodium chloride 0.9 % flush 10 mL  10 mL Intravenous PRN Jeremiah Flores MD       • sodium chloride 0.9 % flush 10 mL  10 mL Intravenous PRN Jeremiah Flores MD       • vitamin B-12 (CYANOCOBALAMIN) tablet 1,000 mcg  1,000 mcg Oral Daily ANGELICA Amin   1,000 mcg at 07/22/17 2103         Medication Review:     aspirin 81 mg Oral Daily   carvedilol 6.25 mg Oral Q12H   clopidogrel 75 mg Oral Daily   escitalopram 10 mg Oral Daily   lisinopril 2.5 mg Oral Daily   pantoprazole 40 mg Oral QAM   vitamin B-12 1,000 mcg Oral Daily         Objective     Vital Signs  Temp:  [97.5 °F (36.4 °C)-97.8 °F (36.6 °C)] 97.5 °F (36.4 °C)  Heart Rate:  [51-75] 72  Resp:  [16-18] 18  BP: ()/(59-93) 93/59    BP 93/59 (BP Location: Left arm, Patient Position: Lying)  Pulse 72  Temp 97.5 °F (36.4 °C) (Oral)   Resp 18  Ht 72\" (182.9 cm)  Wt 220 lb (99.8 kg)  SpO2 96%  BMI 29.84 kg/m2   Dr Ochoa  OBJNOHEADERBEGIN@ Physical Exam   Constitutional: He is oriented to person, place, and time. He appears well-developed and well-nourished. No distress.   HENT:   Head: Normocephalic and atraumatic.   Right Ear: External ear normal.   Left Ear: External ear normal.   Mouth/Throat: Oropharynx is clear and moist. No oropharyngeal exudate.   Eyes: Conjunctivae and EOM are normal. Pupils are equal, round, and reactive to light. No scleral icterus.   Neck: Normal range of motion. Neck supple. No JVD present. " No tracheal deviation present. No thyromegaly present.   Cardiovascular: Normal rate, regular rhythm, S1 normal, S2 normal, normal heart sounds and intact distal pulses.  PMI is not displaced.  Exam reveals no gallop, no distant heart sounds, no friction rub and no decreased pulses.    No murmur heard.  Pulmonary/Chest: Effort normal and breath sounds normal. No accessory muscle usage. No respiratory distress. He has no wheezes. He has no rales. He exhibits no tenderness.   Abdominal: Soft. Bowel sounds are normal. He exhibits no distension and no mass. There is no tenderness. There is no rebound and no guarding.   Musculoskeletal: Normal range of motion. He exhibits no edema, tenderness or deformity.   Lymphadenopathy:     He has no cervical adenopathy.   Neurological: He is alert and oriented to person, place, and time. He has normal reflexes. No cranial nerve deficit. Coordination normal.   Skin: Skin is dry. No rash noted. He is not diaphoretic. No erythema. No pallor.   Psychiatric: He has a normal mood and affect.         WEIGHTS:     Wt Readings from Last 1 Encounters:   07/22/17 1119 220 lb (99.8 kg)       Results Review:     I reviewed the patient's new clinical results    LABS:  Lab Results   Component Value Date    CALCIUM 9.0 07/23/2017    PHOS 2.1 (L) 07/22/2017   @LABRCNTIP(MG:3,NA:3,K:3,CL:3,co2:3,bun:3,  creatinine:3,labglom:3,glucose,calcium:3,WBC:3,HGB:3,PLT:3,ALT:3,AST:3)@  Lab Results   Component Value Date    CKTOTAL 111 07/23/2017    CKMB 2.08 07/23/2017    TROPONINT <0.010 07/23/2017     Estimated Creatinine Clearance: 90.7 mL/min (by C-G formula based on Cr of 1.06).  Lab Results   Component Value Date    CHOL 209 (H) 05/16/2017    CHOL 219 (H) 04/14/2017    CHOL 202 (H) 02/15/2017     Lab Results   Component Value Date    HDL 46 05/16/2017    HDL 53 04/14/2017    HDL 42 02/15/2017     Lab Results   Component Value Date     (H) 08/17/2016     Lab Results   Component Value Date    TRIG  146 05/16/2017    TRIG 134 04/14/2017    TRIG 161 (H) 02/15/2017     No components found for: CHOLHDL  @RESUFAST(GLUCOSE,BUN,CREATININE,EGFRIFNONA,EGFRIFAFRI,BCR,  SODIUM,POTASSIUM,CHLORIDE,CO2,CALCIUM,PROTENTOTREF,ALBUMIN,  GLOBULIN,LABIL2,BILIRUBIN,ALK PHOS,AST,ALT)@  @RESUFAST(GLUCOSE,CALCIUM,NA,K,CO2,CL,BUN,CREATININE,EGFRIFAFRI,  EGFRIFNONA,BCR,ANIONGAP)@  Lab Results   Component Value Date    WBC 7.40 07/23/2017    HGB 14.2 07/23/2017    HCT 42.1 07/23/2017    MCV 92.7 07/23/2017     07/23/2017     No results found for: DDIMER  Lab Results   Component Value Date    TSH 0.533 07/22/2017     Lab Results   Component Value Date    CKTOTAL 111 07/23/2017     No results found for: DIGOXIN  Lab Results   Component Value Date    INR 1.04 07/22/2017    INR 1.10 02/18/2017    INR 1.05 02/16/2017    PROTIME 13.2 07/22/2017    PROTIME 13.8 02/18/2017    PROTIME 13.3 02/16/2017     Estimated Creatinine Clearance: 90.7 mL/min (by C-G formula based on Cr of 1.06).      Results from last 7 days  Lab Units 07/23/17  0410 07/22/17  2333 07/22/17  1818   CK TOTAL U/L 111  --  120   TROPONIN T ng/mL <0.010 <0.010 <0.010       Results from last 7 days  Lab Units 07/23/17  0410   MAGNESIUM mg/dL 2.3       Results from last 7 days  Lab Units 07/23/17  0410   SODIUM mmol/L 143   POTASSIUM mmol/L 4.8   BUN mg/dL 13   CREATININE mg/dL 1.06   CALCIUM mg/dL 9.0     @LABRCNTIPbnp@    Results from last 7 days  Lab Units 07/23/17  0410 07/22/17  1140   WBC 10*3/mm3 7.40 5.32   HEMOGLOBIN g/dL 14.2 14.9   HEMATOCRIT % 42.1 43.1   PLATELETS 10*3/mm3 177 194       Results from last 7 days  Lab Units 07/22/17  1818   INR  1.04   APTT seconds 24.9       Lab Results (last 24 hours)     Procedure Component Value Units Date/Time    CBC & Differential [[id number]] Collected:  07/22/17 1140    Specimen:  Blood Updated:  07/22/17 1156    Narrative:       The following orders were created for panel order CBC & Differential.  Procedure                                Abnormality         Status                     ---------                               -----------         ------                     CBC Auto Differential[[id number]]        Abnormal            Final result                 Please view results for these tests on the individual orders.    CBC Auto Differential [[id number]]  (Abnormal) Collected:  07/22/17 1140    Specimen:  Blood Updated:  07/22/17 1156     WBC 5.32 10*3/mm3      RBC 4.78 10*6/mm3      Hemoglobin 14.9 g/dL      Hematocrit 43.1 %      MCV 90.2 fL      MCH 31.2 pg      MCHC 34.6 g/dL      RDW 12.6 %      RDW-SD 41.6 fl      MPV 11.0 fL      Platelets 194 10*3/mm3      Neutrophil % 47.7 %      Lymphocyte % 24.1 %      Monocyte % 19.5 (H) %      Eosinophil % 8.1 (H) %      Basophil % 0.6 %      Immature Grans % 0.0 %      Neutrophils, Absolute 2.54 10*3/mm3      Lymphocytes, Absolute 1.28 10*3/mm3      Monocytes, Absolute 1.04 10*3/mm3      Eosinophils, Absolute 0.43 10*3/mm3      Basophils, Absolute 0.03 10*3/mm3      Immature Grans, Absolute 0.00 10*3/mm3     Comprehensive Metabolic Panel [[id number]]  (Abnormal) Collected:  07/22/17 1140    Specimen:  Blood Updated:  07/22/17 1215     Glucose 89 mg/dL      BUN 11 mg/dL      Creatinine 0.91 mg/dL      Sodium 140 mmol/L      Potassium 3.9 mmol/L      Chloride 107 mmol/L      CO2 21.8 (L) mmol/L      Calcium 9.4 mg/dL      Total Protein 7.1 g/dL      Albumin 4.40 g/dL      ALT (SGPT) 25 U/L      AST (SGOT) 21 U/L      Alkaline Phosphatase 69 U/L      Total Bilirubin 0.5 mg/dL      eGFR Non African Amer 85 mL/min/1.73      Globulin 2.7 gm/dL      A/G Ratio 1.6 g/dL      BUN/Creatinine Ratio 12.1     Anion Gap 11.2 mmol/L     Troponin [[id number]]  (Normal) Collected:  07/22/17 1140    Specimen:  Blood Updated:  07/22/17 1218     Troponin T <0.010 ng/mL     Narrative:       Troponin T Reference Ranges:  Less than 0.03 ng/mL:    Negative for AMI  0.03 to 0.09 ng/mL:      Indeterminant for  AMI  Greater than 0.09 ng/mL: Positive for AMI    Wilbur Draw [[id number]] Collected:  07/22/17 1140    Specimen:  Blood Updated:  07/22/17 1301    Narrative:       The following orders were created for panel order Wilbur Draw.  Procedure                               Abnormality         Status                     ---------                               -----------         ------                     Light Blue Top[[id number]]                                   Final result               Green Top (Gel)[[id number]]                                  Final result               Lavender Top[[id number]]                                     Final result               Gold Top - SST[[id number]]                                   Final result                 Please view results for these tests on the individual orders.    Light Blue Top [[id number]] Collected:  07/22/17 1140    Specimen:  Blood Updated:  07/22/17 1301     Extra Tube hold for add-on      Auto resulted       Green Top (Gel) [[id number]] Collected:  07/22/17 1140    Specimen:  Blood Updated:  07/22/17 1301     Extra Tube Hold for add-ons.      Auto resulted.       Lavender Top [[id number]] Collected:  07/22/17 1140    Specimen:  Blood Updated:  07/22/17 1301     Extra Tube hold for add-on      Auto resulted       Gold Top - SST [[id number]] Collected:  07/22/17 1140    Specimen:  Blood Updated:  07/22/17 1301     Extra Tube Hold for add-ons.      Auto resulted.       POC Glucose Fingerstick [[id number]]  (Normal) Collected:  07/22/17 1825    Specimen:  Blood Updated:  07/22/17 1825     Glucose 82 mg/dL     Narrative:       Meter: [id number] : [id number] Garret Mcwilliams    Protime-INR [[id number]]  (Normal) Collected:  07/22/17 1818    Specimen:  Blood Updated:  07/22/17 1847     Protime 13.2 Seconds      INR 1.04    aPTT [[id number]]  (Normal) Collected:  07/22/17 1818    Specimen:  Blood Updated:  07/22/17 1847     PTT 24.9 seconds     D-dimer, Quantitative [[id number]]   (Normal) Collected:  07/22/17 1818    Specimen:  Blood Updated:  07/22/17 1849     D-Dimer, Quantitative <0.27 MCGFEU/mL     Narrative:       The Stago D-Dimer test used in conjunction with a clinical pretest probability (PTP) assessment model, has been approved by the FDA to rule out the presence of venous thromboembolism (VTE) in outpatients suspected of deep venous thrombosis (DVT) or pulmonary embolism (PE).     Hemoglobin A1c [[id number]]  (Normal) Collected:  07/22/17 1818    Specimen:  Blood Updated:  07/22/17 1855     Hemoglobin A1C 5.50 %     Narrative:       Hemoglobin A1C Ranges:    Increased Risk for Diabetes  5.7% to 6.4%  Diabetes                     >= 6.5%  Diabetic Goal                < 7.0%    Magnesium [[id number]]  (Normal) Collected:  07/22/17 1818    Specimen:  Blood Updated:  07/22/17 1900     Magnesium 2.3 mg/dL     Lipase [[id number]]  (Normal) Collected:  07/22/17 1818    Specimen:  Blood Updated:  07/22/17 1900     Lipase 45 U/L     Amylase [[id number]]  (Normal) Collected:  07/22/17 1818    Specimen:  Blood Updated:  07/22/17 1900     Amylase 34 U/L     Phosphorus [[id number]]  (Abnormal) Collected:  07/22/17 1818    Specimen:  Blood Updated:  07/22/17 1900     Phosphorus 2.1 (L) mg/dL     Troponin [[id number]]  (Normal) Collected:  07/22/17 1818    Specimen:  Blood Updated:  07/22/17 1903     Troponin T <0.010 ng/mL     Narrative:       Troponin T Reference Ranges:  Less than 0.03 ng/mL:    Negative for AMI  0.03 to 0.09 ng/mL:      Indeterminant for AMI  Greater than 0.09 ng/mL: Positive for AMI    CK Total & CKMB [[id number]]  (Normal) Collected:  07/22/17 1818    Specimen:  Blood Updated:  07/22/17 1904     CKMB 2.40 ng/mL      Creatine Kinase 120 U/L     BNP [[id number]]  (Normal) Collected:  07/22/17 1818    Specimen:  Blood Updated:  07/22/17 1914     proBNP 187.4 pg/mL     Narrative:       Among patients with dyspnea, NT-proBNP is highly sensitive for the detection of acute congestive  heart failure. In addition NT-proBNP of <300 pg/ml effectively rules out acute congestive heart failure with 99% negative predictive value.    TSH [[id number]]  (Normal) Collected:  07/22/17 1818    Specimen:  Blood Updated:  07/22/17 1914     TSH 0.533 mIU/mL     Troponin [[id number]]  (Normal) Collected:  07/22/17 2333    Specimen:  Blood Updated:  07/23/17 0042     Troponin T <0.010 ng/mL     Narrative:       Troponin T Reference Ranges:  Less than 0.03 ng/mL:    Negative for AMI  0.03 to 0.09 ng/mL:      Indeterminant for AMI  Greater than 0.09 ng/mL: Positive for AMI    CBC (No Diff) [[id number]]  (Abnormal) Collected:  07/23/17 0410    Specimen:  Blood Updated:  07/23/17 0444     WBC 7.40 10*3/mm3      RBC 4.54 (L) 10*6/mm3      Hemoglobin 14.2 g/dL      Hematocrit 42.1 %      MCV 92.7 fL      MCH 31.3 pg      MCHC 33.7 g/dL      RDW 13.0 %      RDW-SD 43.6 fl      MPV 11.2 fL      Platelets 177 10*3/mm3     Basic Metabolic Panel [[id number]]  (Abnormal) Collected:  07/23/17 0410    Specimen:  Blood Updated:  07/23/17 0508     Glucose 103 (H) mg/dL      BUN 13 mg/dL      Creatinine 1.06 mg/dL      Sodium 143 mmol/L      Potassium 4.8 mmol/L      Chloride 104 mmol/L      CO2 24.1 mmol/L      Calcium 9.0 mg/dL      eGFR Non African Amer 71 mL/min/1.73      BUN/Creatinine Ratio 12.3     Anion Gap 14.9 mmol/L     Narrative:       GFR Normal >60  Chronic Kidney Disease <60  Kidney Failure <15    Magnesium [[id number]]  (Normal) Collected:  07/23/17 0410    Specimen:  Blood Updated:  07/23/17 0508     Magnesium 2.3 mg/dL     CK [[id number]]  (Normal) Collected:  07/23/17 0410    Specimen:  Blood Updated:  07/23/17 0508     Creatine Kinase 111 U/L     BNP [[id number]]  (Normal) Collected:  07/23/17 0410    Specimen:  Blood Updated:  07/23/17 0509     proBNP 118.0 pg/mL     Narrative:       Among patients with dyspnea, NT-proBNP is highly sensitive for the detection of acute congestive heart failure. In addition NT-proBNP of  <300 pg/ml effectively rules out acute congestive heart failure with 99% negative predictive value.    CK-MB [[id number]]  (Normal) Collected:  07/23/17 0410    Specimen:  Blood Updated:  07/23/17 0509     CKMB 2.08 ng/mL     Troponin [[id number]]  (Normal) Collected:  07/23/17 0410    Specimen:  Blood Updated:  07/23/17 0927     Troponin T <0.010 ng/mL     Narrative:       Troponin T Reference Ranges:  Less than 0.03 ng/mL:    Negative for AMI  0.03 to 0.09 ng/mL:      Indeterminant for AMI  Greater than 0.09 ng/mL: Positive for AMI          Imaging Results (last 72 hours)     Procedure Component Value Units Date/Time    XR Chest 2 View [[id number]] Collected:  07/22/17 1155     Updated:  07/22/17 1159    Narrative:       XR CHEST 2 VW-     HISTORY: Male who is 61 years-old,  chest pain     TECHNIQUE: Frontal and lateral views of the chest     COMPARISON: 2/16/2017     FINDINGS: Heart, mediastinum and pulmonary vasculature are unremarkable.  No focal pulmonary consolidation, pleural effusion, or pneumothorax. No  acute osseous process.       Impression:       No focal pulmonary consolidation. Follow-up as clinical  indications persist.     This report was finalized on 7/22/2017 11:56 AM by Dr. Ld Casas MD.             Imaging Results (all)     Procedure Component Value Units Date/Time    XR Chest 2 View [[id number]] Collected:  07/22/17 1155     Updated:  07/22/17 1159    Narrative:       XR CHEST 2 VW-     HISTORY: Male who is 61 years-old,  chest pain     TECHNIQUE: Frontal and lateral views of the chest     COMPARISON: 2/16/2017     FINDINGS: Heart, mediastinum and pulmonary vasculature are unremarkable.  No focal pulmonary consolidation, pleural effusion, or pneumothorax. No  acute osseous process.       Impression:       No focal pulmonary consolidation. Follow-up as clinical  indications persist.     This report was finalized on 7/22/2017 11:56 AM by Dr. Ld Casas MD.             ECG/EMG  Results (last 24 hours)     Procedure Component Value Units Date/Time    ECG 12 Lead [[id number]] Collected:  07/22/17 1802     Updated:  07/22/17 1807    Narrative:       RR Interval= 882 ms  ID Interval= 184 ms  QRSD Interval= 100 ms  QT Interval= 412 ms  QTc Interval= 439 ms  Heart Rate= 68 ms  P Axis= 38 deg  QRS Axis= 38 deg  T Wave Axis= 82 deg  I: 40 Axis= 23 deg  T: 40 Axis= 61 deg  ST Axis= 151 deg  SINUS RHYTHM  Electronically Signed by:  Date and Time of Study: 2017-07-22 18:02:10    ECG 12 Lead [[id number]] Collected:  07/22/17 1125     Updated:  07/22/17 2341    Narrative:       RR Interval= 1091 ms  ID Interval= 168 ms  QRSD Interval= 100 ms  QT Interval= 436 ms  QTc Interval= 417 ms  Heart Rate= 55 ms  P Axis= 4 deg  QRS Axis= 45 deg  T Wave Axis= 78 deg  I: 40 Axis= 35 deg  T: 40 Axis= 85 deg  ST Axis= 149 deg  SINUS RHYTHM  NO SIGNIFICANT CHANGE FROM PREVIOUS ECG  Electronically Signed by:  Tony Fisher ([facility] 22-Jul-2017 23:39:03  Date and Time of Study: 2017-07-22 11:25:45    ECG 12 Lead [[id number]] Collected:  07/23/17 0006     Updated:  07/23/17 0011    Narrative:       RR Interval= 968 ms  ID Interval= 184 ms  QRSD Interval= 102 ms  QT Interval= 428 ms  QTc Interval= 435 ms  Heart Rate= 62 ms  P Axis= 51 deg  QRS Axis= 31 deg  T Wave Axis= 86 deg  I: 40 Axis= 18 deg  T: 40 Axis= 50 deg  ST Axis= 142 deg  SINUS RHYTHM  Electronically Signed by:  Date and Time of Study: 2017-07-23 00:06:58    ECG 12 Lead [[id number]] Collected:  07/23/17 0605     Updated:  07/23/17 0607    Narrative:       RR Interval= 1034 ms  ID Interval= 180 ms  QRSD Interval= 100 ms  QT Interval= 452 ms  QTc Interval= 445 ms  Heart Rate= 58 ms  P Axis= 58 deg  QRS Axis= 53 deg  T Wave Axis= 83 deg  I: 40 Axis= 35 deg  T: 40 Axis= 87 deg  ST Axis= 133 deg  SINUS RHYTHM  BORDERLINE T ABNORMALITIES, ANT-LAT LEADS  Electronically Signed by:  Date and Time of Study: 2017-07-23 06:05:18              Assessment:         [unfilled]    Active  Problems:    Chest pain      Assessment/Plan     -Chest pain  -Coronary artery disease with stenting-Successful thrombectomy of the ramus branch, Successful angioplasty and stent to the ramus branch reduced from 100% to 0% with 2.5/15 XIENCE dilated to 2.6, Mild global hypokinetic estimated ejection fraction 40%,  LAD 20-30% diffuse irregularity, Normal left main, circumflex had a ramus branch which was and plasty and otherwise was normal, Cor dominant RCA midportion 60% on 2/15/17  -Parasternal chest tenderness  -Hypertension- controlled  -Hyperlipidemia  - Cough, dry, nonproductive, occasional  -GERD     Is chest pain free now.  Initial troponin negative.  Will obtain laboratory studies repeat troponin, obtain cardiac enzyme, TSH, mag, proBNP, lipase,.  Lipid profile in a.m.  EKG and in a.m.  Monitor troponin, cardiac enzyme and EKG.  Consider Cardiolite stress test to evaluate for myocardial ischemia  Has a cough, consider possibly ACE inhibitor cough     I not only counseled the patient today on the significant factors noted in the assessment and plan, but I also recommended that the patient reduce salt and saturated animal fat intake in diet, as well as to perform scheduled exercise on a regular basis.      management by ANGELICA Parker  07/22/17  5:33 PM      I agree with above note.  I personally carried out physical exam, reviewed all pertinent clinical data, discussed assessment with APRN, then determined optimal plan.    I have also discussed the assessment and plan with family.     61-year-old white male, patient of Dr. Billings, history of MI in February 2017, undergoing stent at the time, history of hypertension, GERD, who presents with chest pain.  Chest pain is similar to the pain he had with his heart attack.  He states that after the stent he didn't have any pain for a few months but then it started back up a couple months ago.  The pains is  in his substernal area, starting with a brief stabbing pain and then turning into a dull pain in the last about 5 minutes subsiding spontaneously.  The pain will go to the left lateral chest, associated with numbness in his left arm and tingling and his last 2 fingers of his left hand.  He feels hot and sweaty and short of breath but no nausea.  He also has noted that he is taking more and acids which she had been doing prior to his heart attack as well.  His EKGs are completely normal as are his cardiac enzymes.  However, given the circumstances and cardiac risk factors, we'll proceed with exercise Cardiolite stress test.  Is any abnormality on that test, then cardiac catheterization will be indicated.     Consider Toradol or GI cocktail if stress test is normal.     The chest pain is atypical for angina though in some ways as it seemed to worsen with movement or palpation.  There is a possibility that he may be having statin-induced myalgia.  I will hold his pravastatin.     He does snore and has never had a sleep study.  Echocardiogram will be done.  It was right ventricle is enlarged are hypokinetic, will recommend a sleep study as an outpatient.     Ld Ochoa M.D.  July 22, 2017 7/23    Denies chest pain or chest discomfort now, comes and goes without any worsening. Troponin and cardiac enzymes are normal . EKG sinus, no acute STT abn.  Cardiolite stress test in progress    I not only counseled the patient today on the significant factors noted in the assessment and plan, but I also recommended that the patient reduce salt and saturated animal fat intake in diet, as well as to perform scheduled exercise on a regular basis.         management by Dr. Ld Ochoa-ANGELICA Peres  07/23/17  9:57 AM     I agree with above note.  I personally carried out physical exam, reviewed all pertinent clinical data, discussed assessment with ANGELICA, then determined optimal plan.    I  have also discussed the assessment and plan with family.    Cardiolite stress test was equivocal but probably normal.  There is a fixed defect in the basal inferior wall probably due to diaphragmatic attenuation artifact.  However, the patient continues to have the chest pain rating down his left arm at rest lately.  This is the same pain he was having prior to his stent in February 2017.  Therefore, I have recommended that he undergo cardiac catheterization by Dr. Billings tomorrow morning to rule out progression of coronary artery disease.  Risks and benefits of the procedure have been explained to the patient.  He agrees to proceed.    Pravastatin has been stopped in case his discomfort is due to statin--induced myalgia.    I still may try an anti-inflammatory drugs as well as his Toradol or Solu-Medrol leave the pain.    Ld Ochoa M.D.  July 23, 2017      EMR Dragon/Transcription disclaimer:   Much of this encounter note is an electronic transcription/translation of spoken language to printed text. The electronic translation of spoken language may permit erroneous, or at times, nonsensical words or phrases to be inadvertently transcribed; Although I have reviewed the note for such errors, some may still exist.

## 2017-07-23 NOTE — NURSING NOTE
7/23/2017 Walking Lexiscan cardiolite stress test completion- Over seen by Dr Ld Ochoa MD- Hamida DOMINGUEZ

## 2017-07-24 VITALS
BODY MASS INDEX: 29.8 KG/M2 | WEIGHT: 220 LBS | RESPIRATION RATE: 12 BRPM | HEART RATE: 55 BPM | HEIGHT: 72 IN | SYSTOLIC BLOOD PRESSURE: 106 MMHG | TEMPERATURE: 97.4 F | DIASTOLIC BLOOD PRESSURE: 73 MMHG | OXYGEN SATURATION: 96 %

## 2017-07-24 PROBLEM — R07.2 PRECORDIAL PAIN: Status: ACTIVE | Noted: 2017-07-22

## 2017-07-24 LAB
ALBUMIN SERPL-MCNC: 4.1 G/DL (ref 3.5–5.2)
ALBUMIN/GLOB SERPL: 1.6 G/DL
ALP SERPL-CCNC: 65 U/L (ref 39–117)
ALT SERPL W P-5'-P-CCNC: 19 U/L (ref 1–41)
ANION GAP SERPL CALCULATED.3IONS-SCNC: 15.3 MMOL/L
AST SERPL-CCNC: 15 U/L (ref 1–40)
BASOPHILS # BLD AUTO: 0.02 10*3/MM3 (ref 0–0.2)
BASOPHILS NFR BLD AUTO: 0.3 % (ref 0–1.5)
BILIRUB SERPL-MCNC: 0.6 MG/DL (ref 0.1–1.2)
BUN BLD-MCNC: 15 MG/DL (ref 8–23)
BUN/CREAT SERPL: 15.2 (ref 7–25)
CALCIUM SPEC-SCNC: 8.8 MG/DL (ref 8.6–10.5)
CHLORIDE SERPL-SCNC: 103 MMOL/L (ref 98–107)
CK MB SERPL-CCNC: 1.83 NG/ML
CK SERPL-CCNC: 85 U/L (ref 20–200)
CO2 SERPL-SCNC: 21.7 MMOL/L (ref 22–29)
CREAT BLD-MCNC: 0.99 MG/DL (ref 0.76–1.27)
DEPRECATED RDW RBC AUTO: 43.1 FL (ref 37–54)
EOSINOPHIL # BLD AUTO: 0.38 10*3/MM3 (ref 0–0.7)
EOSINOPHIL NFR BLD AUTO: 5.7 % (ref 0.3–6.2)
ERYTHROCYTE [DISTWIDTH] IN BLOOD BY AUTOMATED COUNT: 12.7 % (ref 11.5–14.5)
GFR SERPL CREATININE-BSD FRML MDRD: 77 ML/MIN/1.73
GLOBULIN UR ELPH-MCNC: 2.6 GM/DL
GLUCOSE BLD-MCNC: 109 MG/DL (ref 65–99)
HCT VFR BLD AUTO: 43.8 % (ref 40.4–52.2)
HGB BLD-MCNC: 14.4 G/DL (ref 13.7–17.6)
IMM GRANULOCYTES # BLD: 0.03 10*3/MM3 (ref 0–0.03)
IMM GRANULOCYTES NFR BLD: 0.4 % (ref 0–0.5)
LYMPHOCYTES # BLD AUTO: 1.31 10*3/MM3 (ref 0.9–4.8)
LYMPHOCYTES NFR BLD AUTO: 19.5 % (ref 19.6–45.3)
MAGNESIUM SERPL-MCNC: 2.2 MG/DL (ref 1.6–2.4)
MCH RBC QN AUTO: 30.4 PG (ref 27–32.7)
MCHC RBC AUTO-ENTMCNC: 32.9 G/DL (ref 32.6–36.4)
MCV RBC AUTO: 92.4 FL (ref 79.8–96.2)
MONOCYTES # BLD AUTO: 1.19 10*3/MM3 (ref 0.2–1.2)
MONOCYTES NFR BLD AUTO: 17.7 % (ref 5–12)
NEUTROPHILS # BLD AUTO: 3.79 10*3/MM3 (ref 1.9–8.1)
NEUTROPHILS NFR BLD AUTO: 56.4 % (ref 42.7–76)
NT-PROBNP SERPL-MCNC: 161 PG/ML (ref 0–900)
PLATELET # BLD AUTO: 187 10*3/MM3 (ref 140–500)
PMV BLD AUTO: 11.2 FL (ref 6–12)
POTASSIUM BLD-SCNC: 4.2 MMOL/L (ref 3.5–5.2)
PROT SERPL-MCNC: 6.7 G/DL (ref 6–8.5)
RBC # BLD AUTO: 4.74 10*6/MM3 (ref 4.6–6)
SODIUM BLD-SCNC: 140 MMOL/L (ref 136–145)
TROPONIN T SERPL-MCNC: <0.01 NG/ML (ref 0–0.03)
WBC NRBC COR # BLD: 6.72 10*3/MM3 (ref 4.5–10.7)

## 2017-07-24 PROCEDURE — 93010 ELECTROCARDIOGRAM REPORT: CPT | Performed by: INTERNAL MEDICINE

## 2017-07-24 PROCEDURE — 0 IOPAMIDOL PER 1 ML: Performed by: INTERNAL MEDICINE

## 2017-07-24 PROCEDURE — 25010000002 MIDAZOLAM PER 1 MG: Performed by: INTERNAL MEDICINE

## 2017-07-24 PROCEDURE — 82553 CREATINE MB FRACTION: CPT | Performed by: INTERNAL MEDICINE

## 2017-07-24 PROCEDURE — 85025 COMPLETE CBC W/AUTO DIFF WBC: CPT | Performed by: INTERNAL MEDICINE

## 2017-07-24 PROCEDURE — G0378 HOSPITAL OBSERVATION PER HR: HCPCS

## 2017-07-24 PROCEDURE — 84484 ASSAY OF TROPONIN QUANT: CPT | Performed by: INTERNAL MEDICINE

## 2017-07-24 PROCEDURE — 80053 COMPREHEN METABOLIC PANEL: CPT | Performed by: INTERNAL MEDICINE

## 2017-07-24 PROCEDURE — 93458 L HRT ARTERY/VENTRICLE ANGIO: CPT | Performed by: INTERNAL MEDICINE

## 2017-07-24 PROCEDURE — 83880 ASSAY OF NATRIURETIC PEPTIDE: CPT | Performed by: INTERNAL MEDICINE

## 2017-07-24 PROCEDURE — 99217 PR OBSERVATION CARE DISCHARGE MANAGEMENT: CPT | Performed by: INTERNAL MEDICINE

## 2017-07-24 PROCEDURE — C1769 GUIDE WIRE: HCPCS | Performed by: INTERNAL MEDICINE

## 2017-07-24 PROCEDURE — 82550 ASSAY OF CK (CPK): CPT | Performed by: INTERNAL MEDICINE

## 2017-07-24 PROCEDURE — 25010000002 FENTANYL CITRATE (PF) 100 MCG/2ML SOLUTION: Performed by: INTERNAL MEDICINE

## 2017-07-24 PROCEDURE — 83735 ASSAY OF MAGNESIUM: CPT | Performed by: INTERNAL MEDICINE

## 2017-07-24 PROCEDURE — C1894 INTRO/SHEATH, NON-LASER: HCPCS | Performed by: INTERNAL MEDICINE

## 2017-07-24 PROCEDURE — 93005 ELECTROCARDIOGRAM TRACING: CPT | Performed by: INTERNAL MEDICINE

## 2017-07-24 PROCEDURE — 99152 MOD SED SAME PHYS/QHP 5/>YRS: CPT | Performed by: INTERNAL MEDICINE

## 2017-07-24 PROCEDURE — 25010000002 HEPARIN (PORCINE) PER 1000 UNITS: Performed by: INTERNAL MEDICINE

## 2017-07-24 PROCEDURE — 99153 MOD SED SAME PHYS/QHP EA: CPT | Performed by: INTERNAL MEDICINE

## 2017-07-24 RX ORDER — LIDOCAINE HYDROCHLORIDE 20 MG/ML
INJECTION, SOLUTION INFILTRATION; PERINEURAL AS NEEDED
Status: DISCONTINUED | OUTPATIENT
Start: 2017-07-24 | End: 2017-07-24 | Stop reason: HOSPADM

## 2017-07-24 RX ORDER — ISOSORBIDE MONONITRATE 30 MG/1
30 TABLET, EXTENDED RELEASE ORAL
Qty: 30 TABLET | Refills: 2 | Status: SHIPPED | OUTPATIENT
Start: 2017-07-24 | End: 2017-10-21 | Stop reason: SDUPTHER

## 2017-07-24 RX ORDER — FENTANYL CITRATE 50 UG/ML
INJECTION, SOLUTION INTRAMUSCULAR; INTRAVENOUS AS NEEDED
Status: DISCONTINUED | OUTPATIENT
Start: 2017-07-24 | End: 2017-07-24 | Stop reason: HOSPADM

## 2017-07-24 RX ORDER — MIDAZOLAM HYDROCHLORIDE 1 MG/ML
INJECTION INTRAMUSCULAR; INTRAVENOUS AS NEEDED
Status: DISCONTINUED | OUTPATIENT
Start: 2017-07-24 | End: 2017-07-24 | Stop reason: HOSPADM

## 2017-07-24 RX ADMIN — SODIUM CHLORIDE 75 ML/HR: 9 INJECTION, SOLUTION INTRAVENOUS at 08:31

## 2017-07-24 RX ADMIN — ISOSORBIDE MONONITRATE 30 MG: 30 TABLET ORAL at 08:03

## 2017-07-24 RX ADMIN — ASPIRIN 324 MG: 81 TABLET, CHEWABLE ORAL at 08:03

## 2017-07-24 RX ADMIN — LISINOPRIL 2.5 MG: 2.5 TABLET ORAL at 08:03

## 2017-07-24 RX ADMIN — PANTOPRAZOLE SODIUM 40 MG: 40 TABLET, DELAYED RELEASE ORAL at 07:54

## 2017-07-24 RX ADMIN — CLOPIDOGREL 75 MG: 75 TABLET, FILM COATED ORAL at 08:03

## 2017-07-24 RX ADMIN — CYANOCOBALAMIN TAB 500 MCG 1000 MCG: 500 TAB at 08:03

## 2017-07-24 RX ADMIN — CARVEDILOL 6.25 MG: 6.25 TABLET, FILM COATED ORAL at 08:03

## 2017-07-24 RX ADMIN — ESCITALOPRAM 10 MG: 10 TABLET, FILM COATED ORAL at 08:03

## 2017-07-24 NOTE — DISCHARGE SUMMARY
"[facility]  Physician Discharge Summary    Patient Identification:  Name: Sander White  Age: 61 y.o.  Sex: male  :  1955  MRN: [id number]    Admit date: 2017    Discharge date and time:  2017    Admitting Physician: Kristina Billings MD      Discharge Physician: Dr Billings    Discharge Diagnoses:   -  Chest pain - no MI  -  50% mid RCA, early atherosclerotic plaquing of LAD, diagonal, perforators and LCx per cardiac catheterization  -  EF 50-55%, trace-mild MR/TR  -  H/o STEMI, resuscitated arrest and CAYLA-> ramus   - Hypertension   - Dyslipidemia    Discharged Condition: stable    Hospital Course:   This patient was admitted for further evaluation after presenting to the emergency room reporting a 3 week history of intermittent episodes of \"sharp\" left sided chest discomfort radiating to his central chest and left upper extremity with accompanying diaphoresis and dyspnea.  The episodes were unrelated to activity and would last 2-3 minutes before resolving.  He was placed on full dose aspirin, topical nitrate, DVT prophylaxis with continuation of home Plavix, aspirin and beta blocker.  Serial cardiac enzymes and EKGs were negative for acute cardiac ischemic event.  Cardiac catheterization showed widely patent stent in the ramus branch with 50% mid RCA for which continued dual antiplatelet therapy, risk factor modification and medical therapy was recommended.  He tolerated the procedure without complications.    At time of discharge, he is awake, alert and resting comfortably.  He denies any recurrent chest discomfort, diaphoresis or shortness of breath.  He is maintaining saturations of greater than 92% on room air.  I reviewed activity restrictions, procedure site care, changes to home medications and follow-up appointment with the patient.  He was advised to return to the emergency room for any recurrent symptoms otherwise we will see him in office as scheduled on " September 7.  He was advised to see his PCP within the next one week.  All questions were answered.  He voiced understanding and agreement with treatment plan    Consults:   IP CONSULT TO CARDIOLOGY  IP CONSULT TO CARDIOLOGY    Discharge Exam:  General: Awake, alert and resting quietly.  Appears in no acute distress   Skin: Warm and dry, no diaphoresis noted   EYES: PERTL   HEENT: external ear and nose normal; oral mucosa moist   Neck: no JVD   Heart: S1S2 regular rate and rhythm; no murmurs; no gallop or rub appreciated   Pulmonary: Respirations regular, unlabored at rest, bilateral breath sounds have good air entry throughout all lung fields; no crackles, rubs or wheezes auscultated.     GI: Soft, non-tender, non-distended, positive bowel sounds   Extremities: Right radial site with dry, intact dressing.   RUE pink, warm with 1 second cap refill.  Right brachial 2/right ulnar 2/right radial 2 Bilateral lower extremities have no pre-tibial pitting edema   Neurological: Alert and oriented x 3; no neuro deficits          LABS:    Results from last 7 days  Lab Units 07/24/17  0414 07/23/17  0410 07/22/17  2333 07/22/17  1818   CK TOTAL U/L 85 111  --  120   TROPONIN T ng/mL <0.010 <0.010 <0.010 <0.010       Results from last 7 days  Lab Units 07/24/17  0414   MAGNESIUM mg/dL 2.2       Results from last 7 days  Lab Units 07/24/17  0414   SODIUM mmol/L 140   POTASSIUM mmol/L 4.2   BUN mg/dL 15   CREATININE mg/dL 0.99   CALCIUM mg/dL 8.8         Results from last 7 days  Lab Units 07/24/17  0414 07/23/17  0410 07/22/17  1140   WBC 10*3/mm3 6.72 7.40 5.32   HEMOGLOBIN g/dL 14.4 14.2 14.9   HEMATOCRIT % 43.8 42.1 43.1   PLATELETS 10*3/mm3 187 177 194       Disposition:  home    Discharge Medications:    Sander White   Home Medication Instructions [place]:[id number]    Printed on:07/24/17 6322   Medication Information                      aspirin 81 MG chewable tablet  Chew 1 tablet Daily.             carvedilol (COREG)  6.25 MG tablet  Take 1 tablet by mouth Every 12 (Twelve) Hours.             ciclopirox (PENLAC) 8 % solution  Apply  topically Every Night. Apply to affected toenail             clopidogrel (PLAVIX) 75 MG tablet  Take 75 mg by mouth Daily.             escitalopram (LEXAPRO) 10 MG tablet  Take 1 tablet by mouth Daily.             isosorbide mononitrate (IMDUR) 30 MG 24 hr tablet  Take 1 tablet by mouth Daily.             lisinopril (ZESTRIL) 2.5 MG tablet  Take 1 tablet by mouth daily.             meloxicam (MOBIC) 7.5 MG tablet  Take 1 tablet by mouth Daily.             nitroglycerin (NITROSTAT) 0.4 MG SL tablet  1 under the tongue as needed for angina, may repeat q5mins for up three doses             Omega-3 Fatty Acids (OMEGA 3 PO)  Take 1 capsule by mouth daily.             omeprazole (priLOSEC) 20 MG capsule  Take 20 mg by mouth Daily.             pravastatin (PRAVACHOL) 20 MG tablet  Take 1 tablet by mouth Daily.             Pyridoxine HCl (VITAMIN B-6 PO)  Take 1 tablet by mouth daily.             vitamin B-12 (CYANOCOBALAMIN) 1000 MCG tablet  Take 1,000 mcg by mouth daily.             vitamin C (ASCORBIC ACID) 500 MG tablet  Take 500 mg by mouth daily.             vitamin E 1000 UNIT capsule  Take 1,000 Units by mouth Daily. Taking 400 mg daily                   Discharge Home Instructions:  1. May return to work on 7-  2. Routine post cardiac catheterization discharge home care instructions:      No submerging procedure site below water for 7-10 days.      No lifting objects greater than 1 lbs for 3 days.      Monitor puncture site for bleeding and/or knots;. If bleeding should occur at the wrist site, apply pressure and return to the ER.      You may apply a DRY Band-Aid over the puncture site if needed. Do not apply any lotions, salves or ointments to site.      No driving for 3 days.      Return to ER for recurrent symptoms.      No smoking.      Take all medications as prescribed.  3.  Follow up  "with PCP in one week  4. Follow up with Dr Billings as scheduled on September 7 at 9:00 AM      I reviewed the patient's new clinical results, discharge treatments, medications and follow up with Dr Billings. I personally viewed and interpreted the patient's EKG/Telemetry data  Signed:  Kristina Billings MD  7/24/2017  2:39 PM      EMR Dragon/Transcription:   \"Dictated utilizing Dragon dictation\".     "

## 2017-07-24 NOTE — PLAN OF CARE
Problem: Patient Care Overview (Adult)  Goal: Plan of Care Review  Outcome: Outcome(s) achieved Date Met:  07/24/17 07/24/17 9332   Coping/Psychosocial Response Interventions   Plan Of Care Reviewed With patient   Patient Care Overview   Progress improving   Outcome Evaluation   Outcome Summary/Follow up Plan Pt denies pain at this time. Right radial site soft with dressing dry and intact. No intervention in cardiac catherization needed. Ready for discharge.

## 2017-08-18 DIAGNOSIS — I10 ESSENTIAL HYPERTENSION: ICD-10-CM

## 2017-08-21 RX ORDER — LISINOPRIL 2.5 MG/1
TABLET ORAL
Qty: 90 TABLET | Refills: 2 | Status: SHIPPED | OUTPATIENT
Start: 2017-08-21 | End: 2018-03-05 | Stop reason: SDUPTHER

## 2017-08-24 ENCOUNTER — OFFICE VISIT (OUTPATIENT)
Dept: CARDIOLOGY | Facility: CLINIC | Age: 62
End: 2017-08-24

## 2017-08-24 VITALS
HEIGHT: 70 IN | HEART RATE: 64 BPM | DIASTOLIC BLOOD PRESSURE: 84 MMHG | BODY MASS INDEX: 31.64 KG/M2 | WEIGHT: 221 LBS | SYSTOLIC BLOOD PRESSURE: 125 MMHG

## 2017-08-24 DIAGNOSIS — I25.10 CORONARY ARTERY DISEASE INVOLVING NATIVE CORONARY ARTERY OF NATIVE HEART WITHOUT ANGINA PECTORIS: Primary | ICD-10-CM

## 2017-08-24 DIAGNOSIS — I10 ESSENTIAL HYPERTENSION: ICD-10-CM

## 2017-08-24 PROCEDURE — 99213 OFFICE O/P EST LOW 20 MIN: CPT | Performed by: INTERNAL MEDICINE

## 2017-08-24 NOTE — PROGRESS NOTES
Subjective:       Sander White is a 61 y.o. male who here for follow up    CC  61-year-old male with known history of coronary artery disease with acute MI approximately 4-6 months ago  HPI  61-year-old male with known history of coronary artery disease with acute MI approximately 4-6 months ago, here for the follow-up after the recent heart catheter [place] showing the stent is widely patent denies any chest pains or tightness in chest       Problem List Items Addressed This Visit        Cardiovascular and Mediastinum    Essential hypertension    Coronary artery disease involving native coronary artery of native heart without angina pectoris - Primary        .    The following portions of the patient's history were reviewed and updated as appropriate: allergies, current medications, past family history, past medical history, past social history, past surgical history and problem list.    Past Medical History:   Diagnosis Date   • Arthritis    • Chronic pain in right foot    • Coronary artery disease    • ED (erectile dysfunction) 7/15/2016   • GERD (gastroesophageal reflux disease)    • Hypertension    • Myocardial infarction     reports that he quit smoking about 18 years ago. His smoking use included Cigarettes. He has a 3.50 pack-year smoking history. He has never used smokeless tobacco. He reports that he drinks alcohol. He reports that he does not use illicit drugs.  Family History   Problem Relation Age of Onset   • Diabetes Mother      Type 2   • Hypertension Mother    • Hyperlipidemia Mother    • Heart attack Mother    • Heart disease Mother    • Diabetes Father      Type 2   • Stroke Father    • Hypertension Father    • Hyperlipidemia Father    • Heart attack Father    • Heart disease Father    • Aneurysm Brother      Brain       Review of Systems  Constitutional: No wt loss, fever, fatigue  Gastrointestinal: No nausea, abdominal pain  Behavioral/Psych: No insomnia or anxiety   Cardiovascular No chest  "pains or tightness in chest  Objective:       Physical Exam             Physical Exam  /84  Pulse 64  Ht 70\" (177.8 cm)  Wt 221 lb (100 kg)  BMI 31.71 kg/m2    General appearance: NAD, conversant   Eyes: anicteric sclerae, moist conjunctivae; no lid-lag; PERRLA   HENT: Atraumatic; oropharynx clear with moist mucous membranes and no mucosal ulcerations;  normal hard and soft palate   Neck: Trachea midline; FROM, supple, no thyromegaly or lymphadenopathy   Lungs: CTA, with normal respiratory effort and no intercostal retractions   CV: S1-S2 regular, no murmurs, no rub, no gallop   Abdomen: Soft, non-tender; no masses or HSM   Extremities: No peripheral edema or extremity lymphadenopathy  Skin: Normal temperature, turgor and texture; no rash, ulcers or subcutaneous nodules   Psych: Appropriate affect, alert and oriented to person, place and time           Cardiographics  @Procedures    Echocardiogram:        Current Outpatient Prescriptions:   •  aspirin 81 MG chewable tablet, Chew 1 tablet Daily., Disp: , Rfl:   •  carvedilol (COREG) 6.25 MG tablet, Take 1 tablet by mouth Every 12 (Twelve) Hours., Disp: 180 tablet, Rfl: 3  •  ciclopirox (PENLAC) 8 % solution, Apply  topically Every Night. Apply to affected toenail, Disp: 6 mL, Rfl: 5  •  clopidogrel (PLAVIX) 75 MG tablet, Take 75 mg by mouth Daily., Disp: , Rfl:   •  escitalopram (LEXAPRO) 10 MG tablet, Take 1 tablet by mouth Daily., Disp: 30 tablet, Rfl: 11  •  isosorbide mononitrate (IMDUR) 30 MG 24 hr tablet, Take 1 tablet by mouth Daily., Disp: 30 tablet, Rfl: 2  •  lisinopril (PRINIVIL,ZESTRIL) 2.5 MG tablet, TAKE ONE TABLET BY MOUTH DAILY, Disp: 90 tablet, Rfl: 2  •  meloxicam (MOBIC) 7.5 MG tablet, Take 1 tablet by mouth Daily., Disp: , Rfl:   •  nitroglycerin (NITROSTAT) 0.4 MG SL tablet, 1 under the tongue as needed for angina, may repeat q5mins for up three doses, Disp: 25 tablet, Rfl: 0  •  Omega-3 Fatty Acids (OMEGA 3 PO), Take 1 capsule by mouth " daily., Disp: , Rfl:   •  omeprazole (priLOSEC) 20 MG capsule, Take 20 mg by mouth Daily., Disp: , Rfl:   •  pravastatin (PRAVACHOL) 20 MG tablet, Take 1 tablet by mouth Daily., Disp: 30 tablet, Rfl: 5  •  Pyridoxine HCl (VITAMIN B-6 PO), Take 1 tablet by mouth daily., Disp: , Rfl:   •  vitamin B-12 (CYANOCOBALAMIN) 1000 MCG tablet, Take 1,000 mcg by mouth daily., Disp: , Rfl:   •  vitamin C (ASCORBIC ACID) 500 MG tablet, Take 500 mg by mouth daily., Disp: , Rfl:   •  vitamin E 1000 UNIT capsule, Take 1,000 Units by mouth Daily. Taking 400 mg daily, Disp: , Rfl:    Assessment:        Patient Active Problem List   Diagnosis   • Airway hyperreactivity   • Elevated cholesterol   • Gain of weight   • ED (erectile dysfunction)   • ST elevation myocardial infarction (STEMI)   • Mallet finger   • Sprain of carpometacarpal joint   • Essential hypertension   • Coronary artery disease involving native coronary artery of native heart without angina pectoris   • Chest pain   • Precordial pain     1. Normal left main  2. Early atherosclerotic plaque of LAD including the diagonal and  branches  3. Early atherosclerotic plaque of the circumflex with no stenosis  4. Dominant RCA with the midportion 50% stenosis  5. Normal LV gram     Recommendations:      1. Moderate coronary artery disease will be treated medically            Plan:            ICD-10-CM ICD-9-CM   1. Coronary artery disease involving native coronary artery of native heart without angina pectoris I25.10 414.01   2. Essential hypertension I10 401.9     1. Coronary artery disease involving native coronary artery of native heart without angina pectoris  Sander White with coronary artery disease has no angina pectoris    Risk reduction for the coronary artery disease, controlling the blood pressure, blood sugar management, cholesterol management, exercise, stress management, and proper compliance with medications and follow-up has been discussed      2.  Essential hypertension  Importance of controlling hypertension and blood pressure  checkup on the regular basis has been explained  Hypertension as a silent killer has been discussed  Risk reduction of the weight and regular exercises to control the hypertension has been explained       Ok to go back to work    See 6 months  COUNSELING:    Sander Moore was given to patient for the following topics: diagnostic results, risk factor reductions, impressions, risks and benefits of treatment options and importance of treatment compliance .       SMOKING COUNSELING:    Counseling given: Not Answered      EMR Dragon/Transcription disclaimer:   Much of this encounter note is an electronic transcription/translation of spoken language to printed text. The electronic translation of spoken language may permit erroneous, or at times, nonsensical words or phrases to be inadvertently transcribed; Although I have reviewed the note for such errors, some may still exist.

## 2017-09-28 ENCOUNTER — TELEPHONE (OUTPATIENT)
Dept: CARDIOLOGY | Facility: CLINIC | Age: 62
End: 2017-09-28

## 2017-09-28 ENCOUNTER — OFFICE VISIT (OUTPATIENT)
Dept: INTERNAL MEDICINE | Facility: CLINIC | Age: 62
End: 2017-09-28

## 2017-09-28 VITALS
SYSTOLIC BLOOD PRESSURE: 124 MMHG | HEIGHT: 70 IN | WEIGHT: 221 LBS | BODY MASS INDEX: 31.64 KG/M2 | OXYGEN SATURATION: 97 % | TEMPERATURE: 97.7 F | HEART RATE: 70 BPM | DIASTOLIC BLOOD PRESSURE: 78 MMHG

## 2017-09-28 DIAGNOSIS — B35.1 ONYCHOMYCOSIS: ICD-10-CM

## 2017-09-28 DIAGNOSIS — D49.89 NEOPLASM OF BACK: ICD-10-CM

## 2017-09-28 DIAGNOSIS — M54.2 CERVICALGIA: Primary | ICD-10-CM

## 2017-09-28 PROCEDURE — 99214 OFFICE O/P EST MOD 30 MIN: CPT | Performed by: NURSE PRACTITIONER

## 2017-09-28 RX ORDER — CYCLOBENZAPRINE HCL 10 MG
10 TABLET ORAL 3 TIMES DAILY PRN
Qty: 30 TABLET | Refills: 0 | Status: SHIPPED | OUTPATIENT
Start: 2017-09-28 | End: 2017-10-31

## 2017-09-28 NOTE — TELEPHONE ENCOUNTER
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     PER DR DAVIS THESE TEMPS ARE OKAY AND HE MAY WEAR A MASK .    LVM FOR MR NEL VILLANUEVA TO RETURN MY CALL

## 2017-09-28 NOTE — PROGRESS NOTES
Subjective   Sander White is a 61 y.o. male who presents due to neck pain. He also c/o thickening of toenails, change in skin lesion.    HPI Comments: He denies acute injury or trauma but c/o worsening neck pain with muscle spasms and tightness, denies radicular sx.  He has used Penlac since June on both great toenails but still c/o thickening and discoloration of nails.  His wife is concerned about increasing skin lesion on lower back which he states is painless.  He recently underwent heart cath which did not reveal any acute changes.    Neck Pain    This is a new problem. The current episode started 1 to 4 weeks ago (2 weeks ago). The problem occurs constantly. The problem has been gradually worsening. The pain is present in the left side. The quality of the pain is described as aching and burning. The pain is severe. The symptoms are aggravated by position (turning head to right). Pertinent negatives include no chest pain, fever, headaches, numbness, tingling, trouble swallowing or weakness. He has tried heat and NSAIDs (Ibuprofen) for the symptoms. The treatment provided mild relief.   Rash   This is a new problem. The current episode started 1 to 4 weeks ago. The affected locations include the back. Pertinent negatives include no congestion, cough, fatigue, fever, sore throat or vomiting.        The following portions of the patient's history were reviewed and updated as appropriate: allergies, current medications, past social history and problem list.    Past Medical History:   Diagnosis Date   • Arthritis    • Chronic pain in right foot    • Coronary artery disease    • ED (erectile dysfunction) 7/15/2016   • GERD (gastroesophageal reflux disease)    • Hypertension    • Myocardial infarction          Current Outpatient Prescriptions:   •  aspirin 81 MG chewable tablet, Chew 1 tablet Daily., Disp: , Rfl:   •  carvedilol (COREG) 6.25 MG tablet, Take 1 tablet by mouth Every 12 (Twelve) Hours., Disp: 180  tablet, Rfl: 3  •  ciclopirox (PENLAC) 8 % solution, Apply  topically Every Night. Apply to affected toenail, Disp: 6 mL, Rfl: 5  •  clopidogrel (PLAVIX) 75 MG tablet, Take 75 mg by mouth Daily., Disp: , Rfl:   •  escitalopram (LEXAPRO) 10 MG tablet, Take 1 tablet by mouth Daily., Disp: 30 tablet, Rfl: 11  •  isosorbide mononitrate (IMDUR) 30 MG 24 hr tablet, Take 1 tablet by mouth Daily., Disp: 30 tablet, Rfl: 2  •  lisinopril (PRINIVIL,ZESTRIL) 2.5 MG tablet, TAKE ONE TABLET BY MOUTH DAILY, Disp: 90 tablet, Rfl: 2  •  nitroglycerin (NITROSTAT) 0.4 MG SL tablet, 1 under the tongue as needed for angina, may repeat q5mins for up three doses, Disp: 25 tablet, Rfl: 0  •  Omega-3 Fatty Acids (OMEGA 3 PO), Take 1 capsule by mouth daily., Disp: , Rfl:   •  omeprazole (priLOSEC) 20 MG capsule, Take 20 mg by mouth Daily., Disp: , Rfl:   •  pravastatin (PRAVACHOL) 20 MG tablet, Take 1 tablet by mouth Daily., Disp: 30 tablet, Rfl: 5  •  Pyridoxine HCl (VITAMIN B-6 PO), Take 1 tablet by mouth daily., Disp: , Rfl:   •  vitamin B-12 (CYANOCOBALAMIN) 1000 MCG tablet, Take 1,000 mcg by mouth daily., Disp: , Rfl:   •  vitamin C (ASCORBIC ACID) 500 MG tablet, Take 500 mg by mouth daily., Disp: , Rfl:   •  vitamin E 1000 UNIT capsule, Take 1,000 Units by mouth Daily. Taking 400 mg daily, Disp: , Rfl:   •  cyclobenzaprine (FLEXERIL) 10 MG tablet, Take 1 tablet by mouth 3 (Three) Times a Day As Needed for Muscle Spasms., Disp: 30 tablet, Rfl: 0  •  Efinaconazole 10 % solution, Apply 1 drop topically Daily. Apply to two toenails, Disp: 1 bottle, Rfl: 5    Allergies   Allergen Reactions   • Amoxicillin Itching   • Penicillins        Review of Systems   Constitutional: Negative for chills, fatigue, fever and unexpected weight change.   HENT: Negative for congestion, ear pain, postnasal drip, sinus pressure, sore throat and trouble swallowing.    Eyes: Negative for visual disturbance.   Respiratory: Negative for cough, chest tightness and  "wheezing.    Cardiovascular: Negative for chest pain, palpitations and leg swelling.   Gastrointestinal: Negative for abdominal pain, blood in stool, nausea and vomiting.   Endocrine: Negative for cold intolerance and heat intolerance.   Genitourinary: Negative for dysuria, frequency and urgency.   Musculoskeletal: Positive for neck pain and neck stiffness. Negative for arthralgias and joint swelling.   Skin: Positive for rash. Negative for color change.   Allergic/Immunologic: Negative for immunocompromised state.   Neurological: Negative for tingling, syncope, weakness, numbness and headaches.   Hematological: Does not bruise/bleed easily.       Objective   Vitals:    09/28/17 1431   BP: 124/78   BP Location: Left arm   Patient Position: Sitting   Cuff Size: Adult   Pulse: 70   Temp: 97.7 °F (36.5 °C)   TempSrc: Oral   SpO2: 97%   Weight: 221 lb (100 kg)   Height: 70\" (177.8 cm)     Physical Exam   Constitutional: He is oriented to person, place, and time. He appears well-developed and well-nourished. No distress.   HENT:   Head: Normocephalic and atraumatic.   Right Ear: External ear normal.   Left Ear: External ear normal.   Eyes: Conjunctivae are normal.   Neck: Neck supple. Muscular tenderness present. Decreased range of motion present.   Cardiovascular: Normal rate, regular rhythm, normal heart sounds and intact distal pulses.  Exam reveals no gallop and no friction rub.    No murmur heard.  Pulmonary/Chest: Effort normal and breath sounds normal. No respiratory distress.   Lymphadenopathy:     He has no cervical adenopathy.   Neurological: He is alert and oriented to person, place, and time. He has normal strength. No sensory deficit.   Skin: Skin is warm and dry. No rash noted. No erythema.   Thickening with discoloration (yellowing) of bilateral 1st toenails.  Slightly raised, keratotic, brown, flat plaque with a slightly more raised center on lower back   Psychiatric: He has a normal mood and affect. His " behavior is normal.   Nursing note and vitals reviewed.      Assessment/Plan   Sander was seen today for neck pain, suspicious skin lesion and nail problem.    Diagnoses and all orders for this visit:    Cervicalgia  Comments:  start muscle relaxer and continue to monitor (declined PT referral)  Orders:  -     cyclobenzaprine (FLEXERIL) 10 MG tablet; Take 1 tablet by mouth 3 (Three) Times a Day As Needed for Muscle Spasms.    Onychomycosis  Comments:  due to failure of Penlac, will try Jublia  Orders:  -     Efinaconazole 10 % solution; Apply 1 drop topically Daily. Apply to two toenails    Neoplasm of back  Comments:  sx suggestive of seborrheic keratosis, will refer to derm for further eval and annual skin checks  Orders:  -     Ambulatory Referral to Dermatology

## 2017-10-23 RX ORDER — ISOSORBIDE MONONITRATE 30 MG/1
TABLET, EXTENDED RELEASE ORAL
Qty: 30 TABLET | Refills: 5 | Status: SHIPPED | OUTPATIENT
Start: 2017-10-23 | End: 2018-02-08 | Stop reason: ALTCHOICE

## 2017-10-31 ENCOUNTER — OFFICE VISIT (OUTPATIENT)
Dept: INTERNAL MEDICINE | Facility: CLINIC | Age: 62
End: 2017-10-31

## 2017-10-31 ENCOUNTER — HOSPITAL ENCOUNTER (OUTPATIENT)
Dept: GENERAL RADIOLOGY | Facility: HOSPITAL | Age: 62
Discharge: HOME | End: 2017-10-31
Admitting: NURSE PRACTITIONER

## 2017-10-31 ENCOUNTER — TELEPHONE (OUTPATIENT)
Dept: INTERNAL MEDICINE | Facility: CLINIC | Age: 62
End: 2017-10-31

## 2017-10-31 VITALS
WEIGHT: 224 LBS | BODY MASS INDEX: 32.07 KG/M2 | DIASTOLIC BLOOD PRESSURE: 80 MMHG | SYSTOLIC BLOOD PRESSURE: 118 MMHG | HEIGHT: 70 IN

## 2017-10-31 DIAGNOSIS — E78.00 ELEVATED CHOLESTEROL: ICD-10-CM

## 2017-10-31 DIAGNOSIS — B35.1 ONYCHOMYCOSIS: ICD-10-CM

## 2017-10-31 DIAGNOSIS — I10 ESSENTIAL HYPERTENSION: Primary | ICD-10-CM

## 2017-10-31 DIAGNOSIS — M54.2 CERVICALGIA: ICD-10-CM

## 2017-10-31 LAB
ALBUMIN SERPL-MCNC: 4.4 G/DL (ref 3.5–5.2)
ALBUMIN/GLOB SERPL: 1.8 G/DL
ALP SERPL-CCNC: 56 U/L (ref 39–117)
ALT SERPL-CCNC: 22 U/L (ref 1–41)
AST SERPL-CCNC: 20 U/L (ref 1–40)
BASOPHILS # BLD AUTO: 0.01 10*3/MM3 (ref 0–0.2)
BASOPHILS NFR BLD AUTO: 0.2 % (ref 0–2)
BILIRUB SERPL-MCNC: 0.4 MG/DL (ref 0.1–1.2)
BUN SERPL-MCNC: 16 MG/DL (ref 8–23)
BUN/CREAT SERPL: 14.2 (ref 7–25)
CALCIUM SERPL-MCNC: 9.5 MG/DL (ref 8.6–10.5)
CHLORIDE SERPL-SCNC: 105 MMOL/L (ref 98–107)
CO2 SERPL-SCNC: 25.3 MMOL/L (ref 22–29)
CREAT SERPL-MCNC: 1.13 MG/DL (ref 0.76–1.27)
DEPRECATED RDW RBC AUTO: 40.4 FL (ref 37–54)
EOSINOPHIL # BLD AUTO: 0.35 10*3/MM3 (ref 0–0.7)
EOSINOPHIL NFR BLD AUTO: 7.1 % (ref 0–5)
ERYTHROCYTE [DISTWIDTH] IN BLOOD BY AUTOMATED COUNT: 12.6 % (ref 11.5–15)
GLOBULIN SER CALC-MCNC: 2.5 GM/DL
GLUCOSE SERPL-MCNC: 102 MG/DL (ref 65–99)
HCT VFR BLD AUTO: 41.3 % (ref 40.1–51)
HGB BLD-MCNC: 14.4 G/DL (ref 13.7–17.5)
LYMPHOCYTES # BLD AUTO: 1.12 10*3/MM3 (ref 0.8–7)
LYMPHOCYTES NFR BLD AUTO: 22.7 % (ref 10–60)
MCH RBC QN AUTO: 31.5 PG (ref 26–34)
MCHC RBC AUTO-ENTMCNC: 34.9 G/DL (ref 31–37)
MCV RBC AUTO: 90.4 FL (ref 80–100)
MONOCYTES # BLD AUTO: 0.78 10*3/MM3 (ref 0–1)
MONOCYTES NFR BLD AUTO: 15.8 % (ref 0–13)
NEUTROPHILS # BLD AUTO: 2.68 10*3/MM3 (ref 1–11)
NEUTROPHILS NFR BLD AUTO: 54.2 % (ref 30–85)
PLATELET # BLD AUTO: 193 10*3/MM3 (ref 150–450)
PMV BLD AUTO: 11.2 FL (ref 6–12)
POTASSIUM SERPL-SCNC: 4.2 MMOL/L (ref 3.5–5.2)
PROT SERPL-MCNC: 6.9 G/DL (ref 6–8.5)
RBC # BLD AUTO: 4.57 10*6/MM3 (ref 4.63–6.08)
SODIUM SERPL-SCNC: 144 MMOL/L (ref 136–145)
WBC NRBC COR # BLD: 4.94 10*3/MM3 (ref 5–10)

## 2017-10-31 PROCEDURE — 99214 OFFICE O/P EST MOD 30 MIN: CPT | Performed by: NURSE PRACTITIONER

## 2017-10-31 PROCEDURE — 85025 COMPLETE CBC W/AUTO DIFF WBC: CPT | Performed by: NURSE PRACTITIONER

## 2017-10-31 PROCEDURE — 36415 COLL VENOUS BLD VENIPUNCTURE: CPT | Performed by: NURSE PRACTITIONER

## 2017-10-31 PROCEDURE — 72050 X-RAY EXAM NECK SPINE 4/5VWS: CPT

## 2017-10-31 RX ORDER — MELOXICAM 15 MG/1
15 TABLET ORAL DAILY
Qty: 30 TABLET | Refills: 0 | Status: SHIPPED | OUTPATIENT
Start: 2017-10-31 | End: 2017-11-17

## 2017-10-31 RX ORDER — CYCLOBENZAPRINE HCL 10 MG
10 TABLET ORAL 3 TIMES DAILY PRN
Qty: 45 TABLET | Refills: 0 | Status: SHIPPED | OUTPATIENT
Start: 2017-10-31 | End: 2017-11-17

## 2017-10-31 NOTE — PROGRESS NOTES
Subjective   Sander White is a 61 y.o. male.     HPI Comments: He is not exercising. His last eye exam about 2 years ago. His last dental exam within last 6 months. 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     Hypertension   This is a chronic problem. The current episode started more than 1 year ago. The problem is controlled. Associated symptoms include neck pain (radiating down right upper arm ). Pertinent negatives include no blurred vision, chest pain, headaches, orthopnea, palpitations, peripheral edema, PND or shortness of breath. Risk factors for coronary artery disease include male gender and dyslipidemia. Past treatments include beta blockers and ACE inhibitors. The current treatment provides significant improvement. Hypertensive end-organ damage includes CAD/MI.   Neck Pain    This is a new problem. The current episode started more than 1 month ago. The problem has been gradually worsening. The pain is present in the left side. The quality of the pain is described as burning. The pain is at a severity of 4/10 (worst 10/10). The pain is moderate. The symptoms are aggravated by twisting. The pain is same all the time. Stiffness is present all day. Associated symptoms include tingling (bilateral hands,). Pertinent negatives include no chest pain, fever, headaches, numbness, pain with swallowing, photophobia, trouble swallowing, visual change or weakness. Treatments tried: flexeril, heat, ice aspercreme, lidocaine,  The treatment provided moderate relief.        The following portions of the patient's history were reviewed and updated as appropriate: allergies, current medications, past family history, past medical history, past social history, past surgical history and problem list.    Review of Systems   Constitutional: Negative for activity change, appetite change, fatigue and fever.   HENT: Negative for  trouble swallowing.    Eyes: Negative for blurred vision and photophobia.   Respiratory: Negative for cough, shortness of breath and wheezing.    Cardiovascular: Negative for chest pain, palpitations, orthopnea, leg swelling and PND.   Musculoskeletal: Positive for neck pain (radiating down right upper arm ) and neck stiffness. Negative for arthralgias.   Neurological: Positive for tingling (bilateral hands,). Negative for dizziness, weakness, numbness and headaches.       Objective   Physical Exam   Constitutional: He is oriented to person, place, and time. He appears well-developed and well-nourished.   HENT:   Head: Normocephalic.   Nose: Nose normal.   Neck: Muscular tenderness present. Carotid bruit is not present. Decreased range of motion present. No thyroid mass and no thyromegaly present.   Pain with rotation of neck    Cardiovascular: Regular rhythm and normal heart sounds.  Exam reveals no S3 and no S4.    No murmur heard.  No pedal edema   Repeat bp left arm 122/78   Pulmonary/Chest: Effort normal and breath sounds normal. He has no decreased breath sounds. He has no wheezes. He has no rhonchi. He has no rales.   Musculoskeletal: He exhibits no edema.        Cervical back: He exhibits decreased range of motion, tenderness and pain.        Back:    Equal  strength   Neurological: He is alert and oriented to person, place, and time. Gait normal.   Skin: Skin is warm and dry.   Psychiatric: He has a normal mood and affect.       Assessment/Plan   Sander was seen today for hypertension and neck pain.    Diagnoses and all orders for this visit:    Essential hypertension  Comments:  goal of bp less than 140/90  Orders:  -     CBC Auto Differential; Future  -     Comprehensive Metabolic Panel; Future  -     CBC Auto Differential  -     Cancel: Comprehensive Metabolic Panel  -     Comprehensive Metabolic Panel; Future  -     Comprehensive Metabolic Panel    Cervicalgia  Comments:  start neck exercises; heat  and massage   Orders:  -     Cancel: XR Spine Cervical Complete 4 or 5 View  -     Ambulatory Referral to Physical Therapy Evaluate and treat  -     cyclobenzaprine (FLEXERIL) 10 MG tablet; Take 1 tablet by mouth 3 (Three) Times a Day As Needed for Muscle Spasms.  -     meloxicam (MOBIC) 15 MG tablet; Take 1 tablet by mouth Daily for 30 days. Take with food  -     XR Spine Cervical Complete 4 or 5 View    Elevated cholesterol  Comments:  will check at next office visit     Onychomycosis  Comments:  will have him check with insurance of coverage of antifungal; states penlac was ineffective and jublia not covered.    Will have him go to hospital for imaging for c-spine. Will start PT therapy. He was given home exercises. Will use mobic temporary only due to excessive use of ibuprofen.   He will come fasting to next appt to check lipid panel

## 2017-10-31 NOTE — TELEPHONE ENCOUNTER
Spoke with pt, asked him to call insurance to ask which medication for onychomycosis would be covered or most affordable.  Advised him Penlac is the one he tried before and it was ineffective.  He will call insurance and call us back.

## 2017-11-02 ENCOUNTER — TELEPHONE (OUTPATIENT)
Dept: INTERNAL MEDICINE | Facility: CLINIC | Age: 62
End: 2017-11-02

## 2017-11-02 NOTE — TELEPHONE ENCOUNTER
----- Message from Elise Toribio MA sent at 11/2/2017 12:07 PM EDT -----  Pt returning call and says he is agreeable to MRI as pain is not improved    Pt also states to call following number to authorize RX for toe fungus      PA #[phone]

## 2017-11-02 NOTE — TELEPHONE ENCOUNTER
I called patient to discuss his pain and further imaging.  There was no answer so I left a message for patient to return call. Will have him stop mobic and try gabapentin, if he is agreeable. Will need to order ct of cervical spine, instead of mri  due to contraindications.

## 2017-11-02 NOTE — TELEPHONE ENCOUNTER
I spoke to pt yesterday RE:medication and asked him to see what is covered.  Not sure what needs PA, I don't see anything you prescribed.  I did get on Covermymeds to try to do PA for Jublia since that is the last thing that was presribed.  It says generic alternatives are:Ciclopirox (Penlac, which did not provide successful treatment response for pt), Fluconazole, and Terbinafine.  Please advise.  I can continue with PA attempt if you prefer.   He would like MRI please.

## 2017-11-03 ENCOUNTER — TELEPHONE (OUTPATIENT)
Dept: INTERNAL MEDICINE | Facility: CLINIC | Age: 62
End: 2017-11-03

## 2017-11-03 DIAGNOSIS — M54.2 CERVICALGIA: Primary | ICD-10-CM

## 2017-11-03 RX ORDER — GABAPENTIN 100 MG/1
100 CAPSULE ORAL NIGHTLY
Qty: 60 CAPSULE | Refills: 0 | Status: SHIPPED | OUTPATIENT
Start: 2017-11-03 | End: 2017-12-01 | Stop reason: SDUPTHER

## 2017-11-03 NOTE — TELEPHONE ENCOUNTER
I called patient and discuss his concerns. Will stop meloxicam due to limit response. He will start gabapentin. I cautioned him to driving and alcohol with medication .Will obtain ct of neck.

## 2017-11-03 NOTE — TELEPHONE ENCOUNTER
----- Message from Jennifer Benavides sent at 11/3/2017 12:20 PM EDT -----  Iraida  Pt returning your call from yesterday about his MRI.  He is not sure if he needs meds or pt.  Please advise      Pt# [phone]

## 2017-11-06 ENCOUNTER — TELEPHONE (OUTPATIENT)
Dept: INTERNAL MEDICINE | Facility: CLINIC | Age: 62
End: 2017-11-06

## 2017-11-06 DIAGNOSIS — B35.1 ONYCHOMYCOSIS: Primary | ICD-10-CM

## 2017-11-06 RX ORDER — TERBINAFINE HYDROCHLORIDE 250 MG/1
250 TABLET ORAL DAILY
Qty: 30 TABLET | Refills: 1 | Status: SHIPPED | OUTPATIENT
Start: 2017-11-06 | End: 2018-02-08

## 2017-11-06 NOTE — TELEPHONE ENCOUNTER
PA for Margaret was denied.  I will put notification in your clinic inbox. Please advise.  Thank you.

## 2017-11-06 NOTE — TELEPHONE ENCOUNTER
Please inform patient that I sent over prescription for Lamisil 250 mg daily for toenail fungus secondary to insurance. I will need to recheck labs at next visit. Thanks

## 2017-11-16 ENCOUNTER — HOSPITAL ENCOUNTER (EMERGENCY)
Facility: HOSPITAL | Age: 62
Discharge: HOME | End: 2017-11-16
Attending: EMERGENCY MEDICINE | Admitting: EMERGENCY MEDICINE

## 2017-11-16 ENCOUNTER — APPOINTMENT (OUTPATIENT)
Dept: CT IMAGING | Facility: HOSPITAL | Age: 62
End: 2017-11-16

## 2017-11-16 VITALS
HEART RATE: 65 BPM | HEIGHT: 71 IN | BODY MASS INDEX: 31.5 KG/M2 | WEIGHT: 225 LBS | SYSTOLIC BLOOD PRESSURE: 131 MMHG | DIASTOLIC BLOOD PRESSURE: 95 MMHG | TEMPERATURE: 98.3 F | RESPIRATION RATE: 18 BRPM | OXYGEN SATURATION: 96 %

## 2017-11-16 DIAGNOSIS — M54.12 CERVICAL RADICULOPATHY: Primary | ICD-10-CM

## 2017-11-16 DIAGNOSIS — M47.22 OSTEOARTHRITIS OF SPINE WITH RADICULOPATHY, CERVICAL REGION: ICD-10-CM

## 2017-11-16 PROCEDURE — 72125 CT NECK SPINE W/O DYE: CPT

## 2017-11-16 PROCEDURE — 25010000002 HYDROMORPHONE PER 4 MG: Performed by: EMERGENCY MEDICINE

## 2017-11-16 PROCEDURE — 96374 THER/PROPH/DIAG INJ IV PUSH: CPT

## 2017-11-16 PROCEDURE — 99284 EMERGENCY DEPT VISIT MOD MDM: CPT

## 2017-11-16 PROCEDURE — 25010000002 ONDANSETRON PER 1 MG: Performed by: EMERGENCY MEDICINE

## 2017-11-16 PROCEDURE — 96375 TX/PRO/DX INJ NEW DRUG ADDON: CPT

## 2017-11-16 RX ORDER — BACLOFEN 10 MG/1
10 TABLET ORAL 3 TIMES DAILY
Qty: 30 TABLET | Refills: 0 | Status: SHIPPED | OUTPATIENT
Start: 2017-11-16 | End: 2017-12-01 | Stop reason: SDUPTHER

## 2017-11-16 RX ORDER — SODIUM CHLORIDE 0.9 % (FLUSH) 0.9 %
10 SYRINGE (ML) INJECTION AS NEEDED
Status: DISCONTINUED | OUTPATIENT
Start: 2017-11-16 | End: 2017-11-16 | Stop reason: HOSPADM

## 2017-11-16 RX ORDER — OXYCODONE HYDROCHLORIDE AND ACETAMINOPHEN 5; 325 MG/1; MG/1
1 TABLET ORAL EVERY 6 HOURS PRN
Qty: 30 TABLET | Refills: 0 | Status: SHIPPED | OUTPATIENT
Start: 2017-11-16 | End: 2017-12-12

## 2017-11-16 RX ORDER — METHYLPREDNISOLONE 4 MG/1
TABLET ORAL
Qty: 21 EACH | Refills: 0 | Status: SHIPPED | OUTPATIENT
Start: 2017-11-16 | End: 2017-12-12

## 2017-11-16 RX ORDER — ONDANSETRON 2 MG/ML
4 INJECTION INTRAMUSCULAR; INTRAVENOUS ONCE
Status: COMPLETED | OUTPATIENT
Start: 2017-11-16 | End: 2017-11-16

## 2017-11-16 RX ADMIN — ONDANSETRON 4 MG: 2 INJECTION INTRAMUSCULAR; INTRAVENOUS at 19:13

## 2017-11-16 RX ADMIN — HYDROMORPHONE HYDROCHLORIDE 1 MG: 2 INJECTION, SOLUTION INTRAMUSCULAR; INTRAVENOUS; SUBCUTANEOUS at 19:13

## 2017-11-16 NOTE — ED PROVIDER NOTES
" EMERGENCY DEPARTMENT ENCOUNTER    CHIEF COMPLAINT  Chief Complaint: Shoulder pain  History given by: Patient  History limited by: None  Room Number: 26/26  PMD: Husam Gomez MD      HPI:  Pt is a 62 y.o. male who presents complaining of right shoulder pain that radiates down the RUE x3 weeks. Pain is aggravated by pressure and excessive movement of the RUE. He is unsure of any recent injuries that could have caused his pain. Pain is unresolved with ibuprofen. There is some associated numbness/tingling in the right digits, but no associated chest pain, SOA, headache or other symptoms.     Duration:  3 weeks  Onset: graudal  Timing: constant  Location: right shoulder  Radiation: down RUE  Quality: \"pain\"  Intensity/Severity: moderate  Progression: unchanged  Associated Symptoms: numbness/tingling in right digits  Aggravating Factors: excessive movement of RUE and pressure  Alleviating Factors: none stated  Previous Episodes: none staetd  Treatment before arrival: patient has taken ibuprofen with no resolve of sx    PAST MEDICAL HISTORY  Active Ambulatory Problems     Diagnosis Date Noted   • Airway hyperreactivity 05/03/2016   • Elevated cholesterol 05/03/2016   • Gain of weight 05/03/2016   • ED (erectile dysfunction) 07/15/2016   • ST elevation myocardial infarction (STEMI) 02/15/2017   • Mallet finger 10/20/2014   • Sprain of carpometacarpal joint 10/20/2014   • Essential hypertension 04/20/2017   • Coronary artery disease involving native coronary artery of native heart without angina pectoris 04/20/2017   • Chest pain 07/22/2017   • Precordial pain 07/22/2017     Resolved Ambulatory Problems     Diagnosis Date Noted   • No Resolved Ambulatory Problems     Past Medical History:   Diagnosis Date   • Arthritis    • Chronic pain in right foot    • Coronary artery disease    • ED (erectile dysfunction) 7/15/2016   • GERD (gastroesophageal reflux disease)    • Hypertension    • Myocardial infarction        PAST " SURGICAL HISTORY  Past Surgical History:   Procedure Laterality Date   • APPENDECTOMY     • CARDIAC CATHETERIZATION N/A 2/15/2017    Procedure: Left Heart Cath;  Surgeon: Kristina Billings MD;  Location:  [facility];  Service:    • CARDIAC CATHETERIZATION  2/15/2017    Procedure: Percutaneous Manual Thrombectomy;  Surgeon: Kristina Billings MD;  Location:  [facility];  Service:    • CARDIAC CATHETERIZATION N/A 2/15/2017    Procedure: Coronary angiography;  Surgeon: Kristina Billings MD;  Location:  [facility];  Service:    • CARDIAC CATHETERIZATION N/A 2/15/2017    Procedure: Left ventriculography;  Surgeon: Kristina Billings MD;  [facility];  Service:    • CARDIAC CATHETERIZATION N/A 7/24/2017    Procedure: Left Heart Cath;  Surgeon: Kristina Billings MD;  [facility];  Service:    • CARDIAC CATHETERIZATION N/A 7/24/2017    Procedure: Coronary angiography;  Surgeon: Kristina Billings MD;  [facility];  Service:    • CARDIAC CATHETERIZATION N/A 7/24/2017    Procedure: Left ventriculography;  Surgeon: Kristina Billings MD;  Location: [facility] CATH INVASIVE LOCATION;  Service:    • CHOLECYSTECTOMY     • COLONOSCOPY N/A 02/27/2015    Normal   • CYST REMOVAL N/A     Neck   • HERNIA REPAIR Right     Inguinal   • PERIPHERAL ARTERIAL STENT GRAFT     • GA RT/LT HEART CATHETERS N/A 2/15/2017    Procedure: Percutaneous Coronary Intervention;  Surgeon: Kristina Billings MD;  Location: [facility] CATH INVASIVE LOCATION;  Service: Cardiovascular       FAMILY HISTORY  Family History   Problem Relation Age of Onset   • Diabetes Mother      Type 2   • Hypertension Mother    • Hyperlipidemia Mother    • Heart attack Mother    • Heart disease Mother    • Diabetes Father      Type 2   • Stroke Father    • Hypertension Father    • Hyperlipidemia Father    • Heart attack  Father    • Heart disease Father    • Aneurysm Brother      Brain       SOCIAL HISTORY  Social History     Social History   • Marital status:      Spouse name: N/A   • Number of children: N/A   • Years of education: N/A     Occupational History   • Not on file.     Social History Main Topics   • Smoking status: Former Smoker     Packs/day: 0.50     Years: 7.00     Types: Cigarettes     Quit date: 12/23/1999   • Smokeless tobacco: Never Used   • Alcohol use Yes   • Drug use: No   • Sexual activity: Defer     Other Topics Concern   • Not on file     Social History Narrative       ALLERGIES  Amoxicillin and Penicillins    REVIEW OF SYSTEMS  Review of Systems   Constitutional: Negative for activity change, appetite change and fever.   HENT: Negative for congestion and sore throat.    Eyes: Negative.    Respiratory: Negative for cough and shortness of breath.    Cardiovascular: Negative for chest pain and leg swelling.   Gastrointestinal: Negative for abdominal pain, diarrhea and vomiting.   Musculoskeletal: Positive for arthralgias (right shoulder radiating down RUE). Negative for neck pain.   Skin: Negative for rash and wound.   Neurological: Positive for numbness (tingling of fingers). Negative for weakness and headaches.   Hematological: Negative.    Psychiatric/Behavioral: Negative.    All other systems reviewed and are negative.      PHYSICAL EXAM  ED Triage Vitals   Temp Heart Rate Resp BP SpO2   11/16/17 1629 11/16/17 1629 11/16/17 1629 11/16/17 1636 11/16/17 1629   98.7 °F (37.1 °C) 76 16 135/91 98 %      Temp src Heart Rate Source Patient Position BP Location FiO2 (%)   11/16/17 1629 11/16/17 1629 11/16/17 1636 11/16/17 1636 --   Tympanic Monitor Sitting Left arm        Physical Exam   Constitutional: He is oriented to person, place, and time and well-developed, well-nourished, and in no distress.   HENT:   Head: Normocephalic and atraumatic.   Eyes: EOM are normal. Pupils are equal, round, and reactive  to light.   Neck: Neck supple.   Cardiovascular: Normal rate, regular rhythm and normal heart sounds.    Pulmonary/Chest: Effort normal and breath sounds normal. No respiratory distress.   Abdominal: Soft. There is no tenderness.   Musculoskeletal: Normal range of motion. He exhibits no edema.   Right sided cervical tenderness with decreased ROM of the neck. There is good strength and sensation in the RUE.    Neurological: He is alert and oriented to person, place, and time. He has normal sensation, normal strength and normal reflexes.   Skin: Skin is warm and dry.   Psychiatric: Mood and affect normal.   Nursing note and vitals reviewed.      RADIOLOGY  CT Cervical Spine Without Contrast   Final Result   The CT scan was performed as an emergency procedure through the cervical  spine and demonstrates the followin. The foramen magnum appears normal. There is mild spurring involving  the odontoid and the anterior arch of C1.  2. C2-3 there is spurring of the lateral facets, left greater than right  without central or foraminal encroachment.  3. At C3-4 there is severe spurring of the left lateral facet to a  lesser extent the right and there is some mild to moderate bilateral  foraminal encroachment.  4. At C4-5 there is some minor facet spurring on the right. There is no  central or foraminal encroachment.  5. At C5-6 there is a moderate posterior disc osteophyte complex  producing borderline central stenosis. There is spurring of the  uncovertebral joints with considerable bilateral foraminal encroachment.  6. At C6-7 there is a mild posterior disc osteophyte complex without  central stenosis. There is also no foraminal encroachment at this level.  The C7-T1 and T1-2 levels are unremarkable.     Radiation dose reduction techniques were utilized, including automated  exposure control and exposure modulation based on body size.     This report was finalized on 2017 7:54 PM by Dr. Jonathan Montelongo MD.         I ordered the above noted radiological studies. Interpreted by radiologist.  Reviewed by me in PACS.       PROCEDURES  Procedures      PROGRESS AND CONSULTS  ED Course   1859: Ordered CT C-Spine to r/o compression/fracture/arthritis. Ordered zofran for nausea and dilaudid for pain.     2000: Patient is stable for d/c and f/u. He has been made aware of unremarkable CT results. Pt understands and agrees with the plan. All questions answered.      MEDICAL DECISION MAKING  Results were reviewed/discussed with the patient and they were also made aware of online access. Pt also made aware that some labs, such as cultures, will not be resulted during ER visit and follow up with PMD is necessary.     MDM  Number of Diagnoses or Management Options     Amount and/or Complexity of Data Reviewed  Tests in the radiology section of CPT®: ordered and reviewed (Radiology results are unremarkable)  Review and summarize past medical records: yes (Outpatient C-Spine from 10/31 showed degenerative changes but no obvious abnormality )    Patient Progress  Patient progress: stable         DIAGNOSIS  Final diagnoses:   Cervical radiculopathy   Osteoarthritis of spine with radiculopathy, cervical region       DISPOSITION  DISCHARGE    Patient discharged in stable condition.    Reviewed implications of results, diagnosis, meds, responsibility to follow up, warning signs and symptoms of possible worsening, potential complications and reasons to return to ER, including new or worsening sx.    Patient/Family voiced understanding of above instructions.    Discussed plan for discharge, as there is no emergent indication for admission.  Pt/family is agreeable and understands need for follow up and repeat testing.  Pt is aware that discharge does not mean that nothing is wrong but it indicates no emergency is present that requires admission and they must continue care with follow-up as given below or physician of their choice.      FOLLOW-UP  Husam Gomez MD  [address]  [place] [address]  [phone]               Medication List      New Prescriptions          baclofen 10 MG tablet   Commonly known as:  LIORESAL   Take 1 tablet by mouth 3 (Three) Times a Day.       MethylPREDNISolone 4 MG tablet   Commonly known as:  MEDROL (KENDRICK)   Take as directed on package instructions.       oxyCODONE-acetaminophen 5-325 MG per tablet   Commonly known as:  PERCOCET   Take 1 tablet by mouth Every 6 (Six) Hours As Needed for Severe Pain .         Stop          cyclobenzaprine 10 MG tablet   Commonly known as:  FLEXERIL       OMEGA 3 PO               Latest Documented Vital Signs:  As of 10:49 PM  BP- 131/95 HR- 65 Temp- 98.3 °F (36.8 °C) (Tympanic) O2 sat- 96%    --  Documentation assistance provided by nahed Mcfadden for Kaushik Tracy PA-C.  Information recorded by the scribe was done at my direction and has been verified and validated by me.       Chela Mcfadden  11/16/17 2362       LUZ Conteh  11/17/17 0012

## 2017-11-16 NOTE — ED NOTES
"Patient states that he has been dealing with cervical neck pain over the last couple of weeks and has seen his PCP who scheduled him for a CT tomorrow but pain has gotten too bad. Reports cervical neck pain that is worse when twisting and it radiates down the right arm and feels like \"tingling and burning\" pain.      Mita Soto RN  11/16/17 2688    "

## 2017-11-17 ENCOUNTER — TELEPHONE (OUTPATIENT)
Dept: SOCIAL WORK | Facility: HOSPITAL | Age: 62
End: 2017-11-17

## 2017-11-17 ENCOUNTER — APPOINTMENT (OUTPATIENT)
Dept: CT IMAGING | Facility: HOSPITAL | Age: 62
End: 2017-11-17

## 2017-11-17 ENCOUNTER — OFFICE VISIT (OUTPATIENT)
Dept: INTERNAL MEDICINE | Facility: CLINIC | Age: 62
End: 2017-11-17

## 2017-11-17 VITALS
SYSTOLIC BLOOD PRESSURE: 118 MMHG | BODY MASS INDEX: 30.68 KG/M2 | WEIGHT: 220 LBS | HEART RATE: 100 BPM | OXYGEN SATURATION: 97 % | DIASTOLIC BLOOD PRESSURE: 80 MMHG

## 2017-11-17 DIAGNOSIS — M54.10 RADICULAR NEUROPATHY: ICD-10-CM

## 2017-11-17 DIAGNOSIS — M48.02 DEGENERATIVE CERVICAL SPINAL STENOSIS: ICD-10-CM

## 2017-11-17 DIAGNOSIS — M54.2 CERVICALGIA: Primary | ICD-10-CM

## 2017-11-17 PROCEDURE — 99213 OFFICE O/P EST LOW 20 MIN: CPT | Performed by: NURSE PRACTITIONER

## 2017-11-17 NOTE — ED PROVIDER NOTES
Pt presents with right sided shoulder pain that radiates down his right arm.  On exam, he seems uncomfortable and is holding his neck still. Any movement towards the right or with rotation gives him sharp pain down his R arm, and seems to be in C6-7 distribution. No neuro deficit. Good pulse. CT shows stenosis and spurring as a cause. Will discharge him home with pain meds and steroids. He will see his family doctor tomorrow.          MEDICAL DECISION MAKING  Results were reviewed/discussed with the patient and they were also made aware of online access. Pt also made aware that follow up with PMD is necessary.     MDM  Number of Diagnoses or Management Options  Cervical radiculopathy:   Osteoarthritis of spine with radiculopathy, cervical region:      Amount and/or Complexity of Data Reviewed  Tests in the radiology section of CPT®: reviewed (CT C Spine shows stenosis and spurring)  Independent visualization of images, tracings, or specimens: yes    Patient Progress  Patient progress: stable         DIAGNOSIS  Final diagnoses:   Cervical radiculopathy   Osteoarthritis of spine with radiculopathy, cervical region         DISPOSITION  DISCHARGE    Patient discharged in stable condition.    Reviewed implications of results, diagnosis, meds, responsibility to follow up, warning signs and symptoms of possible worsening, potential complications and reasons to return to the ED.    Patient/Family voiced understanding of above instructions.    Discussed plan for discharge, as there is no emergent indication for admission.  Pt/family is agreeable and understands need for follow up and repeat testing.  Pt is aware that discharge does not mean that nothing is wrong but it indicates no emergency is present that requires admission and they must continue care with follow-up as given below or physician of their choice.     FOLLOW-UP  Husam Gomez MD  [facility] [address] [address] [phone]              "  Medication List      New Prescriptions          baclofen 10 MG tablet   Commonly known as:  LIORESAL   Take 1 tablet by mouth 3 (Three) Times a Day.       MethylPREDNISolone 4 MG tablet   Commonly known as:  MEDROL (KENDRICK)   Take as directed on package instructions.       oxyCODONE-acetaminophen 5-325 MG per tablet   Commonly known as:  PERCOCET   Take 1 tablet by mouth Every 6 (Six) Hours As Needed for Severe Pain .         Stop          cyclobenzaprine 10 MG tablet   Commonly known as:  FLEXERIL       OMEGA 3 PO           /95  Pulse 76  Temp 98.7 °F (37.1 °C) (Tympanic)   Resp 16  Ht 71\" (180.3 cm)  Wt 225 lb (102 kg)  SpO2 97%  BMI 31.38 kg/m2        I supervised care provided by the midlevel provider.   We have discussed this patient's history, physical exam, and treatment plan.  I have reviewed the note and personally saw and examined the patient and agree with the plan of care. See attending note.  Documentation assistance provided by nahed Fowler for Dr. Linares.  Information recorded by the scribe was done at my direction and has been verified and validated by me.           Marj Fowler  11/16/17 2012       Marj Fowler  11/16/17 2013       Peng Linares MD  11/17/17 0244    "

## 2017-11-17 NOTE — TELEPHONE ENCOUNTER
ER F/U phone call:   Pt states that he is still having pain. Taking medications as ordered. Saw his PCP this day and a referral was made to neurosurgery. No other questions or concerns voiced at this time. Rhonda Love RN

## 2017-11-17 NOTE — PROGRESS NOTES
Subjective   Sander White is a 62 y.o. male.     HPI Comments: He was seen several weeks ago for neck pain, which he reports was doing much better. However, yesterday while at work he went to lift a 20 lbs object and got pain in the neck. He went to ER last night and had Ct of neck performed which revealed spurring (C2-C3, C3-C4, C4-C5) and stenosis (C5-C6 and C6-C7). He was prescribed oxycodone and medrol esthela.  He reports some relief since last night.  He has been taking gabapentin 100 mg tid.     Neck Pain    This is a recurrent problem. The current episode started more than 1 month ago. The problem occurs constantly. The problem has been waxing and waning. The pain is associated with lifting a heavy object. The pain is present in the midline. The quality of the pain is described as burning. The pain is at a severity of 5/10. The pain is moderate. The symptoms are aggravated by twisting. Associated symptoms include numbness (bilateral hands ) and tingling (bilateral hands/worst in left hand  (2-4th digit) ). Pertinent negatives include no chest pain, fever, headaches, leg pain, visual change or weakness. Treatments tried: oxycodone, oral steroid, gabapentin.        The following portions of the patient's history were reviewed and updated as appropriate: allergies, current medications and problem list.    Review of Systems   Constitutional: Negative for activity change, appetite change, fatigue and fever.   Respiratory: Negative for cough, shortness of breath and wheezing.    Cardiovascular: Negative for chest pain, palpitations and leg swelling.   Musculoskeletal: Positive for neck pain and neck stiffness.   Neurological: Positive for tingling (bilateral hands/worst in left hand  (2-4th digit) ) and numbness (bilateral hands ). Negative for dizziness, weakness and headaches.       Objective   Physical Exam   Constitutional: He is oriented to person, place, and time. He appears well-developed and well-nourished.    HENT:   Head: Normocephalic.   Nose: Nose normal.   Neck: Muscular tenderness present. Carotid bruit is not present. Decreased range of motion present. No thyroid mass and no thyromegaly present.       Cardiovascular: Regular rhythm and normal heart sounds.  Exam reveals no S3 and no S4.    No murmur heard.  Pulmonary/Chest: Effort normal and breath sounds normal. He has no decreased breath sounds. He has no wheezes. He has no rhonchi. He has no rales.   Musculoskeletal: He exhibits no edema.        Cervical back: He exhibits decreased range of motion, tenderness and pain.   Bilateral equal  strength  Negative phalens/tinel    Neurological: He is alert and oriented to person, place, and time. Gait normal.   Skin: Skin is warm and dry.   Psychiatric: He has a normal mood and affect.       Assessment/Plan   Sander was seen today for neck pain.    Diagnoses and all orders for this visit:    Cervicalgia  Comments:  increase gabapentin 300 mg at bedtime;  continue with exercise; he will due PT therapy up [facility])     Degenerative cervical spinal stenosis  Comments:  will refer to neurosurgery   Orders:  -     Ambulatory Referral to Neurosurgery    Radicular neuropathy  -     EMG & Nerve Conduction Test; Future      I reviewed recent [facility] ER records.

## 2017-11-21 ENCOUNTER — APPOINTMENT (OUTPATIENT)
Dept: PHYSICAL THERAPY | Facility: HOSPITAL | Age: 62
End: 2017-11-21

## 2017-11-25 ENCOUNTER — APPOINTMENT (OUTPATIENT)
Dept: MRI IMAGING | Facility: HOSPITAL | Age: 62
End: 2017-11-25

## 2017-11-25 ENCOUNTER — HOSPITAL ENCOUNTER (EMERGENCY)
Facility: HOSPITAL | Age: 62
Discharge: HOME | End: 2017-11-25
Attending: EMERGENCY MEDICINE | Admitting: EMERGENCY MEDICINE

## 2017-11-25 ENCOUNTER — APPOINTMENT (OUTPATIENT)
Dept: CT IMAGING | Facility: HOSPITAL | Age: 62
End: 2017-11-25

## 2017-11-25 VITALS
SYSTOLIC BLOOD PRESSURE: 113 MMHG | TEMPERATURE: 97.6 F | DIASTOLIC BLOOD PRESSURE: 81 MMHG | BODY MASS INDEX: 30.8 KG/M2 | HEART RATE: 101 BPM | WEIGHT: 220 LBS | HEIGHT: 71 IN | RESPIRATION RATE: 16 BRPM | OXYGEN SATURATION: 96 %

## 2017-11-25 DIAGNOSIS — M54.12 CERVICAL RADICULOPATHY: Primary | ICD-10-CM

## 2017-11-25 LAB
ANION GAP SERPL CALCULATED.3IONS-SCNC: 12.3 MMOL/L
BASOPHILS # BLD AUTO: 0.02 10*3/MM3 (ref 0–0.2)
BASOPHILS NFR BLD AUTO: 0.4 % (ref 0–1.5)
BUN BLD-MCNC: 16 MG/DL (ref 8–23)
BUN/CREAT SERPL: 14.5 (ref 7–25)
CALCIUM SPEC-SCNC: 9.1 MG/DL (ref 8.6–10.5)
CHLORIDE SERPL-SCNC: 105 MMOL/L (ref 98–107)
CO2 SERPL-SCNC: 22.7 MMOL/L (ref 22–29)
CREAT BLD-MCNC: 1.1 MG/DL (ref 0.76–1.27)
DEPRECATED RDW RBC AUTO: 42.4 FL (ref 37–54)
EOSINOPHIL # BLD AUTO: 0.26 10*3/MM3 (ref 0–0.7)
EOSINOPHIL NFR BLD AUTO: 4.6 % (ref 0.3–6.2)
ERYTHROCYTE [DISTWIDTH] IN BLOOD BY AUTOMATED COUNT: 12.7 % (ref 11.5–14.5)
GFR SERPL CREATININE-BSD FRML MDRD: 68 ML/MIN/1.73
GLUCOSE BLD-MCNC: 74 MG/DL (ref 65–99)
HCT VFR BLD AUTO: 42.1 % (ref 40.4–52.2)
HGB BLD-MCNC: 14.6 G/DL (ref 13.7–17.6)
IMM GRANULOCYTES # BLD: 0.05 10*3/MM3 (ref 0–0.03)
IMM GRANULOCYTES NFR BLD: 0.9 % (ref 0–0.5)
LYMPHOCYTES # BLD AUTO: 1.5 10*3/MM3 (ref 0.9–4.8)
LYMPHOCYTES NFR BLD AUTO: 26.7 % (ref 19.6–45.3)
MCH RBC QN AUTO: 31.7 PG (ref 27–32.7)
MCHC RBC AUTO-ENTMCNC: 34.7 G/DL (ref 32.6–36.4)
MCV RBC AUTO: 91.5 FL (ref 79.8–96.2)
MONOCYTES # BLD AUTO: 0.9 10*3/MM3 (ref 0.2–1.2)
MONOCYTES NFR BLD AUTO: 16 % (ref 5–12)
NEUTROPHILS # BLD AUTO: 2.89 10*3/MM3 (ref 1.9–8.1)
NEUTROPHILS NFR BLD AUTO: 51.4 % (ref 42.7–76)
PLATELET # BLD AUTO: 241 10*3/MM3 (ref 140–500)
PMV BLD AUTO: 10.8 FL (ref 6–12)
POTASSIUM BLD-SCNC: 4.1 MMOL/L (ref 3.5–5.2)
RBC # BLD AUTO: 4.6 10*6/MM3 (ref 4.6–6)
SODIUM BLD-SCNC: 140 MMOL/L (ref 136–145)
WBC NRBC COR # BLD: 5.62 10*3/MM3 (ref 4.5–10.7)

## 2017-11-25 PROCEDURE — 80048 BASIC METABOLIC PNL TOTAL CA: CPT | Performed by: EMERGENCY MEDICINE

## 2017-11-25 PROCEDURE — 0 IOPAMIDOL 61 % SOLUTION: Performed by: EMERGENCY MEDICINE

## 2017-11-25 PROCEDURE — 99284 EMERGENCY DEPT VISIT MOD MDM: CPT

## 2017-11-25 PROCEDURE — 25010000002 KETOROLAC TROMETHAMINE PER 15 MG: Performed by: NURSE PRACTITIONER

## 2017-11-25 PROCEDURE — 85025 COMPLETE CBC W/AUTO DIFF WBC: CPT | Performed by: EMERGENCY MEDICINE

## 2017-11-25 PROCEDURE — 25010000002 HYDROMORPHONE PER 4 MG: Performed by: EMERGENCY MEDICINE

## 2017-11-25 PROCEDURE — 96375 TX/PRO/DX INJ NEW DRUG ADDON: CPT

## 2017-11-25 PROCEDURE — 96372 THER/PROPH/DIAG INJ SC/IM: CPT

## 2017-11-25 PROCEDURE — 25010000002 TRIAMCINOLONE PER 10 MG: Performed by: NURSE PRACTITIONER

## 2017-11-25 PROCEDURE — 0 GADOBENATE DIMEGLUMINE 529 MG/ML SOLUTION: Performed by: EMERGENCY MEDICINE

## 2017-11-25 PROCEDURE — 72156 MRI NECK SPINE W/O & W/DYE: CPT

## 2017-11-25 PROCEDURE — 96376 TX/PRO/DX INJ SAME DRUG ADON: CPT

## 2017-11-25 PROCEDURE — A9577 INJ MULTIHANCE: HCPCS | Performed by: EMERGENCY MEDICINE

## 2017-11-25 PROCEDURE — 96374 THER/PROPH/DIAG INJ IV PUSH: CPT

## 2017-11-25 PROCEDURE — 25010000002 HYDROMORPHONE PER 4 MG: Performed by: NURSE PRACTITIONER

## 2017-11-25 PROCEDURE — 70498 CT ANGIOGRAPHY NECK: CPT

## 2017-11-25 PROCEDURE — 25010000002 ONDANSETRON PER 1 MG: Performed by: EMERGENCY MEDICINE

## 2017-11-25 RX ORDER — HYDROMORPHONE HYDROCHLORIDE 1 MG/ML
0.5 INJECTION, SOLUTION INTRAMUSCULAR; INTRAVENOUS; SUBCUTANEOUS ONCE
Status: DISCONTINUED | OUTPATIENT
Start: 2017-11-25 | End: 2017-11-25

## 2017-11-25 RX ORDER — GABAPENTIN 100 MG/1
100 CAPSULE ORAL ONCE
Status: COMPLETED | OUTPATIENT
Start: 2017-11-25 | End: 2017-11-25

## 2017-11-25 RX ORDER — OXYCODONE AND ACETAMINOPHEN 10; 325 MG/1; MG/1
1 TABLET ORAL ONCE
Status: COMPLETED | OUTPATIENT
Start: 2017-11-25 | End: 2017-11-25

## 2017-11-25 RX ORDER — KETOROLAC TROMETHAMINE 30 MG/ML
30 INJECTION, SOLUTION INTRAMUSCULAR; INTRAVENOUS ONCE
Status: COMPLETED | OUTPATIENT
Start: 2017-11-25 | End: 2017-11-25

## 2017-11-25 RX ORDER — TRIAMCINOLONE ACETONIDE 40 MG/ML
40 INJECTION, SUSPENSION INTRA-ARTICULAR; INTRAMUSCULAR ONCE
Status: COMPLETED | OUTPATIENT
Start: 2017-11-25 | End: 2017-11-25

## 2017-11-25 RX ORDER — ONDANSETRON 2 MG/ML
4 INJECTION INTRAMUSCULAR; INTRAVENOUS ONCE
Status: COMPLETED | OUTPATIENT
Start: 2017-11-25 | End: 2017-11-25

## 2017-11-25 RX ORDER — OXYCODONE AND ACETAMINOPHEN 10; 325 MG/1; MG/1
1 TABLET ORAL EVERY 6 HOURS PRN
Qty: 12 TABLET | Refills: 0 | Status: SHIPPED | OUTPATIENT
Start: 2017-11-25 | End: 2017-12-12

## 2017-11-25 RX ORDER — NAPROXEN 500 MG/1
500 TABLET ORAL 2 TIMES DAILY WITH MEALS
Qty: 60 TABLET | Refills: 0 | Status: SHIPPED | OUTPATIENT
Start: 2017-11-25 | End: 2017-12-26

## 2017-11-25 RX ORDER — PREDNISONE 50 MG/1
50 TABLET ORAL DAILY
Qty: 7 TABLET | Refills: 0 | Status: SHIPPED | OUTPATIENT
Start: 2017-11-25 | End: 2017-12-02

## 2017-11-25 RX ORDER — SODIUM CHLORIDE 0.9 % (FLUSH) 0.9 %
10 SYRINGE (ML) INJECTION AS NEEDED
Status: DISCONTINUED | OUTPATIENT
Start: 2017-11-25 | End: 2017-11-25 | Stop reason: HOSPADM

## 2017-11-25 RX ADMIN — HYDROMORPHONE HYDROCHLORIDE 1 MG: 2 INJECTION INTRAMUSCULAR; INTRAVENOUS; SUBCUTANEOUS at 21:07

## 2017-11-25 RX ADMIN — GADOBENATE DIMEGLUMINE 20 ML: 529 INJECTION, SOLUTION INTRAVENOUS at 17:41

## 2017-11-25 RX ADMIN — KETOROLAC TROMETHAMINE 30 MG: 30 INJECTION, SOLUTION INTRAMUSCULAR at 15:48

## 2017-11-25 RX ADMIN — HYDROMORPHONE HYDROCHLORIDE 1 MG: 2 INJECTION INTRAMUSCULAR; INTRAVENOUS; SUBCUTANEOUS at 18:40

## 2017-11-25 RX ADMIN — TRIAMCINOLONE ACETONIDE 40 MG: 40 INJECTION, SUSPENSION INTRA-ARTICULAR; INTRAMUSCULAR at 15:47

## 2017-11-25 RX ADMIN — IOPAMIDOL 95 ML: 612 INJECTION, SOLUTION INTRAVENOUS at 19:20

## 2017-11-25 RX ADMIN — HYDROMORPHONE HYDROCHLORIDE 1 MG: 2 INJECTION INTRAMUSCULAR; INTRAVENOUS; SUBCUTANEOUS at 16:34

## 2017-11-25 RX ADMIN — OXYCODONE HYDROCHLORIDE AND ACETAMINOPHEN 1 TABLET: 10; 325 TABLET ORAL at 18:37

## 2017-11-25 RX ADMIN — ONDANSETRON 4 MG: 2 INJECTION INTRAMUSCULAR; INTRAVENOUS at 16:32

## 2017-11-25 RX ADMIN — GABAPENTIN 100 MG: 100 CAPSULE ORAL at 15:41

## 2017-11-25 NOTE — ED PROVIDER NOTES
Pt presents to ED complaining of right sided neck pain that radiates down his RLE for 4 weeks that has become severe more recently. Pain is exacerbated by looking left and right, abduction of right arm, and by palpation. Painis burning at times. No CP or SOB. Pt had a lateral SR elevation MI and subsequent stent placed to ramus branch of circumflex artery on 02/15/17. He has been taking pain medicine and steroids without relief. Pt is on Plavix. Pt is scheduled to see a neurosurgereon on 01/03/18, but pt cannot wait that long. Recent CT cervical spine showed severe spurring at different levels and multilevel DDD.  On my exam, there is cervical paraspinal tenderness on right that is reproducible with palpation and with abduction of right arm above head. The patient has no motor or sensory changes with good grasp and normal Radial, Median and ulnar neurologic evaluation. No sensory changes. With intact distal pulses.     1611: Ordered MRI cervical spine with and without contrast. I spoke with radiology to make sure that pt's cardiac stent was safe for MRI. I have also ordered 0.5 mg Dilaudid/zofran for pain.     MRI Cervical Spine With & Without Contrast   Preliminary Result   1. No evidence of focal herniation or cord signal abnormality.   Multilevel degenerative disease involving the cervical spine as   described.   2. Asymmetrical signal within the vertebral arteries. This may be a   normal variant. The suggestion of a slightly increased caliber of the   right vertebral artery intracranially versus the distal cervical segment   may be artifactual. I cannot exclude a right vertebral stenosis or   dissection.        The above information was called to and discussed with Dr. Johnson.       CHECK CHECK              CT Angiogram Neck With & Without Contrast    (Results Pending)     1824: Discussed MRI results with Dr. Chiang. MRI shows multilevel DDD, foraminal narrowing, and stenosis. He also notes that left vertebral  artery is more prominent than left. The significance of this is unknown; dissection cannot be ruled out. CTA Neck is recommended.     1953: Rechecked pt. Discussed cervical stenosis and radiculopathy. MRI cervical spine and CTA Neck shows nothing emergent. We will attempt to expedite neurosurgery appointment and pt will be discharged.      I spoke at length with patient and spouse about results. He is good to go home.     I supervised care provided by the midlevel provider.    We have discussed this patient's history, physical exam, and treatment plan.   I have reviewed the note and personally saw and examined the patient and agree with the plan of care.    Documentation assistance provided by nahed Medley for Chito Johnson.  Information recorded by the scribe was done at my direction and has been verified and validated by me.              Yarely Medley  11/25/17 2018       Chito Johnson MD  11/25/17 2038

## 2017-11-25 NOTE — ED PROVIDER NOTES
EMERGENCY DEPARTMENT ENCOUNTER    Room Number:  34/34  Date seen:  11/25/2017  Time seen: 2:56 PM  PCP: Husam Gomez MD    HPI:  Chief complaint: Right neck and shoulder pain    Context:Sander White is a 62 y.o. male who presents to the ED with c/o right neck and shoulder pain onset 1 month ago with symptoms worsening in severity to the point he came to the ED. The pt states he has seen his PCP for the pain and was discharged with a muscle relaxer, pain mediation, and a steroid dose pack. The pt has an appointment scheduled with a neurosurgeon on 1-3-18. The pt states that he has been taking the medication given to him by the PCP, and it will improve his symptoms for about 20 minutes, but they will return. The pt states the pain is worse with movement of the arms above his head. The pt describes the pain as burning. The pt reports that he has been dropping things more frequently.    Timing: Constant  Duration: 1 month with symptoms worsening in severity to the point he came to the ED  Location: Right neck and right shoulder  Radiation: None  Quality: Burning pain  Intensity/Severity: Moderate  Associated Symptoms: Dropping objects more frequently  Aggravating Factors: Raising his arms above his head  Alleviating Factors: None stated  Previous Episodes: None  Treatment before arrival: The pt states that he has been taking the medication given to him by the PCP, and it will improve his symptoms for about 20 minutes, but they will return.    MEDICAL RECORD REVIEW    ALLERGIES  Amoxicillin and Penicillins    PAST MEDICAL HISTORY  Active Ambulatory Problems     Diagnosis Date Noted   • Airway hyperreactivity 05/03/2016   • Elevated cholesterol 05/03/2016   • Gain of weight 05/03/2016   • ED (erectile dysfunction) 07/15/2016   • ST elevation myocardial infarction (STEMI) 02/15/2017   • Mallet finger 10/20/2014   • Sprain of carpometacarpal joint 10/20/2014   • Essential hypertension 04/20/2017   • Coronary artery  disease involving native coronary artery of native heart without angina pectoris 04/20/2017   • Chest pain 07/22/2017   • Precordial pain 07/22/2017     Resolved Ambulatory Problems     Diagnosis Date Noted   • No Resolved Ambulatory Problems     Past Medical History:   Diagnosis Date   • Arthritis    • Chronic pain in right foot    • Coronary artery disease    • ED (erectile dysfunction) 7/15/2016   • GERD (gastroesophageal reflux disease)    • Hypertension    • Myocardial infarction        PAST SURGICAL HISTORY  Past Surgical History:   Procedure Laterality Date   • APPENDECTOMY     • CARDIAC CATHETERIZATION N/A 2/15/2017    Procedure: Left Heart Cath;  Surgeon: Kristina Billings MD;  Location:  [facility] CATH INVASIVE LOCATION;  Service:    • CARDIAC CATHETERIZATION  2/15/2017    Procedure: Percutaneous Manual Thrombectomy;  Surgeon: Kristina Billings MD;  Location: [facility] CATH INVASIVE LOCATION;  Service:    • CARDIAC CATHETERIZATION N/A 2/15/2017    Procedure: Coronary angiography;  Surgeon: Kristina Billings MD;  Location: [facility] CATH INVASIVE LOCATION;  Service:    • CARDIAC CATHETERIZATION N/A 2/15/2017    Procedure: Left ventriculography;  Surgeon: Kristina Billings MD;  Location: [facility] CATH INVASIVE LOCATION;  Service:    • CARDIAC CATHETERIZATION N/A 7/24/2017    Procedure: Left Heart Cath;  Surgeon: Kristina Billings MD;  Location: [facility] CATH INVASIVE LOCATION;  Service:    • CARDIAC CATHETERIZATION N/A 7/24/2017    Procedure: Coronary angiography;  Surgeon: Kristina Billings MD;  Location: [facility] CATH INVASIVE LOCATION;  Service:    • CARDIAC CATHETERIZATION N/A 7/24/2017    Procedure: Left ventriculography;  Surgeon: Kristina Billings MD;  Location: [facility] CATH INVASIVE LOCATION;  Service:    • CHOLECYSTECTOMY     • COLONOSCOPY N/A 02/27/2015    Normal   • CYST REMOVAL N/A     Neck   • HERNIA REPAIR Right     Inguinal   • PERIPHERAL ARTERIAL STENT GRAFT     • CO RT/LT HEART  CATHETERS N/A 2/15/2017    Procedure: Percutaneous Coronary Intervention;  Surgeon: Kristina Billings MD;  Location: [facility] INVASIVE LOCATION;  Service: Cardiovascular       FAMILY HISTORY  Family History   Problem Relation Age of Onset   • Diabetes Mother      Type 2   • Hypertension Mother    • Hyperlipidemia Mother    • Heart attack Mother    • Heart disease Mother    • Diabetes Father      Type 2   • Stroke Father    • Hypertension Father    • Hyperlipidemia Father    • Heart attack Father    • Heart disease Father    • Aneurysm Brother      Brain       SOCIAL HISTORY  Social History     Social History   • Marital status:      Spouse name: N/A   • Number of children: N/A   • Years of education: N/A     Occupational History   • Not on file.     Social History Main Topics   • Smoking status: Former Smoker     Packs/day: 0.50     Years: 7.00     Types: Cigarettes     Quit date: 12/23/1999   • Smokeless tobacco: Never Used   • Alcohol use Yes   • Drug use: No   • Sexual activity: Defer     Other Topics Concern   • Not on file     Social History Narrative       REVIEW OF SYSTEMS  Review of Systems   Constitutional: Negative for activity change, appetite change, diaphoresis and fever.   HENT: Negative for trouble swallowing.    Eyes: Negative for visual disturbance.   Respiratory: Negative for cough, chest tightness, shortness of breath and wheezing.    Cardiovascular: Negative for chest pain, palpitations and leg swelling.   Gastrointestinal: Negative for abdominal pain, diarrhea, nausea and vomiting.   Genitourinary: Negative for dysuria.   Musculoskeletal: Positive for neck pain (Right). Negative for back pain.        Right shoulder pain   Skin: Negative for rash.   Neurological: Negative for dizziness, speech difficulty and light-headedness.        Dropping objects more frequently       PHYSICAL EXAM  ED Triage Vitals   Temp Heart Rate Resp BP SpO2   11/25/17 1333 11/25/17 1333 11/25/17 1333  11/25/17 1348 11/25/17 1333   97.6 °F (36.4 °C) 101 16 138/88 97 %      Temp src Heart Rate Source Patient Position BP Location FiO2 (%)   11/25/17 1333 -- -- -- --   Tympanic         Physical Exam   Constitutional: He is oriented to person, place, and time and well-developed, well-nourished, and in no distress. No distress.   HENT:   Head: Normocephalic and atraumatic.   Eyes: Pupils are equal, round, and reactive to light.   Neck: Normal range of motion. Neck supple.   Cardiovascular: Normal rate and normal heart sounds.  Exam reveals no gallop and no friction rub.    No murmur heard.  Pulmonary/Chest: Effort normal. No respiratory distress. He has no wheezes. He has no rales. He exhibits no tenderness.   Abdominal: Soft. There is no rebound and no guarding.   Musculoskeletal: He exhibits tenderness (Right paraspinal). He exhibits no deformity.   Lymphadenopathy:     He has no cervical adenopathy.   Neurological: He is alert and oriented to person, place, and time. He has normal motor skills. No cranial nerve deficit.   The pt has no loss of strength to the right hand. The pt is able to extend and flex the right arm. The pt has no loss of sensation radially or ulnarly.    Skin: Skin is warm and dry.   Psychiatric: Affect normal.   Nursing note and vitals reviewed.      LAB RESULTS  Recent Results (from the past 24 hour(s))   Basic Metabolic Panel    Collection Time: 11/25/17  4:31 PM   Result Value Ref Range    Glucose 74 65 - 99 mg/dL    BUN 16 8 - 23 mg/dL    Creatinine 1.10 0.76 - 1.27 mg/dL    Sodium 140 136 - 145 mmol/L    Potassium 4.1 3.5 - 5.2 mmol/L    Chloride 105 98 - 107 mmol/L    CO2 22.7 22.0 - 29.0 mmol/L    Calcium 9.1 8.6 - 10.5 mg/dL    eGFR Non African Amer 68 >60 mL/min/1.73    BUN/Creatinine Ratio 14.5 7.0 - 25.0    Anion Gap 12.3 mmol/L   CBC Auto Differential    Collection Time: 11/25/17  4:31 PM   Result Value Ref Range    WBC 5.62 4.50 - 10.70 10*3/mm3    RBC 4.60 4.60 - 6.00 10*6/mm3     Hemoglobin 14.6 13.7 - 17.6 g/dL    Hematocrit 42.1 40.4 - 52.2 %    MCV 91.5 79.8 - 96.2 fL    MCH 31.7 27.0 - 32.7 pg    MCHC 34.7 32.6 - 36.4 g/dL    RDW 12.7 11.5 - 14.5 %    RDW-SD 42.4 37.0 - 54.0 fl    MPV 10.8 6.0 - 12.0 fL    Platelets 241 140 - 500 10*3/mm3    Neutrophil % 51.4 42.7 - 76.0 %    Lymphocyte % 26.7 19.6 - 45.3 %    Monocyte % 16.0 (H) 5.0 - 12.0 %    Eosinophil % 4.6 0.3 - 6.2 %    Basophil % 0.4 0.0 - 1.5 %    Immature Grans % 0.9 (H) 0.0 - 0.5 %    Neutrophils, Absolute 2.89 1.90 - 8.10 10*3/mm3    Lymphocytes, Absolute 1.50 0.90 - 4.80 10*3/mm3    Monocytes, Absolute 0.90 0.20 - 1.20 10*3/mm3    Eosinophils, Absolute 0.26 0.00 - 0.70 10*3/mm3    Basophils, Absolute 0.02 0.00 - 0.20 10*3/mm3    Immature Grans, Absolute 0.05 (H) 0.00 - 0.03 10*3/mm3       I ordered the above labs and reviewed the results    RADIOLOGY  MRI Cervical Spine With & Without Contrast   Preliminary Result   1. No evidence of focal herniation or cord signal abnormality.   Multilevel degenerative disease involving the cervical spine as   described.   2. Asymmetrical signal within the vertebral arteries. This may be a   normal variant. The suggestion of a slightly increased caliber of the   right vertebral artery intracranially versus the distal cervical segment   may be artifactual. I cannot exclude a right vertebral stenosis or   dissection.        The above information was called to and discussed with Dr. Johnson.       CHECK CHECK              CT Angiogram Neck With & Without Contrast - No dissections or aneurysm seen on the CTA       I ordered the above noted radiological studies and reviewed the images on the PACS system.  Spoke with Dr. Chiang regarding CT/MRI scan results    MEDICATIONS GIVEN IN ER  Medications   sodium chloride 0.9 % flush 10 mL (not administered)   triamcinolone acetonide (KENALOG-40) injection 40 mg (40 mg Intramuscular Given 11/25/17 1547)   ketorolac (TORADOL) injection 30 mg (30 mg  Intramuscular Given 11/25/17 1548)   gabapentin (NEURONTIN) capsule 100 mg (100 mg Oral Given 11/25/17 1541)   ondansetron (ZOFRAN) injection 4 mg (4 mg Intravenous Given 11/25/17 1632)   HYDROmorphone (DILAUDID) injection 1 mg (1 mg Intravenous Given 11/25/17 1634)   gadobenate dimeglumine (MULTIHANCE) injection 20 mL (20 mL Intravenous Given 11/25/17 1741)   oxyCODONE-acetaminophen (PERCOCET)  MG per tablet 1 tablet (1 tablet Oral Given 11/25/17 1837)   HYDROmorphone (DILAUDID) injection 1 mg (1 mg Intravenous Given 11/25/17 1840)   iopamidol (ISOVUE-300) 61 % injection 100 mL (95 mL Intravenous Given 11/25/17 1920)       EKG  Interpreted by ED Physician    PROCEDURES  Procedures    COURSE & MEDICAL DECISION MAKING  Pertinent Labs and Imaging studies that were ordered and reviewed are noted above.  Results were reviewed/discussed with the patient and they were also made aware of online assess.  Pt also made aware that some labs, such as cultures, will not be resulted during ER visit and follow up with PMD is necessary.     PROGRESS AND CONSULTS    Progress Notes:    ED Course     1533  Reviewed pt's history and workup with Dr. Johnson.  After a bedside evaluation; Dr. Johnson agrees with the plan of care.    1611  Dr. Johnson is ordering an MRI Cervical Spine for further evaluation into the cause of his pain.    2014  Consult placed to Neurosurgery.    2022  Received a call from Dr. Maldonado and discussed pt's case. Dr. Maldonado has reviewed the MRI. Dr. Maldonado agrees that the pt will not require surgery and can be discharged with the plan to f/u outpatiently.    2041  The patient's history, physical exam, and lab findings were discussed with the physician, who also performed a face to face history and physical exam.  I discussed all results and noted any abnormalities with patient.  Discussed absoute need to recheck abnormalities with their family physician.  I answered any of the patient's questions.  Discussed  "plan for discharge, as there is no emergent indication for admission.  Pt is agreeable and understands need for follow up and repeat testing.  Pt is aware that discharge does not mean that nothing is wrong but it indicates no emergency is present and they must continue care with their family physician.  Pt is discharged with instructions to follow up with primary care doctor to have their blood pressure rechecked.     2043  BP- 113/81 HR- 101 Temp- 97.6 °F (36.4 °C) (Tympanic) O2 sat- 96%    Rechecked pt, his pain has improved. Discussed the consult with Dr. Maldonado and the plan to discharge the pt with steroids, Naprosyn, and Oxycodone. I advised the pt to f/u with his PCP and f/u with pain management for pain control. The pt is still concern he will not have his pain controlled. I offered the pt admission for pain control, but the pt denied saying he would rather be discharged home with the plan to take oral medication. The pt understands and agrees with the plan.  I also discussed with patient that due to his MRI he may not qualify for any surgical intervention but may benefit from PT/ pain management.     Disposition vitals:  /81  Pulse 101  Temp 97.6 °F (36.4 °C) (Tympanic)   Resp 16  Ht 71\" (180.3 cm)  Wt 220 lb (99.8 kg)  SpO2 96%  BMI 30.68 kg/m2      DIAGNOSIS  Final diagnoses:   Cervical radiculopathy       FOLLOW UP   Husam Gomez MD  [facility] [address]  [phone]    Schedule an appointment as soon as possible for a visit        RX     Medication List      New Prescriptions          naproxen 500 MG tablet   Commonly known as:  NAPROSYN   Take 1 tablet by mouth 2 (Two) Times a Day With Meals.       predniSONE 50 MG tablet   Commonly known as:  DELTASONE   Take 1 tablet by mouth Daily for 7 days.         Changed          * oxyCODONE-acetaminophen 5-325 MG per tablet   Commonly known as:  PERCOCET   Take 1 tablet by mouth Every 6 (Six) Hours As Needed for Severe " Pain .   What changed:  Another medication with the same name was added. Make sure   you understand how and when to take each.       * oxyCODONE-acetaminophen  MG per tablet   Commonly known as:  PERCOCET   Take 1 tablet by mouth Every 6 (Six) Hours As Needed for Moderate Pain .   What changed:  You were already taking a medication with the same name,   and this prescription was added. Make sure you understand how and when to   take each.       * Notice:  This list has 2 medication(s) that are the same as other   medications prescribed for you. Read the directions carefully, and ask   your doctor or other care provider to review them with you.      Stop          gabapentin 100 MG capsule   Commonly known as:  NEURONTIN           Graham Report  Graham report [id number] reviewed.   After examining the available clinical information and risks of prescribing controlled substances (including non-treatment or other adjunct treatments), it is considered medically appropriate that a controlled medication be prescribed.  I discussed risks/benefits/alternatives of using a controlled substance including risk of tolerance and dependence.  I discussed with patient (and family if applicable) that the patient should not drive, operate machinery, or otherwise engage in risky behavior as this medication may impair judgment and/or motor capabilities. I discussed that the patient will receive only a short-term controlled substance prescription for management of acute symptoms and that any additional medication needs should be addressed by the primary care provider or appropriate specialist.    Documentation assistance provided by nahed Patel for ANGELICA Barbosa.  Information recorded by the nahed was done at my direction and has been verified and validated by me.  Electronically signed by Neftaly Patel on 11/25/2017 at time 8:53 PM     Neftaly Patel  11/25/17 2053       ANGELICA Booker  11/26/17 0000

## 2017-11-25 NOTE — ED NOTES
Pt. c/o right shoulder pain. Pt. reports pain meds no longer working. Pt. has FROM in his RUE with palpable pulses.     Ad Shaffer RN  11/25/17 [id number]

## 2017-11-26 NOTE — DISCHARGE INSTRUCTIONS
1.  Call PCP to set up appointment with {Pain Management Provider  2.  Find out name of Physical Therapy place and phone number/fax number.  Give it to your Primary Care Provider so you can start therapy    Take pain medications on a schedule.  Add in Tylenol arthritis formula twice a  Day    YOU HAVE BEEN PRESCRIBED NARCOTIC PAIN MEDICATION    Narcotic pain medications can be addicting; only take them when needed    You have only been given a 2-3 day supply    Do not operate heavy machinery or make important decisions while taking narcotics    Take an over the counters tool softener while taking pain pills to avoid constipation    Do not consume alcohol while taking pain medication as this can be fatal    Do not share your pain medication with others    Store pain medication securely to prevent accidental exposure or death    Return Precautions    Although you are being discharged from the ED today, I encourage you to return for worsening symptoms.  Things can, and do, change such that treatment at home with medication may not be adequate.      Specifically, return for any of the following:    Chest pain, shortness of breath, pain or nausea and vomiting not controlled by medications provided.    Please make a follow up with your Primary Care Provider for a blood pressure recheck.

## 2017-11-27 ENCOUNTER — TELEPHONE (OUTPATIENT)
Dept: INTERNAL MEDICINE | Facility: CLINIC | Age: 62
End: 2017-11-27

## 2017-11-27 DIAGNOSIS — M48.02 DEGENERATIVE CERVICAL SPINAL STENOSIS: Primary | ICD-10-CM

## 2017-11-27 NOTE — TELEPHONE ENCOUNTER
Patient is requesting a referral for a pain management for cervical radiculopathy. He had an ER visit on 11/25/17 and 11/16/17 and was previously seen here in the office on 11/17/17 for Cervicalgia. He was referred to Dr. Rivera Neurosurgery at that time and has an appointment for 1/3/2018.    Thank you,    Marcelino

## 2017-11-27 NOTE — TELEPHONE ENCOUNTER
----- Message from Kaye Purvis sent at 11/27/2017  9:52 AM EST -----  Contact: Patient  Patient called requesting referral to pain management.  He was seen in ER Saturday with cervical radiculopathy.  Please advise.      Patient:  [phone]

## 2017-11-30 ENCOUNTER — HOSPITAL ENCOUNTER (OUTPATIENT)
Dept: INFUSION THERAPY | Facility: HOSPITAL | Age: 62
Discharge: HOME | End: 2017-11-30
Attending: PSYCHIATRY & NEUROLOGY | Admitting: PSYCHIATRY & NEUROLOGY

## 2017-11-30 DIAGNOSIS — M54.10 RADICULAR NEUROPATHY: ICD-10-CM

## 2017-11-30 PROCEDURE — 95886 MUSC TEST DONE W/N TEST COMP: CPT | Performed by: PSYCHIATRY & NEUROLOGY

## 2017-11-30 PROCEDURE — 95886 MUSC TEST DONE W/N TEST COMP: CPT

## 2017-11-30 PROCEDURE — 95911 NRV CNDJ TEST 9-10 STUDIES: CPT

## 2017-11-30 PROCEDURE — 95911 NRV CNDJ TEST 9-10 STUDIES: CPT | Performed by: PSYCHIATRY & NEUROLOGY

## 2017-12-01 DIAGNOSIS — M54.2 CERVICALGIA: ICD-10-CM

## 2017-12-01 DIAGNOSIS — G56.03 CARPAL TUNNEL SYNDROME ON BOTH SIDES: Primary | ICD-10-CM

## 2017-12-01 RX ORDER — BACLOFEN 10 MG/1
10 TABLET ORAL 3 TIMES DAILY
Qty: 30 TABLET | Refills: 0 | Status: SHIPPED | OUTPATIENT
Start: 2017-12-01 | End: 2017-12-12

## 2017-12-01 RX ORDER — GABAPENTIN 100 MG/1
100 CAPSULE ORAL NIGHTLY
Qty: 90 CAPSULE | Refills: 0 | OUTPATIENT
Start: 2017-12-01 | End: 2017-12-15 | Stop reason: DRUGHIGH

## 2017-12-12 ENCOUNTER — OFFICE VISIT (OUTPATIENT)
Dept: NEUROSURGERY | Facility: CLINIC | Age: 62
End: 2017-12-12

## 2017-12-12 VITALS
HEART RATE: 68 BPM | DIASTOLIC BLOOD PRESSURE: 70 MMHG | HEIGHT: 71 IN | BODY MASS INDEX: 30.38 KG/M2 | SYSTOLIC BLOOD PRESSURE: 107 MMHG | WEIGHT: 217 LBS

## 2017-12-12 DIAGNOSIS — M54.12 CERVICAL RADICULOPATHY: ICD-10-CM

## 2017-12-12 DIAGNOSIS — M48.02 SPINAL STENOSIS IN CERVICAL REGION: Primary | ICD-10-CM

## 2017-12-12 PROCEDURE — 99243 OFF/OP CNSLTJ NEW/EST LOW 30: CPT | Performed by: PHYSICIAN ASSISTANT

## 2017-12-12 NOTE — PROGRESS NOTES
Subjective   Patient ID: Sander White is a 62 y.o. male is being seen for consultation today at the request of ANGELICA Ochoa   for right sided neck pain, arm pain, N/T and weakness.  He denies any cause or injury.  Mr. White tried a MDP with no relief.  He takes Naproxen 500 mg BID, Tylenol arthritis 1300 mg TID and Gabapentin 300 mg HS  for pain.       Neck Pain    This is a new problem. The current episode started more than 1 month ago (5 months ). The problem has been rapidly worsening. The pain is associated with nothing. The quality of the pain is described as aching. The pain is at a severity of 7/10. The pain is severe. The symptoms are aggravated by position. The pain is same all the time. Associated symptoms include numbness and tingling. Pertinent negatives include no weakness. He has tried heat, oral narcotics and muscle relaxants for the symptoms. The treatment provided mild relief.   Arm Pain    The pain is present in the right elbow, right hand and right forearm. The quality of the pain is described as aching. The pain is at a severity of 4/10. The pain is moderate. The pain has been constant since the incident. Associated symptoms include numbness and tingling. He has tried heat for the symptoms. The treatment provided mild relief.       The following portions of the patient's history were reviewed and updated as appropriate: allergies, current medications, past family history, past medical history, past social history, past surgical history and problem list.    Review of Systems   Constitutional: Positive for activity change.   Genitourinary: Negative for difficulty urinating.   Musculoskeletal: Positive for back pain, myalgias, neck pain (right arm pain ) and neck stiffness.   Neurological: Positive for tingling and numbness. Negative for weakness.   All other systems reviewed and are negative.      Objective   Physical Exam   Constitutional: He is oriented to person, place, and time.  He appears well-developed and well-nourished.   HENT:   Head: Normocephalic and atraumatic.   Right Ear: External ear normal.   Left Ear: External ear normal.   Eyes: Conjunctivae and EOM are normal. Pupils are equal, round, and reactive to light. Right eye exhibits no discharge. Left eye exhibits no discharge.   Neck: Normal range of motion. Neck supple. No tracheal deviation present.   Pulmonary/Chest: Effort normal. No stridor. No respiratory distress.   Musculoskeletal: Normal range of motion. He exhibits no edema, tenderness or deformity.   Neurological: He is alert and oriented to person, place, and time. He has normal strength and normal reflexes. He displays no atrophy, no tremor and normal reflexes. No cranial nerve deficit or sensory deficit. He exhibits normal muscle tone. He displays a negative Romberg sign. He displays no seizure activity. Coordination and gait normal.   No long tract signs   Skin: Skin is warm and dry.   Psychiatric: He has a normal mood and affect. His behavior is normal. Judgment and thought content normal.   Nursing note and vitals reviewed.    Neurologic Exam     Mental Status   Oriented to person, place, and time.     Cranial Nerves     CN III, IV, VI   Pupils are equal, round, and reactive to light.  Extraocular motions are normal.     Motor Exam     Strength   Strength 5/5 throughout.       Assessment/Plan   Independent Review of Radiographic Studies:    I did review the cervical spine MRI from November 25.  It shows multilevel disc degeneration with a disc osteophyte complex at C5-C6 causing mild to moderate central stenosis or neural foraminal narrowing.  Medical Decision Making:    Mr. White was referred to us by Dr. Gomez for neck and right arm pain.  The patient states that his symptoms began around August.  He denies any accident or injury.  The pain radiates from the right side of the neck into the right shoulder and scapula and down the right arm.  He admits to  numbness and tingling in the right hand that is intermittent.  The pain is rather constant and described as moderate to severe and burning.  It worsens with any activity particularly any overhead activity and improves minimally with rest.  He has not had any treatment at this time.  He denies gait issues or incontinence.    Of note he did have a cardiac stent placed in February 2017 and is on aspirin and Plavix.  He has a follow-up appointment with Dr. Billings in February.    Review the MRI findings with the patient.  I explained that unfortunately his need to be on aspirin and Plavix at least until February eliminate any invasive options such as injections or certainly surgery.  We did discuss physical therapy and he is willing to give it a try.  He will call with any worsening and otherwise call in February if the pain has not improved and he has gotten clearance to stop his aspirin and Plavix.  As long as he is not weak I would suggest trying some epidural injections.  If that failed a myelogram could be considered.  Sander was seen today for neck pain and arm pain.    Diagnoses and all orders for this visit:    Spinal stenosis in cervical region  -     Ambulatory Referral to Physical Therapy Evaluate and treat (2-3 times per week for 4wks)    Cervical radiculopathy  -     Ambulatory Referral to Physical Therapy Evaluate and treat (2-3 times per week for 4wks)    Return if symptoms worsen or fail to improve.

## 2017-12-15 ENCOUNTER — OFFICE VISIT (OUTPATIENT)
Dept: PAIN MEDICINE | Facility: CLINIC | Age: 62
End: 2017-12-15

## 2017-12-15 VITALS
DIASTOLIC BLOOD PRESSURE: 83 MMHG | WEIGHT: 218.6 LBS | HEART RATE: 74 BPM | SYSTOLIC BLOOD PRESSURE: 125 MMHG | BODY MASS INDEX: 30.6 KG/M2 | TEMPERATURE: 97 F | OXYGEN SATURATION: 97 % | HEIGHT: 71 IN | RESPIRATION RATE: 16 BRPM

## 2017-12-15 DIAGNOSIS — M54.2 NECK PAIN: ICD-10-CM

## 2017-12-15 DIAGNOSIS — M54.12 CERVICAL RADICULOPATHY: Primary | ICD-10-CM

## 2017-12-15 DIAGNOSIS — M48.02 SPINAL STENOSIS IN CERVICAL REGION: ICD-10-CM

## 2017-12-15 LAB
POC AMPHETAMINES: NEGATIVE
POC BARBITURATES: NEGATIVE
POC BENZODIAZEPHINES: NEGATIVE
POC COCAINE: NEGATIVE
POC METHADONE: NEGATIVE
POC METHAMPHETAMINE SCREEN URINE: NEGATIVE
POC OPIATES: NEGATIVE
POC OXYCODONE: NEGATIVE
POC PHENCYCLIDINE: NEGATIVE
POC PROPOXYPHENE: NEGATIVE
POC THC: NEGATIVE
POC TRICYCLIC ANTIDEPRESSANTS: NEGATIVE

## 2017-12-15 PROCEDURE — 80305 DRUG TEST PRSMV DIR OPT OBS: CPT | Performed by: PAIN MEDICINE

## 2017-12-15 PROCEDURE — 99204 OFFICE O/P NEW MOD 45 MIN: CPT | Performed by: PAIN MEDICINE

## 2017-12-15 RX ORDER — GABAPENTIN 300 MG/1
300 CAPSULE ORAL 3 TIMES DAILY
Qty: 90 CAPSULE | Refills: 0 | Status: SHIPPED | OUTPATIENT
Start: 2017-12-15 | End: 2018-01-18 | Stop reason: SDUPTHER

## 2017-12-15 NOTE — PROGRESS NOTES
"CHIEF COMPLAINT: Neck Pain    Sander White is a 62 y.o. male.   He was referred here by Jennifer DOMINGUEZ. He presents to the office for evaluation and treatment of Neck Pain      HPI  Neck Pain  Started around August this year. No trauma or accident. He at first thought it was just a pulled muscle but it did not calm down and just gradually got worse. He had no idea how this started. It is a constant pain and the more active he is moving his right arm, the more it hurts. He takes Gabapentin at night and keeps it \"somewhat under control\", \"helps but it doesn't help.\" He feels better in the morning since he went the whole night without using his arm. If he lifts anything or reaches above his head, the pain is worse. Lifting anything more than a couple pounds will make his pain worse. He has numbness on his three middle fingers of bilateral arms, worse in the right hand. He also has carpal tunnel on both arms. His pain is in both arms, but the right arm is worse.     Had EMG performed. No evidence of cervical radiculopathy. Moderate bilateral median neuropathies. ROSI evaluation. Can not come off blood thinners to consider surgery at this time. Referral placed here.     The patient states their pain is a 4 on a scale of 1-10.  The patient describes this pain as constant dull and ache and intermittent sharp shooting pain.  The pain is located in midline neck and does radiate bilateral shoulder - down BUE to fingers. This painful problem is aggravated by moving/using arms and is alleviated by pain medication, relaxation and physical therapy. Dropping things out of hands. R>L. Tingling in bilateral hands.     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 Can not stop until 2/2018 to consider injections or surgery. 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     Past pain medications:   Oxycodone - minimal help  Medrol dose pack  lidoderm patch - didn't stick to skin    Current pain medications:   Gabapentin 100 " mg tid - some help  Tylenol Arthritis 6x a day- some hlep  Naproxen 500 mg - helps    Past therapies:  Physical Therapy: yes- pt started it yesterday- some help  Chiropractor: no  Massage Therapy: no  TENS: no  Neck or back surgery: no  Past pain management: no    Previous Injections: none    PEG Assessment   What number best describes your pain on average in the past week? 6-7  What number best describes how, during the past week, pain has interfered with your enjoyment of life? 7  What number best describes how, during the past week, pain has interfered with your general activity? 7      Current Outpatient Prescriptions:   •  Acetaminophen (TYLENOL ARTHRITIS PAIN PO), Take  by mouth. States he takes 6 pills a day-maximum amount, Disp: , Rfl:   •  aspirin 81 MG chewable tablet, Chew 1 tablet Daily., Disp: , Rfl:   •  carvedilol (COREG) 6.25 MG tablet, Take 1 tablet by mouth Every 12 (Twelve) Hours., Disp: 180 tablet, Rfl: 3  •  clopidogrel (PLAVIX) 75 MG tablet, Take 75 mg by mouth Daily., Disp: , Rfl:   •  isosorbide mononitrate (IMDUR) 30 MG 24 hr tablet, TAKE ONE TABLET BY MOUTH DAILY, Disp: 30 tablet, Rfl: 5  •  lisinopril (PRINIVIL,ZESTRIL) 2.5 MG tablet, TAKE ONE TABLET BY MOUTH DAILY, Disp: 90 tablet, Rfl: 2  •  naproxen (NAPROSYN) 500 MG tablet, Take 1 tablet by mouth 2 (Two) Times a Day With Meals., Disp: 60 tablet, Rfl: 0  •  omeprazole (priLOSEC) 20 MG capsule, Take 20 mg by mouth Daily., Disp: , Rfl:   •  pravastatin (PRAVACHOL) 20 MG tablet, Take 1 tablet by mouth Daily., Disp: 30 tablet, Rfl: 5  •  Pyridoxine HCl (VITAMIN B-6 PO), Take 1 tablet by mouth daily., Disp: , Rfl:   •  terbinafine (LAMISIL) 250 MG tablet, Take 1 tablet by mouth Daily., Disp: 30 tablet, Rfl: 1  •  vitamin B-12 (CYANOCOBALAMIN) 1000 MCG tablet, Take 1,000 mcg by mouth daily., Disp: , Rfl:   •  vitamin C (ASCORBIC ACID) 500 MG tablet, Take 500 mg by mouth daily., Disp: , Rfl:   •  vitamin E 1000 UNIT capsule, Take 1,000 Units  by mouth Daily. Taking 400 mg daily, Disp: , Rfl:   •  gabapentin (NEURONTIN) 300 MG capsule, Take 1 capsule by mouth 3 (Three) Times a Day., Disp: 90 capsule, Rfl: 0    REVIEW OF PERTINENT MEDICAL DATA  Chart reviewed and summarization of all medical records up to new patient visit performed.  EMG and imaging results reviewed. No cervical radic seen. ROSI may consider myelogram 2/2018 once he can stop plavix.     IMAGING  11-25-17 Cervical MRI:  MRI CERVICAL SPINE WITH AND WITHOUT CONTRAST  A MRI examination of the cervical spine was performed before and after  the intravenous administration of contrast and compared to the prior CT  examination of 11/16/2017. No prior MRA is available from comparison.  FINDINGS: There is mild reversal of the normal cervical lordosis with  the apex at the level of C5-6. There is moderate loss of disc height at  C5-6. Signal intensity within the cord is normal on the sagittal T2  sequence.  C2-3: There is mild and moderate facet degenerative disease on the right  and left respectively.  C3-4: There is moderate facet degenerative disease bilaterally, more  prominent on the left.  C4-5: There is moderate facet degenerative disease on the right. There  is mild facet degenerative disease on the left.  C5-6: A broad-based disc osteophyte complex is present which results in  mild-to-moderate canal stenosis. Mild-to-moderate facet degenerative  disease is present bilaterally. There is mild uncovertebral degenerative  disease bilaterally.  C6-7: There is a mild central disc osteophyte complex with no evidence  of herniation.  C7-T1: There is no evidence of disc bulge or herniation.  After contrast administration, there was no evidence of abnormal  enhancement related to the cervical spine.  On the axial T2 sequence, there is flow void present within the  vertebral arteries bilaterally. The left vertebral artery is larger than  that on the right. There is no evidence of vascular  calcification  involving the carotid bifurcations or involving the right vertebral  artery origin. The most distal aspect of the right vertebral artery  appears slightly larger than the midcervical portion. This may be  artifactual. I cannot exclude stenosis. Further evaluation could be  performed with a CT angiogram of the neck and head. The patient has had  intravenous contrast and a MRA of the neck is not possible at this time.  IMPRESSION:  1. No evidence of focal herniation or cord signal abnormality.  Multilevel degenerative disease involving the cervical spine as  described.  2. Asymmetrical signal within the vertebral arteries. This may be a  normal variant. The suggestion of a slightly increased caliber of the  right vertebral artery intracranially versus the distal cervical segment  may be artifactual. I cannot exclude a right vertebral stenosis or  dissection.     I personally reviewed the images of cervical MRI while patient was in the office.  Findings discussed with patient.      PFSH:  The following portions of the patient's history were reviewed and updated as appropriate: problem list, past medical history, past surgery history, social history, family history, medications, and allergies    Review of Systems   Constitutional: Positive for activity change and fatigue. Negative for appetite change, chills and fever.   Respiratory: Negative for apnea, cough and shortness of breath.    Cardiovascular: Negative for chest pain.   Gastrointestinal: Negative for constipation, diarrhea, nausea and vomiting.   Genitourinary: Negative for difficulty urinating.   Musculoskeletal: Positive for back pain (cervical), neck pain and neck stiffness.   Neurological: Positive for dizziness (occ), weakness and numbness. Negative for light-headedness and headaches.   Psychiatric/Behavioral: Positive for sleep disturbance. Negative for suicidal ideas. The patient is not nervous/anxious.    All other systems reviewed and  "are negative.      Vitals:    12/15/17 0801   BP: 125/83   Pulse: 74   Resp: 16   Temp: 97 °F (36.1 °C)   SpO2: 97%   Weight: 99.2 kg (218 lb 9.6 oz)   Height: 180.3 cm (70.98\")   PainSc:   4   PainLoc: Neck       Physical Exam   Constitutional: He appears well-developed and well-nourished. No distress.   HENT:   Head: Normocephalic and atraumatic.   Nose: Nose normal.   Mouth/Throat: Oropharynx is clear and moist.   Eyes: Conjunctivae and EOM are normal.   Neck: Normal range of motion. Neck supple.   Cardiovascular: Normal rate, regular rhythm and normal heart sounds.    Pulmonary/Chest: Effort normal and breath sounds normal. No stridor. No respiratory distress.   Abdominal: Soft. Bowel sounds are normal. He exhibits no distension. There is no tenderness.   Musculoskeletal:        Right shoulder: He exhibits decreased range of motion and pain.        Cervical back: He exhibits decreased range of motion, tenderness and pain.   Neurological: He is alert. He has normal strength. No cranial nerve deficit or sensory deficit. Gait normal. Gait normal.   Skin: Skin is warm and dry. No rash noted. He is not diaphoretic.   Psychiatric: He has a normal mood and affect. His speech is normal and behavior is normal.   Nursing note and vitals reviewed.    Ortho Exam  Neurologic Exam     Mental Status   Speech: speech is normal     Cranial Nerves     CN III, IV, VI   Extraocular motions are normal.     Motor Exam     Strength   Strength 5/5 throughout.     Sensory Exam   Right arm light touch: decreased from wrist  Left arm light touch: decreased from wrist    Gait, Coordination, and Reflexes     Gait  Gait: normal      No results found for: POCMETH, POCAMPHET, POCBARBITUR, POCBENZO, POCCOCAINE, POCMETHADO, POCOPIATES, POCOXYCODO, POCPHENCYC, POCPROPOXY, POCTHC, POCTRICYC    Comments: Reviewed POC today      Date of last ROSA ELENA reviewed : 12/15/17   Comments: Consistent     Assessment/Plan   Sander was seen today for neck " pain.    Diagnoses and all orders for this visit:    Cervical radiculopathy  -     Case Request  -     gabapentin (NEURONTIN) 300 MG capsule; Take 1 capsule by mouth 3 (Three) Times a Day.    Neck pain  -     Case Request    Spinal stenosis in cervical region      Requested Prescriptions     Signed Prescriptions Disp Refills   • gabapentin (NEURONTIN) 300 MG capsule 90 capsule 0     Sig: Take 1 capsule by mouth 3 (Three) Times a Day.     - Continue with PT.   - can not perform SHIRA given he can not stop plavix. Will try more superficial injectoin where he can stay on plavix.   - Discussed with the patient regarding the etiology of their pain. Informed them that they would likely benefit from a trigger point injection to right splenius capitis, right trapezius, right scalene muscles.  The procedure was described in detail and the risks, benefits and alternatives were discussed with the patient (including but not limited to: bleeding, infection, nerve damage, worsening of pain, inability to perform injection) who agreed to proceed.   - not interested in naroctics. Will try with other modalities first. Minimal help with oxycodone anyway.   - increase gabapentin from 100 to 300 mg tablets. (#90, 1 refills) Patient instructed regarding side effects and the need to avoid driving until they know how the medication changes affect them.     - continue tylenol and naproxen prn pain.   - can consider shira 2/2018 after he gets clearance to stop plavix.       Wt Readings from Last 3 Encounters:   12/15/17 99.2 kg (218 lb 9.6 oz)   12/12/17 98.4 kg (217 lb)   11/25/17 99.8 kg (220 lb)     Body mass index is 30.5 kg/(m^2). Patient counseled on the importance of weight loss to help with overall health and pain control. Patient instructed to attempt weight loss.   Plan: Calorie counting increase physical activity, cut out extra servings and reduce fast food intake    Follow-up in 1 month.      Chichi Sofia MD  Pain  Management

## 2017-12-16 DIAGNOSIS — E78.00 ELEVATED CHOLESTEROL: ICD-10-CM

## 2017-12-18 ENCOUNTER — PROCEDURE VISIT (OUTPATIENT)
Dept: PAIN MEDICINE | Facility: CLINIC | Age: 62
End: 2017-12-18

## 2017-12-18 VITALS
SYSTOLIC BLOOD PRESSURE: 130 MMHG | HEART RATE: 56 BPM | BODY MASS INDEX: 30.55 KG/M2 | DIASTOLIC BLOOD PRESSURE: 89 MMHG | RESPIRATION RATE: 16 BRPM | HEIGHT: 71 IN | TEMPERATURE: 97.3 F | WEIGHT: 218.2 LBS | OXYGEN SATURATION: 98 %

## 2017-12-18 DIAGNOSIS — M79.18 MYOFASCIAL PAIN: ICD-10-CM

## 2017-12-18 DIAGNOSIS — M54.2 NECK PAIN: ICD-10-CM

## 2017-12-18 DIAGNOSIS — M54.12 CERVICAL RADICULOPATHY: Primary | ICD-10-CM

## 2017-12-18 PROCEDURE — 20553 NJX 1/MLT TRIGGER POINTS 3/>: CPT | Performed by: PAIN MEDICINE

## 2017-12-18 RX ORDER — PRAVASTATIN SODIUM 20 MG
TABLET ORAL
Qty: 30 TABLET | Refills: 4 | Status: SHIPPED | OUTPATIENT
Start: 2017-12-18 | End: 2018-03-05 | Stop reason: SDUPTHER

## 2017-12-18 NOTE — PROGRESS NOTES
"CHIEF COMPLAINT: Neck Pain      Sander White is a 62 y.o. male.  He is here for the following procedure: trigger point injection.  trigger point injection to right splenius capitis, right trapezius, right rhomboid muscles    Vitals:    12/18/17 0748   BP: 130/89   Pulse: 56   Resp: 16   Temp: 97.3 °F (36.3 °C)   SpO2: 98%   Weight: 99 kg (218 lb 3.2 oz)   Height: 180.3 cm (70.98\")   PainSc:   3   PainLoc: Neck     Trigger point injection  Date/Time: 12/18/2017 8:22 AM  Performed by: CHICHI CURRY  Authorized by: CHICHI CURRY   Consent: Verbal consent obtained. Written consent obtained.  Risks and benefits: risks, benefits and alternatives were discussed  Consent given by: patient  Patient understanding: patient states understanding of the procedure being performed  Patient consent: the patient's understanding of the procedure matches consent given  Procedure consent: procedure consent matches procedure scheduled  Patient identity confirmed: verbally with patient  Indications: pain relief  Patient position: sitting  Needle size: 25 G  Location technique: anatomical landmarks  Local Anesthetic: bupivacaine 0.25% without epinephrine  Anesthetic total: 10 mL            Depo-Medrol lot #: [id number] EXP: 10/2018  Bupiviacaine lot #: [id number] EXP: 5/2019    Pain level after procedure: 3.     Total of: 40 mg of Methylprednisolone used.     Visit Diagnoses:  1. Cervical radiculopathy    2. Neck pain    3. Myofascial pain        Chichi Curry MD  Pain Management  "

## 2017-12-26 ENCOUNTER — OFFICE VISIT (OUTPATIENT)
Dept: INTERNAL MEDICINE | Facility: CLINIC | Age: 62
End: 2017-12-26

## 2017-12-26 VITALS
DIASTOLIC BLOOD PRESSURE: 80 MMHG | HEART RATE: 64 BPM | OXYGEN SATURATION: 98 % | BODY MASS INDEX: 30.7 KG/M2 | WEIGHT: 220 LBS | SYSTOLIC BLOOD PRESSURE: 118 MMHG

## 2017-12-26 DIAGNOSIS — I10 ESSENTIAL HYPERTENSION: ICD-10-CM

## 2017-12-26 DIAGNOSIS — M48.02 DEGENERATIVE CERVICAL SPINAL STENOSIS: Primary | ICD-10-CM

## 2017-12-26 DIAGNOSIS — G56.03 CARPAL TUNNEL SYNDROME ON BOTH SIDES: ICD-10-CM

## 2017-12-26 PROCEDURE — 99214 OFFICE O/P EST MOD 30 MIN: CPT | Performed by: NURSE PRACTITIONER

## 2017-12-26 RX ORDER — NAPROXEN 500 MG/1
500 TABLET ORAL 2 TIMES DAILY PRN
Qty: 30 TABLET | Refills: 0 | Status: SHIPPED | OUTPATIENT
Start: 2017-12-26 | End: 2018-08-05

## 2017-12-26 NOTE — PROGRESS NOTES
Subjective   Sander White is a 62 y.o. male.     HPI Comments: He is here for follow up neck pain. He has had imaging to include ct of neck and mri of c-spine. He has seen pain management twice with injections. He is going to physical therapy routinely. 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     Neck Pain    This is a new problem. The current episode started more than 1 month ago. The problem has been gradually improving. The pain is associated with nothing. The pain is present in the right side. The quality of the pain is described as burning (tightness ). The pain is at a severity of 0/10. The patient is experiencing no pain. Nothing aggravates the symptoms. Associated symptoms include numbness and tingling. Pertinent negatives include no chest pain, fever or headaches. He has tried muscle relaxants, heat and home exercises (gabapentin ) for the symptoms.        The following portions of the patient's history were reviewed and updated as appropriate: allergies, current medications and problem list.    Review of Systems   Constitutional: Negative for chills and fever.   Respiratory: Negative for cough and shortness of breath.    Cardiovascular: Negative for chest pain, palpitations and leg swelling.   Musculoskeletal: Positive for neck pain and neck stiffness.   Neurological: Positive for tingling and numbness. Negative for headaches.       Objective   Physical Exam   Constitutional: He is oriented to person, place, and time. He appears well-developed and well-nourished.   HENT:   Head: Normocephalic.   Nose: Nose normal.   Neck: Muscular tenderness present. Carotid bruit is not present. No thyroid mass and no thyromegaly present.       Cardiovascular: Regular rhythm and normal heart sounds.  Exam reveals no S3 and no S4.    No murmur heard.  Repeat bp left arm 118/68  No pedal edema    Pulmonary/Chest: Effort normal and breath sounds normal. He has no decreased breath sounds. He has no wheezes. He has no rhonchi. He  has no rales.   Musculoskeletal: He exhibits no edema.        Cervical back: He exhibits tenderness. He exhibits normal range of motion.   Neurological: He is alert and oriented to person, place, and time. Gait normal.   Reflex Scores:       Brachioradialis reflexes are 2+ on the right side and 2+ on the left side.  Skin: Skin is warm and dry.   Psychiatric: He has a normal mood and affect.       Assessment/Plan   Sander was seen today for neck pain.    Diagnoses and all orders for this visit:    Degenerative cervical spinal stenosis  Comments:  continue with PT,pain management and exercises   Orders:  -     naproxen (NAPROSYN) 500 MG tablet; Take 1 tablet by mouth 2 (Two) Times a Day As Needed for Moderate Pain . Take with food    Carpal tunnel syndrome on both sides  Comments:  he is waiting on hand surgeon consult due to insurance    Essential hypertension  Comments:  goal of bp less than 140/90

## 2018-01-18 ENCOUNTER — OFFICE VISIT (OUTPATIENT)
Dept: PAIN MEDICINE | Facility: CLINIC | Age: 63
End: 2018-01-18

## 2018-01-18 VITALS
HEIGHT: 70 IN | TEMPERATURE: 98.3 F | HEART RATE: 73 BPM | RESPIRATION RATE: 18 BRPM | OXYGEN SATURATION: 96 % | WEIGHT: 220 LBS | BODY MASS INDEX: 31.5 KG/M2 | SYSTOLIC BLOOD PRESSURE: 123 MMHG | DIASTOLIC BLOOD PRESSURE: 79 MMHG

## 2018-01-18 DIAGNOSIS — M54.12 CERVICAL RADICULOPATHY: ICD-10-CM

## 2018-01-18 DIAGNOSIS — M54.2 NECK PAIN: Primary | ICD-10-CM

## 2018-01-18 PROCEDURE — 99213 OFFICE O/P EST LOW 20 MIN: CPT | Performed by: PAIN MEDICINE

## 2018-01-18 RX ORDER — GABAPENTIN 300 MG/1
300 CAPSULE ORAL 3 TIMES DAILY
Qty: 90 CAPSULE | Refills: 5 | Status: SHIPPED | OUTPATIENT
Start: 2018-01-18 | End: 2018-08-12 | Stop reason: SDUPTHER

## 2018-01-18 NOTE — PROGRESS NOTES
CHIEF COMPLAINT: Neck Pain    HPI  Sander White is a 62 y.o. male.  He is here to follow up for Neck Pain    Sander White is a 62 y.o. male  who presents to the office for follow-up.  He completed a cervical TPI on 12/18/2017. Patient reports 60-70% relief from the procedure.   Since last visit their pain has improved, more tolerable than it was. Improved since last visit. Able to perform more physical activity with less pain. Increase in gabapentin with increased relief with no side effects.     The patient states their pain is a 2 on a scale of 1-10.  The patient describes this pain as intermittent dull, ache now since injection (was a sharp shooting pain).  The pain is located in midline of neck and does radiate right shoulder but not down BUE less numbness in bilateral hands. This painful problem is aggravated by reaching above head, physical activity and is alleviated by sitting past injection.    Past pain medications:   Oxycodone - minimal help  Medrol dose pack  lidoderm patch - didn't stick to skin     Current pain medications:   Gabapentin 300 mg tid - some help  Tylenol Arthritis 6x a day- some hlep  Naproxen 500 mg - helps     Past therapies:  Physical Therapy: yes- pt started it yesterday- some help  Chiropractor: no  Massage Therapy: no  TENS: no  Neck or back surgery: no  Past pain management: no    Previous Injection: Right TPI - 12/18/2017  Effect of Injection (%): 70%  Length of Relief: ongoing - 1 month out     PEG Assessment   What number best describes your pain on average in the past week? 5  What number best describes how, during the past week, pain has interfered with your enjoyment of life? 7  What number best describes how, during the past week, pain has interfered with your general activity? 6      Current Outpatient Prescriptions:   •  Acetaminophen (TYLENOL ARTHRITIS PAIN PO), Take  by mouth. States he takes 6 pills a day-maximum amount, Disp: , Rfl:   •  aspirin 81 MG chewable  tablet, Chew 1 tablet Daily., Disp: , Rfl:   •  carvedilol (COREG) 6.25 MG tablet, Take 1 tablet by mouth Every 12 (Twelve) Hours., Disp: 180 tablet, Rfl: 3  •  clopidogrel (PLAVIX) 75 MG tablet, Take 75 mg by mouth Daily., Disp: , Rfl:   •  gabapentin (NEURONTIN) 300 MG capsule, Take 1 capsule by mouth 3 (Three) Times a Day., Disp: 90 capsule, Rfl: 5  •  isosorbide mononitrate (IMDUR) 30 MG 24 hr tablet, TAKE ONE TABLET BY MOUTH DAILY, Disp: 30 tablet, Rfl: 5  •  lisinopril (PRINIVIL,ZESTRIL) 2.5 MG tablet, TAKE ONE TABLET BY MOUTH DAILY, Disp: 90 tablet, Rfl: 2  •  naproxen (NAPROSYN) 500 MG tablet, Take 1 tablet by mouth 2 (Two) Times a Day As Needed for Moderate Pain . Take with food, Disp: 30 tablet, Rfl: 0  •  omeprazole (priLOSEC) 20 MG capsule, Take 20 mg by mouth Daily., Disp: , Rfl:   •  pravastatin (PRAVACHOL) 20 MG tablet, TAKE ONE TABLET BY MOUTH DAILY, Disp: 30 tablet, Rfl: 4  •  Pyridoxine HCl (VITAMIN B-6 PO), Take 1 tablet by mouth daily., Disp: , Rfl:   •  terbinafine (LAMISIL) 250 MG tablet, Take 1 tablet by mouth Daily., Disp: 30 tablet, Rfl: 1  •  vitamin B-12 (CYANOCOBALAMIN) 1000 MCG tablet, Take 1,000 mcg by mouth daily., Disp: , Rfl:   •  vitamin C (ASCORBIC ACID) 500 MG tablet, Take 500 mg by mouth daily., Disp: , Rfl:   •  vitamin E 1000 UNIT capsule, Take 1,000 Units by mouth Daily. Taking 400 mg daily, Disp: , Rfl:     IMAGING  11-25-17 Cervical MRI:  MRI CERVICAL SPINE WITH AND WITHOUT CONTRAST  A MRI examination of the cervical spine was performed before and after  the intravenous administration of contrast and compared to the prior CT  examination of 11/16/2017. No prior MRA is available from comparison.  FINDINGS: There is mild reversal of the normal cervical lordosis with  the apex at the level of C5-6. There is moderate loss of disc height at  C5-6. Signal intensity within the cord is normal on the sagittal T2  sequence.  C2-3: There is mild and moderate facet degenerative disease  on the right  and left respectively.  C3-4: There is moderate facet degenerative disease bilaterally, more  prominent on the left.  C4-5: There is moderate facet degenerative disease on the right. There  is mild facet degenerative disease on the left.  C5-6: A broad-based disc osteophyte complex is present which results in  mild-to-moderate canal stenosis. Mild-to-moderate facet degenerative  disease is present bilaterally. There is mild uncovertebral degenerative  disease bilaterally.  C6-7: There is a mild central disc osteophyte complex with no evidence  of herniation.  C7-T1: There is no evidence of disc bulge or herniation.  After contrast administration, there was no evidence of abnormal  enhancement related to the cervical spine.  On the axial T2 sequence, there is flow void present within the  vertebral arteries bilaterally. The left vertebral artery is larger than  that on the right. There is no evidence of vascular calcification  involving the carotid bifurcations or involving the right vertebral  artery origin. The most distal aspect of the right vertebral artery  appears slightly larger than the midcervical portion. This may be  artifactual. I cannot exclude stenosis. Further evaluation could be  performed with a CT angiogram of the neck and head. The patient has had  intravenous contrast and a MRA of the neck is not possible at this time.  IMPRESSION:  1. No evidence of focal herniation or cord signal abnormality.  Multilevel degenerative disease involving the cervical spine as  described.  2. Asymmetrical signal within the vertebral arteries. This may be a  normal variant. The suggestion of a slightly increased caliber of the  right vertebral artery intracranially versus the distal cervical segment  may be artifactual. I cannot exclude a right vertebral stenosis or  dissection.     Imaging last reviewed: 01/18/18     [facility]:  The following portions of the patient's history were reviewed and updated as  "appropriate: problem list, past medical history, past surgery history, social history, family history, medications, and allergies    Review of Systems   Constitutional: Negative for chills and fever.   Respiratory: Negative for shortness of breath.    Cardiovascular: Negative for chest pain.   Gastrointestinal: Negative for constipation, diarrhea, nausea and vomiting.   Genitourinary: Negative for difficulty urinating, dysuria and enuresis.   Musculoskeletal: Positive for neck pain.   Allergic/Immunologic: Negative for immunocompromised state.   Neurological: Positive for dizziness, weakness and numbness. Negative for light-headedness and headaches.   Hematological: Bruises/bleeds easily.   Psychiatric/Behavioral: Positive for sleep disturbance. Negative for confusion, hallucinations, self-injury and suicidal ideas. The patient is not nervous/anxious.    All other systems reviewed and are negative.      Vitals:    01/18/18 0753   BP: 123/79   Pulse: 73   Resp: 18   Temp: 98.3 °F (36.8 °C)   SpO2: 96%   Weight: 99.8 kg (220 lb)   Height: 177.8 cm (70\")   PainSc:   2   PainLoc: Neck       Physical Exam   Constitutional: He appears well-developed and well-nourished. No distress.   HENT:   Head: Normocephalic and atraumatic.   Nose: Nose normal.   Mouth/Throat: Oropharynx is clear and moist.   Eyes: Conjunctivae and EOM are normal.   Neck: Normal range of motion. Neck supple.   Cardiovascular: Normal rate, regular rhythm and normal heart sounds.    Pulmonary/Chest: Effort normal and breath sounds normal. No stridor. No respiratory distress.   Abdominal: Soft. Bowel sounds are normal. He exhibits no distension. There is no tenderness.   Musculoskeletal:        Right shoulder: He exhibits pain. He exhibits normal range of motion.        Cervical back: He exhibits decreased range of motion (improved), tenderness (minimal) and pain.   Neurological: He is alert. He has normal strength. No cranial nerve deficit or sensory " deficit. Gait normal.   Skin: Skin is warm and dry. No rash noted. He is not diaphoretic.   Psychiatric: He has a normal mood and affect. His speech is normal and behavior is normal.   Nursing note and vitals reviewed.    Ortho Exam  Neurologic Exam     Mental Status   Speech: speech is normal     Cranial Nerves     CN III, IV, VI   Extraocular motions are normal.     Motor Exam     Strength   Strength 5/5 throughout.       Lab Results   Component Value Date    POCMETH Negative 12/15/2017    POCAMPHET Negative 12/15/2017    POCBARBITUR Negative 12/15/2017    POCBENZO Negative 12/15/2017    POCCOCAINE Negative 12/15/2017    POCMETHADO Negative 12/15/2017    POCOPIATES Negative 12/15/2017    POCOXYCODO Negative 12/15/2017    POCPHENCYC Negative 12/15/2017    POCPROPOXY Negative 12/15/2017    POCTHC Negative 12/15/2017    POCTRICYC Negative 12/15/2017     Last UDS results reviewed: 01/18/18   Last UDS: 12/15/2017  Comments: Consistent       Date of last ROSA ELENA reviewed : 01/18/18   Comments: Consistent     Assessment/Plan   Sander was seen today for neck pain.    Diagnoses and all orders for this visit:    Neck pain    Cervical radiculopathy  -     gabapentin (NEURONTIN) 300 MG capsule; Take 1 capsule by mouth 3 (Three) Times a Day.      Requested Prescriptions     Signed Prescriptions Disp Refills   • gabapentin (NEURONTIN) 300 MG capsule 90 capsule 5     Sig: Take 1 capsule by mouth 3 (Three) Times a Day.     - Continue with PT.   - Good relief with trigger point injection to right splenius capitis, right trapezius, right scalene muscles. Can repeat in the future if pain returns or worsens. Pain is significantly improved at this time and is tolerable.   - can not perform SHIRA given he can not stop plavix until after 2/2018, can consider in future if needed.   - not interested in naroctics. Will try with other modalities first. Minimal help with oxycodone anyway.   - Good relief with increased gabapentin at 300 mg  tablets tid.  Stay at increased dose. (#90, 5 refills) Patient instructed regarding side effects and the need to avoid driving until they know how the medication changes affect them.     - continue tylenol and naproxen prn pain.     Wt Readings from Last 3 Encounters:   01/18/18 99.8 kg (220 lb)   12/26/17 99.8 kg (220 lb)   12/18/17 99 kg (218 lb 3.2 oz)     Body mass index is 31.57 kg/(m^2). Patient counseled on the importance of weight loss to help with overall health and pain control. Patient instructed to attempt weight loss.   Plan: Calorie counting  reduce portion size, plan meals and have 3 meals a day    Follow-up as needed for pain     Chichi Sofia MD  Pain Management    ROSA ELENA REPORT  As part of the patient's treatment plan, I am prescribing controlled substances. The patient has been made aware of appropriate use of such medications, including potential risk of somnolence, limited ability to drive and/or work safely, and the potential for dependence or overdose. It has also bee made clear that these medications are for use by this patient only, without concomitant use of alcohol or other substances unless prescribed.   Patient has completed prescribing agreement detailing terms of continued prescribing of controlled substances, including monitoring ROSA ELENA reports, urine drug screening, and pill counts if necessary. The patient is aware that inappropriate use will results in cessation of prescribing such medications.  ROSA ELENA report has been reviewed and scanned into the patient's chart.  History and physical exam exhibit continued safe and appropriate use of controlled substances.

## 2018-02-08 ENCOUNTER — OFFICE VISIT (OUTPATIENT)
Dept: CARDIOLOGY | Facility: CLINIC | Age: 63
End: 2018-02-08

## 2018-02-08 VITALS
HEIGHT: 71 IN | WEIGHT: 221 LBS | DIASTOLIC BLOOD PRESSURE: 76 MMHG | SYSTOLIC BLOOD PRESSURE: 119 MMHG | HEART RATE: 61 BPM | BODY MASS INDEX: 30.94 KG/M2

## 2018-02-08 DIAGNOSIS — I10 ESSENTIAL HYPERTENSION: ICD-10-CM

## 2018-02-08 DIAGNOSIS — I25.10 CORONARY ARTERY DISEASE INVOLVING NATIVE CORONARY ARTERY OF NATIVE HEART WITHOUT ANGINA PECTORIS: ICD-10-CM

## 2018-02-08 DIAGNOSIS — E78.00 ELEVATED CHOLESTEROL: Primary | ICD-10-CM

## 2018-02-08 PROCEDURE — 99214 OFFICE O/P EST MOD 30 MIN: CPT | Performed by: INTERNAL MEDICINE

## 2018-02-08 NOTE — PROGRESS NOTES
Subjective:       Sander White is a 62 y.o. male who here for follow up    CC  The follow-up for the coronary artery disease hypertension and cholesterol  HPI  62-year-old male with known history of the coronary artery disease, status post angioplasty and a stent along with a history of benign essential arterial hypertension and hyperlipidemia here for the follow-up with no complaints of chest pains or tightness in chest no heaviness with a pressure sensation     Problem List Items Addressed This Visit        Cardiovascular and Mediastinum    Elevated cholesterol - Primary    Essential hypertension    Coronary artery disease involving native coronary artery of native heart without angina pectoris        .    The following portions of the patient's history were reviewed and updated as appropriate: allergies, current medications, past family history, past medical history, past social history, past surgical history and problem list.    Past Medical History:   Diagnosis Date   • Arthritis    • Chronic pain in right foot    • Coronary artery disease    • ED (erectile dysfunction) 7/15/2016   • GERD (gastroesophageal reflux disease)    • Hypertension    • Myocardial infarction     reports that he quit smoking about 18 years ago. His smoking use included Cigarettes. He has a 3.50 pack-year smoking history. He has never used smokeless tobacco. He reports that he drinks alcohol. He reports that he does not use illicit drugs.  Family History   Problem Relation Age of Onset   • Diabetes Mother      Type 2   • Hypertension Mother    • Hyperlipidemia Mother    • Heart attack Mother    • Heart disease Mother    • Diabetes Father      Type 2   • Stroke Father    • Hypertension Father    • Hyperlipidemia Father    • Heart attack Father    • Heart disease Father    • Diabetes insipidus Father    • Aneurysm Brother      Brain       Review of Systems  Constitutional: No wt loss, fever, fatigue  Gastrointestinal: No nausea,  "abdominal pain  Behavioral/Psych: No insomnia or anxiety   Cardiovascular No chest pains or tightness in chest  Objective:       Physical Exam           Physical Exam  /76  Pulse 61  Ht 180.3 cm (71\")  Wt 100 kg (221 lb)  BMI 30.82 kg/m2    General appearance: NAD, conversant   Eyes: anicteric sclerae, moist conjunctivae; no lid-lag; PERRLA   HENT: Atraumatic; oropharynx clear with moist mucous membranes and no mucosal ulcerations;  normal hard and soft palate   Neck: Trachea midline; FROM, supple, no thyromegaly or lymphadenopathy   Lungs: CTA, with normal respiratory effort and no intercostal retractions   CV: S1-S2 regular, no murmurs, no rub, no gallop   Abdomen: Soft, non-tender; no masses or HSM   Extremities: No peripheral edema or extremity lymphadenopathy  Skin: Normal temperature, turgor and texture; no rash, ulcers or subcutaneous nodules   Psych: Appropriate affect, alert and oriented to person, place and time           Cardiographics  @Procedures    Echocardiogram:        Current Outpatient Prescriptions:   •  Acetaminophen (TYLENOL ARTHRITIS PAIN PO), Take  by mouth. States he takes 6 pills a day-maximum amount, Disp: , Rfl:   •  aspirin 81 MG chewable tablet, Chew 1 tablet Daily., Disp: , Rfl:   •  carvedilol (COREG) 6.25 MG tablet, Take 1 tablet by mouth Every 12 (Twelve) Hours., Disp: 180 tablet, Rfl: 3  •  clopidogrel (PLAVIX) 75 MG tablet, Take 75 mg by mouth Daily., Disp: , Rfl:   •  gabapentin (NEURONTIN) 300 MG capsule, Take 1 capsule by mouth 3 (Three) Times a Day., Disp: 90 capsule, Rfl: 5  •  isosorbide mononitrate (IMDUR) 30 MG 24 hr tablet, TAKE ONE TABLET BY MOUTH DAILY, Disp: 30 tablet, Rfl: 5  •  lisinopril (PRINIVIL,ZESTRIL) 2.5 MG tablet, TAKE ONE TABLET BY MOUTH DAILY, Disp: 90 tablet, Rfl: 2  •  naproxen (NAPROSYN) 500 MG tablet, Take 1 tablet by mouth 2 (Two) Times a Day As Needed for Moderate Pain . Take with food, Disp: 30 tablet, Rfl: 0  •  omeprazole (priLOSEC) 20 MG " capsule, Take 20 mg by mouth Daily., Disp: , Rfl:   •  pravastatin (PRAVACHOL) 20 MG tablet, TAKE ONE TABLET BY MOUTH DAILY, Disp: 30 tablet, Rfl: 4  •  Pyridoxine HCl (VITAMIN B-6 PO), Take 1 tablet by mouth daily., Disp: , Rfl:   •  vitamin B-12 (CYANOCOBALAMIN) 1000 MCG tablet, Take 1,000 mcg by mouth daily., Disp: , Rfl:   •  vitamin C (ASCORBIC ACID) 500 MG tablet, Take 500 mg by mouth daily., Disp: , Rfl:   •  vitamin E 1000 UNIT capsule, Take 1,000 Units by mouth Daily. Taking 400 mg daily, Disp: , Rfl:    Assessment:        Patient Active Problem List   Diagnosis   • Airway hyperreactivity   • Elevated cholesterol   • Gain of weight   • ED (erectile dysfunction)   • ST elevation myocardial infarction (STEMI)   • Mallet finger   • Sprain of carpometacarpal joint   • Essential hypertension   • Coronary artery disease involving native coronary artery of native heart without angina pectoris   • Chest pain   • Precordial pain   • Spinal stenosis in cervical region   • Cervical radiculopathy   • Neck pain     Total Cholesterol 0 - 200 mg/dL 209 (H)   Triglycerides 0 - 150 mg/dL 146   HDL Cholesterol 40 - 81 mg/dL 46   LDL Cholesterol  0 - 100 mg/dL 134 (H)   VLDL Cholesterol mg/dL 29.2   LDL/HDL Ratio  2.91       1.   Normal left main  2. Early atherosclerotic plaque of LAD including the diagonal and  branches  3. Early atherosclerotic plaque of the circumflex with no stenosis  4. Dominant RCA with the midportion 50% stenosis  5. Normal LV gram     Recommendations:      1. Moderate coronary artery disease will be treated medically     Conclusion      · Successful left heart catheter  · Successful thrombectomy of the ramus branch  · Successful angioplasty and stent to the ramus branch reduced from 100% to 0% with 2.5/15 XIENCE dilated to 2.6  · Mild global hypokinetic estimated ejection fraction 40%  · LAD 20-30% diffuse irregularity  · Normal left main  · Circumflex had a ramus branch which was and  plasty and otherwise was normal  · Cor dominant RCA midportion 60           Plan:            ICD-10-CM ICD-9-CM   1. Elevated cholesterol E78.00 272.0   2. Essential hypertension I10 401.9   3. Coronary artery disease involving native coronary artery of native heart without angina pectoris I25.10 414.01     1. Elevated cholesterol  Counseling has been done    2. Essential hypertension  Blood pressure under control    3. Coronary artery disease involving native coronary artery of native heart without angina pectoris  Moderate coronary artery disease with a recent heart catheterization    CV STABLE    STOP PLAVIX AND IMDUR    Pros and cons of this new medication / change medication has been explained to  the patient    Possible side effects has been explained    Associated need of the blood  Work has been explained    Need for the compliance of the medication has been explained      SEE IN 1 YR  COUNSELING:    Sander Moore was given to patient for the following topics: diagnostic results, risk factor reductions, impressions, risks and benefits of treatment options and importance of treatment compliance .       SMOKING COUNSELING:    Counseling given: Not Answered      EMR Dragon/Transcription disclaimer:   Much of this encounter note is an electronic transcription/translation of spoken language to printed text. The electronic translation of spoken language may permit erroneous, or at times, nonsensical words or phrases to be inadvertently transcribed; Although I have reviewed the note for such errors, some may still exist.

## 2018-02-09 ENCOUNTER — TELEPHONE (OUTPATIENT)
Dept: INTERNAL MEDICINE | Facility: CLINIC | Age: 63
End: 2018-02-09

## 2018-02-09 RX ORDER — CARVEDILOL 6.25 MG/1
6.25 TABLET ORAL EVERY 12 HOURS SCHEDULED
Qty: 180 TABLET | Refills: 0 | Status: SHIPPED | OUTPATIENT
Start: 2018-02-09 | End: 2018-03-05 | Stop reason: ALTCHOICE

## 2018-03-05 ENCOUNTER — OFFICE VISIT (OUTPATIENT)
Dept: INTERNAL MEDICINE | Facility: CLINIC | Age: 63
End: 2018-03-05

## 2018-03-05 VITALS
DIASTOLIC BLOOD PRESSURE: 78 MMHG | BODY MASS INDEX: 30.54 KG/M2 | OXYGEN SATURATION: 98 % | HEART RATE: 64 BPM | WEIGHT: 219 LBS | SYSTOLIC BLOOD PRESSURE: 114 MMHG

## 2018-03-05 DIAGNOSIS — I25.10 CORONARY ARTERY DISEASE INVOLVING NATIVE CORONARY ARTERY OF NATIVE HEART WITHOUT ANGINA PECTORIS: ICD-10-CM

## 2018-03-05 DIAGNOSIS — I10 ESSENTIAL HYPERTENSION: Primary | ICD-10-CM

## 2018-03-05 DIAGNOSIS — Z11.59 SCREENING FOR VIRAL DISEASE: ICD-10-CM

## 2018-03-05 DIAGNOSIS — Z12.5 ENCOUNTER FOR SCREENING FOR MALIGNANT NEOPLASM OF PROSTATE: ICD-10-CM

## 2018-03-05 DIAGNOSIS — E78.5 HYPERLIPIDEMIA, UNSPECIFIED HYPERLIPIDEMIA TYPE: ICD-10-CM

## 2018-03-05 DIAGNOSIS — Z23 NEED FOR DIPHTHERIA-TETANUS-PERTUSSIS (TDAP) VACCINE, ADULT/ADOLESCENT: ICD-10-CM

## 2018-03-05 DIAGNOSIS — M48.02 DEGENERATIVE CERVICAL SPINAL STENOSIS: ICD-10-CM

## 2018-03-05 LAB
ALBUMIN SERPL-MCNC: 4.7 G/DL (ref 3.5–5.2)
ALBUMIN/GLOB SERPL: 2 G/DL
ALP SERPL-CCNC: 57 U/L (ref 39–117)
ALT SERPL-CCNC: 26 U/L (ref 1–41)
AST SERPL-CCNC: 23 U/L (ref 1–40)
BASOPHILS # BLD AUTO: 0.05 10*3/MM3 (ref 0–0.2)
BASOPHILS NFR BLD AUTO: 0.9 % (ref 0–2)
BILIRUB SERPL-MCNC: 0.6 MG/DL (ref 0.1–1.2)
BUN SERPL-MCNC: 10 MG/DL (ref 8–23)
BUN/CREAT SERPL: 9.7 (ref 7–25)
CALCIUM SERPL-MCNC: 9.5 MG/DL (ref 8.6–10.5)
CHLORIDE SERPL-SCNC: 104 MMOL/L (ref 98–107)
CHOLEST SERPL-MCNC: 184 MG/DL (ref 0–200)
CO2 SERPL-SCNC: 24.9 MMOL/L (ref 22–29)
CREAT SERPL-MCNC: 1.03 MG/DL (ref 0.76–1.27)
DEPRECATED RDW RBC AUTO: 39.9 FL (ref 37–54)
EOSINOPHIL # BLD AUTO: 0.48 10*3/MM3 (ref 0–0.7)
EOSINOPHIL NFR BLD AUTO: 8.5 % (ref 0–5)
ERYTHROCYTE [DISTWIDTH] IN BLOOD BY AUTOMATED COUNT: 12.3 % (ref 11.5–15)
GLOBULIN SER CALC-MCNC: 2.3 GM/DL
GLUCOSE SERPL-MCNC: 100 MG/DL (ref 65–99)
HCT VFR BLD AUTO: 44.7 % (ref 40.1–51)
HDLC SERPL-MCNC: 51 MG/DL (ref 40–60)
HGB BLD-MCNC: 15.6 G/DL (ref 13.7–17.5)
LDLC SERPL CALC-MCNC: 111 MG/DL (ref 0–100)
LYMPHOCYTES # BLD AUTO: 1.46 10*3/MM3 (ref 0.8–7)
LYMPHOCYTES NFR BLD AUTO: 25.9 % (ref 10–60)
MCH RBC QN AUTO: 31.6 PG (ref 26–34)
MCHC RBC AUTO-ENTMCNC: 34.9 G/DL (ref 31–37)
MCV RBC AUTO: 90.5 FL (ref 80–100)
MONOCYTES # BLD AUTO: 0.96 10*3/MM3 (ref 0–1)
MONOCYTES NFR BLD AUTO: 17.1 % (ref 0–13)
NEUTROPHILS # BLD AUTO: 2.68 10*3/MM3 (ref 1–11)
NEUTROPHILS NFR BLD AUTO: 47.6 % (ref 30–85)
PLATELET # BLD AUTO: 194 10*3/MM3 (ref 150–450)
PMV BLD AUTO: 11.8 FL (ref 6–12)
POTASSIUM SERPL-SCNC: 4.3 MMOL/L (ref 3.5–5.2)
PROT SERPL-MCNC: 7 G/DL (ref 6–8.5)
PSA SERPL-MCNC: 0.82 NG/ML (ref 0–4)
RBC # BLD AUTO: 4.94 10*6/MM3 (ref 4.63–6.08)
SODIUM SERPL-SCNC: 141 MMOL/L (ref 136–145)
TRIGL SERPL-MCNC: 108 MG/DL (ref 0–150)
VLDLC SERPL-MCNC: 21.6 MG/DL (ref 5–40)
WBC NRBC COR # BLD: 5.63 10*3/MM3 (ref 5–10)

## 2018-03-05 PROCEDURE — 85025 COMPLETE CBC W/AUTO DIFF WBC: CPT | Performed by: NURSE PRACTITIONER

## 2018-03-05 PROCEDURE — 36415 COLL VENOUS BLD VENIPUNCTURE: CPT | Performed by: NURSE PRACTITIONER

## 2018-03-05 PROCEDURE — 90471 IMMUNIZATION ADMIN: CPT | Performed by: NURSE PRACTITIONER

## 2018-03-05 PROCEDURE — 99214 OFFICE O/P EST MOD 30 MIN: CPT | Performed by: NURSE PRACTITIONER

## 2018-03-05 PROCEDURE — 90715 TDAP VACCINE 7 YRS/> IM: CPT | Performed by: NURSE PRACTITIONER

## 2018-03-05 RX ORDER — LISINOPRIL 2.5 MG/1
2.5 TABLET ORAL DAILY
Qty: 90 TABLET | Refills: 1 | Status: SHIPPED | OUTPATIENT
Start: 2018-03-05 | End: 2018-09-04 | Stop reason: SDUPTHER

## 2018-03-05 RX ORDER — PRAVASTATIN SODIUM 20 MG
20 TABLET ORAL DAILY
Qty: 90 TABLET | Refills: 1 | Status: SHIPPED | OUTPATIENT
Start: 2018-03-05 | End: 2019-12-11

## 2018-03-05 NOTE — PROGRESS NOTES
Subjective   Sander White is a 62 y.o. male.     HPI Comments: He checks his blood pressure three times a week and readings less than 140/90. He exercises 5 days. His last eye exam over a year ago. He is dealing with a lot of stressors with mom, dad and sister.     Hypertension   This is a chronic problem. The problem is controlled. Associated symptoms include anxiety, blurred vision (intermittent ) and neck pain. Pertinent negatives include no chest pain, headaches, orthopnea, palpitations, peripheral edema, PND or shortness of breath. Risk factors for coronary artery disease include male gender, obesity and dyslipidemia. Past treatments include ACE inhibitors. The current treatment provides significant improvement.   Neck Pain    This is a chronic problem. The current episode started more than 1 year ago. The problem has been resolved. The pain is present in the midline and right side. The quality of the pain is described as cramping. The pain is at a severity of 0/10. The patient is experiencing no pain. Nothing aggravates the symptoms. Associated symptoms include numbness (chronic ). Pertinent negatives include no chest pain, fever, headaches, photophobia, tingling, trouble swallowing or visual change. He has tried home exercises and heat (gabapentin ) for the symptoms. The treatment provided significant relief.   Toe Pain    The incident occurred more than 1 week ago. The incident occurred at home. The injury mechanism was a direct blow. The pain is present in the right toes. The quality of the pain is described as stabbing (intermittent ). The pain is at a severity of 0/10. The patient is experiencing no pain. The pain has been fluctuating since onset. Associated symptoms include numbness (chronic ). Pertinent negatives include no inability to bear weight, loss of motion or tingling. He has tried elevation for the symptoms. The treatment provided mild relief.        The following portions of the patient's  history were reviewed and updated as appropriate: allergies, current medications, past family history, past medical history, past social history, past surgical history and problem list.    Review of Systems   Constitutional: Negative for activity change, appetite change, fatigue and fever.   HENT: Negative for trouble swallowing.    Eyes: Positive for blurred vision (intermittent ) and visual disturbance. Negative for photophobia.   Respiratory: Negative for cough, shortness of breath and wheezing. Chest tightness: intermittent blurrred vision     Cardiovascular: Negative for chest pain, palpitations, orthopnea, leg swelling and PND.   Musculoskeletal: Positive for joint swelling (mild in right fifth digit, doing better, previous injury ), neck pain and neck stiffness.   Neurological: Positive for numbness (chronic ). Negative for tingling and headaches.   Psychiatric/Behavioral: Positive for dysphoric mood. Negative for sleep disturbance and suicidal ideas. The patient is nervous/anxious.        Objective   Physical Exam   Constitutional: He is oriented to person, place, and time. He appears well-developed and well-nourished.   HENT:   Head: Normocephalic.   Nose: Nose normal.   Neck: Carotid bruit is not present. No thyroid mass and no thyromegaly present.   Cardiovascular: Regular rhythm and normal heart sounds.  Exam reveals no S3 and no S4.    No murmur heard.  Pulses:       Dorsalis pedis pulses are 2+ on the right side, and 2+ on the left side.        Posterior tibial pulses are 2+ on the right side, and 2+ on the left side.   Repeat bp left arm 110/72  No pedal edema    Pulmonary/Chest: Effort normal and breath sounds normal. He has no decreased breath sounds. He has no wheezes. He has no rhonchi. He has no rales.   Musculoskeletal: He exhibits no edema.   Neurological: He is alert and oriented to person, place, and time. Gait normal.   Skin: Skin is warm and dry.   Psychiatric: He has a normal mood and  affect.       Assessment/Plan   Sander was seen today for hypertension and neck pain.    Diagnoses and all orders for this visit:    Essential hypertension  Comments:  goal of bp less than 140/90; low salt diet   Orders:  -     Comprehensive Metabolic Panel; Future  -     CBC Auto Differential; Future  -     lisinopril (PRINIVIL,ZESTRIL) 2.5 MG tablet; Take 1 tablet by mouth Daily.  -     Comprehensive Metabolic Panel  -     CBC Auto Differential    Coronary artery disease involving native coronary artery of native heart without angina pectoris    Hyperlipidemia, unspecified hyperlipidemia type  -     Lipid Panel; Future  -     pravastatin (PRAVACHOL) 20 MG tablet; Take 1 tablet by mouth Daily.  -     Lipid Panel    Encounter for screening for malignant neoplasm of prostate  -     PSA Screen; Future  -     PSA Screen    Need for diphtheria-tetanus-pertussis (Tdap) vaccine, adult/adolescent  -     Tdap Vaccine Greater Than or Equal To 6yo IM    Screening for viral disease  -     Hepatitis C Antibody; Future  -     Hepatitis C Antibody    Degenerative cervical spinal stenosis  Comments:  stable, continue with home exercises

## 2018-03-06 LAB — HCV AB S/CO SERPL IA: 0.2 S/CO RATIO (ref 0–0.9)

## 2018-03-22 ENCOUNTER — TELEPHONE (OUTPATIENT)
Dept: INTERNAL MEDICINE | Facility: CLINIC | Age: 63
End: 2018-03-22

## 2018-03-22 NOTE — TELEPHONE ENCOUNTER
----- Message from Frida Chaidez sent at 3/22/2018  8:40 AM EDT -----  Contact: Patient  Patient called stated he needed to see the DrLinnette For a broken toe and pain in fingers. Would like to be worked in 3/27 in the morning before 1 if possible. Please advise    Patient: [phone]

## 2018-03-22 NOTE — TELEPHONE ENCOUNTER
Called [facility]. They said that the patient would have to be seen by two separate physicians for his toes and his fingers. He needs referrals for both and imaging for the doctors to go by so they can work him into the schedule .     Please advise

## 2018-03-23 NOTE — TELEPHONE ENCOUNTER
I spoke with Mr. White and he states he really just wanted to come in for an appointment to establish with Dr Mart.  He does have some issues but wanted to see Dr Mart first.

## 2018-03-23 NOTE — TELEPHONE ENCOUNTER
His quickest treatment option would be the walk in clinic with [facility] on [facility]

## 2018-03-27 ENCOUNTER — OFFICE VISIT (OUTPATIENT)
Dept: INTERNAL MEDICINE | Facility: CLINIC | Age: 63
End: 2018-03-27

## 2018-03-27 VITALS
SYSTOLIC BLOOD PRESSURE: 110 MMHG | DIASTOLIC BLOOD PRESSURE: 74 MMHG | WEIGHT: 216 LBS | HEIGHT: 71 IN | BODY MASS INDEX: 30.24 KG/M2

## 2018-03-27 DIAGNOSIS — N52.9 ERECTILE DYSFUNCTION, UNSPECIFIED ERECTILE DYSFUNCTION TYPE: Chronic | ICD-10-CM

## 2018-03-27 DIAGNOSIS — I21.21 ST ELEVATION MYOCARDIAL INFARCTION INVOLVING LEFT CIRCUMFLEX CORONARY ARTERY (HCC): Primary | Chronic | ICD-10-CM

## 2018-03-27 DIAGNOSIS — M54.12 CERVICAL RADICULOPATHY: Chronic | ICD-10-CM

## 2018-03-27 DIAGNOSIS — I10 ESSENTIAL HYPERTENSION: Chronic | ICD-10-CM

## 2018-03-27 DIAGNOSIS — G56.03 BILATERAL CARPAL TUNNEL SYNDROME: ICD-10-CM

## 2018-03-27 DIAGNOSIS — E78.00 ELEVATED CHOLESTEROL: Chronic | ICD-10-CM

## 2018-03-27 LAB
ALBUMIN SERPL-MCNC: 4.5 G/DL (ref 3.5–5.2)
ALBUMIN/GLOB SERPL: 2 G/DL
ALP SERPL-CCNC: 62 U/L (ref 39–117)
ALT SERPL-CCNC: 21 U/L (ref 1–41)
AST SERPL-CCNC: 25 U/L (ref 1–40)
BILIRUB SERPL-MCNC: 0.5 MG/DL (ref 0.1–1.2)
BUN SERPL-MCNC: 13 MG/DL (ref 8–23)
BUN/CREAT SERPL: 12.9 (ref 7–25)
CALCIUM SERPL-MCNC: 9.8 MG/DL (ref 8.6–10.5)
CHLORIDE SERPL-SCNC: 103 MMOL/L (ref 98–107)
CHOLEST SERPL-MCNC: 182 MG/DL (ref 0–200)
CO2 SERPL-SCNC: 23.3 MMOL/L (ref 22–29)
CREAT SERPL-MCNC: 1.01 MG/DL (ref 0.76–1.27)
GLOBULIN SER CALC-MCNC: 2.3 GM/DL
GLUCOSE SERPL-MCNC: 91 MG/DL (ref 65–99)
HDLC SERPL-MCNC: 46 MG/DL (ref 40–60)
LDLC SERPL CALC-MCNC: 113 MG/DL (ref 0–100)
POTASSIUM SERPL-SCNC: 4.5 MMOL/L (ref 3.5–5.2)
PROT SERPL-MCNC: 6.8 G/DL (ref 6–8.5)
SODIUM SERPL-SCNC: 142 MMOL/L (ref 136–145)
TRIGL SERPL-MCNC: 116 MG/DL (ref 0–150)
TSH SERPL-ACNC: 0.6 MIU/ML (ref 0.27–4.2)
VLDLC SERPL-MCNC: 23.2 MG/DL (ref 5–40)

## 2018-03-27 PROCEDURE — 99214 OFFICE O/P EST MOD 30 MIN: CPT | Performed by: INTERNAL MEDICINE

## 2018-03-27 PROCEDURE — 36415 COLL VENOUS BLD VENIPUNCTURE: CPT | Performed by: INTERNAL MEDICINE

## 2018-03-27 RX ORDER — VARDENAFIL HYDROCHLORIDE 20 MG/1
20 TABLET ORAL DAILY PRN
Qty: 12 TABLET | Refills: 12 | Status: SHIPPED | OUTPATIENT
Start: 2018-03-27 | End: 2018-04-05

## 2018-03-27 NOTE — PROGRESS NOTES
Elle White is a 62 y.o. male.     History of Present Illness     The following portions of the patient's history were reviewed and updated as appropriate: allergies, current medications, past family history, past medical history, past social history, past surgical history and problem list.  Hypertension   This is a chronic problem. The problem is controlled. Associated symptoms include anxiety, blurred vision (intermittent ) and neck pain. Pertinent negatives include no chest pain, headaches, orthopnea, palpitations, peripheral edema, PND or shortness of breath. Risk factors for coronary artery disease include male gender, obesity and dyslipidemia. Past treatments include ACE inhibitors. The current treatment provides significant improvement.   Neck Pain    This is a chronic problem. The current episode started more than 1 year ago. The problem has been resolved. The pain is present in the midline and right side. The quality of the pain is described as cramping. The pain is at a severity of 0/10. The patient is experiencing no pain. Nothing aggravates the symptoms. Associated symptoms include numbness (chronic ). Pertinent negatives include no chest pain, fever, headaches, photophobia, tingling, trouble swallowing or visual change. He has tried home exercises and heat (gabapentin ) for the symptoms. The treatment provided significant relief.  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   Review of Systems   Constitutional: Negative.    HENT: Negative.    Eyes: Negative.    Respiratory: Negative.    Cardiovascular: Negative.    Gastrointestinal: Negative.    Endocrine: Negative.    Genitourinary: Negative.    Musculoskeletal: Positive for arthralgias and neck pain.   Skin: Negative.    Allergic/Immunologic: Negative.     Neurological: Negative.    Hematological: Negative.    Psychiatric/Behavioral: Negative.        Objective   Physical Exam   Constitutional: He is oriented to person, place, and time. He appears well-developed and well-nourished.   HENT:   Head: Normocephalic and atraumatic.   Eyes: EOM are normal. Pupils are equal, round, and reactive to light.   Neck: Normal range of motion. Neck supple.   Doing much better with the gabapenten   Cardiovascular: Normal rate, regular rhythm and normal heart sounds.    Pulmonary/Chest: Effort normal and breath sounds normal.   Abdominal: Soft. Bowel sounds are normal.   Musculoskeletal: Normal range of motion.   Neurological: He is alert and oriented to person, place, and time. He has normal reflexes.   Skin: Skin is warm and dry.   Psychiatric: He has a normal mood and affect. His behavior is normal. Thought content normal.   Nursing note and vitals reviewed.        Assessment/Plan   Sander was seen today for hand pain and nail problem.    Diagnoses and all orders for this visit:    ST elevation myocardial infarction involving left circumflex coronary artery  Comments:  working with a cardiologist    Essential hypertension  Comments:  well controlled  Orders:  -     Comprehensive Metabolic Panel; Future  -     TSH; Future    Elevated cholesterol  Comments:  will check lipids today  Orders:  -     Lipid Panel; Future    Erectile dysfunction, unspecified erectile dysfunction type  Comments:  well compensated at present    Cervical radiculopathy  Comments:  does well with neurontin

## 2018-03-30 ENCOUNTER — TELEPHONE (OUTPATIENT)
Dept: INTERNAL MEDICINE | Facility: CLINIC | Age: 63
End: 2018-03-30

## 2018-03-30 NOTE — TELEPHONE ENCOUNTER
----- Message from Frida Chaidez sent at 3/30/2018  1:40 PM EDT -----  Contact: Patient  Patient calling for prescription vardenafil (LEVITRA) 20 MG tablet pharmacy suggetsed phenazopyrid. Please advise    Patient:[phone]    Pharmacy:[facility] [address] [place] [address] [phone] [facility] [phone]

## 2018-04-04 ENCOUNTER — TELEPHONE (OUTPATIENT)
Dept: INTERNAL MEDICINE | Facility: CLINIC | Age: 63
End: 2018-04-04

## 2018-04-04 NOTE — TELEPHONE ENCOUNTER
If we have samples of Levitra, it is okay for him to have 2 or 3 tablets and discuss change in medication with Dr. Mart next week.

## 2018-04-04 NOTE — TELEPHONE ENCOUNTER
----- Message from Frida Chaidez sent at 4/4/2018  8:04 AM EDT -----  Contact: Patient  Patient is calling to see if he can get something else besides vardenafil (LEVITRA) 20 MG tablet the out of pocket cost is to much the pharmacy suggested phenazopyrid. Please advise      Patient:[phone] [facility]    Pharmacy:[address] [place] [address] [phone] [facility] [phone]

## 2018-04-05 ENCOUNTER — TELEPHONE (OUTPATIENT)
Dept: INTERNAL MEDICINE | Facility: CLINIC | Age: 63
End: 2018-04-05

## 2018-04-05 DIAGNOSIS — N52.9 ERECTILE DYSFUNCTION, UNSPECIFIED ERECTILE DYSFUNCTION TYPE: Primary | ICD-10-CM

## 2018-04-05 RX ORDER — SILDENAFIL CITRATE 20 MG/1
40 TABLET ORAL 2 TIMES DAILY PRN
Qty: 20 TABLET | Refills: 0 | Status: SHIPPED | OUTPATIENT
Start: 2018-04-05 | End: 2018-08-05

## 2018-04-05 NOTE — TELEPHONE ENCOUNTER
I called and spoke to Mr. White and told him the Phenazopyridine is used to treat a UTI and other urinary problems. He stated he doesn't have any urinary problems or a UTI, and he does not want to try that medication as advised by the pharmacist.    He would like to have a different medication sent in so that he can take prior to being intimate.     He stated he has tried Viagra in the past but this was costly and he has tried an injection.    He asked if he could tried Silendafil since that is an ingredient in Viagra.    I told him that I would send this order request to the covering provider.     He stated thank you.

## 2018-04-05 NOTE — TELEPHONE ENCOUNTER
Please make sure that patient knows: This is Viagra, generic. This medication might be dangerous if taken whithin short time from any form of nitrates: BP will be very low and it will not be easy to get it back to normal. So if he takes this medication and then has chest pain, he has to notify any EMS/ED personal, that he had this medication, otherwise he will get Nitroglycerin ASAP.

## 2018-04-05 NOTE — TELEPHONE ENCOUNTER
----- Message from Clara Fay sent at 4/5/2018  9:49 AM EDT -----  Contact: pt  Pt would like to know if we have samples of     (LEVITRA) 20 MG tablet    Please advise        Patient:[phone] [facility]

## 2018-04-05 NOTE — TELEPHONE ENCOUNTER
"----- Message from Clara Georgentmarianna sent at 4/5/2018  1:53 PM EDT -----  Contact: pt  Pt is calling for a medication (LEVITRA) 20 MG tablet   The cost for this medication is too expensive for the patient.  We checked for samples, we do not have any at this time.  The pharmacy recommended he get \"phenazopyrid\"  However his MD is out all week, and the pt leaves for vacation Saturday 4/7    Would like something called in please or a phone call to discuss.     Patient:[phone] [facility]     Pharmacy:[address] [place] [address] [phone] [facility] [phone] [facility]  "

## 2018-04-27 ENCOUNTER — TELEPHONE (OUTPATIENT)
Dept: CARDIOLOGY | Facility: CLINIC | Age: 63
End: 2018-04-27

## 2018-04-27 ENCOUNTER — HOSPITAL ENCOUNTER (OUTPATIENT)
Dept: CARDIOLOGY | Facility: HOSPITAL | Age: 63
Discharge: HOME | End: 2018-04-27
Attending: PLASTIC SURGERY | Admitting: PLASTIC SURGERY

## 2018-04-27 ENCOUNTER — TRANSCRIBE ORDERS (OUTPATIENT)
Dept: ADMINISTRATIVE | Facility: HOSPITAL | Age: 63
End: 2018-04-27

## 2018-04-27 DIAGNOSIS — Z01.818 PRE-OP EXAM: ICD-10-CM

## 2018-04-27 DIAGNOSIS — Z01.818 PRE-OP EXAM: Primary | ICD-10-CM

## 2018-04-27 PROCEDURE — 93010 ELECTROCARDIOGRAM REPORT: CPT | Performed by: INTERNAL MEDICINE

## 2018-04-27 PROCEDURE — 93005 ELECTROCARDIOGRAM TRACING: CPT | Performed by: PLASTIC SURGERY

## 2018-06-29 ENCOUNTER — OFFICE VISIT (OUTPATIENT)
Dept: INTERNAL MEDICINE | Facility: CLINIC | Age: 63
End: 2018-06-29

## 2018-06-29 ENCOUNTER — TELEPHONE (OUTPATIENT)
Dept: CARDIOLOGY | Facility: CLINIC | Age: 63
End: 2018-06-29

## 2018-06-29 VITALS
HEART RATE: 66 BPM | WEIGHT: 211 LBS | OXYGEN SATURATION: 96 % | SYSTOLIC BLOOD PRESSURE: 126 MMHG | BODY MASS INDEX: 29.43 KG/M2 | DIASTOLIC BLOOD PRESSURE: 88 MMHG

## 2018-06-29 DIAGNOSIS — H10.9 CONJUNCTIVITIS OF BOTH EYES, UNSPECIFIED CONJUNCTIVITIS TYPE: Primary | ICD-10-CM

## 2018-06-29 DIAGNOSIS — N52.9 ERECTILE DYSFUNCTION, UNSPECIFIED ERECTILE DYSFUNCTION TYPE: ICD-10-CM

## 2018-06-29 PROCEDURE — 99213 OFFICE O/P EST LOW 20 MIN: CPT | Performed by: NURSE PRACTITIONER

## 2018-06-29 RX ORDER — CIPROFLOXACIN HYDROCHLORIDE 3.5 MG/ML
1 SOLUTION/ DROPS TOPICAL EVERY 4 HOURS
Qty: 2.5 ML | Refills: 0 | Status: SHIPPED | OUTPATIENT
Start: 2018-06-29 | End: 2018-07-06

## 2018-06-29 NOTE — PATIENT INSTRUCTIONS
Bacterial Conjunctivitis  Bacterial conjunctivitis is an infection of the clear membrane that covers the white part of your eye and the inner surface of your eyelid (conjunctiva). When the blood vessels in your conjunctiva become inflamed, your eye becomes red or pink, and it will probably feel itchy. Bacterial conjunctivitis spreads very easily from person to person (is contagious). It also spreads easily from one eye to the other eye.  What are the causes?  This condition is caused by several common bacteria. You may get the infection if you come into close contact with another person who is infected. You may also come into contact with items that are contaminated with the bacteria, such as a face towel, contact lens solution, or eye makeup.  What increases the risk?  This condition is more likely to develop in people who:  · Are exposed to other people who have the infection.  · Wear contact lenses.  · Have a sinus infection.  · Have had a recent eye injury or surgery.  · Have a weak body defense system (immune system).  · Have a medical condition that causes dry eyes.    What are the signs or symptoms?  Symptoms of this condition include:  · Eye redness.  · Tearing or watery eyes.  · Itchy eyes.  · Burning feeling in your eyes.  · Thick, yellowish discharge from an eye. This may turn into a crust on the eyelid overnight and cause your eyelids to stick together.  · Swollen eyelids.  · Blurred vision.    How is this diagnosed?  Your health care provider can diagnose this condition based on your symptoms and medical history. Your health care provider may also take a sample of discharge from your eye to find the cause of your infection. This is rarely done.  How is this treated?  Treatment for this condition includes:  · Antibiotic eye drops or ointment to clear the infection more quickly and prevent the spread of infection to others.  · Oral antibiotic medicines to treat infections that do not respond to drops or  ointments, or last longer than 10 days.  · Cool, wet cloths (cool compresses) placed on the eyes.  · Artificial tears applied 2-6 times a day.    Follow these instructions at home:  Medicines  · Take or apply your antibiotic medicine as told by your health care provider. Do not stop taking or applying the antibiotic even if you start to feel better.  · Take or apply over-the-counter and prescription medicines only as told by your health care provider.  · Be very careful to avoid touching the edge of your eyelid with the eye drop bottle or the ointment tube when you apply medicines to the affected eye. This will keep you from spreading the infection to your other eye or to other people.  Managing discomfort  · Gently wipe away any drainage from your eye with a warm, wet washcloth or a cotton ball.  · Apply a cool, clean washcloth to your eye for 10-20 minutes, 3-4 times a day.  General instructions  · Do not wear contact lenses until the inflammation is gone and your health care provider says it is safe to wear them again. Ask your health care provider how to sterilize or replace your contact lenses before you use them again. Wear glasses until you can resume wearing contacts.  · Avoid wearing eye makeup until the inflammation is gone. Throw away any old eye cosmetics that may be contaminated.  · Change or wash your pillowcase every day.  · Do not share towels or washcloths. This may spread the infection.  · Wash your hands often with soap and water. Use paper towels to dry your hands.  · Avoid touching or rubbing your eyes.  · Do not drive or use heavy machinery if your vision is blurred.  Contact a health care provider if:  · You have a fever.  · Your symptoms do not get better after 10 days.  Get help right away if:  · You have a fever and your symptoms suddenly get worse.  · You have severe pain when you move your eye.  · You have facial pain, redness, or swelling.  · You have sudden loss of vision.  This  information is not intended to replace advice given to you by your health care provider. Make sure you discuss any questions you have with your health care provider.  Document Released: 12/18/2006 Document Revised: 04/27/2017 Document Reviewed: 09/29/2016  [employer] Interactive Patient Education © 2017 [employer]

## 2018-06-29 NOTE — PROGRESS NOTES
Elle White is a 62 y.o. male.     Conjunctivitis    The current episode started more than 1 week ago. The onset was gradual. The problem is mild. Nothing relieves the symptoms. Associated symptoms include eye discharge and eye redness. Pertinent negatives include no fever, no decreased vision, no double vision, no eye itching, no photophobia, no congestion, no ear discharge, no ear pain, no rhinorrhea, no sore throat and no eye pain.        The following portions of the patient's history were reviewed and updated as appropriate: allergies, current medications, past social history and problem list.    Review of Systems   Constitutional: Negative for fever.   HENT: Negative for congestion, ear discharge, ear pain, rhinorrhea and sore throat.    Eyes: Positive for discharge and redness. Negative for double vision, photophobia, pain, itching and visual disturbance.   Cardiovascular: Negative for chest pain.       Objective   Physical Exam   Constitutional: He is oriented to person, place, and time. He appears well-developed and well-nourished.   HENT:   Head: Normocephalic.   Nose: Nose normal.   Eyes: EOM and lids are normal. Pupils are equal, round, and reactive to light. Lids are everted and swept, no foreign bodies found. Right eye exhibits no exudate and no hordeolum. No foreign body present in the right eye. Left eye exhibits no exudate and no hordeolum. No foreign body present in the left eye. Right conjunctiva is injected. Left conjunctiva is injected.   Neck: Carotid bruit is not present. No thyroid mass and no thyromegaly present.   Cardiovascular: Regular rhythm and normal heart sounds.  Exam reveals no S3 and no S4.    No murmur heard.  Pulmonary/Chest: Effort normal and breath sounds normal. He has no decreased breath sounds. He has no wheezes. He has no rhonchi. He has no rales.   Musculoskeletal: He exhibits no edema.   Neurological: He is alert and oriented to person, place, and time.  Gait normal.   Skin: Skin is warm and dry.   Psychiatric: He has a normal mood and affect.       Assessment/Plan   Sander was seen today for conjunctivitis.    Diagnoses and all orders for this visit:    Conjunctivitis of both eyes, unspecified conjunctivitis type  -     ciprofloxacin (CILOXAN) 0.3 % ophthalmic solution; Administer 1 drop to both eyes Every 4 (Four) Hours for 7 days.    Erectile dysfunction, unspecified erectile dysfunction type  Comments:  [facility]; needs clearance from cardiologist (he will contact him)

## 2018-06-29 NOTE — TELEPHONE ENCOUNTER
Wants to know if he is ok from cardiac standpoint to be seen at [facility].     If ok wants letter mailed to his house.

## 2018-08-03 ENCOUNTER — TELEPHONE (OUTPATIENT)
Dept: CARDIOLOGY | Facility: CLINIC | Age: 63
End: 2018-08-03

## 2018-08-03 RX ORDER — NITROGLYCERIN 0.4 MG/1
TABLET SUBLINGUAL
Qty: 25 TABLET | Refills: 3 | Status: SHIPPED | OUTPATIENT
Start: 2018-08-03 | End: 2019-02-15 | Stop reason: SDUPTHER

## 2018-08-03 NOTE — TELEPHONE ENCOUNTER
PATIENT WAS TOLD WE WILL SCHEDULE HIM IN TO SEE DR DAVIS -HE STATES HE IS FEELING MUCH BETTER AND DONT NEED TO COME IN, WE ADVISED HIM THAT IF IT HAPPENS AGAIN TO GO TO ER OR COME IN TO SEE DR DAVIS -EMIL CLARK ALSO CALLED NITRO IN FOR HIM, PATIENT UNDERSTOOD AND AGREED

## 2018-08-03 NOTE — TELEPHONE ENCOUNTER
----- Message from Carla Benton sent at 8/2/2018  9:06 AM EDT -----  Regarding: LEEANN  Contact: [phone]  Had a incident yesterday where he started having chest pain during a stressful situation.  Once he was calmed down it went away.  Was just wondering if it's something he should be concerned about.  He doesn't have any nitro.

## 2018-08-04 ENCOUNTER — HOSPITAL ENCOUNTER (EMERGENCY)
Facility: HOSPITAL | Age: 63
Discharge: HOME | End: 2018-08-05
Attending: EMERGENCY MEDICINE | Admitting: EMERGENCY MEDICINE

## 2018-08-04 ENCOUNTER — APPOINTMENT (OUTPATIENT)
Dept: GENERAL RADIOLOGY | Facility: HOSPITAL | Age: 63
End: 2018-08-04

## 2018-08-04 DIAGNOSIS — R07.9 CHEST PAIN IN ADULT: Primary | ICD-10-CM

## 2018-08-04 DIAGNOSIS — K21.9 GASTROESOPHAGEAL REFLUX DISEASE WITHOUT ESOPHAGITIS: ICD-10-CM

## 2018-08-04 PROCEDURE — 99284 EMERGENCY DEPT VISIT MOD MDM: CPT

## 2018-08-04 PROCEDURE — 36415 COLL VENOUS BLD VENIPUNCTURE: CPT

## 2018-08-04 PROCEDURE — 93010 ELECTROCARDIOGRAM REPORT: CPT | Performed by: INTERNAL MEDICINE

## 2018-08-04 PROCEDURE — 93005 ELECTROCARDIOGRAM TRACING: CPT | Performed by: EMERGENCY MEDICINE

## 2018-08-04 PROCEDURE — 71046 X-RAY EXAM CHEST 2 VIEWS: CPT

## 2018-08-05 VITALS
HEART RATE: 73 BPM | RESPIRATION RATE: 18 BRPM | WEIGHT: 216 LBS | OXYGEN SATURATION: 96 % | BODY MASS INDEX: 29.26 KG/M2 | TEMPERATURE: 97.6 F | SYSTOLIC BLOOD PRESSURE: 121 MMHG | HEIGHT: 72 IN | DIASTOLIC BLOOD PRESSURE: 83 MMHG

## 2018-08-05 LAB
ALBUMIN SERPL-MCNC: 4.6 G/DL (ref 3.5–5.2)
ALBUMIN/GLOB SERPL: 1.8 G/DL
ALP SERPL-CCNC: 67 U/L (ref 39–117)
ALT SERPL W P-5'-P-CCNC: 19 U/L (ref 1–41)
ANION GAP SERPL CALCULATED.3IONS-SCNC: 13.1 MMOL/L
AST SERPL-CCNC: 14 U/L (ref 1–40)
BASOPHILS # BLD AUTO: 0.03 10*3/MM3 (ref 0–0.2)
BASOPHILS NFR BLD AUTO: 0.5 % (ref 0–1.5)
BILIRUB SERPL-MCNC: 0.4 MG/DL (ref 0.1–1.2)
BUN BLD-MCNC: 13 MG/DL (ref 8–23)
BUN/CREAT SERPL: 13.3 (ref 7–25)
CALCIUM SPEC-SCNC: 9.5 MG/DL (ref 8.6–10.5)
CHLORIDE SERPL-SCNC: 105 MMOL/L (ref 98–107)
CO2 SERPL-SCNC: 24.9 MMOL/L (ref 22–29)
CREAT BLD-MCNC: 0.98 MG/DL (ref 0.76–1.27)
DEPRECATED RDW RBC AUTO: 42.3 FL (ref 37–54)
EOSINOPHIL # BLD AUTO: 0.44 10*3/MM3 (ref 0–0.7)
EOSINOPHIL NFR BLD AUTO: 6.8 % (ref 0.3–6.2)
ERYTHROCYTE [DISTWIDTH] IN BLOOD BY AUTOMATED COUNT: 12.6 % (ref 11.5–14.5)
GFR SERPL CREATININE-BSD FRML MDRD: 78 ML/MIN/1.73
GLOBULIN UR ELPH-MCNC: 2.5 GM/DL
GLUCOSE BLD-MCNC: 125 MG/DL (ref 65–99)
HCT VFR BLD AUTO: 44.6 % (ref 40.4–52.2)
HGB BLD-MCNC: 14.8 G/DL (ref 13.7–17.6)
IMM GRANULOCYTES # BLD: 0.02 10*3/MM3 (ref 0–0.03)
IMM GRANULOCYTES NFR BLD: 0.3 % (ref 0–0.5)
LYMPHOCYTES # BLD AUTO: 1.21 10*3/MM3 (ref 0.9–4.8)
LYMPHOCYTES NFR BLD AUTO: 18.8 % (ref 19.6–45.3)
MCH RBC QN AUTO: 30.7 PG (ref 27–32.7)
MCHC RBC AUTO-ENTMCNC: 33.2 G/DL (ref 32.6–36.4)
MCV RBC AUTO: 92.5 FL (ref 79.8–96.2)
MONOCYTES # BLD AUTO: 1.03 10*3/MM3 (ref 0.2–1.2)
MONOCYTES NFR BLD AUTO: 16 % (ref 5–12)
NEUTROPHILS # BLD AUTO: 3.71 10*3/MM3 (ref 1.9–8.1)
NEUTROPHILS NFR BLD AUTO: 57.6 % (ref 42.7–76)
PLATELET # BLD AUTO: 210 10*3/MM3 (ref 140–500)
PMV BLD AUTO: 12.1 FL (ref 6–12)
POTASSIUM BLD-SCNC: 3.6 MMOL/L (ref 3.5–5.2)
PROT SERPL-MCNC: 7.1 G/DL (ref 6–8.5)
RBC # BLD AUTO: 4.82 10*6/MM3 (ref 4.6–6)
SODIUM BLD-SCNC: 143 MMOL/L (ref 136–145)
TROPONIN T SERPL-MCNC: <0.01 NG/ML (ref 0–0.03)
TROPONIN T SERPL-MCNC: <0.01 NG/ML (ref 0–0.03)
WBC NRBC COR # BLD: 6.44 10*3/MM3 (ref 4.5–10.7)

## 2018-08-05 PROCEDURE — 84484 ASSAY OF TROPONIN QUANT: CPT | Performed by: EMERGENCY MEDICINE

## 2018-08-05 PROCEDURE — 85025 COMPLETE CBC W/AUTO DIFF WBC: CPT | Performed by: EMERGENCY MEDICINE

## 2018-08-05 PROCEDURE — 80053 COMPREHEN METABOLIC PANEL: CPT | Performed by: EMERGENCY MEDICINE

## 2018-08-05 NOTE — ED TRIAGE NOTES
Pt to ED via [facility] with c/o anxiety and Chest pain.  Pt reports he awoke with his arms shaking and nauseous, he went to the bathroom and vomited x1.  In route via EMS pt developed CP- 12 lead NSR.  Pt reports the pain it L chest intermittent and also reports hx of an MI.

## 2018-08-05 NOTE — ED PROVIDER NOTES
EMERGENCY DEPARTMENT ENCOUNTER    CHIEF COMPLAINT  Chief Complaint: left sided chest pain   History given by: pt  History limited by: none  Room Number: 22/22  PMD: Teddy Mart MD      HPI:  Pt is a 62 y.o. male who presents complaining of left chest pain that started after he vomited tonight at about 10 PM. After eating a large meal tonight, lying in bed and woke with reflux and vomited.  After this he had some brief left sided chest pain described as mild.  He has had a couple of episodes since.  No pain currently.  He said he became anxious and felt shaky.  He also had some chest pain yesterday after a stressful situation.  Called Dr. Billings and they called in Rx for NTG.          PAST MEDICAL HISTORY  Active Ambulatory Problems     Diagnosis Date Noted   • Airway hyperreactivity 05/03/2016   • Elevated cholesterol 05/03/2016   • Gain of weight 05/03/2016   • ED (erectile dysfunction) 07/15/2016   • ST elevation myocardial infarction (STEMI) (CMS/[facility]) 02/15/2017   • Mallet finger 10/20/2014   • Sprain of carpometacarpal joint 10/20/2014   • Essential hypertension 04/20/2017   • Coronary artery disease involving native coronary artery of native heart without angina pectoris 04/20/2017   • Chest pain 07/22/2017   • Precordial pain 07/22/2017   • Spinal stenosis in cervical region 12/12/2017   • Cervical radiculopathy 12/12/2017   • Neck pain 12/15/2017     Resolved Ambulatory Problems     Diagnosis Date Noted   • No Resolved Ambulatory Problems     Past Medical History:   Diagnosis Date   • Arthritis    • Chronic pain in right foot    • Coronary artery disease    • ED (erectile dysfunction) 7/15/2016   • GERD (gastroesophageal reflux disease)    • Hypertension    • Myocardial infarction        PAST SURGICAL HISTORY  Past Surgical History:   Procedure Laterality Date   • APPENDECTOMY     • CARDIAC CATHETERIZATION N/A 2/15/2017    Procedure: Left Heart Cath;  Surgeon: Kristina Billings MD;   Location:  [facility]IVE [facility];  Service:    • CARDIAC CATHETERIZATION  2/15/2017    Procedure: Percutaneous Manual Thrombectomy;  Surgeon: Kristina Billings MD;  Location:  [facility];  Service:    • CARDIAC CATHETERIZATION N/A 2/15/2017    Procedure: Coronary angiography;  Surgeon: Kristina Billings MD;  Location: [facility] CATH INVASIVE LOCATION;  Service:    • CARDIAC CATHETERIZATION N/A 2/15/2017    Procedure: Left ventriculography;  Surgeon: Kristina Billings MD;  Location: [facility] CATH INVASIVE LOCATION;  Service:    • CARDIAC CATHETERIZATION N/A 7/24/2017    Procedure: Left Heart Cath;  Surgeon: Kristina Billings MD;  Location: [facility] CATH INVASIVE LOCATION;  Service:    • CARDIAC CATHETERIZATION N/A 7/24/2017    Procedure: Coronary angiography;  Surgeon: Kristina Billings MD;  Location: [facility] CATH INVASIVE LOCATION;  Service:    • CARDIAC CATHETERIZATION N/A 7/24/2017    Procedure: Left ventriculography;  Surgeon: Kristina Billings MD;  Location: [facility] CATH INVASIVE LOCATION;  Service:    • CHOLECYSTECTOMY     • COLONOSCOPY N/A 02/27/2015    Normal   • CYST REMOVAL N/A     Neck   • HERNIA REPAIR Right     Inguinal   • PERIPHERAL ARTERIAL STENT GRAFT     • [place] RT/LT HEART CATHETERS N/A 2/15/2017    Procedure: Percutaneous Coronary Intervention;  Surgeon: Kristina Billings MD;  Location: [facility] CATH INVASIVE LOCATION;  Service: Cardiovascular       FAMILY HISTORY  Family History   Problem Relation Age of Onset   • Diabetes Mother         Type 2   • Hypertension Mother    • Hyperlipidemia Mother    • Heart attack Mother    • Heart disease Mother    • Diabetes Father         Type 2   • Stroke Father    • Hypertension Father    • Hyperlipidemia Father    • Heart attack Father    • Heart disease Father    • Diabetes insipidus Father    • Alzheimer's disease Father    • Aneurysm Brother         Brain       SOCIAL HISTORY  Social History     Social History   •  Marital status:      Spouse name: N/A   • Number of children: 5   • Years of education: 2 YEARS OF COLLEGE     Occupational History   • WAREHOUSE       Social History Main Topics   • Smoking status: Former Smoker     Packs/day: 0.50     Years: 7.00     Types: Cigarettes     Quit date: 12/23/1999   • Smokeless tobacco: Never Used   • Alcohol use Yes      Comment: occasional   • Drug use: No   • Sexual activity: Defer     Other Topics Concern   • Not on file     Social History Narrative   • No narrative on file       ALLERGIES  Icy hot; Amoxicillin; and Penicillins    REVIEW OF SYSTEMS  Review of Systems   Constitutional: Positive for diaphoresis. Negative for activity change, appetite change and fever.   HENT: Negative for congestion and sore throat.    Eyes: Negative.    Respiratory: Negative for cough and shortness of breath.    Cardiovascular: Negative for chest pain and leg swelling.   Gastrointestinal: Positive for nausea and vomiting. Negative for abdominal pain and diarrhea.   Endocrine: Negative.    Genitourinary: Negative for decreased urine volume and dysuria.   Musculoskeletal: Negative for neck pain.   Skin: Negative for rash and wound.   Allergic/Immunologic: Negative.    Neurological: Positive for tremors. Negative for weakness, numbness and headaches.   Hematological: Negative.    Psychiatric/Behavioral: Negative.    All other systems reviewed and are negative.      PHYSICAL EXAM  ED Triage Vitals [08/04/18 2255]   Temp Heart Rate Resp BP SpO2   97.6 °F (36.4 °C) 88 18 142/90 98 %      Temp src Heart Rate Source Patient Position BP Location FiO2 (%)   Tympanic -- -- -- --       Physical Exam   Constitutional: He is oriented to person, place, and time. No distress.   HENT:   Head: Normocephalic and atraumatic.   Eyes: Pupils are equal, round, and reactive to light. EOM are normal.   Neck: Normal range of motion. Neck supple.   Cardiovascular: Normal rate, regular rhythm and normal heart sounds.     Pulmonary/Chest: Effort normal and breath sounds normal. No respiratory distress.   Abdominal: Soft. There is no tenderness. There is no rebound and no guarding.   Musculoskeletal: Normal range of motion. He exhibits no edema.   Neurological: He is alert and oriented to person, place, and time. He has normal sensation and normal strength.   Skin: Skin is warm and dry.   Psychiatric: Mood and affect normal.   Nursing note and vitals reviewed.      LAB RESULTS  Lab Results (last 24 hours)     Procedure Component Value Units Date/Time    CBC & Differential [[id number]] Collected:  08/05/18 0008    Specimen:  Blood Updated:  08/05/18 0026    Narrative:       The following orders were created for panel order CBC & Differential.  Procedure                               Abnormality         Status                     ---------                               -----------         ------                     CBC Auto Differential[[id number]]        Abnormal            Final result                 Please view results for these tests on the individual orders.    Comprehensive Metabolic Panel [[id number]]  (Abnormal) Collected:  08/05/18 0008    Specimen:  Blood Updated:  08/05/18 0046     Glucose 125 (H) mg/dL      BUN 13 mg/dL      Creatinine 0.98 mg/dL      Sodium 143 mmol/L      Potassium 3.6 mmol/L      Chloride 105 mmol/L      CO2 24.9 mmol/L      Calcium 9.5 mg/dL      Total Protein 7.1 g/dL      Albumin 4.60 g/dL      ALT (SGPT) 19 U/L      AST (SGOT) 14 U/L      Alkaline Phosphatase 67 U/L      Total Bilirubin 0.4 mg/dL      eGFR Non African Amer 78 mL/min/1.73      Globulin 2.5 gm/dL      A/G Ratio 1.8 g/dL      BUN/Creatinine Ratio 13.3     Anion Gap 13.1 mmol/L     Troponin [[id number]]  (Normal) Collected:  08/05/18 0008    Specimen:  Blood Updated:  08/05/18 0046     Troponin T <0.010 ng/mL     Narrative:       Troponin T Reference Ranges:  Less than 0.03 ng/mL:    Negative for AMI  0.03 to 0.09 ng/mL:      Indeterminant  for AMI  Greater than 0.09 ng/mL: Positive for AMI    CBC Auto Differential [[id number]]  (Abnormal) Collected:  08/05/18 0008    Specimen:  Blood Updated:  08/05/18 0026     WBC 6.44 10*3/mm3      RBC 4.82 10*6/mm3      Hemoglobin 14.8 g/dL      Hematocrit 44.6 %      MCV 92.5 fL      MCH 30.7 pg      MCHC 33.2 g/dL      RDW 12.6 %      RDW-SD 42.3 fl      MPV 12.1 (H) fL      Platelets 210 10*3/mm3      Neutrophil % 57.6 %      Lymphocyte % 18.8 (L) %      Monocyte % 16.0 (H) %      Eosinophil % 6.8 (H) %      Basophil % 0.5 %      Immature Grans % 0.3 %      Neutrophils, Absolute 3.71 10*3/mm3      Lymphocytes, Absolute 1.21 10*3/mm3      Monocytes, Absolute 1.03 10*3/mm3      Eosinophils, Absolute 0.44 10*3/mm3      Basophils, Absolute 0.03 10*3/mm3      Immature Grans, Absolute 0.02 10*3/mm3     Troponin [[id number]]  (Normal) Collected:  08/05/18 0205    Specimen:  Blood Updated:  08/05/18 0233     Troponin T <0.010 ng/mL     Narrative:       Troponin T Reference Ranges:  Less than 0.03 ng/mL:    Negative for AMI  0.03 to 0.09 ng/mL:      Indeterminant for AMI  Greater than 0.09 ng/mL: Positive for AMI          I ordered the above labs and reviewed the results    RADIOLOGY  XR Chest 2 View   Preliminary Result   No acute findings.                   I ordered the above noted radiological studies. Interpreted by radiologist.  Reviewed by me in PACS.       PROCEDURES  Procedures  EKG           EKG time: 2328  Rhythm/Rate: NSR, 78  P waves and AL: nml  QRS, axis: nml   ST and T waves: non specific ST changes    Interpreted Contemporaneously by me, independently viewed  Un changed compared to prior 4/27/18      PROGRESS AND CONSULTS    2351- Odered CXR, CMP, Troponin, CBC, and EKG    0133- Rechecked pt. Pt is resting comfortably. Notified pt of of the troponin lab results that were negative. Discussed the plan to discharge home if a  repeat troponin test is negative. Pt understands and agrees with the plan, all  questions answered.    Repeat troponin negative.  No longer having pain.  Would like to go home.  Can follow up with Dr. DEWITT this week.        MEDICAL DECISION MAKING  Results were reviewed/discussed with the patient and they were also made aware of online access. Pt also made aware that some labs, such as cultures, will not be resulted during ER visit and follow up with PMD is necessary.     MDM  Number of Diagnoses or Management Options     Amount and/or Complexity of Data Reviewed  Clinical lab tests: ordered and reviewed (Troponin- negative  Glucose- 125)  Tests in the radiology section of CPT®: ordered and reviewed (CXR- No acute findings)  Decide to obtain previous medical records or to obtain history from someone other than the patient: yes  Review and summarize past medical records: yes (8/8/18- called because he had chest pain from a stressful situation. hx stent for STEMI)  Independent visualization of images, tracings, or specimens: yes           DIAGNOSIS  Final diagnoses:   Chest pain in adult   Gastroesophageal reflux disease without esophagitis       DISPOSITION  DISCHARGE    Patient discharged in stable condition.    Reviewed implications of results, diagnosis, meds, responsibility to follow up, warning signs and symptoms of possible worsening, potential complications and reasons to return to ER.    Patient/Family voiced understanding of above instructions.    Discussed plan for discharge, as there is no emergent indication for admission. Patient referred to primary care provider for BP management due to today's BP. Pt/family is agreeable and understands need for follow up and repeat testing.  Pt is aware that discharge does not mean that nothing is wrong but it indicates no emergency is present that requires admission and they must continue care with follow-up as given below or physician of their choice.     FOLLOW-UP  Teddy Mart MD  [address]  [address]  [phone]          Kristina Billings MD  [address] [id number] [phone]               Medication List      Stop    naproxen 500 MG tablet  Commonly known as:  NAPROSYN     sildenafil 20 MG tablet  Commonly known as:  REVATIO              Latest Documented Vital Signs:  As of 2:41 AM  BP- 121/83 HR- 73 Temp- 97.6 °F (36.4 °C) (Tympanic) O2 sat- 96%    --  Documentation assistance provided by nahed Bee  for Dr Bowman.  Information recorded by the scribe was done at my direction and has been verified and validated by me.         Uli Bee  08/05/18 5072       Kaushik Bowman MD  08/06/18 4313

## 2018-08-07 ENCOUNTER — TELEPHONE (OUTPATIENT)
Dept: SOCIAL WORK | Facility: HOSPITAL | Age: 63
End: 2018-08-07

## 2018-08-12 DIAGNOSIS — M54.12 CERVICAL RADICULOPATHY: ICD-10-CM

## 2018-08-13 RX ORDER — GABAPENTIN 300 MG/1
CAPSULE ORAL
Qty: 90 CAPSULE | Refills: 0 | Status: SHIPPED | OUTPATIENT
Start: 2018-08-13 | End: 2019-02-15

## 2018-08-13 NOTE — TELEPHONE ENCOUNTER
I will refill once but let him know he will need a follow up before another refill will be given. Ok to schedule with ANGELICA.

## 2018-09-04 ENCOUNTER — TELEPHONE (OUTPATIENT)
Dept: INTERNAL MEDICINE | Facility: CLINIC | Age: 63
End: 2018-09-04

## 2018-09-04 DIAGNOSIS — I10 ESSENTIAL HYPERTENSION: ICD-10-CM

## 2018-09-04 RX ORDER — LISINOPRIL 2.5 MG/1
2.5 TABLET ORAL DAILY
Qty: 90 TABLET | Refills: 1 | Status: SHIPPED | OUTPATIENT
Start: 2018-09-04 | End: 2019-03-23 | Stop reason: SDUPTHER

## 2018-09-04 NOTE — TELEPHONE ENCOUNTER
----- Message from Frida Chaidez sent at 9/4/2018  9:39 AM EDT -----  Contact: Patient  Patient requesting refill on    lisinopril (PRINIVIL,ZESTRIL) 2.5 MG tablet    Patient:[phone]    Pharmacy:[facility] [address] [place] [address] [phone] [facility] [phone]

## 2018-09-20 ENCOUNTER — TELEPHONE (OUTPATIENT)
Dept: CARDIOLOGY | Facility: CLINIC | Age: 63
End: 2018-09-20

## 2018-09-20 NOTE — TELEPHONE ENCOUNTER
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     PATIENT HAD MI IN 2017 -NO CURRENT PROBLEMS JUST MAKING SURE HE CAN DO THIS     WILL NOT BE EXPOSED TO EXTREME HEAT OR COLD - NO HEAVY LIFTING   HE DOING INVENTORY     [phone]

## 2018-09-20 NOTE — TELEPHONE ENCOUNTER
Will discuss with Dr DEWITT on Monday when he returns      Per dr chavez this is ok    Pt verbalized understanding

## 2018-12-15 DIAGNOSIS — E78.00 ELEVATED CHOLESTEROL: ICD-10-CM

## 2018-12-17 RX ORDER — PRAVASTATIN SODIUM 20 MG
TABLET ORAL
Qty: 30 TABLET | Refills: 3 | Status: SHIPPED | OUTPATIENT
Start: 2018-12-17 | End: 2019-02-15 | Stop reason: SDUPTHER

## 2019-02-15 ENCOUNTER — OFFICE VISIT (OUTPATIENT)
Dept: CARDIOLOGY | Facility: CLINIC | Age: 64
End: 2019-02-15

## 2019-02-15 VITALS
HEART RATE: 59 BPM | BODY MASS INDEX: 29.26 KG/M2 | WEIGHT: 209 LBS | HEIGHT: 71 IN | SYSTOLIC BLOOD PRESSURE: 119 MMHG | DIASTOLIC BLOOD PRESSURE: 73 MMHG

## 2019-02-15 DIAGNOSIS — E78.00 ELEVATED CHOLESTEROL: ICD-10-CM

## 2019-02-15 DIAGNOSIS — I25.10 CORONARY ARTERY DISEASE INVOLVING NATIVE CORONARY ARTERY OF NATIVE HEART WITHOUT ANGINA PECTORIS: Primary | ICD-10-CM

## 2019-02-15 DIAGNOSIS — I10 ESSENTIAL HYPERTENSION: ICD-10-CM

## 2019-02-15 PROCEDURE — 99212 OFFICE O/P EST SF 10 MIN: CPT | Performed by: NURSE PRACTITIONER

## 2019-02-15 PROCEDURE — 93000 ELECTROCARDIOGRAM COMPLETE: CPT | Performed by: NURSE PRACTITIONER

## 2019-02-15 RX ORDER — NITROGLYCERIN 0.4 MG/1
TABLET SUBLINGUAL
Qty: 25 TABLET | Refills: 3 | Status: SHIPPED | OUTPATIENT
Start: 2019-02-15 | End: 2020-03-06 | Stop reason: SDUPTHER

## 2019-02-15 NOTE — PROGRESS NOTES
Subjective:        Sander White is a 63 y.o. male who here for follow up    Chief Complaint   Patient presents with   • Coronary Artery Disease     1 YR FOLLOW UP       HPI  Sander White is a 63-year-old male, who is new to me.  He has a history of hyperlipidemia, essential hypertension, coronary artery disease, ED,  and history of ST elevation myocardial infarction. Cardiac catheterization 7/2017 showed ramus branch stent widely patent medical manage.  Echo on 4/2017 showed EF 52.9% paced to mild MV and TV regurgitation. He is here for a one-year follow-up.    He denies chest pain/pressure, palpitations, and shortness of breath.    The following portions of the patient's history were reviewed and updated as appropriate: allergies, current medications, past family history, past medical history, past social history, past surgical history and problem list.    4/2017 ECHO  Interpretation Summary     · Left Ventricle: Calculated EF = 52.9%.  · Trace-to-mild mitral valve regurgitation  · Trace to mild tricuspid valve regurgitation is present  · There is no evidence of pericardial effusion     7/2017 Cardiac catheterization  Conclusion        · Successful right and left coronary angiogram and LV gram  · Normal left main  · Early atherosclerotic plaque of LAD including the diagonal and  branches  · Ramus branch stent widely patent  · Early atherosclerotic plaque of the circumflex with no stenosis  · Dominant RCA with the midportion 50% stenosis  · Normal LV gram     July 24, 2017     Sander White  1955  61 y.o.  male  [id number]            Past Medical History:   Diagnosis Date   • Arthritis    • Chronic pain in right foot    • Coronary artery disease    • ED (erectile dysfunction) 7/15/2016   • GERD (gastroesophageal reflux disease)    • Hypertension    • Myocardial infarction (CMS/[facility])          reports that he quit smoking about 19 years ago. His smoking use included cigarettes. He has a 3.50  pack-year smoking history. he has never used smokeless tobacco. He reports that he drinks alcohol. He reports that he does not use drugs.     Family History   Problem Relation Age of Onset   • Diabetes Mother         Type 2   • Hypertension Mother    • Hyperlipidemia Mother    • Heart attack Mother    • Heart disease Mother    • Diabetes Father         Type 2   • Stroke Father    • Hypertension Father    • Hyperlipidemia Father    • Heart attack Father    • Heart disease Father    • Diabetes insipidus Father    • Alzheimer's disease Father    • Aneurysm Brother         Brain       ROS     Review of Systems  Constitutional: No wt loss, fever, fatigue  Gastrointestinal: No nausea, abdominal pain  Behavioral/Psych: No insomnia or anxiety  Cardiovascular: Denies chest pain, pressure      Objective:           Physical Exam   Constitutional: He is oriented to person, place, and time. He appears well-developed and well-nourished.   HENT:   Head: Normocephalic.   Right Ear: External ear normal.   Left Ear: External ear normal.   Eyes: EOM are normal.   Neck: Normal range of motion. No JVD present.   Cardiovascular: Normal rate, regular rhythm, normal heart sounds and intact distal pulses. Exam reveals no gallop and no friction rub.   No murmur heard.  Pulmonary/Chest: Effort normal and breath sounds normal. No stridor. No respiratory distress. He has no rales.   Abdominal: Soft. Bowel sounds are normal. He exhibits no distension. There is no tenderness. There is no guarding.   Musculoskeletal: Normal range of motion. He exhibits no edema or tenderness.   Neurological: He is alert and oriented to person, place, and time. He has normal reflexes.   Skin: Skin is warm.   Psychiatric: He has a normal mood and affect. Judgment normal.   Nursing note and vitals reviewed.        ECG 12 Lead  Date/Time: 2/15/2019 2:05 PM  Performed by: Serina Low APRN  Authorized by: Serina Low APRN   Comparison: compared with  previous ECG   Similar to previous ECG  Rhythm: sinus rhythm             Current Outpatient Medications:   •  Acetaminophen (TYLENOL ARTHRITIS PAIN PO), Take  by mouth. States he takes 6 pills a day-maximum amount, Disp: , Rfl:   •  aspirin 81 MG chewable tablet, Chew 1 tablet Daily., Disp: , Rfl:   •  lisinopril (PRINIVIL,ZESTRIL) 2.5 MG tablet, Take 1 tablet by mouth Daily., Disp: 90 tablet, Rfl: 1  •  omeprazole (priLOSEC) 20 MG capsule, Take 20 mg by mouth Daily., Disp: , Rfl:   •  pravastatin (PRAVACHOL) 20 MG tablet, Take 1 tablet by mouth Daily., Disp: 90 tablet, Rfl: 1  •  vitamin C (ASCORBIC ACID) 500 MG tablet, Take 500 mg by mouth daily., Disp: , Rfl:   •  vitamin E 1000 UNIT capsule, Take 1,000 Units by mouth Daily. Taking 400 mg daily, Disp: , Rfl:   •  nitroglycerin (NITROSTAT) 0.4 MG SL tablet, 1 under the tongue as needed for angina, may repeat q5mins for up three doses, Disp: 25 tablet, Rfl: 3     Assessment:        Patient Active Problem List   Diagnosis   • Airway hyperreactivity   • Elevated cholesterol   • Gain of weight   • ED (erectile dysfunction)   • ST elevation myocardial infarction (STEMI) (CMS/HCC)   • Mallet finger   • Sprain of carpometacarpal joint   • Essential hypertension   • Coronary artery disease involving native coronary artery of native heart without angina pectoris   • Chest pain   • Precordial pain   • Spinal stenosis in cervical region   • Cervical radiculopathy   • Neck pain               Plan:   1.  Elevated cholesterol  On statin. Risk of the hyperlipidemia, importance of the treatment has been explained. Pros and cons of the statins has been explained.     Regular blood workup as well as side effects including the liver failure, myelopathy death has been explained.     2.  Essential hypertension   Controlled on medication.Importance of controlling hypertension and blood pressure  checkup on the regular basis has been explained  Hypertension as a silent killer has been  discussed. Risk reduction of the weight and regular exercises to control the hypertension has been explained.    3.  Coronary artery disease  Denies chest pain.  Moderate coronary artery disease with recent heart catheterization 7/2017        No diagnosis found.    There are no diagnoses linked to this encounter.    COUNSELING:    Sander Moore was given to patient for the following topics: diagnostic results, risk factor reductions, impressions, risks and benefits of treatment options and importance of treatment compliance .       SMOKING COUNSELING:    Counseling given: Not Answered    Will follow up in 6 months     Sincerely,   ANGELICA Russo  [facility]  02/15/19  1:39 PM

## 2019-03-08 ENCOUNTER — OFFICE VISIT (OUTPATIENT)
Dept: INTERNAL MEDICINE | Facility: CLINIC | Age: 64
End: 2019-03-08

## 2019-03-08 VITALS
HEIGHT: 71 IN | DIASTOLIC BLOOD PRESSURE: 72 MMHG | WEIGHT: 210 LBS | HEART RATE: 72 BPM | OXYGEN SATURATION: 98 % | BODY MASS INDEX: 29.4 KG/M2 | SYSTOLIC BLOOD PRESSURE: 100 MMHG

## 2019-03-08 DIAGNOSIS — I10 ESSENTIAL HYPERTENSION: Primary | ICD-10-CM

## 2019-03-08 DIAGNOSIS — F32.A DEPRESSION, UNSPECIFIED DEPRESSION TYPE: ICD-10-CM

## 2019-03-08 DIAGNOSIS — K21.9 GASTROESOPHAGEAL REFLUX DISEASE, ESOPHAGITIS PRESENCE NOT SPECIFIED: ICD-10-CM

## 2019-03-08 PROCEDURE — 99214 OFFICE O/P EST MOD 30 MIN: CPT | Performed by: NURSE PRACTITIONER

## 2019-03-08 RX ORDER — BUPROPION HYDROCHLORIDE 150 MG/1
150 TABLET ORAL EVERY MORNING
Qty: 30 TABLET | Refills: 3 | Status: SHIPPED | OUTPATIENT
Start: 2019-03-08 | End: 2019-05-13 | Stop reason: SDUPTHER

## 2019-03-08 RX ORDER — POLYMYXIN B SULFATE AND TRIMETHOPRIM 1; 10000 MG/ML; [USP'U]/ML
1 SOLUTION OPHTHALMIC EVERY 4 HOURS
COMMUNITY
End: 2019-07-31

## 2019-03-10 NOTE — PROGRESS NOTES
Subjective   Sander White is a 63 y.o. male who presents for f/u regarding CAD and concerns with depression.    He underwent surgery for right carpal tunnel syndrome 8/2018, has done well post-operatively.  He presents due to decreased motivation and fatigue, reports decreased enjoyment with activities.      Hypertension   This is a chronic problem. The current episode started more than 1 year ago. The problem is unchanged. The problem is controlled. Pertinent negatives include no chest pain, headaches or palpitations. Current antihypertension treatment includes ACE inhibitors. The current treatment provides significant improvement. There are no compliance problems.         The following portions of the patient's history were reviewed and updated as appropriate: allergies, current medications, past social history and problem list.    Past Medical History:   Diagnosis Date   • Arthritis    • Chronic pain in right foot    • Coronary artery disease    • ED (erectile dysfunction) 7/15/2016   • GERD (gastroesophageal reflux disease)    • Hypertension    • Myocardial infarction (CMS/[facility])          Current Outpatient Medications:   •  Acetaminophen (TYLENOL ARTHRITIS PAIN PO), Take  by mouth. States he takes 6 pills a day-maximum amount, Disp: , Rfl:   •  aspirin 81 MG chewable tablet, Chew 1 tablet Daily., Disp: , Rfl:   •  lisinopril (PRINIVIL,ZESTRIL) 2.5 MG tablet, Take 1 tablet by mouth Daily., Disp: 90 tablet, Rfl: 1  •  nitroglycerin (NITROSTAT) 0.4 MG SL tablet, 1 under the tongue as needed for angina, may repeat q5mins for up three doses, Disp: 25 tablet, Rfl: 3  •  omeprazole (priLOSEC) 20 MG capsule, Take 20 mg by mouth Daily., Disp: , Rfl:   •  pravastatin (PRAVACHOL) 20 MG tablet, Take 1 tablet by mouth Daily., Disp: 90 tablet, Rfl: 1  •  trimethoprim-polymyxin b (POLYTRIM) 16570-3.1 UNIT/ML-% ophthalmic solution, 1 drop Every 4 (Four) Hours., Disp: , Rfl:   •  vitamin C (ASCORBIC ACID) 500 MG tablet, Take  "500 mg by mouth daily., Disp: , Rfl:   •  vitamin E 1000 UNIT capsule, Take 1,000 Units by mouth Daily. Taking 400 mg daily, Disp: , Rfl:   •  buPROPion XL (WELLBUTRIN XL) 150 MG 24 hr tablet, Take 1 tablet by mouth Every Morning., Disp: 30 tablet, Rfl: 3    Allergies   Allergen Reactions   • Icy Hot Rash   • Amoxicillin Itching   • Penicillins        Review of Systems   Constitutional: Positive for fatigue. Negative for chills, fever and unexpected weight change.   HENT: Negative for congestion, ear pain, postnasal drip, sinus pressure, sore throat and trouble swallowing.    Eyes: Negative for visual disturbance.   Respiratory: Negative for cough, chest tightness and wheezing.    Cardiovascular: Negative for chest pain, palpitations and leg swelling.   Gastrointestinal: Negative for abdominal pain, blood in stool, nausea and vomiting.        Intermittent dyspepsia   Genitourinary: Negative for dysuria, frequency and urgency.   Musculoskeletal: Positive for arthralgias. Negative for joint swelling.   Skin: Negative for color change.   Neurological: Negative for syncope, weakness and headaches.   Hematological: Does not bruise/bleed easily.   Psychiatric/Behavioral: Positive for decreased concentration and dysphoric mood. Negative for sleep disturbance.       Objective   Vitals:    03/08/19 1250   BP: 100/72   BP Location: Left arm   Patient Position: Sitting   Cuff Size: Adult   Pulse: 72   SpO2: 98%   Weight: 95.3 kg (210 lb)   Height: 180.3 cm (71\")     Physical Exam   Constitutional: He appears well-developed and well-nourished. He is cooperative. He does not have a sickly appearance. He does not appear ill.   HENT:   Head: Normocephalic.   Right Ear: Hearing, tympanic membrane and external ear normal.   Left Ear: Hearing, tympanic membrane and external ear normal.   Nose: Nose normal. No mucosal edema, rhinorrhea, sinus tenderness or nasal deformity. Right sinus exhibits no maxillary sinus tenderness and no " frontal sinus tenderness. Left sinus exhibits no maxillary sinus tenderness and no frontal sinus tenderness.   Mouth/Throat: Oropharynx is clear and moist and mucous membranes are normal. Normal dentition.   Eyes: Conjunctivae and lids are normal. Right eye exhibits no discharge and no exudate. Left eye exhibits no discharge and no exudate.   Neck: Trachea normal. Carotid bruit is not present. No edema present. No thyroid mass present.   Cardiovascular: Regular rhythm, normal heart sounds and normal pulses.   No murmur heard.  Pulmonary/Chest: Breath sounds normal. No respiratory distress. He has no decreased breath sounds. He has no wheezes. He has no rhonchi. He has no rales.   Abdominal: Soft. There is no tenderness.   Lymphadenopathy:        Head (right side): No submental, no submandibular, no tonsillar, no preauricular, no posterior auricular and no occipital adenopathy present.        Head (left side): No submental, no submandibular, no tonsillar, no preauricular, no posterior auricular and no occipital adenopathy present.   Neurological: He is alert.   Skin: Skin is warm, dry and intact. No cyanosis. Nails show no clubbing.       Assessment/Plan   Sander was seen today for depression.    Diagnoses and all orders for this visit:    Essential hypertension  Comments:  stable on current meds    Depression, unspecified depression type  Comments:  start Wellbutrin and re-evaluate in 6-8 weeks    Gastroesophageal reflux disease, esophagitis presence not specified  Comments:  managed with Omeprazole    Other orders  -     buPROPion XL (WELLBUTRIN XL) 150 MG 24 hr tablet; Take 1 tablet by mouth Every Morning.

## 2019-03-23 DIAGNOSIS — I10 ESSENTIAL HYPERTENSION: ICD-10-CM

## 2019-03-25 RX ORDER — LISINOPRIL 2.5 MG/1
TABLET ORAL
Qty: 30 TABLET | Refills: 3 | Status: SHIPPED | OUTPATIENT
Start: 2019-03-25 | End: 2020-03-06 | Stop reason: SDUPTHER

## 2019-04-20 DIAGNOSIS — E78.00 ELEVATED CHOLESTEROL: ICD-10-CM

## 2019-04-22 RX ORDER — PRAVASTATIN SODIUM 20 MG
TABLET ORAL
Qty: 30 TABLET | Refills: 2 | Status: SHIPPED | OUTPATIENT
Start: 2019-04-22 | End: 2019-05-03 | Stop reason: SDUPTHER

## 2019-05-03 ENCOUNTER — OFFICE VISIT (OUTPATIENT)
Dept: INTERNAL MEDICINE | Facility: CLINIC | Age: 64
End: 2019-05-03

## 2019-05-03 VITALS
HEART RATE: 81 BPM | DIASTOLIC BLOOD PRESSURE: 70 MMHG | BODY MASS INDEX: 29.4 KG/M2 | OXYGEN SATURATION: 98 % | SYSTOLIC BLOOD PRESSURE: 100 MMHG | WEIGHT: 210 LBS | HEIGHT: 71 IN

## 2019-05-03 DIAGNOSIS — J06.9 VIRAL URI WITH COUGH: ICD-10-CM

## 2019-05-03 DIAGNOSIS — F32.A DEPRESSION, UNSPECIFIED DEPRESSION TYPE: ICD-10-CM

## 2019-05-03 DIAGNOSIS — I10 ESSENTIAL HYPERTENSION: Primary | ICD-10-CM

## 2019-05-03 DIAGNOSIS — M19.042 PRIMARY OSTEOARTHRITIS OF BOTH HANDS: ICD-10-CM

## 2019-05-03 DIAGNOSIS — M19.041 PRIMARY OSTEOARTHRITIS OF BOTH HANDS: ICD-10-CM

## 2019-05-03 PROCEDURE — 99214 OFFICE O/P EST MOD 30 MIN: CPT | Performed by: NURSE PRACTITIONER

## 2019-05-03 RX ORDER — LORATADINE 10 MG/1
10 TABLET ORAL DAILY
Qty: 10 TABLET
Start: 2019-05-03 | End: 2019-05-13

## 2019-05-05 NOTE — PROGRESS NOTES
Subjective   Sander White is a 63 y.o. male who presents for f/u regarding anxiety and c/o bilateral hand pain.    He is doing well on Wellbutrin and reports improvement in mood, states co-workers and family have noticed an improvement. He denies side effects from Wellbutrin.  He does c/o a cough with increased postnasal drainage over the past few weeks, denies fever/chills.  He also c/o bilateral hand stiffness, denies numbness/tingling.      Depression   Visit Type: follow-up  Patient presents with the following symptoms: decreased concentration, depressed mood, excessive worry and fatigue.  Patient is not experiencing: choking sensation, palpitations, shortness of breath, suicidal ideas and suicidal planning.  Frequency of symptoms: occasionally   Severity: moderate   Sleep quality: good      URI    This is a new problem. The current episode started 1 to 4 weeks ago (2-3 weeks ago). Associated symptoms include congestion, coughing, rhinorrhea and sneezing. Pertinent negatives include no abdominal pain, chest pain, diarrhea, dysuria, ear pain, nausea, sore throat, vomiting or wheezing. He has tried nothing for the symptoms.        The following portions of the patient's history were reviewed and updated as appropriate: allergies, current medications, past social history and problem list.    Past Medical History:   Diagnosis Date   • Arthritis    • Chronic pain in right foot    • Coronary artery disease    • ED (erectile dysfunction) 7/15/2016   • GERD (gastroesophageal reflux disease)    • Hypertension    • Myocardial infarction (CMS/[facility])          Current Outpatient Medications:   •  Acetaminophen (TYLENOL ARTHRITIS PAIN PO), Take  by mouth. States he takes 6 pills a day-maximum amount, Disp: , Rfl:   •  aspirin 81 MG chewable tablet, Chew 1 tablet Daily., Disp: , Rfl:   •  buPROPion XL (WELLBUTRIN XL) 150 MG 24 hr tablet, Take 1 tablet by mouth Every Morning., Disp: 30 tablet, Rfl: 3  •  lisinopril  (PRINIVIL,ZESTRIL) 2.5 MG tablet, TAKE ONE TABLET BY MOUTH DAILY, Disp: 30 tablet, Rfl: 3  •  nitroglycerin (NITROSTAT) 0.4 MG SL tablet, 1 under the tongue as needed for angina, may repeat q5mins for up three doses, Disp: 25 tablet, Rfl: 3  •  omeprazole (priLOSEC) 20 MG capsule, Take 20 mg by mouth Daily., Disp: , Rfl:   •  pravastatin (PRAVACHOL) 20 MG tablet, Take 1 tablet by mouth Daily., Disp: 90 tablet, Rfl: 1  •  trimethoprim-polymyxin b (POLYTRIM) 10418-6.1 UNIT/ML-% ophthalmic solution, 1 drop Every 4 (Four) Hours., Disp: , Rfl:   •  vitamin C (ASCORBIC ACID) 500 MG tablet, Take 500 mg by mouth daily., Disp: , Rfl:   •  vitamin E 1000 UNIT capsule, Take 1,000 Units by mouth Daily. Taking 400 mg daily, Disp: , Rfl:   •  loratadine (CLARITIN) 10 MG tablet, Take 1 tablet by mouth Daily for 10 days., Disp: 10 tablet, Rfl:     Allergies   Allergen Reactions   • Icy Hot Rash   • Amoxicillin Itching   • Penicillins        Review of Systems   Constitutional: Positive for fatigue. Negative for activity change, appetite change, chills, fever and unexpected weight change.   HENT: Positive for congestion, postnasal drip, rhinorrhea, sinus pressure and sneezing. Negative for drooling, ear pain, facial swelling, hearing loss, mouth sores, nosebleeds, sore throat, trouble swallowing and voice change.    Eyes: Negative for pain, discharge, itching and visual disturbance.   Respiratory: Positive for cough and chest tightness. Negative for choking, shortness of breath and wheezing.    Cardiovascular: Negative for chest pain and palpitations.   Gastrointestinal: Negative for abdominal pain, constipation, diarrhea, nausea and vomiting.   Endocrine: Negative for polyuria.   Genitourinary: Negative for dysuria and frequency.   Musculoskeletal: Positive for arthralgias. Negative for back pain and joint swelling.   Psychiatric/Behavioral: Positive for decreased concentration. Negative for suicidal ideas.       Objective   Vitals:  "   05/03/19 1254   BP: 100/70   BP Location: Left arm   Patient Position: Sitting   Cuff Size: Adult   Pulse: 81   SpO2: 98%   Weight: 95.3 kg (210 lb)   Height: 180.3 cm (71\")     Physical Exam   Constitutional: He appears well-developed and well-nourished. He is cooperative. He does not have a sickly appearance. He does not appear ill.   HENT:   Head: Normocephalic.   Right Ear: Hearing, tympanic membrane and external ear normal. No drainage, swelling or tenderness. Tympanic membrane is not injected, not erythematous and not bulging. No middle ear effusion.   Left Ear: Hearing, tympanic membrane and external ear normal. No drainage, swelling or tenderness. Tympanic membrane is not injected, not erythematous and not bulging.  No middle ear effusion.   Nose: Nose normal. No mucosal edema, rhinorrhea or sinus tenderness. Right sinus exhibits no maxillary sinus tenderness and no frontal sinus tenderness. Left sinus exhibits no maxillary sinus tenderness and no frontal sinus tenderness.   Mouth/Throat: Mucous membranes are normal. Posterior oropharyngeal erythema present.   Eyes: Conjunctivae and lids are normal. Right eye exhibits no discharge and no exudate. Left eye exhibits no discharge and no exudate.   Neck: Trachea normal and normal range of motion. Edema present.   Cardiovascular: Regular rhythm, normal heart sounds and normal pulses.   No murmur heard.  Pulmonary/Chest: Breath sounds normal. No respiratory distress. He has no decreased breath sounds. He has no wheezes. He has no rhonchi. He has no rales.   Lymphadenopathy:     He has no cervical adenopathy.   Neurological: He is alert.   Skin: Skin is warm, dry and intact.   Nursing note and vitals reviewed.      Assessment/Plan   Sander was seen today for depression.    Diagnoses and all orders for this visit:    Essential hypertension  Comments:  stable on current meds    Depression, unspecified depression type  Comments:  improved with Wellbutrin    Primary " osteoarthritis of both hands  Comments:  discussed use of Blue Emu    Viral URI with cough  Comments:  start Claritin and continue to monitor  Orders:  -     loratadine (CLARITIN) 10 MG tablet; Take 1 tablet by mouth Daily for 10 days.    He c/o a dry, nonproductive cough-discussed possible side effect of Lisinopril. He will start Claritin but consider d/c Lisinopril if cough continues (discussed).

## 2019-05-13 ENCOUNTER — TELEPHONE (OUTPATIENT)
Dept: INTERNAL MEDICINE | Facility: CLINIC | Age: 64
End: 2019-05-13

## 2019-05-13 RX ORDER — BUPROPION HYDROCHLORIDE 150 MG/1
150 TABLET ORAL EVERY MORNING
Qty: 30 TABLET | Refills: 3 | Status: SHIPPED | OUTPATIENT
Start: 2019-05-13 | End: 2019-05-15 | Stop reason: RX

## 2019-05-13 NOTE — TELEPHONE ENCOUNTER
----- Message from Clara Fay sent at 5/13/2019  7:55 AM EDT -----  Contact: pt  Pt was seen in the office 5/3.  He was under the assumption he was going to have refills sent to his pharm for  buPROPion XL (WELLBUTRIN XL) 150 MG 24 hr tablet  I stated to him that it was last sent in, march 8 with 3 refills.  However he insists they didn't even have that.    He would like a refill sent into   [address] AT [place] [phone] [place] [phone] FX    Pt#[phone]

## 2019-05-15 DIAGNOSIS — F41.9 ANXIETY: Primary | ICD-10-CM

## 2019-05-15 RX ORDER — BUPROPION HYDROCHLORIDE 150 MG/1
150 TABLET, EXTENDED RELEASE ORAL 2 TIMES DAILY
Qty: 60 TABLET | Refills: 3 | Status: SHIPPED | OUTPATIENT
Start: 2019-05-15 | End: 2019-12-06

## 2019-07-20 DIAGNOSIS — E78.00 ELEVATED CHOLESTEROL: ICD-10-CM

## 2019-07-22 RX ORDER — PRAVASTATIN SODIUM 20 MG
TABLET ORAL
Qty: 30 TABLET | Refills: 5 | Status: SHIPPED | OUTPATIENT
Start: 2019-07-22 | End: 2019-07-31

## 2019-07-31 ENCOUNTER — OFFICE VISIT (OUTPATIENT)
Dept: INTERNAL MEDICINE | Facility: CLINIC | Age: 64
End: 2019-07-31

## 2019-07-31 VITALS
DIASTOLIC BLOOD PRESSURE: 78 MMHG | OXYGEN SATURATION: 96 % | BODY MASS INDEX: 29.15 KG/M2 | HEART RATE: 69 BPM | TEMPERATURE: 97.8 F | SYSTOLIC BLOOD PRESSURE: 102 MMHG | WEIGHT: 209 LBS

## 2019-07-31 DIAGNOSIS — E78.00 ELEVATED CHOLESTEROL: ICD-10-CM

## 2019-07-31 DIAGNOSIS — Z12.5 SCREENING FOR PROSTATE CANCER: ICD-10-CM

## 2019-07-31 DIAGNOSIS — I10 ESSENTIAL HYPERTENSION: ICD-10-CM

## 2019-07-31 DIAGNOSIS — F32.A DEPRESSION, UNSPECIFIED DEPRESSION TYPE: ICD-10-CM

## 2019-07-31 DIAGNOSIS — H01.001 BLEPHARITIS OF RIGHT UPPER EYELID, UNSPECIFIED TYPE: Primary | ICD-10-CM

## 2019-07-31 LAB
ALBUMIN SERPL-MCNC: 4.3 G/DL (ref 3.5–5.2)
ALBUMIN/GLOB SERPL: 1.7 G/DL
ALP SERPL-CCNC: 89 U/L (ref 39–117)
ALT SERPL-CCNC: 15 U/L (ref 1–41)
AST SERPL-CCNC: 17 U/L (ref 1–40)
BASOPHILS # BLD AUTO: 0.03 10*3/MM3 (ref 0–0.2)
BASOPHILS NFR BLD AUTO: 0.5 % (ref 0–1.5)
BILIRUB SERPL-MCNC: 0.5 MG/DL (ref 0.2–1.2)
BUN SERPL-MCNC: 12 MG/DL (ref 8–23)
BUN/CREAT SERPL: 11.3 (ref 7–25)
CALCIUM SERPL-MCNC: 9.3 MG/DL (ref 8.6–10.5)
CHLORIDE SERPL-SCNC: 103 MMOL/L (ref 98–107)
CHOLEST SERPL-MCNC: 178 MG/DL (ref 0–200)
CO2 SERPL-SCNC: 24.7 MMOL/L (ref 22–29)
CREAT SERPL-MCNC: 1.06 MG/DL (ref 0.76–1.27)
DEPRECATED RDW RBC AUTO: 40.1 FL (ref 37–54)
EOSINOPHIL # BLD AUTO: 0.46 10*3/MM3 (ref 0–0.4)
EOSINOPHIL NFR BLD AUTO: 7.4 % (ref 0.3–6.2)
ERYTHROCYTE [DISTWIDTH] IN BLOOD BY AUTOMATED COUNT: 12.4 % (ref 12.3–15.4)
GLOBULIN SER CALC-MCNC: 2.6 GM/DL
GLUCOSE SERPL-MCNC: 104 MG/DL (ref 65–99)
HCT VFR BLD AUTO: 43.9 % (ref 37.5–51)
HDLC SERPL-MCNC: 54 MG/DL (ref 40–60)
HGB BLD-MCNC: 15.2 G/DL (ref 13–17.7)
LDLC SERPL CALC-MCNC: 104 MG/DL (ref 0–100)
LYMPHOCYTES # BLD AUTO: 0.99 10*3/MM3 (ref 0.7–3.1)
LYMPHOCYTES NFR BLD AUTO: 16 % (ref 19.6–45.3)
MCH RBC QN AUTO: 31 PG (ref 26.6–33)
MCHC RBC AUTO-ENTMCNC: 34.6 G/DL (ref 31.5–35.7)
MCV RBC AUTO: 89.6 FL (ref 79–97)
MONOCYTES # BLD AUTO: 0.95 10*3/MM3 (ref 0.1–0.9)
MONOCYTES NFR BLD AUTO: 15.3 % (ref 5–12)
NEUTROPHILS # BLD AUTO: 3.76 10*3/MM3 (ref 1.7–7)
NEUTROPHILS NFR BLD AUTO: 60.8 % (ref 42.7–76)
PLATELET # BLD AUTO: 206 10*3/MM3 (ref 140–450)
PMV BLD AUTO: 11.5 FL (ref 6–12)
POTASSIUM SERPL-SCNC: 4.3 MMOL/L (ref 3.5–5.2)
PROT SERPL-MCNC: 6.9 G/DL (ref 6–8.5)
PSA SERPL-MCNC: 0.74 NG/ML (ref 0–4)
RBC # BLD AUTO: 4.9 10*6/MM3 (ref 4.14–5.8)
SODIUM SERPL-SCNC: 140 MMOL/L (ref 136–145)
TRIGL SERPL-MCNC: 100 MG/DL (ref 0–150)
VLDLC SERPL-MCNC: 20 MG/DL
WBC NRBC COR # BLD: 6.19 10*3/MM3 (ref 3.4–10.8)

## 2019-07-31 PROCEDURE — 36415 COLL VENOUS BLD VENIPUNCTURE: CPT | Performed by: NURSE PRACTITIONER

## 2019-07-31 PROCEDURE — 85025 COMPLETE CBC W/AUTO DIFF WBC: CPT | Performed by: NURSE PRACTITIONER

## 2019-07-31 PROCEDURE — 99214 OFFICE O/P EST MOD 30 MIN: CPT | Performed by: NURSE PRACTITIONER

## 2019-07-31 RX ORDER — ERYTHROMYCIN 5 MG/G
OINTMENT OPHTHALMIC NIGHTLY
Qty: 3.5 G | Refills: 0 | Status: SHIPPED | OUTPATIENT
Start: 2019-07-31 | End: 2019-12-06

## 2019-07-31 RX ORDER — MELOXICAM 15 MG/1
1 TABLET ORAL DAILY
COMMUNITY
Start: 2019-07-30 | End: 2019-12-06

## 2019-07-31 NOTE — PROGRESS NOTES
Subjective   Sander White is a 63 y.o. male who presents due to eyelid irritation.    He noticed right eyelid irritation since this morning, c/o irritation in the left eyelid as well. No visual changes.   He also presents due to increased fatigue over the past few weeks, denies chest pain or shortness of breath. He has a hx of CAD, last heart cath 7/2017. He is managed on Pravastatin.        Fatigue   Associated symptoms include fatigue. Pertinent negatives include no abdominal pain, arthralgias, chest pain, chills, congestion, coughing, fever, headaches, joint swelling, nausea, sore throat, vomiting or weakness.        The following portions of the patient's history were reviewed and updated as appropriate: allergies, current medications, past social history and problem list.    Past Medical History:   Diagnosis Date   • Arthritis    • Chronic pain in right foot    • Coronary artery disease    • ED (erectile dysfunction) 7/15/2016   • GERD (gastroesophageal reflux disease)    • Hypertension    • Myocardial infarction (CMS/[facility])          Current Outpatient Medications:   •  Acetaminophen (TYLENOL ARTHRITIS PAIN PO), Take  by mouth. States he takes 6 pills a day-maximum amount, Disp: , Rfl:   •  aspirin 81 MG chewable tablet, Chew 1 tablet Daily., Disp: , Rfl:   •  buPROPion SR (WELLBUTRIN SR) 150 MG 12 hr tablet, Take 1 tablet by mouth 2 (Two) Times a Day., Disp: 60 tablet, Rfl: 3  •  erythromycin (ROMYCIN) 5 MG/GM ophthalmic ointment, Administer  to the right eye Every Night., Disp: 3.5 g, Rfl: 0  •  lisinopril (PRINIVIL,ZESTRIL) 2.5 MG tablet, TAKE ONE TABLET BY MOUTH DAILY, Disp: 30 tablet, Rfl: 3  •  meloxicam (MOBIC) 15 MG tablet, Take 1 tablet by mouth Daily., Disp: , Rfl:   •  nitroglycerin (NITROSTAT) 0.4 MG SL tablet, 1 under the tongue as needed for angina, may repeat q5mins for up three doses, Disp: 25 tablet, Rfl: 3  •  omeprazole (priLOSEC) 20 MG capsule, Take 20 mg by mouth Daily., Disp: , Rfl:   •   pravastatin (PRAVACHOL) 20 MG tablet, Take 1 tablet by mouth Daily., Disp: 90 tablet, Rfl: 1  •  vitamin C (ASCORBIC ACID) 500 MG tablet, Take 500 mg by mouth daily., Disp: , Rfl:   •  vitamin E 1000 UNIT capsule, Take 1,000 Units by mouth Daily. Taking 400 mg daily, Disp: , Rfl:     Allergies   Allergen Reactions   • Icy Hot Rash   • Amoxicillin Itching   • Penicillins        Review of Systems   Constitutional: Positive for fatigue and night sweats. Negative for chills, fever and unexpected weight change.   HENT: Negative for congestion, ear pain, postnasal drip, sinus pressure, sore throat and trouble swallowing.    Eyes: Positive for redness and itching. Negative for visual disturbance.   Respiratory: Negative for cough, chest tightness and wheezing.    Cardiovascular: Negative for chest pain, palpitations and leg swelling.   Gastrointestinal: Negative for abdominal pain, blood in stool, nausea and vomiting.   Genitourinary: Negative for dysuria, frequency and urgency.   Musculoskeletal: Negative for arthralgias and joint swelling.   Skin: Negative for color change.   Neurological: Negative for syncope, weakness and headaches.   Hematological: Does not bruise/bleed easily.       Objective   Vitals:    07/31/19 0813   BP: 102/78   BP Location: Left arm   Patient Position: Sitting   Cuff Size: Adult   Pulse: 69   Temp: 97.8 °F (36.6 °C)   SpO2: 96%   Weight: 94.8 kg (209 lb)     Physical Exam   Constitutional: He appears well-developed and well-nourished. He is cooperative. He does not have a sickly appearance. He does not appear ill.   HENT:   Head: Normocephalic.   Right Ear: Hearing, tympanic membrane and external ear normal.   Left Ear: Hearing, tympanic membrane and external ear normal.   Nose: Nose normal. No mucosal edema, rhinorrhea, sinus tenderness or nasal deformity. Right sinus exhibits no maxillary sinus tenderness and no frontal sinus tenderness. Left sinus exhibits no maxillary sinus tenderness and  no frontal sinus tenderness.   Mouth/Throat: Oropharynx is clear and moist and mucous membranes are normal. Normal dentition.   Eyes: Conjunctivae are normal. Right eye exhibits no discharge and no exudate. Left eye exhibits no discharge and no exudate.   Right upper eyelid erythematous with mild swelling   Neck: Trachea normal. Carotid bruit is not present. No edema present. No thyroid mass present.   Cardiovascular: Regular rhythm, normal heart sounds and normal pulses.   No murmur heard.  Pulmonary/Chest: Breath sounds normal. No respiratory distress. He has no decreased breath sounds. He has no wheezes. He has no rhonchi. He has no rales.   Lymphadenopathy:        Head (right side): No submental, no submandibular, no tonsillar, no preauricular, no posterior auricular and no occipital adenopathy present.        Head (left side): No submental, no submandibular, no tonsillar, no preauricular, no posterior auricular and no occipital adenopathy present.   Neurological: He is alert.   Skin: Skin is warm, dry and intact. No cyanosis. Nails show no clubbing.       Assessment/Plan   Sander was seen today for eyelid problem, skin problem, fatigue and night sweats.    Diagnoses and all orders for this visit:    Blepharitis of right upper eyelid, unspecified type  -     erythromycin (ROMYCIN) 5 MG/GM ophthalmic ointment; Administer  to the right eye Every Night.    Essential hypertension  -     CBC Auto Differential; Future  -     Comprehensive Metabolic Panel; Future  -     TSH; Future  -     CBC Auto Differential  -     Comprehensive Metabolic Panel  -     TSH  -     Scan Slide; Future  -     Cancel: Scan Slide    Elevated cholesterol  -     Lipid Panel; Future  -     Lipid Panel    Depression, unspecified depression type    Screening for prostate cancer  -     PSA Screen; Future  -     PSA Screen    He will apply warm compresses to his right eyelid and start topical ointment, consider further evaluation if sx  persist/worsen.  Will also check fasting labs to f/u on chronic medications and c/o fatigue.

## 2019-08-01 LAB — TSH SERPL-ACNC: 1.14 MIU/ML (ref 0.27–4.2)

## 2019-08-28 ENCOUNTER — OFFICE VISIT (OUTPATIENT)
Dept: CARDIOLOGY | Age: 64
End: 2019-08-28

## 2019-08-28 VITALS
SYSTOLIC BLOOD PRESSURE: 123 MMHG | WEIGHT: 205 LBS | BODY MASS INDEX: 27.77 KG/M2 | HEART RATE: 64 BPM | DIASTOLIC BLOOD PRESSURE: 80 MMHG | HEIGHT: 72 IN

## 2019-08-28 DIAGNOSIS — I10 ESSENTIAL HYPERTENSION: ICD-10-CM

## 2019-08-28 DIAGNOSIS — I25.10 CORONARY ARTERY DISEASE INVOLVING NATIVE CORONARY ARTERY OF NATIVE HEART WITHOUT ANGINA PECTORIS: ICD-10-CM

## 2019-08-28 DIAGNOSIS — E78.00 ELEVATED CHOLESTEROL: ICD-10-CM

## 2019-08-28 PROCEDURE — 99213 OFFICE O/P EST LOW 20 MIN: CPT | Performed by: INTERNAL MEDICINE

## 2019-08-28 PROCEDURE — 93000 ELECTROCARDIOGRAM COMPLETE: CPT | Performed by: INTERNAL MEDICINE

## 2019-09-03 ENCOUNTER — HOSPITAL ENCOUNTER (OUTPATIENT)
Dept: CARDIOLOGY | Facility: HOSPITAL | Age: 64
Discharge: HOME | End: 2019-09-03

## 2019-09-03 VITALS — SYSTOLIC BLOOD PRESSURE: 118 MMHG | DIASTOLIC BLOOD PRESSURE: 80 MMHG | HEART RATE: 60 BPM

## 2019-09-03 PROCEDURE — 93018 CV STRESS TEST I&R ONLY: CPT | Performed by: INTERNAL MEDICINE

## 2019-09-03 PROCEDURE — A9500 TC99M SESTAMIBI: HCPCS | Performed by: INTERNAL MEDICINE

## 2019-09-03 PROCEDURE — 0 TECHNETIUM SESTAMIBI: Performed by: INTERNAL MEDICINE

## 2019-09-03 PROCEDURE — 93016 CV STRESS TEST SUPVJ ONLY: CPT | Performed by: NURSE PRACTITIONER

## 2019-09-03 PROCEDURE — 93017 CV STRESS TEST TRACING ONLY: CPT

## 2019-09-03 PROCEDURE — 78452 HT MUSCLE IMAGE SPECT MULT: CPT | Performed by: INTERNAL MEDICINE

## 2019-09-03 PROCEDURE — 78452 HT MUSCLE IMAGE SPECT MULT: CPT

## 2019-09-03 RX ADMIN — TECHNETIUM TC 99M SESTAMIBI 1 DOSE: 1 INJECTION INTRAVENOUS at 10:54

## 2019-09-03 RX ADMIN — TECHNETIUM TC 99M SESTAMIBI 1 DOSE: 1 INJECTION INTRAVENOUS at 08:30

## 2019-09-05 LAB
BH CV STRESS BP STAGE 1: NORMAL
BH CV STRESS BP STAGE 2: NORMAL
BH CV STRESS BP STAGE 3: NORMAL
BH CV STRESS DURATION MIN STAGE 1: 3
BH CV STRESS DURATION MIN STAGE 2: 3
BH CV STRESS DURATION MIN STAGE 3: 3
BH CV STRESS DURATION SEC STAGE 1: 0
BH CV STRESS DURATION SEC STAGE 2: 0
BH CV STRESS DURATION SEC STAGE 3: 0
BH CV STRESS GRADE STAGE 1: 10
BH CV STRESS GRADE STAGE 2: 12
BH CV STRESS GRADE STAGE 3: 14
BH CV STRESS HR STAGE 1: 100
BH CV STRESS HR STAGE 2: 111
BH CV STRESS HR STAGE 3: 142
BH CV STRESS METS STAGE 1: 4.6
BH CV STRESS METS STAGE 2: 7
BH CV STRESS METS STAGE 3: 10.1
BH CV STRESS PROTOCOL 1: NORMAL
BH CV STRESS RECOVERY BP: NORMAL MMHG
BH CV STRESS RECOVERY HR: 87 BPM
BH CV STRESS SPEED STAGE 1: 1.7
BH CV STRESS SPEED STAGE 2: 2.5
BH CV STRESS SPEED STAGE 3: 3.4
BH CV STRESS STAGE 1: 1
BH CV STRESS STAGE 2: 2
BH CV STRESS STAGE 3: 3
LV EF NUC BP: 60 %
MAXIMAL PREDICTED HEART RATE: 158 BPM
PERCENT MAX PREDICTED HR: 89.87 %
STRESS BASELINE BP: NORMAL MMHG
STRESS BASELINE HR: 56 BPM
STRESS PERCENT HR: 106 %
STRESS POST ESTIMATED WORKLOAD: 10.1 METS
STRESS POST EXERCISE DUR MIN: 9 MIN
STRESS POST EXERCISE DUR SEC: 0 SEC
STRESS POST PEAK BP: NORMAL MMHG
STRESS POST PEAK HR: 142 BPM
STRESS TARGET HR: 134 BPM

## 2019-09-09 ENCOUNTER — OFFICE VISIT (OUTPATIENT)
Dept: CARDIOLOGY | Facility: CLINIC | Age: 64
End: 2019-09-09

## 2019-09-09 ENCOUNTER — HOSPITAL ENCOUNTER (OUTPATIENT)
Dept: CARDIOLOGY | Facility: HOSPITAL | Age: 64
Discharge: HOME | End: 2019-09-09
Admitting: INTERNAL MEDICINE

## 2019-09-09 VITALS
HEART RATE: 80 BPM | HEIGHT: 71 IN | SYSTOLIC BLOOD PRESSURE: 132 MMHG | BODY MASS INDEX: 29.68 KG/M2 | WEIGHT: 212 LBS | DIASTOLIC BLOOD PRESSURE: 88 MMHG

## 2019-09-09 DIAGNOSIS — R94.39 ABNORMAL CARDIOVASCULAR STRESS TEST: ICD-10-CM

## 2019-09-09 DIAGNOSIS — I10 ESSENTIAL HYPERTENSION: Primary | ICD-10-CM

## 2019-09-09 DIAGNOSIS — I25.10 CORONARY ARTERY DISEASE INVOLVING NATIVE CORONARY ARTERY OF NATIVE HEART WITHOUT ANGINA PECTORIS: ICD-10-CM

## 2019-09-09 LAB
ANION GAP SERPL CALCULATED.3IONS-SCNC: 11.7 MMOL/L (ref 5–15)
APTT PPP: 27.2 SECONDS (ref 22.7–35.4)
BASOPHILS # BLD AUTO: 0.06 10*3/MM3 (ref 0–0.2)
BASOPHILS NFR BLD AUTO: 1.1 % (ref 0–1.5)
BUN BLD-MCNC: 14 MG/DL (ref 8–23)
BUN/CREAT SERPL: 11.9 (ref 7–25)
CALCIUM SPEC-SCNC: 9.6 MG/DL (ref 8.6–10.5)
CHLORIDE SERPL-SCNC: 103 MMOL/L (ref 98–107)
CO2 SERPL-SCNC: 26.3 MMOL/L (ref 22–29)
CREAT BLD-MCNC: 1.18 MG/DL (ref 0.76–1.27)
DEPRECATED RDW RBC AUTO: 41.7 FL (ref 37–54)
EOSINOPHIL # BLD AUTO: 0.39 10*3/MM3 (ref 0–0.4)
EOSINOPHIL NFR BLD AUTO: 6.9 % (ref 0.3–6.2)
ERYTHROCYTE [DISTWIDTH] IN BLOOD BY AUTOMATED COUNT: 12.3 % (ref 12.3–15.4)
GFR SERPL CREATININE-BSD FRML MDRD: 63 ML/MIN/1.73
GLUCOSE BLD-MCNC: 85 MG/DL (ref 65–99)
HCT VFR BLD AUTO: 46.6 % (ref 37.5–51)
HGB BLD-MCNC: 15.7 G/DL (ref 13–17.7)
IMM GRANULOCYTES # BLD AUTO: 0.04 10*3/MM3 (ref 0–0.05)
IMM GRANULOCYTES NFR BLD AUTO: 0.7 % (ref 0–0.5)
INR PPP: 0.91 (ref 0.9–1.1)
LYMPHOCYTES # BLD AUTO: 1.22 10*3/MM3 (ref 0.7–3.1)
LYMPHOCYTES NFR BLD AUTO: 21.6 % (ref 19.6–45.3)
MCH RBC QN AUTO: 31 PG (ref 26.6–33)
MCHC RBC AUTO-ENTMCNC: 33.7 G/DL (ref 31.5–35.7)
MCV RBC AUTO: 92.1 FL (ref 79–97)
MONOCYTES # BLD AUTO: 0.88 10*3/MM3 (ref 0.1–0.9)
MONOCYTES NFR BLD AUTO: 15.6 % (ref 5–12)
NEUTROPHILS # BLD AUTO: 3.06 10*3/MM3 (ref 1.7–7)
NEUTROPHILS NFR BLD AUTO: 54.1 % (ref 42.7–76)
NRBC BLD AUTO-RTO: 0 /100 WBC (ref 0–0.2)
PLATELET # BLD AUTO: 205 10*3/MM3 (ref 140–450)
PMV BLD AUTO: 12 FL (ref 6–12)
POTASSIUM BLD-SCNC: 4.2 MMOL/L (ref 3.5–5.2)
PROTHROMBIN TIME: 12 SECONDS (ref 11.7–14.2)
RBC # BLD AUTO: 5.06 10*6/MM3 (ref 4.14–5.8)
SODIUM BLD-SCNC: 141 MMOL/L (ref 136–145)
WBC NRBC COR # BLD: 5.65 10*3/MM3 (ref 3.4–10.8)

## 2019-09-09 PROCEDURE — 99213 OFFICE O/P EST LOW 20 MIN: CPT | Performed by: INTERNAL MEDICINE

## 2019-09-09 PROCEDURE — 36415 COLL VENOUS BLD VENIPUNCTURE: CPT | Performed by: INTERNAL MEDICINE

## 2019-09-09 PROCEDURE — 80048 BASIC METABOLIC PNL TOTAL CA: CPT | Performed by: INTERNAL MEDICINE

## 2019-09-09 PROCEDURE — 85025 COMPLETE CBC W/AUTO DIFF WBC: CPT | Performed by: INTERNAL MEDICINE

## 2019-09-09 PROCEDURE — 85610 PROTHROMBIN TIME: CPT | Performed by: INTERNAL MEDICINE

## 2019-09-09 PROCEDURE — 85730 THROMBOPLASTIN TIME PARTIAL: CPT | Performed by: INTERNAL MEDICINE

## 2019-09-11 ENCOUNTER — HOSPITAL ENCOUNTER (OUTPATIENT)
Facility: HOSPITAL | Age: 64
Setting detail: HOSPITAL OUTPATIENT SURGERY
Discharge: HOME | End: 2019-09-11
Attending: INTERNAL MEDICINE | Admitting: INTERNAL MEDICINE

## 2019-09-11 VITALS
WEIGHT: 212 LBS | HEART RATE: 72 BPM | RESPIRATION RATE: 16 BRPM | BODY MASS INDEX: 28.71 KG/M2 | HEIGHT: 72 IN | DIASTOLIC BLOOD PRESSURE: 84 MMHG | SYSTOLIC BLOOD PRESSURE: 136 MMHG | TEMPERATURE: 98.6 F | OXYGEN SATURATION: 95 %

## 2019-09-11 DIAGNOSIS — I25.10 CORONARY ARTERY DISEASE INVOLVING NATIVE CORONARY ARTERY OF NATIVE HEART WITHOUT ANGINA PECTORIS: ICD-10-CM

## 2019-09-11 DIAGNOSIS — I10 ESSENTIAL HYPERTENSION: ICD-10-CM

## 2019-09-11 DIAGNOSIS — R94.39 ABNORMAL CARDIOVASCULAR STRESS TEST: ICD-10-CM

## 2019-09-11 PROCEDURE — 99153 MOD SED SAME PHYS/QHP EA: CPT | Performed by: INTERNAL MEDICINE

## 2019-09-11 PROCEDURE — C1769 GUIDE WIRE: HCPCS | Performed by: INTERNAL MEDICINE

## 2019-09-11 PROCEDURE — C1887 CATHETER, GUIDING: HCPCS | Performed by: INTERNAL MEDICINE

## 2019-09-11 PROCEDURE — 0 IOPAMIDOL PER 1 ML: Performed by: INTERNAL MEDICINE

## 2019-09-11 PROCEDURE — 99152 MOD SED SAME PHYS/QHP 5/>YRS: CPT | Performed by: INTERNAL MEDICINE

## 2019-09-11 PROCEDURE — 93458 L HRT ARTERY/VENTRICLE ANGIO: CPT | Performed by: INTERNAL MEDICINE

## 2019-09-11 PROCEDURE — 25010000002 HEPARIN (PORCINE) PER 1000 UNITS: Performed by: INTERNAL MEDICINE

## 2019-09-11 PROCEDURE — 85347 COAGULATION TIME ACTIVATED: CPT

## 2019-09-11 PROCEDURE — C9600 PERC DRUG-EL COR STENT SING: HCPCS | Performed by: INTERNAL MEDICINE

## 2019-09-11 PROCEDURE — C1894 INTRO/SHEATH, NON-LASER: HCPCS | Performed by: INTERNAL MEDICINE

## 2019-09-11 PROCEDURE — 92928 PRQ TCAT PLMT NTRAC ST 1 LES: CPT | Performed by: INTERNAL MEDICINE

## 2019-09-11 PROCEDURE — 25010000002 MIDAZOLAM PER 1 MG: Performed by: INTERNAL MEDICINE

## 2019-09-11 PROCEDURE — C1874 STENT, COATED/COV W/DEL SYS: HCPCS | Performed by: INTERNAL MEDICINE

## 2019-09-11 PROCEDURE — 25010000002 FENTANYL CITRATE (PF) 100 MCG/2ML SOLUTION: Performed by: INTERNAL MEDICINE

## 2019-09-11 DEVICE — XIENCE SIERRA™ EVEROLIMUS ELUTING CORONARY STENT SYSTEM 3.50 MM X 12 MM / RAPID-EXCHANGE
Type: IMPLANTABLE DEVICE | Status: FUNCTIONAL
Brand: XIENCE SIERRA™

## 2019-09-11 RX ORDER — SODIUM CHLORIDE 9 MG/ML
100 INJECTION, SOLUTION INTRAVENOUS CONTINUOUS
Status: DISCONTINUED | OUTPATIENT
Start: 2019-09-11 | End: 2019-09-11 | Stop reason: HOSPADM

## 2019-09-11 RX ORDER — LIDOCAINE HYDROCHLORIDE 10 MG/ML
0.1 INJECTION, SOLUTION EPIDURAL; INFILTRATION; INTRACAUDAL; PERINEURAL ONCE AS NEEDED
Status: DISCONTINUED | OUTPATIENT
Start: 2019-09-11 | End: 2019-09-11 | Stop reason: HOSPADM

## 2019-09-11 RX ORDER — MIDAZOLAM HYDROCHLORIDE 1 MG/ML
INJECTION INTRAMUSCULAR; INTRAVENOUS AS NEEDED
Status: DISCONTINUED | OUTPATIENT
Start: 2019-09-11 | End: 2019-09-11 | Stop reason: HOSPADM

## 2019-09-11 RX ORDER — LIDOCAINE HYDROCHLORIDE 20 MG/ML
INJECTION, SOLUTION INFILTRATION; PERINEURAL AS NEEDED
Status: DISCONTINUED | OUTPATIENT
Start: 2019-09-11 | End: 2019-09-11 | Stop reason: HOSPADM

## 2019-09-11 RX ORDER — FENTANYL CITRATE 50 UG/ML
INJECTION, SOLUTION INTRAMUSCULAR; INTRAVENOUS AS NEEDED
Status: DISCONTINUED | OUTPATIENT
Start: 2019-09-11 | End: 2019-09-11 | Stop reason: HOSPADM

## 2019-09-11 RX ORDER — ASPIRIN 81 MG/1
TABLET, CHEWABLE ORAL AS NEEDED
Status: DISCONTINUED | OUTPATIENT
Start: 2019-09-11 | End: 2019-09-11 | Stop reason: HOSPADM

## 2019-09-11 RX ORDER — HEPARIN SODIUM 1000 [USP'U]/ML
INJECTION, SOLUTION INTRAVENOUS; SUBCUTANEOUS AS NEEDED
Status: DISCONTINUED | OUTPATIENT
Start: 2019-09-11 | End: 2019-09-11 | Stop reason: HOSPADM

## 2019-09-11 RX ORDER — ASPIRIN 81 MG/1
81 TABLET, CHEWABLE ORAL DAILY
Status: CANCELLED | OUTPATIENT
Start: 2019-09-11

## 2019-09-11 RX ORDER — SODIUM CHLORIDE 0.9 % (FLUSH) 0.9 %
10 SYRINGE (ML) INJECTION AS NEEDED
Status: DISCONTINUED | OUTPATIENT
Start: 2019-09-11 | End: 2019-09-11 | Stop reason: HOSPADM

## 2019-09-11 RX ORDER — SODIUM CHLORIDE 9 MG/ML
75 INJECTION, SOLUTION INTRAVENOUS CONTINUOUS
Status: DISCONTINUED | OUTPATIENT
Start: 2019-09-11 | End: 2019-09-11 | Stop reason: HOSPADM

## 2019-09-11 RX ORDER — SODIUM CHLORIDE 0.9 % (FLUSH) 0.9 %
3 SYRINGE (ML) INJECTION EVERY 12 HOURS SCHEDULED
Status: DISCONTINUED | OUTPATIENT
Start: 2019-09-11 | End: 2019-09-11 | Stop reason: HOSPADM

## 2019-09-11 RX ADMIN — SODIUM CHLORIDE 75 ML/HR: 9 INJECTION, SOLUTION INTRAVENOUS at 09:46

## 2019-09-11 NOTE — CONSULTS
"Met with patient, discussed benefits of cardiac rehab. Provided phase II information packet, which includes; general information about cardiac rehab, \[facility]\"" Cardiac Rehab Programs handout and [facility] letter article entitled “Cardiac Rehab is often the Best Medicine for Recovery\", stresses the importance of cardiac rehab after a heart event. He travels to [facility] for work and stays Monday-Friday return back to [place] Friday evening.    I provided the contact information for cardiac rehab  at Health system in [place] and encouraged him to call when discharged.   Instructed to bring a copy of After Visit Summary to initial assessment.        "

## 2019-09-12 ENCOUNTER — TELEPHONE (OUTPATIENT)
Dept: CARDIAC REHAB | Facility: HOSPITAL | Age: 64
End: 2019-09-12

## 2019-09-12 LAB — ACT BLD: 252 SECONDS (ref 82–152)

## 2019-09-12 NOTE — TELEPHONE ENCOUNTER
Called pt secondary to referral from Dr. Billings for CR program.  Pt says he works in [employer] and it would be more convenient for him to attend program at [facility] in [place].  He is pretty sure there is a program there.  Encouraged pt to call and set up his first appointment at that facility.  Pt thinks he will be allowed to return to work sometime next week.

## 2019-09-25 ENCOUNTER — TELEPHONE (OUTPATIENT)
Dept: CARDIOLOGY | Facility: CLINIC | Age: 64
End: 2019-09-25

## 2019-09-25 NOTE — TELEPHONE ENCOUNTER
Called l/m with [facility] to have request faxed again or if any questions can r/c       ----- Message from Letty Mejia sent at 9/24/2019 11:51 AM EDT -----  Regarding: STATUS OF CARDIAC REHAB REQUEST  PLEASE CALL [facility] [phone] RE:  REQUEST FAXED LAST WEEK.  FAX NUMBER [phone].  THANK YOU

## 2019-09-27 ENCOUNTER — OFFICE VISIT (OUTPATIENT)
Dept: CARDIOLOGY | Facility: CLINIC | Age: 64
End: 2019-09-27

## 2019-09-27 VITALS
HEIGHT: 72 IN | HEART RATE: 65 BPM | DIASTOLIC BLOOD PRESSURE: 84 MMHG | BODY MASS INDEX: 29.16 KG/M2 | SYSTOLIC BLOOD PRESSURE: 136 MMHG

## 2019-09-27 DIAGNOSIS — I10 HYPERTENSION, UNSPECIFIED TYPE: Primary | ICD-10-CM

## 2019-09-27 DIAGNOSIS — E78.5 HYPERLIPIDEMIA, UNSPECIFIED HYPERLIPIDEMIA TYPE: ICD-10-CM

## 2019-09-27 DIAGNOSIS — I25.10 CORONARY ARTERY DISEASE INVOLVING NATIVE CORONARY ARTERY OF NATIVE HEART WITHOUT ANGINA PECTORIS: ICD-10-CM

## 2019-09-27 DIAGNOSIS — I21.21 ST ELEVATION MYOCARDIAL INFARCTION INVOLVING LEFT CIRCUMFLEX CORONARY ARTERY (HCC): ICD-10-CM

## 2019-09-27 DIAGNOSIS — I10 ESSENTIAL HYPERTENSION: ICD-10-CM

## 2019-09-27 PROCEDURE — 99213 OFFICE O/P EST LOW 20 MIN: CPT | Performed by: NURSE PRACTITIONER

## 2019-10-01 NOTE — PROGRESS NOTES
Subjective:        Sander White is a 63 y.o. male who here for follow up    Chief Complaint   Patient presents with   • Follow-up     Cath       HPI  Sander White is a 63-year-old male, he was current with this provider.  A history of arthritis, hypertension, history of ST elevation   infarction, CAD, ED, and GERD. He is here for a hospital follow up s/p cardiac catheterization on 9/11/2019.  Had a high risk stress test on 9/5/2019.  On 9/11/2019 he had a primary stenting of the mid RCA 70-80% reduced to 0% with 3.5/15 Xience stent dilated to 3.6. Normal left main, LAD shows early atherosclerotic plaque including the diagonal and  branches, circ. arteries nondominant and normal, RCA dominant with the midportion 70-80% reduced to 0% with 3.5/15 Xience stent dilated to 3.6, normal LV gram.  Radial no signs or symptoms of infection, no hematoma noted.  Echo on 4/20/2017 showed EF 52.9%, trace to mild MV and TV regurgitation, no evidence of pericardial effusion.      He states he has some shortness of breath, denies chest pain and palpitations    The following portions of the patient's history were reviewed and updated as appropriate: allergies, current medications, past family history, past medical history, past social history, past surgical history and problem list.    Past Medical History:   Diagnosis Date   • Arthritis    • Chronic pain in right foot    • Coronary artery disease    • ED (erectile dysfunction) 7/15/2016   • GERD (gastroesophageal reflux disease)    • Hypertension    • Myocardial infarction (CMS/[facility])          reports that he quit smoking about 19 years ago. His smoking use included cigarettes. He has a 3.50 pack-year smoking history. He has never used smokeless tobacco. He reports that he drinks alcohol. He reports that he does not use drugs.     Family History   Problem Relation Age of Onset   • Diabetes Mother         Type 2   • Hypertension Mother    • Hyperlipidemia Mother    •  Heart attack Mother    • Heart disease Mother    • Diabetes Father         Type 2   • Stroke Father    • Hypertension Father    • Hyperlipidemia Father    • Heart attack Father    • Heart disease Father    • Diabetes insipidus Father    • Alzheimer's disease Father    • Aneurysm Brother         Brain       ROS     Review of Systems  Constitutional: No wt loss, fever, fatigue  Gastrointestinal: No nausea, abdominal pain  Behavioral/Psych: No insomnia or anxiety  Cardiovascular: + SHOB that comes and goes, denies chest pain and palpitations      Objective:           Physical Exam   Constitutional: He is oriented to person, place, and time. He appears well-developed and well-nourished.   HENT:   Head: Normocephalic.   Right Ear: External ear normal.   Left Ear: External ear normal.   Eyes: EOM are normal.   Neck: Normal range of motion. No JVD present.   Cardiovascular: Normal rate, regular rhythm, normal heart sounds and intact distal pulses. Exam reveals no gallop and no friction rub.   No murmur heard.  Pulmonary/Chest: Effort normal and breath sounds normal. No stridor. No respiratory distress. He has no rales.   Abdominal: Soft. Bowel sounds are normal. He exhibits no distension. There is no tenderness. There is no guarding.   Musculoskeletal: Normal range of motion. He exhibits no edema or tenderness.   Neurological: He is alert and oriented to person, place, and time. He has normal reflexes.   Skin: Skin is warm.   Psychiatric: He has a normal mood and affect. Judgment normal.   Nursing note and vitals reviewed.      Procedures       9/11/19 Conclusion        · Successful right and left coronary angiogram and LV gram  · Successful primary stenting of the mid RCA 70 to 80% reduced to 0% with 3.5/15 Xience stent dilated to 3.6  · Normal left main  · Left anterior descending shows early atherosclerotic plaque including the diagonal and  branches  · Circumflex arteries a nondominant and normal  · Right  coronary artery dominant with the midportion 70 to 80% reduced to 0% with 3.5/15 Xience stent dilated to 3.6  · Normal LV gram      Interpretation Summary     · Left Ventricle: Calculated EF = 52.9%.  · Trace-to-mild mitral valve regurgitation  · Trace to mild tricuspid valve regurgitation is present  · There is no evidence of pericardial effusion        Interpretation Summary        · Low risk for ischemic heart disease.  · Findings consistent with a normal ECG stress test.  · Left ventricular ejection fraction is normal (Calculated EF = 60%).  · Myocardial perfusion imaging indicates a medium-sized infarct located in the lateral wall and inferior wall with moderate chas-infarct ischemia.  · Impressions are consistent with a high risk study.              Current Outpatient Medications:   •  Acetaminophen (TYLENOL ARTHRITIS PAIN PO), Take  by mouth. States he takes 6 pills a day-maximum amount, Disp: , Rfl:   •  aspirin 81 MG chewable tablet, Chew 1 tablet Daily., Disp: , Rfl:   •  buPROPion SR (WELLBUTRIN SR) 150 MG 12 hr tablet, Take 1 tablet by mouth 2 (Two) Times a Day., Disp: 60 tablet, Rfl: 3  •  erythromycin (ROMYCIN) 5 MG/GM ophthalmic ointment, Administer  to the right eye Every Night., Disp: 3.5 g, Rfl: 0  •  lisinopril (PRINIVIL,ZESTRIL) 2.5 MG tablet, TAKE ONE TABLET BY MOUTH DAILY, Disp: 30 tablet, Rfl: 3  •  meloxicam (MOBIC) 15 MG tablet, Take 1 tablet by mouth Daily., Disp: , Rfl:   •  nitroglycerin (NITROSTAT) 0.4 MG SL tablet, 1 under the tongue as needed for angina, may repeat q5mins for up three doses, Disp: 25 tablet, Rfl: 3  •  omeprazole (priLOSEC) 20 MG capsule, Take 20 mg by mouth Daily., Disp: , Rfl:   •  pravastatin (PRAVACHOL) 20 MG tablet, Take 1 tablet by mouth Daily., Disp: 90 tablet, Rfl: 1  •  ticagrelor (BRILINTA) 90 MG tablet tablet, Take 1 tablet by mouth 2 (Two) Times a Day., Disp: 60 tablet, Rfl: 6  •  vitamin C (ASCORBIC ACID) 500 MG tablet, Take 500 mg by mouth daily., Disp: ,  Rfl:   •  vitamin E 1000 UNIT capsule, Take 1,000 Units by mouth Daily. Taking 400 mg daily, Disp: , Rfl:      Assessment:        Patient Active Problem List   Diagnosis   • Airway hyperreactivity   • Elevated cholesterol   • Gain of weight   • ED (erectile dysfunction)   • ST elevation myocardial infarction (STEMI) (CMS/HCC)   • Mallet finger   • Sprain of carpometacarpal joint   • Essential hypertension   • Coronary artery disease involving native coronary artery of native heart without angina pectoris   • Chest pain   • Precordial pain   • Spinal stenosis in cervical region   • Cervical radiculopathy   • Neck pain   • Abnormal cardiovascular stress test               Plan:   1  CAD s/p cardiac cath: R radial site no s/s of infection or hematoma noted. Continue ticagrelor, statin, and ace.    Risk reduction for the coronary artery disease, controlling the blood pressure, blood sugar management, cholesterol management, exercise, stress management, and proper compliance with medications and follow-up has been discussed    2. Hypertension: Today in the office his bp is 136/84, and HR 65. Will consider adding BB on next visit.  Continue current medications.     Educated patient on exercising for at least 30 minutes a day for 2 to 3 days a week. Importance of controlling hypertension and blood pressure checkup on the regular basis has been explained. Hypertension as a silent killer has been discussed. Risk reduction of the weight and regular exercises to control the hypertension has been explained.    3. HLD: Last lipid profile on 7/31/2019 showed , HDL 54, , and triglycerides 100.  Continue current statin       Risk of the hyperlipidemia, importance of the treatment has been explained. Pros and cons of the statins has been explained. Regular blood workup as well as side effects including the liver failure, myelopathy death has been explained.     4. Shob: Will do functional stress test and follow up with  Dr. Billings. May need to switch Brilinta to Plavix if no improvement.       Sander Moore was given to patient for the following topics: diagnostic results, risk factor reductions, impressions, risks and benefits of treatment options and importance of treatment compliance .       SMOKING COUNSELING: denies     Will do a functional stress test and follow up with Dr. Billings for results.     Sincerely,   ANGELICA Russo  [facility]  10/01/19  3:36 PM

## 2019-10-17 ENCOUNTER — HOSPITAL ENCOUNTER (OUTPATIENT)
Dept: CARDIOLOGY | Facility: HOSPITAL | Age: 64
Discharge: HOME | End: 2019-10-17
Admitting: NURSE PRACTITIONER

## 2019-10-17 VITALS — DIASTOLIC BLOOD PRESSURE: 78 MMHG | HEART RATE: 66 BPM | SYSTOLIC BLOOD PRESSURE: 118 MMHG

## 2019-10-17 LAB
BH CV STRESS BP STAGE 1: NORMAL
BH CV STRESS BP STAGE 2: NORMAL
BH CV STRESS BP STAGE 3: NORMAL
BH CV STRESS DURATION MIN STAGE 1: 3
BH CV STRESS DURATION MIN STAGE 2: 3
BH CV STRESS DURATION MIN STAGE 3: 3
BH CV STRESS DURATION SEC STAGE 1: 0
BH CV STRESS DURATION SEC STAGE 2: 0
BH CV STRESS DURATION SEC STAGE 3: 0
BH CV STRESS GRADE STAGE 1: 10
BH CV STRESS GRADE STAGE 2: 12
BH CV STRESS GRADE STAGE 3: 14
BH CV STRESS HR STAGE 1: 103
BH CV STRESS HR STAGE 2: 125
BH CV STRESS HR STAGE 3: 146
BH CV STRESS METS STAGE 1: 4.6
BH CV STRESS METS STAGE 2: 7.1
BH CV STRESS METS STAGE 3: 10
BH CV STRESS PROTOCOL 1: NORMAL
BH CV STRESS RECOVERY BP: NORMAL MMHG
BH CV STRESS RECOVERY HR: 89 BPM
BH CV STRESS SPEED STAGE 1: 1.7
BH CV STRESS SPEED STAGE 2: 2.5
BH CV STRESS SPEED STAGE 3: 3.4
BH CV STRESS STAGE 1: 1
BH CV STRESS STAGE 2: 2
BH CV STRESS STAGE 3: 3
MAXIMAL PREDICTED HEART RATE: 158 BPM
PERCENT MAX PREDICTED HR: 94.94 %
STRESS BASELINE BP: NORMAL MMHG
STRESS BASELINE HR: 65 BPM
STRESS PERCENT HR: 112 %
STRESS POST ESTIMATED WORKLOAD: 10.2 METS
STRESS POST EXERCISE DUR MIN: 9 MIN
STRESS POST EXERCISE DUR SEC: 0 SEC
STRESS POST PEAK BP: NORMAL MMHG
STRESS POST PEAK HR: 150 BPM
STRESS TARGET HR: 134 BPM

## 2019-10-17 PROCEDURE — 93018 CV STRESS TEST I&R ONLY: CPT | Performed by: INTERNAL MEDICINE

## 2019-10-17 PROCEDURE — 93016 CV STRESS TEST SUPVJ ONLY: CPT | Performed by: INTERNAL MEDICINE

## 2019-10-17 PROCEDURE — 93017 CV STRESS TEST TRACING ONLY: CPT

## 2019-12-06 ENCOUNTER — OFFICE VISIT (OUTPATIENT)
Dept: INTERNAL MEDICINE | Facility: CLINIC | Age: 64
End: 2019-12-06

## 2019-12-06 VITALS
HEART RATE: 71 BPM | WEIGHT: 210 LBS | OXYGEN SATURATION: 98 % | SYSTOLIC BLOOD PRESSURE: 120 MMHG | BODY MASS INDEX: 28.44 KG/M2 | DIASTOLIC BLOOD PRESSURE: 80 MMHG | HEIGHT: 72 IN

## 2019-12-06 DIAGNOSIS — I10 ESSENTIAL HYPERTENSION: Primary | ICD-10-CM

## 2019-12-06 DIAGNOSIS — E78.00 HYPERCHOLESTEREMIA: ICD-10-CM

## 2019-12-06 DIAGNOSIS — I25.10 CORONARY ARTERY DISEASE INVOLVING NATIVE CORONARY ARTERY OF NATIVE HEART WITHOUT ANGINA PECTORIS: ICD-10-CM

## 2019-12-06 PROBLEM — F32.89 DEPRESSIVE DISORDER, NOT ELSEWHERE CLASSIFIED: Status: ACTIVE | Noted: 2019-12-06

## 2019-12-06 LAB
BASOPHILS # BLD AUTO: 0.04 10*3/MM3 (ref 0–0.2)
BASOPHILS NFR BLD AUTO: 0.7 % (ref 0–1.5)
DEPRECATED RDW RBC AUTO: 40.9 FL (ref 37–54)
EOSINOPHIL # BLD AUTO: 0.47 10*3/MM3 (ref 0–0.4)
EOSINOPHIL NFR BLD AUTO: 8 % (ref 0.3–6.2)
ERYTHROCYTE [DISTWIDTH] IN BLOOD BY AUTOMATED COUNT: 12.6 % (ref 12.3–15.4)
HCT VFR BLD AUTO: 43.1 % (ref 37.5–51)
HGB BLD-MCNC: 14.8 G/DL (ref 13–17.7)
LYMPHOCYTES # BLD AUTO: 1.3 10*3/MM3 (ref 0.7–3.1)
LYMPHOCYTES NFR BLD AUTO: 22 % (ref 19.6–45.3)
MCH RBC QN AUTO: 31.4 PG (ref 26.6–33)
MCHC RBC AUTO-ENTMCNC: 34.3 G/DL (ref 31.5–35.7)
MCV RBC AUTO: 91.5 FL (ref 79–97)
MONOCYTES # BLD AUTO: 0.94 10*3/MM3 (ref 0.1–0.9)
MONOCYTES NFR BLD AUTO: 15.9 % (ref 5–12)
NEUTROPHILS # BLD AUTO: 3.15 10*3/MM3 (ref 1.7–7)
NEUTROPHILS NFR BLD AUTO: 53.4 % (ref 42.7–76)
PLATELET # BLD AUTO: 203 10*3/MM3 (ref 140–450)
PMV BLD AUTO: 11.7 FL (ref 6–12)
RBC # BLD AUTO: 4.71 10*6/MM3 (ref 4.14–5.8)
WBC NRBC COR # BLD: 5.9 10*3/MM3 (ref 3.4–10.8)

## 2019-12-06 PROCEDURE — 36415 COLL VENOUS BLD VENIPUNCTURE: CPT | Performed by: NURSE PRACTITIONER

## 2019-12-06 PROCEDURE — 99214 OFFICE O/P EST MOD 30 MIN: CPT | Performed by: NURSE PRACTITIONER

## 2019-12-06 PROCEDURE — 85025 COMPLETE CBC W/AUTO DIFF WBC: CPT | Performed by: NURSE PRACTITIONER

## 2019-12-07 LAB
ALBUMIN SERPL-MCNC: 4.9 G/DL (ref 3.5–5.2)
ALBUMIN/GLOB SERPL: 2.3 G/DL
ALP SERPL-CCNC: 79 U/L (ref 39–117)
ALT SERPL-CCNC: 19 U/L (ref 1–41)
AST SERPL-CCNC: 20 U/L (ref 1–40)
BILIRUB SERPL-MCNC: 0.5 MG/DL (ref 0.2–1.2)
BUN SERPL-MCNC: 20 MG/DL (ref 8–23)
BUN/CREAT SERPL: 19.2 (ref 7–25)
CALCIUM SERPL-MCNC: 9.3 MG/DL (ref 8.6–10.5)
CHLORIDE SERPL-SCNC: 108 MMOL/L (ref 98–107)
CHOLEST SERPL-MCNC: 213 MG/DL (ref 0–200)
CO2 SERPL-SCNC: 23 MMOL/L (ref 22–29)
CREAT SERPL-MCNC: 1.04 MG/DL (ref 0.76–1.27)
GLOBULIN SER CALC-MCNC: 2.1 GM/DL
GLUCOSE SERPL-MCNC: 90 MG/DL (ref 65–99)
HDLC SERPL-MCNC: 51 MG/DL (ref 40–60)
LDLC SERPL CALC-MCNC: 147 MG/DL (ref 0–100)
POTASSIUM SERPL-SCNC: 4.2 MMOL/L (ref 3.5–5.2)
PROT SERPL-MCNC: 7 G/DL (ref 6–8.5)
SODIUM SERPL-SCNC: 144 MMOL/L (ref 136–145)
TRIGL SERPL-MCNC: 77 MG/DL (ref 0–150)
VLDLC SERPL-MCNC: 15.4 MG/DL

## 2019-12-09 ENCOUNTER — TELEPHONE (OUTPATIENT)
Dept: CARDIOLOGY | Facility: CLINIC | Age: 64
End: 2019-12-09

## 2019-12-09 NOTE — TELEPHONE ENCOUNTER
----- Message from Amee Merino sent at 12/6/2019  1:59 PM EST -----  Regarding: CP the day after Cardiac Rehab  Contact: [phone]  He is experiencing CP the day after Cardiac Rehab

## 2019-12-09 NOTE — PROGRESS NOTES
Subjective   Sander White is a 64 y.o. male who presents for f/u regarding HTN, CAD and hyperlipidemia.    He underwent a heart cath September 11 and had stenting of the mid RCA.  He is currently managed on pravastatin and LDL has been around 100 in the past.  He is tolerating medication well, denies any other statin usage.  He attends cardiac rehab Monday through Wednesday at [facility], he works out of town during the week.  He does complain of increased fatigue on the Thursday after cardiac rehab.  This improves with rest but is recurrent.    Hypertension   This is a chronic problem. The current episode started more than 1 year ago. The problem is unchanged. The problem is controlled. Pertinent negatives include no chest pain, headaches, malaise/fatigue, palpitations, peripheral edema or shortness of breath. Current antihypertension treatment includes ACE inhibitors. The current treatment provides significant improvement. There are no compliance problems.         The following portions of the patient's history were reviewed and updated as appropriate: allergies, current medications, past social history and problem list.    Past Medical History:   Diagnosis Date   • Arthritis    • Chronic pain in right foot    • Coronary artery disease    • ED (erectile dysfunction) 7/15/2016   • GERD (gastroesophageal reflux disease)    • Hypertension    • Myocardial infarction (CMS/[facility])          Current Outpatient Medications:   •  Acetaminophen (TYLENOL ARTHRITIS PAIN PO), Take  by mouth. States he takes 6 pills a day-maximum amount, Disp: , Rfl:   •  aspirin 81 MG chewable tablet, Chew 1 tablet Daily., Disp: , Rfl:   •  lisinopril (PRINIVIL,ZESTRIL) 2.5 MG tablet, TAKE ONE TABLET BY MOUTH DAILY, Disp: 30 tablet, Rfl: 3  •  nitroglycerin (NITROSTAT) 0.4 MG SL tablet, 1 under the tongue as needed for angina, may repeat q5mins for up three doses, Disp: 25 tablet, Rfl: 3  •  omeprazole (priLOSEC) 20 MG capsule, Take 20 mg  "by mouth Daily., Disp: , Rfl:   •  pravastatin (PRAVACHOL) 20 MG tablet, Take 1 tablet by mouth Daily., Disp: 90 tablet, Rfl: 1  •  ticagrelor (BRILINTA) 90 MG tablet tablet, Take 1 tablet by mouth 2 (Two) Times a Day., Disp: 60 tablet, Rfl: 6  •  vitamin C (ASCORBIC ACID) 500 MG tablet, Take 500 mg by mouth daily., Disp: , Rfl:   •  vitamin E 1000 UNIT capsule, Take 1,000 Units by mouth Daily. Taking 400 mg daily, Disp: , Rfl:     Allergies   Allergen Reactions   • Icy Hot Rash   • Amoxicillin Itching   • Penicillins    • Menthol Rash       Review of Systems   Constitutional: Negative for chills, fatigue, fever, malaise/fatigue and unexpected weight change.   HENT: Positive for congestion and postnasal drip. Negative for ear pain, sinus pressure, sore throat and trouble swallowing.    Eyes: Negative for visual disturbance.   Respiratory: Negative for cough, chest tightness, shortness of breath and wheezing.    Cardiovascular: Negative for chest pain, palpitations and leg swelling.   Gastrointestinal: Negative for abdominal pain, blood in stool, nausea and vomiting.   Genitourinary: Negative for dysuria, frequency and urgency.   Musculoskeletal: Positive for arthralgias and back pain. Negative for joint swelling.   Skin: Negative for color change.   Neurological: Negative for syncope, weakness and headaches.   Hematological: Does not bruise/bleed easily.       Objective   Vitals:    12/06/19 1256   BP: 120/80   BP Location: Left arm   Patient Position: Sitting   Cuff Size: Adult   Pulse: 71   SpO2: 98%   Weight: 95.3 kg (210 lb)   Height: 181.6 cm (71.5\")     Body mass index is 28.88 kg/m².  Physical Exam   Constitutional: He appears well-developed and well-nourished. He is cooperative. He does not have a sickly appearance. He does not appear ill.   HENT:   Head: Normocephalic.   Right Ear: Hearing, tympanic membrane and external ear normal.   Left Ear: Hearing, tympanic membrane and external ear normal.   Nose: " Nose normal. No mucosal edema, rhinorrhea, sinus tenderness or nasal deformity. Right sinus exhibits no maxillary sinus tenderness and no frontal sinus tenderness. Left sinus exhibits no maxillary sinus tenderness and no frontal sinus tenderness.   Mouth/Throat: Mucous membranes are normal. Normal dentition. Posterior oropharyngeal erythema present.   Eyes: Conjunctivae and lids are normal. Right eye exhibits no discharge and no exudate. Left eye exhibits no discharge and no exudate.   Neck: Trachea normal. Carotid bruit is not present. No edema present. No thyroid mass present.   Cardiovascular: Regular rhythm, normal heart sounds and normal pulses.   No murmur heard.  Pulmonary/Chest: Breath sounds normal. No respiratory distress. He has no decreased breath sounds. He has no wheezes. He has no rhonchi. He has no rales.   Abdominal: Soft. There is no tenderness.   Lymphadenopathy:        Head (right side): No submental, no submandibular, no tonsillar, no preauricular, no posterior auricular and no occipital adenopathy present.        Head (left side): No submental, no submandibular, no tonsillar, no preauricular, no posterior auricular and no occipital adenopathy present.   Neurological: He is alert.   Skin: Skin is warm, dry and intact. No cyanosis. Nails show no clubbing.       Assessment/Plan   Sander was seen today for hypertension, hyperlipidemia and coronary artery disease.    Diagnoses and all orders for this visit:    Essential hypertension  -     CBC Auto Differential; Future  -     Comprehensive Metabolic Panel; Future  -     Lipid Panel; Future  -     CBC Auto Differential  -     Comprehensive Metabolic Panel  -     Lipid Panel    Coronary artery disease involving native coronary artery of native heart without angina pectoris    Hypercholesteremia    We have discussed his last lipid panel (7/2009) and goal of LDL less than 70.  We will recheck today and if LDL remains elevated we will switch to  atorvastatin for better reduction.  He will continue cardiac rehab, I am concerned about his increased fatigue after completion of therapy.  Discussed monitoring heart rate and following up with cardiology regarding symptoms.

## 2019-12-11 RX ORDER — ATORVASTATIN CALCIUM 40 MG/1
40 TABLET, FILM COATED ORAL DAILY
Qty: 90 TABLET | Refills: 1 | Status: SHIPPED | OUTPATIENT
Start: 2019-12-11 | End: 2020-03-06 | Stop reason: SDUPTHER

## 2019-12-11 NOTE — TELEPHONE ENCOUNTER
ARTUROVM with Jennifer's comments and advised to call with any questions or concerns    Please advise med refill    Last OV:  12/6/19    Thank you,    Gifty

## 2019-12-11 NOTE — TELEPHONE ENCOUNTER
Pt called states he had a missed call from the office about lab results  Pt is working right now  and doesn't have a speaker on his phone so he has to go out to his truck to listen to the message   So pt ask to leave a detailed message about his blood work on his voice mail     Please advise and give call back

## 2020-03-06 ENCOUNTER — OFFICE VISIT (OUTPATIENT)
Dept: INTERNAL MEDICINE | Facility: CLINIC | Age: 65
End: 2020-03-06

## 2020-03-06 VITALS
RESPIRATION RATE: 14 BRPM | WEIGHT: 212 LBS | DIASTOLIC BLOOD PRESSURE: 72 MMHG | BODY MASS INDEX: 28.71 KG/M2 | HEIGHT: 72 IN | SYSTOLIC BLOOD PRESSURE: 124 MMHG

## 2020-03-06 DIAGNOSIS — E78.00 HYPERCHOLESTEREMIA: ICD-10-CM

## 2020-03-06 DIAGNOSIS — I25.10 CORONARY ARTERY DISEASE INVOLVING NATIVE CORONARY ARTERY OF NATIVE HEART WITHOUT ANGINA PECTORIS: ICD-10-CM

## 2020-03-06 DIAGNOSIS — N52.9 ERECTILE DYSFUNCTION, UNSPECIFIED ERECTILE DYSFUNCTION TYPE: ICD-10-CM

## 2020-03-06 DIAGNOSIS — M79.671 RIGHT FOOT PAIN: Primary | ICD-10-CM

## 2020-03-06 DIAGNOSIS — I10 ESSENTIAL HYPERTENSION: ICD-10-CM

## 2020-03-06 PROCEDURE — 99214 OFFICE O/P EST MOD 30 MIN: CPT | Performed by: NURSE PRACTITIONER

## 2020-03-06 RX ORDER — LISINOPRIL 2.5 MG/1
2.5 TABLET ORAL DAILY
Qty: 30 TABLET | Refills: 3 | Status: SHIPPED | OUTPATIENT
Start: 2020-03-06 | End: 2020-07-20

## 2020-03-06 RX ORDER — NITROGLYCERIN 0.4 MG/1
TABLET SUBLINGUAL
Qty: 25 TABLET | Refills: 3 | Status: SHIPPED | OUTPATIENT
Start: 2020-03-06 | End: 2022-09-20 | Stop reason: SDUPTHER

## 2020-03-06 RX ORDER — ATORVASTATIN CALCIUM 40 MG/1
40 TABLET, FILM COATED ORAL DAILY
Qty: 90 TABLET | Refills: 1 | Status: SHIPPED | OUTPATIENT
Start: 2020-03-06 | End: 2021-01-09

## 2020-03-07 ENCOUNTER — LAB (OUTPATIENT)
Dept: LAB | Facility: HOSPITAL | Age: 65
End: 2020-03-07

## 2020-03-07 ENCOUNTER — HOSPITAL ENCOUNTER (OUTPATIENT)
Dept: GENERAL RADIOLOGY | Facility: HOSPITAL | Age: 65
Discharge: HOME | End: 2020-03-07
Admitting: NURSE PRACTITIONER

## 2020-03-07 DIAGNOSIS — M79.671 RIGHT FOOT PAIN: ICD-10-CM

## 2020-03-07 DIAGNOSIS — N52.9 ERECTILE DYSFUNCTION, UNSPECIFIED ERECTILE DYSFUNCTION TYPE: ICD-10-CM

## 2020-03-07 DIAGNOSIS — I10 ESSENTIAL HYPERTENSION: ICD-10-CM

## 2020-03-07 DIAGNOSIS — E78.00 HYPERCHOLESTEREMIA: ICD-10-CM

## 2020-03-07 LAB
ALBUMIN SERPL-MCNC: 4.3 G/DL (ref 3.5–5.2)
ALBUMIN/GLOB SERPL: 1.8 G/DL
ALP SERPL-CCNC: 86 U/L (ref 39–117)
ALT SERPL W P-5'-P-CCNC: 20 U/L (ref 1–41)
ANION GAP SERPL CALCULATED.3IONS-SCNC: 12.6 MMOL/L (ref 5–15)
AST SERPL-CCNC: 16 U/L (ref 1–40)
BASOPHILS # BLD AUTO: 0.04 10*3/MM3 (ref 0–0.2)
BASOPHILS NFR BLD AUTO: 0.7 % (ref 0–1.5)
BILIRUB SERPL-MCNC: 0.8 MG/DL (ref 0.2–1.2)
BUN BLD-MCNC: 13 MG/DL (ref 8–23)
BUN/CREAT SERPL: 12.4 (ref 7–25)
CALCIUM SPEC-SCNC: 9 MG/DL (ref 8.6–10.5)
CHLORIDE SERPL-SCNC: 104 MMOL/L (ref 98–107)
CHOLEST SERPL-MCNC: 124 MG/DL (ref 0–200)
CO2 SERPL-SCNC: 22.4 MMOL/L (ref 22–29)
CREAT BLD-MCNC: 1.05 MG/DL (ref 0.76–1.27)
DEPRECATED RDW RBC AUTO: 39.5 FL (ref 37–54)
EOSINOPHIL # BLD AUTO: 0.38 10*3/MM3 (ref 0–0.4)
EOSINOPHIL NFR BLD AUTO: 6.6 % (ref 0.3–6.2)
ERYTHROCYTE [DISTWIDTH] IN BLOOD BY AUTOMATED COUNT: 12.1 % (ref 12.3–15.4)
GFR SERPL CREATININE-BSD FRML MDRD: 71 ML/MIN/1.73
GLOBULIN UR ELPH-MCNC: 2.4 GM/DL
GLUCOSE BLD-MCNC: 109 MG/DL (ref 65–99)
HCT VFR BLD AUTO: 44.1 % (ref 37.5–51)
HDLC SERPL-MCNC: 43 MG/DL (ref 40–60)
HGB BLD-MCNC: 15 G/DL (ref 13–17.7)
IMM GRANULOCYTES # BLD AUTO: 0.05 10*3/MM3 (ref 0–0.05)
IMM GRANULOCYTES NFR BLD AUTO: 0.9 % (ref 0–0.5)
LDLC SERPL CALC-MCNC: 71 MG/DL (ref 0–100)
LDLC/HDLC SERPL: 1.66 {RATIO}
LYMPHOCYTES # BLD AUTO: 1.2 10*3/MM3 (ref 0.7–3.1)
LYMPHOCYTES NFR BLD AUTO: 20.8 % (ref 19.6–45.3)
MCH RBC QN AUTO: 30.6 PG (ref 26.6–33)
MCHC RBC AUTO-ENTMCNC: 34 G/DL (ref 31.5–35.7)
MCV RBC AUTO: 90 FL (ref 79–97)
MONOCYTES # BLD AUTO: 0.78 10*3/MM3 (ref 0.1–0.9)
MONOCYTES NFR BLD AUTO: 13.5 % (ref 5–12)
NEUTROPHILS # BLD AUTO: 3.32 10*3/MM3 (ref 1.7–7)
NEUTROPHILS NFR BLD AUTO: 57.5 % (ref 42.7–76)
NRBC BLD AUTO-RTO: 0 /100 WBC (ref 0–0.2)
PLATELET # BLD AUTO: 242 10*3/MM3 (ref 140–450)
PMV BLD AUTO: 10.4 FL (ref 6–12)
POTASSIUM BLD-SCNC: 4.5 MMOL/L (ref 3.5–5.2)
PROT SERPL-MCNC: 6.7 G/DL (ref 6–8.5)
RBC # BLD AUTO: 4.9 10*6/MM3 (ref 4.14–5.8)
SODIUM BLD-SCNC: 139 MMOL/L (ref 136–145)
TRIGL SERPL-MCNC: 48 MG/DL (ref 0–150)
URATE SERPL-MCNC: 5.9 MG/DL (ref 3.4–7)
VLDLC SERPL-MCNC: 9.6 MG/DL (ref 5–40)
WBC NRBC COR # BLD: 5.77 10*3/MM3 (ref 3.4–10.8)

## 2020-03-07 PROCEDURE — 73630 X-RAY EXAM OF FOOT: CPT

## 2020-03-07 PROCEDURE — 80061 LIPID PANEL: CPT

## 2020-03-07 PROCEDURE — 36415 COLL VENOUS BLD VENIPUNCTURE: CPT

## 2020-03-07 PROCEDURE — 84403 ASSAY OF TOTAL TESTOSTERONE: CPT

## 2020-03-07 PROCEDURE — 84550 ASSAY OF BLOOD/URIC ACID: CPT

## 2020-03-07 PROCEDURE — 85025 COMPLETE CBC W/AUTO DIFF WBC: CPT

## 2020-03-07 PROCEDURE — 80053 COMPREHEN METABOLIC PANEL: CPT

## 2020-03-07 PROCEDURE — 84402 ASSAY OF FREE TESTOSTERONE: CPT

## 2020-03-09 ENCOUNTER — APPOINTMENT (OUTPATIENT)
Dept: GENERAL RADIOLOGY | Facility: HOSPITAL | Age: 65
End: 2020-03-09

## 2020-03-09 LAB
TESTOST FREE SERPL-MCNC: 7.1 PG/ML (ref 6.6–18.1)
TESTOST SERPL-MCNC: 518 NG/DL (ref 264–916)

## 2020-03-09 NOTE — PROGRESS NOTES
Subjective   Sander White is a 64 y.o. male who presents due to right foot pain and swelling. He also presents for f/u regarding HTN, hyperlipidemia and CAD.    He was switched from Pravastatin to Atorvastatin 12/2019 which he has tolerated well. He has completed cardiac rehab in [facility] which he tolerated well. 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  He presents today due to pain in his right foot for the past couple of weeks. He denies acute injury or trauma but c/o increased pain after bowling. He states the pain is mainly in the 1st-3rd toes and radiates into his midfoot. He has noticed mild swelling, denies redness. He states pain is continuous, has taken Tylenol intermittently with moderate relief.    Hypertension   This is a chronic problem. The current episode started more than 1 year ago. The problem is unchanged. The problem is controlled. Pertinent negatives include no chest pain, headaches, malaise/fatigue, palpitations, peripheral edema or shortness of breath. Current antihypertension treatment includes ACE inhibitors. The current treatment provides significant improvement. There are no compliance problems.         The following portions of the patient's history were reviewed and updated as appropriate: allergies, current medications, past social history and problem list.    Past Medical History:   Diagnosis Date   • Arthritis    • Chronic pain in right foot    • Coronary artery disease    • ED (erectile dysfunction) 7/15/2016   • GERD (gastroesophageal reflux disease)    • Hypertension    • Myocardial infarction (CMS/[facility])          Current Outpatient Medications:   •  Acetaminophen (TYLENOL ARTHRITIS PAIN PO), Take  by mouth. States he takes 6 pills a day-maximum amount, Disp: , Rfl:   •  aspirin 81 MG chewable tablet, Chew 1 tablet Daily., Disp: , Rfl:   •  atorvastatin (LIPITOR) 40 MG tablet, Take 1 tablet by mouth Daily., Disp: 90 tablet,  "Rfl: 1  •  lisinopril (PRINIVIL,ZESTRIL) 2.5 MG tablet, Take 1 tablet by mouth Daily., Disp: 30 tablet, Rfl: 3  •  nitroglycerin (NITROSTAT) 0.4 MG SL tablet, 1 under the tongue as needed for angina, may repeat q5mins for up three doses, Disp: 25 tablet, Rfl: 3  •  omeprazole (priLOSEC) 20 MG capsule, Take 20 mg by mouth Daily., Disp: , Rfl:   •  ticagrelor (BRILINTA) 90 MG tablet tablet, Take 1 tablet by mouth 2 (Two) Times a Day., Disp: 60 tablet, Rfl: 6  •  vitamin C (ASCORBIC ACID) 500 MG tablet, Take 500 mg by mouth daily., Disp: , Rfl:   •  vitamin E 1000 UNIT capsule, Take 1,000 Units by mouth Daily. Taking 400 mg daily, Disp: , Rfl:     Allergies   Allergen Reactions   • Icy Hot Rash   • Amoxicillin Itching   • Penicillins    • Menthol Rash       Review of Systems   Constitutional: Negative for chills, fatigue, fever, malaise/fatigue and unexpected weight change.   HENT: Negative for congestion, ear pain, postnasal drip, sinus pressure, sore throat and trouble swallowing.    Eyes: Negative for visual disturbance.   Respiratory: Negative for cough, chest tightness, shortness of breath and wheezing.    Cardiovascular: Negative for chest pain, palpitations and leg swelling.   Gastrointestinal: Negative for abdominal pain, blood in stool, nausea and vomiting.   Genitourinary: Negative for dysuria, frequency and urgency.        C/o difficulty with ED, has seen urology and men's clinic in the past and did not respond well to Viagra/Cialis nor injections into the penis   Musculoskeletal: Positive for arthralgias and joint swelling.   Skin: Negative for color change.   Neurological: Negative for syncope, weakness and headaches.   Hematological: Does not bruise/bleed easily.       Objective   Vitals:    03/06/20 1345   BP: 124/72   BP Location: Left arm   Patient Position: Sitting   Cuff Size: Adult   Resp: 14   Weight: 96.2 kg (212 lb)   Height: 182.9 cm (72\")     Body mass index is 28.75 kg/m².  Physical Exam "   Constitutional: He appears well-developed and well-nourished. He is cooperative. He does not have a sickly appearance. He does not appear ill.   HENT:   Head: Normocephalic.   Right Ear: Hearing, tympanic membrane and external ear normal.   Left Ear: Hearing, tympanic membrane and external ear normal.   Nose: Nose normal. No mucosal edema, rhinorrhea, sinus tenderness or nasal deformity. Right sinus exhibits no maxillary sinus tenderness and no frontal sinus tenderness. Left sinus exhibits no maxillary sinus tenderness and no frontal sinus tenderness.   Mouth/Throat: Oropharynx is clear and moist and mucous membranes are normal. Normal dentition.   Eyes: Pupils are equal, round, and reactive to light. Conjunctivae and lids are normal. Right eye exhibits no discharge and no exudate. Left eye exhibits no discharge and no exudate.   Neck: Trachea normal and normal range of motion. Carotid bruit is not present. No edema present. No thyroid mass and no thyromegaly present.   Cardiovascular: Regular rhythm, normal heart sounds and normal pulses.   No murmur heard.  Pulmonary/Chest: Breath sounds normal. No respiratory distress. He has no decreased breath sounds. He has no wheezes. He has no rhonchi. He has no rales.   Musculoskeletal:        Right foot: There is tenderness and swelling.   There is mild swelling without erythema or warmth along 1st, 2nd and 3rd metatarsals   Lymphadenopathy:        Head (right side): No submental, no submandibular, no tonsillar, no preauricular, no posterior auricular and no occipital adenopathy present.        Head (left side): No submental, no submandibular, no tonsillar, no preauricular, no posterior auricular and no occipital adenopathy present.   Neurological: He is alert.   Skin: Skin is warm, dry and intact. No cyanosis. Nails show no clubbing.       Assessment/Plan   Sander was seen today for foot swelling, hypertension, hyperlipidemia and coronary artery disease.    Diagnoses and  all orders for this visit:    Right foot pain  -     XR Foot 3+ View Right  -     Cancel: Uric Acid  -     Uric Acid; Future    Essential hypertension  -     Cancel: CBC & Differential  -     lisinopril (PRINIVIL,ZESTRIL) 2.5 MG tablet; Take 1 tablet by mouth Daily.  -     Cancel: Basic Metabolic Panel  -     Cancel: CBC & Differential; Future  -     Cancel: Comprehensive Metabolic Panel; Future  -     CBC & Differential; Future  -     Comprehensive Metabolic Panel; Future    Hypercholesteremia  -     atorvastatin (LIPITOR) 40 MG tablet; Take 1 tablet by mouth Daily.  -     Cancel: Lipid Panel  -     Lipid Panel; Future    Coronary artery disease involving native coronary artery of native heart without angina pectoris  -     nitroglycerin (NITROSTAT) 0.4 MG SL tablet; 1 under the tongue as needed for angina, may repeat q5mins for up three doses    Erectile dysfunction, unspecified erectile dysfunction type  -     Cancel: Testosterone, Free, Total  -     Testosterone, Free, Total; Future    1. Sx due to OA vs. Gout, will check labs and send for xray to further evaluate. He may continue Tylenol for pain.  2. BP is well-controlled with Lisinopril.  3. He was switched from Pravastatin to Atorvastatin, recheck lipid panel.  4. He is tolerating Brilinta without bleeding.  5. Check Testosterone level to further evaluate.

## 2020-03-11 ENCOUNTER — TELEPHONE (OUTPATIENT)
Dept: INTERNAL MEDICINE | Facility: CLINIC | Age: 65
End: 2020-03-11

## 2020-03-11 NOTE — TELEPHONE ENCOUNTER
Discussed results of labs and xray with patient, will try Voltaren gel for mgmt of right foot pain and continue to monitor.

## 2020-03-11 NOTE — TELEPHONE ENCOUNTER
PATIENT STATES THAT HE WOULD LIKE TO KNOW HIS LAB RESULTS FROM 3/07/2020      PATIENT CAN BE REACHED AT: [phone]

## 2020-03-31 ENCOUNTER — TELEPHONE (OUTPATIENT)
Dept: CARDIOLOGY | Facility: CLINIC | Age: 65
End: 2020-03-31

## 2020-03-31 NOTE — TELEPHONE ENCOUNTER
Pt states he would like to know if he should  continue to work since his stents was place 9/19   I informed pt I would discuss with Dr DEWITT and call him back  Pt states if he needed to be off he would call back and let me know. 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

## 2020-04-10 ENCOUNTER — OFFICE VISIT (OUTPATIENT)
Dept: INTERNAL MEDICINE | Facility: CLINIC | Age: 65
End: 2020-04-10

## 2020-04-10 DIAGNOSIS — M79.671 RIGHT FOOT PAIN: Primary | ICD-10-CM

## 2020-04-10 DIAGNOSIS — I10 ESSENTIAL HYPERTENSION: ICD-10-CM

## 2020-04-10 DIAGNOSIS — E78.00 HYPERCHOLESTEREMIA: ICD-10-CM

## 2020-04-10 DIAGNOSIS — I25.10 CORONARY ARTERY DISEASE INVOLVING NATIVE CORONARY ARTERY OF NATIVE HEART WITHOUT ANGINA PECTORIS: ICD-10-CM

## 2020-04-10 PROCEDURE — 99442 PR PHYS/QHP TELEPHONE EVALUATION 11-20 MIN: CPT | Performed by: NURSE PRACTITIONER

## 2020-04-10 RX ORDER — CHOLECALCIFEROL (VITAMIN D3) 25 MCG
CAPSULE ORAL
COMMUNITY
End: 2020-10-30

## 2020-04-13 NOTE — PROGRESS NOTES
Subjective   Sander White is a 64 y.o. male who presents for a telephone visit f/u regarding foot pain, CAD and hyperlipidemia.    His recent lipid panel showed significant improvement (LDL 71) since switching to Atorvastatin (from Pravastatin). He is tolerating medication well without myalgias.  He has continued to c/o right foot pain, xray 3/6/2020 showed significant degenerative changes. Uric acid was normal. He does report improvement in pain with Voltaren gel. Pain is worse with weightbearing, denies acute injury or trauma.  He reports feeling significantly better with an improved energy level since stent placement 9/2019. He has been able to return to his normal activities which he is tolerating well.  He has been layed off from work effective next week.    Foot Pain   Associated symptoms include arthralgias and congestion. Pertinent negatives include no abdominal pain, chest pain, chills, coughing, fatigue, fever, headaches, joint swelling, nausea, sore throat, vomiting or weakness.   Coronary Artery Disease   Pertinent negatives include no chest pain, chest tightness, leg swelling or palpitations. Risk factors include hyperlipidemia.   Hyperlipidemia   Pertinent negatives include no chest pain.        The following portions of the patient's history were reviewed and updated as appropriate: allergies, current medications, past social history and problem list.    Past Medical History:   Diagnosis Date   • Arthritis    • Chronic pain in right foot    • Coronary artery disease    • ED (erectile dysfunction) 7/15/2016   • GERD (gastroesophageal reflux disease)    • Hypertension    • Myocardial infarction (CMS/[facility])          Current Outpatient Medications:   •  Acetaminophen (TYLENOL ARTHRITIS PAIN PO), Take  by mouth. States he takes 6 pills a day-maximum amount, Disp: , Rfl:   •  aspirin 81 MG chewable tablet, Chew 1 tablet Daily., Disp: , Rfl:   •  atorvastatin (LIPITOR) 40 MG tablet, Take 1 tablet by mouth  Daily., Disp: 90 tablet, Rfl: 1  •  Cholecalciferol (VITAMIN D-3) 25 MCG (1000 UT) capsule, Take  by mouth., Disp: , Rfl:   •  diclofenac (VOLTAREN) 1 % gel gel, Apply 4 g topically to the appropriate area as directed 4 (Four) Times a Day., Disp: 100 g, Rfl: 0  •  lisinopril (PRINIVIL,ZESTRIL) 2.5 MG tablet, Take 1 tablet by mouth Daily., Disp: 30 tablet, Rfl: 3  •  nitroglycerin (NITROSTAT) 0.4 MG SL tablet, 1 under the tongue as needed for angina, may repeat q5mins for up three doses, Disp: 25 tablet, Rfl: 3  •  omeprazole (priLOSEC) 20 MG capsule, Take 20 mg by mouth Daily., Disp: , Rfl:   •  ticagrelor (BRILINTA) 90 MG tablet tablet, Take 1 tablet by mouth 2 (Two) Times a Day., Disp: 60 tablet, Rfl: 6  •  vitamin C (ASCORBIC ACID) 500 MG tablet, Take 500 mg by mouth daily., Disp: , Rfl:     Allergies   Allergen Reactions   • Icy Hot Rash   • Amoxicillin Itching   • Penicillins    • Menthol Rash       Review of Systems   Constitutional: Negative for chills, fatigue, fever and unexpected weight change.   HENT: Positive for congestion and postnasal drip. Negative for ear pain, sinus pressure, sore throat and trouble swallowing.    Eyes: Negative for visual disturbance.   Respiratory: Negative for cough, chest tightness and wheezing.    Cardiovascular: Negative for chest pain, palpitations and leg swelling.   Gastrointestinal: Negative for abdominal pain, blood in stool, nausea and vomiting.   Genitourinary: Negative for dysuria, frequency and urgency.   Musculoskeletal: Positive for arthralgias. Negative for joint swelling.   Skin: Negative for color change.   Neurological: Negative for syncope, weakness and headaches.   Hematological: Does not bruise/bleed easily.       Objective   There were no vitals filed for this visit.  There is no height or weight on file to calculate BMI.  Physical Exam   Psychiatric: He has a normal mood and affect. His behavior is normal. Judgment and thought content normal.        Assessment/Plan   Sander was seen today for foot pain, coronary artery disease and hyperlipidemia.    Diagnoses and all orders for this visit:    Right foot pain    Essential hypertension    Hypercholesteremia    Coronary artery disease involving native coronary artery of native heart without angina pectoris    1. Reviewed recent xray of foot which showed degenerative changes, has declined ortho referral. He will continue Voltaren gel.  2. His BP has been well-controlled on current medications.  3. His recent lipid panel showed significant improvement in LDL, continue Atorvastatin.  4. He is feeling well, currently managed on Brilinta.    History and medical judgement obtained  phone conversation with patient. No physical examination was performed.     Approximately 15 minutes spent in chart review and in phone conversation with patient.

## 2020-04-26 ENCOUNTER — HOSPITAL ENCOUNTER (EMERGENCY)
Facility: HOSPITAL | Age: 65
Discharge: HOME | End: 2020-04-26
Attending: EMERGENCY MEDICINE | Admitting: EMERGENCY MEDICINE

## 2020-04-26 ENCOUNTER — APPOINTMENT (OUTPATIENT)
Dept: GENERAL RADIOLOGY | Facility: HOSPITAL | Age: 65
End: 2020-04-26

## 2020-04-26 VITALS
HEIGHT: 72 IN | BODY MASS INDEX: 29.12 KG/M2 | DIASTOLIC BLOOD PRESSURE: 111 MMHG | WEIGHT: 215 LBS | TEMPERATURE: 96.9 F | OXYGEN SATURATION: 96 % | SYSTOLIC BLOOD PRESSURE: 148 MMHG | HEART RATE: 74 BPM | RESPIRATION RATE: 16 BRPM

## 2020-04-26 DIAGNOSIS — R07.81 RIB PAIN ON RIGHT SIDE: ICD-10-CM

## 2020-04-26 DIAGNOSIS — B02.9 HERPES ZOSTER WITHOUT COMPLICATION: Primary | ICD-10-CM

## 2020-04-26 PROCEDURE — 71101 X-RAY EXAM UNILAT RIBS/CHEST: CPT

## 2020-04-26 PROCEDURE — 99283 EMERGENCY DEPT VISIT LOW MDM: CPT

## 2020-04-26 RX ORDER — HYDROCODONE BITARTRATE AND ACETAMINOPHEN 5; 325 MG/1; MG/1
1 TABLET ORAL EVERY 6 HOURS PRN
Qty: 12 TABLET | Refills: 0 | Status: SHIPPED | OUTPATIENT
Start: 2020-04-26 | End: 2020-10-30

## 2020-04-26 RX ORDER — HYDROCODONE BITARTRATE AND ACETAMINOPHEN 5; 325 MG/1; MG/1
1 TABLET ORAL ONCE
Status: COMPLETED | OUTPATIENT
Start: 2020-04-26 | End: 2020-04-26

## 2020-04-26 RX ORDER — VALACYCLOVIR HYDROCHLORIDE 1 G/1
1000 TABLET, FILM COATED ORAL 3 TIMES DAILY
Qty: 21 TABLET | Refills: 0 | Status: SHIPPED | OUTPATIENT
Start: 2020-04-26 | End: 2020-05-03

## 2020-04-26 RX ORDER — VALACYCLOVIR HYDROCHLORIDE 500 MG/1
1000 TABLET, FILM COATED ORAL ONCE
Status: COMPLETED | OUTPATIENT
Start: 2020-04-26 | End: 2020-04-26

## 2020-04-26 RX ADMIN — VALACYCLOVIR 1000 MG: 500 TABLET, FILM COATED ORAL at 09:35

## 2020-04-26 RX ADMIN — HYDROCODONE BITARTRATE AND ACETAMINOPHEN 1 TABLET: 5; 325 TABLET ORAL at 09:34

## 2020-04-26 NOTE — ED TRIAGE NOTES
Pt reports he was walking his dog 10 days ago and had fallen on his right side. Pt reports having 8/10 R rib pain along with a rash on the right side that started 9 days ago.

## 2020-04-26 NOTE — ED PROVIDER NOTES
EMERGENCY DEPARTMENT ENCOUNTER    Room Number:  40/40  Date of encounter:  4/26/2020  PCP: Jennifer Mejia APRN    HPI:  Context: Sander White is a 64 y.o. male who presents to the ED c/o chief complaint of right lateral rib pain.  Patient states he was attacked by dog 10 days ago and during the attack he fell on the right side of his chest.  Patient complains of dull achy pain along the right lateral inferior ribs since that time, worsened with movement.  No other chest pain, no difficulty breathing, no cough or fevers.  Patient also complains of a rash and a stripe-like pattern along his same ribs.  Rash is described as burning.  Patient denies any weakness or numbness.  Patient does have a history of chickenpox in the past, no history of shingles.  Patient denies any recent fevers, cough, shortness of breath, lost of taste or smell.  Patient denies any recent travel.  No exposure to any known or suspected CoVID-19 infections.      MEDICAL HISTORY REVIEW  Reviewed in EPIC    PAST MEDICAL HISTORY  Active Ambulatory Problems     Diagnosis Date Noted   • Airway hyperreactivity 05/03/2016   • Elevated cholesterol 05/03/2016   • Gain of weight 05/03/2016   • ED (erectile dysfunction) 07/15/2016   • ST elevation myocardial infarction (STEMI) (CMS/[facility]) 02/15/2017   • Mallet finger 10/20/2014   • Sprain of carpometacarpal joint 10/20/2014   • Essential hypertension 04/20/2017   • Coronary artery disease involving native coronary artery of native heart without angina pectoris 04/20/2017   • Chest pain 07/22/2017   • Precordial pain 07/22/2017   • Spinal stenosis in cervical region 12/12/2017   • Cervical radiculopathy 12/12/2017   • Neck pain 12/15/2017   • Abnormal cardiovascular stress test 09/09/2019   • Depressive disorder, not elsewhere classified 12/06/2019     Resolved Ambulatory Problems     Diagnosis Date Noted   • No Resolved Ambulatory Problems     Past Medical History:   Diagnosis Date   • Arthritis    •  Chronic pain in right foot    • Coronary artery disease    • GERD (gastroesophageal reflux disease)    • Hypertension    • Myocardial infarction (CMS/[facility])        PAST SURGICAL HISTORY  Past Surgical History:   Procedure Laterality Date   • APPENDECTOMY     • CARDIAC CATHETERIZATION N/A 2/15/2017    Procedure: Left Heart Cath;  Surgeon: Kristina Billings MD;  Location:  [facility]VASIVE [facility];  Service:    • CARDIAC CATHETERIZATION  2/15/2017    Procedure: Percutaneous Manual Thrombectomy;  Surgeon: Kristina Billings MD;  [facility];  Service:    • CARDIAC CATHETERIZATION N/A 2/15/2017    Procedure: Coronary angiography;  Surgeon: Kristina Billings MD;  Location:  [facility]VASIVE LOC[facility];  Service:    • CARDIAC CATHETERIZATION N/A 2/15/2017    Procedure: Left ventriculography;  Surgeon: Kristina Billings MD;  Location: [facility];  Service:    • CARDIAC CATHETERIZATION N/A 7/24/2017    Procedure: Left Heart Cath;  Surgeon: Kristina Billings MD;  Location: [facility] LOC[facility];  Service:    • CARDIAC CATHETERIZATION N/A 7/24/2017    Procedure: Coronary angiography;  Surgeon: Kristina Billings MD;  Location: [facility];  Service:    • CARDIAC CATHETERIZATION N/A 7/24/2017    Procedure: Left ventriculography;  Surgeon: Kristina Billings MD;  Location: [facility] C[facility] INVASIVE LOCATION;  Service:    • CARDIAC CATHETERIZATION N/A 9/11/2019    Procedure: Left Heart Cath;  Surgeon: Kristina Billings MD;  Location: [facility] C[facility]IVE LOCATION;  Service: Cardiology   • CARDIAC CATHETERIZATION N/A 9/11/2019    Procedure: Coronary angiography;  Surgeon: Kristina Billings MD;  Location: [facility] C[facility]VASIVE LOCATION;  Service: Cardiology   • CARDIAC CATHETERIZATION N/A 9/11/2019    Procedure: Stent CAYLA coronary;  Surgeon: Kristina Billings MD;  Location: [facility];  Service:  Cardiology   • CARDIAC CATHETERIZATION N/A 2019    Procedure: Left ventriculography;  Surgeon: Kristina Billings MD;  Location:  [facility];  Service: Cardiology   • CARPAL TUNNEL RELEASE Right    • CHOLECYSTECTOMY     • COLONOSCOPY N/A 2015    Normal   • CORONARY ANGIOPLASTY     • CORONARY STENT PLACEMENT     • CYST REMOVAL N/A     Neck   • HERNIA REPAIR Right     Inguinal   • IL RT/LT HEART CATHETERS N/A 2/15/2017    Procedure: Percutaneous Coronary Intervention;  Surgeon: Kristina Billings MD;  Location:  [facility] INVASIVE LOC[facility];  Service: Cardiovascular       FAMILY HISTORY  Family History   Problem Relation Age of Onset   • Diabetes Mother         Type 2   • Hypertension Mother    • Hyperlipidemia Mother    • Heart attack Mother    • Heart disease Mother    • Diabetes Father         Type 2   • Stroke Father    • Hypertension Father    • Hyperlipidemia Father    • Heart attack Father    • Heart disease Father    • Diabetes insipidus Father    • Alzheimer's disease Father    • Aneurysm Brother         Brain       SOCIAL HISTORY  Social History     Socioeconomic History   • Marital status:      Spouse name: Not on file   • Number of children: 5   • Years of education: 2 YEARS OF COLLEGE   • Highest education level: Not on file   Occupational History   • Occupation: [employer]    Tobacco Use   • Smoking status: Former Smoker     Packs/day: 0.50     Years: 7.00     Pack years: 3.50     Types: Cigarettes     Last attempt to quit: 1999     Years since quittin.3   • Smokeless tobacco: Never Used   Substance and Sexual Activity   • Alcohol use: Yes     Comment: occasional   • Drug use: No   • Sexual activity: Defer       ALLERGIES  Icy hot; Amoxicillin; Penicillins; and Menthol    The patient's allergies have been reviewed    REVIEW OF SYSTEMS  All systems reviewed and negative except for those discussed in HPI.     PHYSICAL EXAM  I have reviewed the triage  vital signs and nursing notes.  ED Triage Vitals   Temp Heart Rate Resp BP SpO2   04/26/20 0856 04/26/20 0856 04/26/20 0856 04/26/20 0905 04/26/20 0856   96.9 °F (36.1 °C) 100 16 (!) 154/103 96 %      Temp src Heart Rate Source Patient Position BP Location FiO2 (%)   04/26/20 0856 -- -- -- --   Tympanic         GENERAL: No acute distress  HENT: NCAT, PERRL, Nares patent  EYES: no scleral icterus  NECK: trachea midline, no ROM limitations  CV: regular rhythm, regular rate  RESPIRATORY: normal effort  Chest wall: Tenderness along right lateral inferior ribs, no point tenderness, no crepitus or deformity, no splinting or paradoxical motion  ABDOMEN: soft  : deferred  MUSCULOSKELETAL: no deformity  NEURO: alert, moves all extremities, follows commands  SKIN: warm, dry.  Erythematous vesicular rash in a stripe-like pattern along right lateral and right anterior ribs in a dermatomal pattern consistent with shingles, no signs of secondary superinfection    LAB RESULTS  No results found for this or any previous visit (from the past 24 hour(s)).    I ordered the above labs and reviewed the results.    RADIOLOGY  Xr Ribs Right With Pa Chest    Result Date: 4/26/2020  XR RIBS RIGHT W PA CHEST-  INDICATIONS: Radiation dose reduction techniques were utilized, including automated exposure control and exposure modulation based on body size.  TECHNIQUE: Frontal view of the chest, 4 views of the right ribs  COMPARISON: 08/05/2018  FINDINGS:  The heart size is normal. Pulmonary vasculature is unremarkable. Minimal left basilar atelectasis or infiltrate. No pleural effusion, or pneumothorax is seen, lateral costophrenic angle is partly excluded.  No acute right rib fracture is identified.       Dense acute right rib fracture is identified, follow up as indications persist.  Minimal left basilar atelectasis or infiltrate.    This report was finalized on 4/26/2020 9:59 AM by Dr. Ld Casas M.D.        I ordered the above noted  radiological studies. I reviewed the images and results. I agree with the radiologist interpretation.    PROCEDURES  Procedures    MEDICATIONS GIVEN IN ER  Medications   HYDROcodone-acetaminophen (NORCO) 5-325 MG per tablet 1 tablet (1 tablet Oral Given 4/26/20 0934)   valACYclovir (VALTREX) tablet 1,000 mg (1,000 mg Oral Given 4/26/20 0935)       PROGRESS, DATA ANALYSIS, CONSULTS, AND MEDICAL DECISION MAKING  A complete history and physical exam have been performed.  All available laboratory and imaging results have been reviewed by myself prior to disposition.  Face mask and gloves were worn throughout the patient encounter, unless additional PPE was worn and specified below. Hand hygiene was performed before entering and after leaving the patient room.  [facility]    ED Course as of Apr 26 1006   Sun Apr 26, 2020   0905 Discussed pertinent information from history and physical exam with patient.  Discussed differential diagnosis.  Discussed plan for ED evaluation/work-up/treatment.  All questions answered.  Patient is agreeable with plan.        [JG]   1005 ROSA ELENA query complete. Treatment plan to include limited course of prescribed  controlled substance. Risks including addiction, benefits, and alternatives presented to patient.       [JG]   1006 The patient was reexamined.  They have had symptomatic improvement during their ED stay.  I discussed today's findings with the patient, explaining the pertinent positives and negatives from today's visit, and the plan of care.  Discussed plan for discharge as there is no emergent indication for admission.  Discussed limitation of the ED work-up and that this is to rule out life-threatening emergencies but that they could require further testing as determined by their primary care and or any referred specialist patient is agreeable and understands need for follow-up and repeat exam/testing.  Patient is aware that discharge does not mean there is nothing wrong, indicates no  emergency is present, and that they must continue their care with their primary care physician and/or any referred specialist.  They were given appropriate follow-up with their primary care physician and/or specialist.  I had an extensive discussion on the expected clinical course and return precautions.  Patient understands to return to the emergency department for continuation, worsening, or new symptoms.  I answered any of the patient's questions. Patient was discharged home in a stable condition.        [JG]      ED Course User Index  [JG] Hector Alberts MD       AS OF 10:06 VITALS:    BP - (!) 154/103  HR - 100  TEMP - 96.9 °F (36.1 °C) (Tympanic)  O2 SATS - 96%    DIAGNOSIS  Final diagnoses:   Herpes zoster without complication   Rib pain on right side         DISPOSITION  DISCHARGE    Patient discharged in stable condition.    Reviewed implications of results, diagnosis, meds, responsibility to follow up, warning signs and symptoms of possible worsening, potential complications and reasons to return to ER.    Patient/Family voiced understanding of above instructions.    Discussed plan for discharge, as there is no emergent indication for admission. Patient referred to primary care provider for BP management due to today's BP. Pt/family is agreeable and understands need for follow up and repeat testing.  Pt is aware that discharge does not mean that nothing is wrong but it indicates no emergency is present that requires admission and they must continue care with follow-up as given below or physician of their choice.     FOLLOW-UP  Jennifer Mejia, APRN  [address] [address] [phone]    Schedule an appointment as soon as possible for a visit in 2 days  even if well         Medication List      New Prescriptions    HYDROcodone-acetaminophen 5-325 MG per tablet  Commonly known as:  NORCO  Take 1 tablet by mouth Every 6 (Six) Hours As Needed for Moderate Pain .     valACYclovir  1000 MG tablet  Commonly known as:  VALTREX  Take 1 tablet by mouth 3 (Three) Times a Day for 7 days.           Hector Alberts MD  04/26/20 1008

## 2020-06-24 ENCOUNTER — TELEPHONE (OUTPATIENT)
Dept: INTERNAL MEDICINE | Facility: CLINIC | Age: 65
End: 2020-06-24

## 2020-06-24 NOTE — TELEPHONE ENCOUNTER
PATIENTS MOTHER TESTED POSITIVE FOR COVID.  PATIENT DID NOT HAVE DIRECT CONTACT WITH HER. HOWEVER DID TOTES FROM HER NURSING HOME THAT SHE WAS IN CONTACT WITH.  PATIENT WOULD LIKE TO KNOW IF HE SHOULD GET TESTED FOR COVID.    PATIENT CALL  BACK #  [phone]

## 2020-06-24 NOTE — TELEPHONE ENCOUNTER
PATIENT STATES THAT HE REALLY NEEDS A CALL BACK DUE TO BEING AROUND HIS MOTHER THAT TESTED POSITIVE FOR COVID AND MOVED HER THINGS INTO STORAGE   HE IS ALSO STATING HE NEEDS TO LET HIS WORK KNOW AS SOON AS HE CAN           GOOD CONTACT NUMBER   [phone] (C)       no

## 2020-06-24 NOTE — TELEPHONE ENCOUNTER
Called pt for more info, he states he has not been in contact with his mother for at least 3 months and she has covid, he has no symptoms. I advised that if he has not been around anyone that has had covid, then there is no reason that he cant go to work, and if he wants to get tested he can that was his choice. Is this ok, please advise thanks.

## 2020-06-24 NOTE — TELEPHONE ENCOUNTER
Discussed with patient-he is asymptomatic and has not had direct contact with his mother. He will monitor his temperature daily. Discussed testing centers that are available as well.

## 2020-07-19 DIAGNOSIS — I10 ESSENTIAL HYPERTENSION: ICD-10-CM

## 2020-07-20 RX ORDER — LISINOPRIL 2.5 MG/1
TABLET ORAL
Qty: 30 TABLET | Refills: 2 | Status: SHIPPED | OUTPATIENT
Start: 2020-07-20 | End: 2020-10-28

## 2020-10-22 NOTE — PROGRESS NOTES
Acute Care - Speech Language Pathology   Swallow Initial Evaluation [facility]     Patient Name: Sander White  : 1955  MRN: [id number]  Today's Date: 2017               Admit Date: 2/15/2017    SPEECH-LANGUAGE PATHOLOGY EVALUATION - SWALLOW  Subjective: The patient was seen on this date for a Clinical Swallow evaluation.  Patient was alert and cooperative.    The patient had a MI w/ cardiac arrest.  CPR performed for 40-45 min w/ the pt being shocked  Objective: Textures given included thin liquid, puree consistency, mechanical soft consistency and regular consistency.  Assessment: Slight wet voicing noted w/ mixed consistencies.  No overt s/s of aspiration w/ thin by cup or straw & good mastication of solids.  Pt had slightly reduced hyolaryngeal excursion, however noted only 1-2 swallows per bolus.  Pt is quite fatigued which places him at a great risk for aspiration.  SLP Findings:  Patient presents with mild oropharyngeal dysphagia, without esophageal component.   Recommendations: Diet Textures: thin liquid, regular consistency food, no mixed consistencies.  Medications should be taken whole with puree.   Recommended Strategies: Upright for PO and small bites and sips. Oral care before breakfast, after all meals and PRN.  Other Recommended Evaluations: Re-evaluation at bedside and Cognitive-Linguistic Evaluation    Dysphagia therapy is recommended. Rationale: to make sure pt is appropriate for current PO diet.    Visit Dx:     ICD-10-CM ICD-9-CM   1. ST elevation myocardial infarction (STEMI), unspecified artery I21.3 410.90   2. Cardiac arrest with ventricular fibrillation I46.9 427.5    I49.01 427.41     Patient Active Problem List   Diagnosis   • Airway hyperreactivity   • Elevated cholesterol   • Gain of weight   • ED (erectile dysfunction)   • ST elevation myocardial infarction (STEMI)     Past Medical History   Diagnosis Date   • Arthritis    • Chronic pain in right foot    • ED (erectile  Additional Area 1 Units: 0 Show Topical Anesthesia: Yes dysfunction) 7/15/2016   • GERD (gastroesophageal reflux disease)    • Hypertension      Past Surgical History   Procedure Laterality Date   • Colonoscopy N/A 02/27/2015     Normal   • Appendectomy     • Hernia repair Right      Inguinal   • Cyst removal N/A      Neck   • Cholecystectomy     • Pr rt/lt heart catheters N/A 2/15/2017     Procedure: Percutaneous Coronary Intervention;  Surgeon: Kristina Billings MD;  Location:  [facility];  Service: Cardiovascular   • Cardiac catheterization N/A 2/15/2017     Procedure: Left Heart Cath;  Surgeon: Kristina Billings MD;  Location:  [facility];  Service:    • Cardiac catheterization  2/15/2017     Procedure: Percutaneous Manual Thrombectomy;  Surgeon: Kristina Billings MD;  Location:  [facility];  Service:    • Cardiac catheterization N/A 2/15/2017     Procedure: Coronary angiography;  Surgeon: Kristina Billings MD;  Location:  [facility];  Service:    • Cardiac catheterization N/A 2/15/2017     Procedure: Left ventriculography;  Surgeon: Kristina Billings MD;  Location:  [facility];  Service:           SWALLOW EVALUATION (last 72 hours)      Swallow Evaluation       02/17/17 1004                Rehab Evaluation    Document Type evaluation  -NB        Symptoms Noted During/After Treatment fatigue  -NB        General Information    Patient Profile Review yes  -NB        Current Diet Limitations NPO  -NB        Precautions/Limitations, Vision WFL  -NB        Precautions/Limitations, Hearing WFL  -NB        Prior Level of Function- Communication functional in all spheres  -NB        Prior Level of Function- Swallowing no diet consistency restrictions  -NB        Plans/Goals Discussed With patient and family;agreed upon  -NB        Barriers to Rehab cognitive status  -NB        Clinical Impression    Patient's Goals For Discharge patient did not state  -NB         Detail Level: Detailed Family Goals For Discharge patient able to return to all previous activities/roles  -NB        SLP Swallowing Diagnosis mild dysphagia;oral dysfunction;pharyngeal dysfunction  -NB        Rehab Potential/Prognosis, Swallowing good, to achieve stated therapy goals  -NB        Criteria for Skilled Therapeutic Interventions Met skilled criteria for dysphagia intervention met  -NB        Therapy Frequency PRN  -NB        Predicted Duration Therapy Interv (days) until discharge  -NB        Expected Duration Therapy Session (min) 15-30 minutes  -NB        SLP Diet Recommendation regular textures;thin liquids  -NB        Recommended Diagnostics reassess via clinical swallow (non-instrumental exam)  -NB        Recommended Feeding/Eating Techniques small sips/bites  -NB        SLP Rec. for Method of Medication Administration meds whole in pudding/applesauce  -NB        Monitor For Signs Of Aspiration cough;gurgly voice;throat clearing  -NB        Anticipated Discharge Disposition other (see comments)   unknown  -NB        Pain Assessment    Pain Assessment No/denies pain  -NB        Cognitive Assessment/Intervention    Current Cognitive/Communication Assessment impaired  -NB        Orientation Status oriented to;person;disoriented to;place;time;situation  -NB        Follows Commands/Answers Questions 100% of the time;able to follow single-step instructions  -NB        Oral Motor Structure and Function    Oral Motor Anatomy and Physiology patient demonstrates anatomy that is WNL  -NB        Dentition Assessment present and adequate  -NB        Secretion Management WNL/WFL  -NB        Mucosal Quality moist, healthy  -NB        Velar Elevation WNL (within normal limits)  -NB        Volitional Swallow no difficulties initiating volitional swallow  -NB        Volitional Cough weak volitional cough  -NB        Oral Musculature General Assessment WNL (within normal limits)  -NB          User Key  (r) = Recorded By, (t) = Taken By,  (c) = Cosigned By    Initials Name Effective Dates    NB Dawn Obed MS [facility]-SLP 04/13/15 -         EDUCATION  The patient has been educated in the following areas:   Dysphagia (Swallowing Impairment).    SLP Recommendation and Plan  SLP Swallowing Diagnosis: mild dysphagia, oral dysfunction, pharyngeal dysfunction  SLP Diet Recommendation: regular textures, thin liquids  Recommended Feeding/Eating Techniques: small sips/bites  SLP Rec. for Method of Medication Administration: meds whole in pudding/applesauce  Monitor For Signs Of Aspiration: cough, gurgly voice, throat clearing  Recommended Diagnostics: reassess via clinical swallow (non-instrumental exam)  Criteria for Skilled Therapeutic Interventions Met: skilled criteria for dysphagia intervention met  Anticipated Discharge Disposition: other (see comments) (unknown)  Rehab Potential/Prognosis, Swallowing: good, to achieve stated therapy goals  Therapy Frequency: PRN             Plan of Care Review  Plan Of Care Reviewed With: patient, spouse, family  Progress: improving  Outcome Summary/Follow up Plan: BSE: slight wet voicing w/ mixed consistencies.  Rec: regular & thin; no mixed consistencies          IP SLP Goals       02/17/17 1001          Safely Consume Diet    Safely Consume Diet- SLP, Date Established 02/17/17  -NB      Safely Consume Diet- SLP, Time to Achieve by discharge  -NB      Safely Consume Diet- SLP, Additional Goal regular & thin no mixed   -NB      Safely Consume Diet- SLP, Date Goal Reviewed 02/17/17  -NB        User Key  (r) = Recorded By, (t) = Taken By, (c) = Cosigned By    Initials Name Provider Type    SAMUEL Clay MS CCC-SLP Speech and Language Pathologist             SLP Outcome Measures (last 72 hours)      SLP Outcome Measures       02/17/17 1003          SLP Outcome Measures    Outcome Measure Used? Adult NOMS  -NB      FCM Scores    FCM Chosen Swallowing  -NB      Swallowing FCM Score 5  -NB        User Key  (r) = Recorded  Show Right And Left Brow Units: No By, (t) = Taken By, (c) = Cosigned By    Initials Name Effective Dates    SAMUEL Clay MS CCC-SLP 04/13/15 -            Time Calculation:         Time Calculation- SLP       02/17/17 1007          Time Calculation- SLP    SLP Received On 02/17/17  -NB        User Key  (r) = Recorded By, (t) = Taken By, (c) = Cosigned By    Initials Name Provider Type    SAMUEL Clay MS CCC-SLP Speech and Language Pathologist          Therapy Charges for Today     Code Description Service Date Service Provider Modifiers Qty    [id number]  ST EVAL ORAL PHARYNG SWALLOW 4 2/17/2017 Dawn Clay MS CCC-SLP GN 1               Dawn Clay MS CCC-SLP  2/17/2017   Reconstitution Date (Optional): 10/21/20 Post-Care Instructions: Patient instructed to not lie down for 4 hours and limit physical activity for 24 hours. Glabellar Complex Units: 15 Inferior Lateral Orbicularis Oculi Units: 8 Expiration Date (Month Year): 1/2021 Lot #: [id number] Forehead Units: 10 Dilution (U/0.1 Cc): 4 Consent: Written consent obtained. Risks include but not limited to lid/brow ptosis, bruising, swelling, diplopia, temporary effect, incomplete chemical denervation.

## 2020-10-23 DIAGNOSIS — I10 ESSENTIAL HYPERTENSION: ICD-10-CM

## 2020-10-28 RX ORDER — LISINOPRIL 2.5 MG/1
TABLET ORAL
Qty: 90 TABLET | Refills: 1 | Status: SHIPPED | OUTPATIENT
Start: 2020-10-28 | End: 2021-05-03 | Stop reason: SDUPTHER

## 2020-10-28 NOTE — TELEPHONE ENCOUNTER
Caller: PATRICK BHAVANI    Relationship: SELF    Best call back number: [phone]    Medication needed:   Requested Prescriptions     Pending Prescriptions Disp Refills   • lisinopril (PRINIVIL,ZESTRIL) 2.5 MG tablet [Pharmacy Med Name: LISINOPRIL 2.5 MG TABLET] 30 tablet 1     Sig: TAKE ONE TABLET BY MOUTH DAILY       When do you need the refill by: ASAP    What details did the patient provide when requesting the medication: PATIENT STATES HE GOT EMERGENCY SUPPLY AND ONLY HAS 3 CURRENTLY     Does the patient have less than a 3 day supply:  [x] Yes  [] No    What is the patient's preferred pharmacy: [address] [place] [address] [phone] [facility] [phone]

## 2020-10-28 NOTE — TELEPHONE ENCOUNTER
Please Advise Med Refill     Last OV: 04/10/2020    Next OV: 11/23/2020    Thank You     Catherine

## 2020-10-30 ENCOUNTER — OFFICE VISIT (OUTPATIENT)
Dept: INTERNAL MEDICINE | Facility: CLINIC | Age: 65
End: 2020-10-30

## 2020-10-30 VITALS
SYSTOLIC BLOOD PRESSURE: 110 MMHG | TEMPERATURE: 97.4 F | HEART RATE: 78 BPM | HEIGHT: 72 IN | DIASTOLIC BLOOD PRESSURE: 84 MMHG | WEIGHT: 217 LBS | BODY MASS INDEX: 29.39 KG/M2 | OXYGEN SATURATION: 97 %

## 2020-10-30 DIAGNOSIS — E78.00 HYPERCHOLESTEREMIA: ICD-10-CM

## 2020-10-30 DIAGNOSIS — I10 ESSENTIAL HYPERTENSION: Primary | ICD-10-CM

## 2020-10-30 DIAGNOSIS — R73.09 ELEVATED GLUCOSE: ICD-10-CM

## 2020-10-30 DIAGNOSIS — Z12.5 SCREENING FOR PROSTATE CANCER: ICD-10-CM

## 2020-10-30 DIAGNOSIS — I25.10 CORONARY ARTERY DISEASE INVOLVING NATIVE CORONARY ARTERY OF NATIVE HEART WITHOUT ANGINA PECTORIS: ICD-10-CM

## 2020-10-30 PROCEDURE — 99214 OFFICE O/P EST MOD 30 MIN: CPT | Performed by: NURSE PRACTITIONER

## 2020-10-30 RX ORDER — VITAMIN B COMPLEX
CAPSULE ORAL DAILY
COMMUNITY
End: 2021-04-30

## 2020-10-30 NOTE — PROGRESS NOTES
Subjective   Sander White is a 64 y.o. male who presents for f/u regarding HTN, hyperlipidemia and GERD.    He c/o an episode of anterior chest pain for a few days after lifting a chest, states pain improved. He denies dyspnea. He has a hx of CAD with stent placement, followed by cardiology.  He takes Prilosec daily for mgmt of dyspepsia, denies abdominal pain or change in bowel movements.    Hypertension  This is a chronic problem. The current episode started more than 1 year ago. The problem is unchanged. The problem is controlled. Pertinent negatives include no chest pain, headaches, palpitations, peripheral edema or shortness of breath. Current antihypertension treatment includes ACE inhibitors. The current treatment provides significant improvement. There are no compliance problems.    Hyperlipidemia  This is a chronic problem. The current episode started more than 1 year ago. The problem is controlled. Recent lipid tests were reviewed and are low. He has no history of diabetes, hypothyroidism or obesity. Pertinent negatives include no chest pain or shortness of breath. Current antihyperlipidemic treatment includes statins. The current treatment provides significant improvement of lipids. There are no compliance problems.    Heartburn  He reports no abdominal pain, no chest pain, no coughing, no nausea, no sore throat or no wheezing. Pertinent negatives include no fatigue.        The following portions of the patient's history were reviewed and updated as appropriate: allergies, current medications, past social history and problem list.    Past Medical History:   Diagnosis Date   • Arthritis    • Chronic pain in right foot    • Coronary artery disease    • ED (erectile dysfunction) 7/15/2016   • GERD (gastroesophageal reflux disease)    • Hypertension    • Myocardial infarction (CMS/[facility])          Current Outpatient Medications:   •  Acetaminophen (TYLENOL ARTHRITIS PAIN PO), Take  by mouth. States he takes 6  pills a day-maximum amount, Disp: , Rfl:   •  aspirin 81 MG chewable tablet, Chew 1 tablet Daily., Disp: , Rfl:   •  atorvastatin (LIPITOR) 40 MG tablet, Take 1 tablet by mouth Daily., Disp: 90 tablet, Rfl: 1  •  B Complex Vitamins (vitamin b complex) capsule capsule, Take  by mouth Daily. b12 and b6, Disp: , Rfl:   •  diclofenac (VOLTAREN) 1 % gel gel, Apply 4 g topically to the appropriate area as directed 4 (Four) Times a Day., Disp: 100 g, Rfl: 0  •  lisinopril (PRINIVIL,ZESTRIL) 2.5 MG tablet, TAKE ONE TABLET BY MOUTH DAILY, Disp: 90 tablet, Rfl: 1  •  nitroglycerin (NITROSTAT) 0.4 MG SL tablet, 1 under the tongue as needed for angina, may repeat q5mins for up three doses, Disp: 25 tablet, Rfl: 3  •  omeprazole (priLOSEC) 20 MG capsule, Take 20 mg by mouth Daily., Disp: , Rfl:   •  ticagrelor (BRILINTA) 90 MG tablet tablet, Take 1 tablet by mouth 2 (Two) Times a Day., Disp: 60 tablet, Rfl: 6  •  vitamin C (ASCORBIC ACID) 500 MG tablet, Take 500 mg by mouth daily., Disp: , Rfl:     Allergies   Allergen Reactions   • Icy Hot Rash   • Amoxicillin Itching   • Penicillins    • Menthol Rash       Review of Systems   Constitutional: Negative for chills, fatigue, fever and unexpected weight change.   HENT: Positive for congestion and postnasal drip. Negative for ear pain, sinus pressure, sore throat and trouble swallowing.    Eyes: Negative for visual disturbance.   Respiratory: Negative for cough, chest tightness, shortness of breath and wheezing.    Cardiovascular: Negative for chest pain, palpitations and leg swelling.   Gastrointestinal: Negative for abdominal pain, blood in stool, nausea and vomiting.        Reflux controlled with Omeprazole   Genitourinary: Negative for dysuria, frequency and urgency.   Musculoskeletal: Positive for arthralgias. Negative for joint swelling.   Skin: Negative for color change.   Neurological: Negative for syncope, weakness and headaches.   Hematological: Does not bruise/bleed  "easily.       Objective   Vitals:    10/30/20 0914   BP: 110/84   Pulse: 78   Temp: 97.4 °F (36.3 °C)   SpO2: 97%   Weight: 98.4 kg (217 lb)   Height: 182.9 cm (72.01\")     Body mass index is 29.42 kg/m².  Physical Exam  Constitutional:       Appearance: He is well-developed. He is not ill-appearing.   HENT:      Head: Normocephalic.      Right Ear: Hearing, tympanic membrane and external ear normal.      Left Ear: Hearing, tympanic membrane and external ear normal.      Nose: Nose normal. No nasal deformity, mucosal edema or rhinorrhea.      Right Sinus: No maxillary sinus tenderness or frontal sinus tenderness.      Left Sinus: No maxillary sinus tenderness or frontal sinus tenderness.      Mouth/Throat:      Dentition: Normal dentition.   Eyes:      General: Lids are normal.         Right eye: No discharge.         Left eye: No discharge.      Conjunctiva/sclera: Conjunctivae normal.      Right eye: No exudate.     Left eye: No exudate.  Neck:      Musculoskeletal: Normal range of motion. No edema.      Thyroid: No thyroid mass or thyromegaly.      Vascular: No carotid bruit.      Trachea: Trachea normal.   Cardiovascular:      Rate and Rhythm: Regular rhythm.      Pulses: Normal pulses.      Heart sounds: Normal heart sounds. No murmur.   Pulmonary:      Effort: No respiratory distress.      Breath sounds: Normal breath sounds. No decreased breath sounds, wheezing, rhonchi or rales.   Abdominal:      General: Bowel sounds are normal.      Palpations: Abdomen is soft.      Tenderness: There is no abdominal tenderness.   Lymphadenopathy:      Head:      Right side of head: No submental, submandibular, tonsillar, preauricular, posterior auricular or occipital adenopathy.      Left side of head: No submental, submandibular, tonsillar, preauricular, posterior auricular or occipital adenopathy.   Skin:     General: Skin is warm and dry.      Nails: There is no clubbing.     Neurological:      Mental Status: He is " alert.   Psychiatric:         Behavior: Behavior is cooperative.         Assessment/Plan   Diagnoses and all orders for this visit:    1. Essential hypertension (Primary)  -     CBC & Differential  -     Comprehensive Metabolic Panel  -     TSH    2. Hypercholesteremia  -     Lipid Panel    3. Coronary artery disease involving native coronary artery of native heart without angina pectoris    4. Screening for prostate cancer  -     PSA Screen    5. Elevated glucose  -     Hemoglobin A1c    1. HTN-BP is excellent in the office, continue current medications along with a low-sodium diet.  2. Hypercholesteremia-cholesterol improved with change to Atorvastatin, he will return for fasting labs.  3. CAD-tolerating daily ASA, followed by cardiology.  4. Glucose intolerance-mildly elevated glucose with recent labs, check A1c today.

## 2020-11-02 LAB
ALBUMIN SERPL-MCNC: 4.8 G/DL (ref 3.5–5.2)
ALBUMIN/GLOB SERPL: 2.3 G/DL
ALP SERPL-CCNC: 96 U/L (ref 39–117)
ALT SERPL-CCNC: 32 U/L (ref 1–41)
AST SERPL-CCNC: 29 U/L (ref 1–40)
BASOPHILS # BLD AUTO: 0.05 10*3/MM3 (ref 0–0.2)
BASOPHILS NFR BLD AUTO: 0.8 % (ref 0–1.5)
BILIRUB SERPL-MCNC: 0.8 MG/DL (ref 0–1.2)
BUN SERPL-MCNC: 13 MG/DL (ref 8–23)
BUN/CREAT SERPL: 12.6 (ref 7–25)
CALCIUM SERPL-MCNC: 9.9 MG/DL (ref 8.6–10.5)
CHLORIDE SERPL-SCNC: 106 MMOL/L (ref 98–107)
CHOLEST SERPL-MCNC: 137 MG/DL (ref 0–200)
CO2 SERPL-SCNC: 26.4 MMOL/L (ref 22–29)
CREAT SERPL-MCNC: 1.03 MG/DL (ref 0.76–1.27)
EOSINOPHIL # BLD AUTO: 0.45 10*3/MM3 (ref 0–0.4)
EOSINOPHIL NFR BLD AUTO: 7.3 % (ref 0.3–6.2)
ERYTHROCYTE [DISTWIDTH] IN BLOOD BY AUTOMATED COUNT: 12.3 % (ref 12.3–15.4)
GLOBULIN SER CALC-MCNC: 2.1 GM/DL
GLUCOSE SERPL-MCNC: 92 MG/DL (ref 65–99)
HBA1C MFR BLD: 5.6 % (ref 4.8–5.6)
HCT VFR BLD AUTO: 46.8 % (ref 37.5–51)
HDLC SERPL-MCNC: 53 MG/DL (ref 40–60)
HGB BLD-MCNC: 15.8 G/DL (ref 13–17.7)
IMM GRANULOCYTES # BLD AUTO: 0.02 10*3/MM3 (ref 0–0.05)
IMM GRANULOCYTES NFR BLD AUTO: 0.3 % (ref 0–0.5)
LDLC SERPL CALC-MCNC: 66 MG/DL (ref 0–100)
LYMPHOCYTES # BLD AUTO: 1.47 10*3/MM3 (ref 0.7–3.1)
LYMPHOCYTES NFR BLD AUTO: 23.8 % (ref 19.6–45.3)
MCH RBC QN AUTO: 31.9 PG (ref 26.6–33)
MCHC RBC AUTO-ENTMCNC: 33.8 G/DL (ref 31.5–35.7)
MCV RBC AUTO: 94.4 FL (ref 79–97)
MONOCYTES # BLD AUTO: 0.82 10*3/MM3 (ref 0.1–0.9)
MONOCYTES NFR BLD AUTO: 13.3 % (ref 5–12)
NEUTROPHILS # BLD AUTO: 3.36 10*3/MM3 (ref 1.7–7)
NEUTROPHILS NFR BLD AUTO: 54.5 % (ref 42.7–76)
NRBC BLD AUTO-RTO: 0 /100 WBC (ref 0–0.2)
PLATELET # BLD AUTO: 217 10*3/MM3 (ref 140–450)
POTASSIUM SERPL-SCNC: 4.4 MMOL/L (ref 3.5–5.2)
PROT SERPL-MCNC: 6.9 G/DL (ref 6–8.5)
PSA SERPL-MCNC: 0.87 NG/ML (ref 0–4)
RBC # BLD AUTO: 4.96 10*6/MM3 (ref 4.14–5.8)
SODIUM SERPL-SCNC: 141 MMOL/L (ref 136–145)
TRIGL SERPL-MCNC: 100 MG/DL (ref 0–150)
TSH SERPL DL<=0.005 MIU/L-ACNC: 1.14 UIU/ML (ref 0.27–4.2)
VLDLC SERPL CALC-MCNC: 18 MG/DL (ref 5–40)
WBC # BLD AUTO: 6.17 10*3/MM3 (ref 3.4–10.8)

## 2020-11-23 ENCOUNTER — OFFICE VISIT (OUTPATIENT)
Dept: CARDIOLOGY | Facility: CLINIC | Age: 65
End: 2020-11-23

## 2020-11-23 VITALS
SYSTOLIC BLOOD PRESSURE: 130 MMHG | HEIGHT: 72 IN | BODY MASS INDEX: 29.53 KG/M2 | WEIGHT: 218 LBS | DIASTOLIC BLOOD PRESSURE: 87 MMHG | HEART RATE: 72 BPM

## 2020-11-23 DIAGNOSIS — E78.00 ELEVATED CHOLESTEROL: ICD-10-CM

## 2020-11-23 DIAGNOSIS — I25.10 CORONARY ARTERY DISEASE INVOLVING NATIVE CORONARY ARTERY OF NATIVE HEART WITHOUT ANGINA PECTORIS: Primary | ICD-10-CM

## 2020-11-23 DIAGNOSIS — I10 ESSENTIAL HYPERTENSION: ICD-10-CM

## 2020-11-23 PROCEDURE — 93000 ELECTROCARDIOGRAM COMPLETE: CPT | Performed by: INTERNAL MEDICINE

## 2020-11-23 PROCEDURE — 99213 OFFICE O/P EST LOW 20 MIN: CPT | Performed by: INTERNAL MEDICINE

## 2020-11-23 NOTE — PROGRESS NOTES
Subjective:        Sander White is a 65 y.o. male who here for follow up    CC  Coronary artery disease  HPI  65-year-old male with known history of coronary artery disease, benign essential arterial hypertension as well as hyperlipidemia here for the follow-up had an angioplasty and a stent in approximately a year ago denies any chest pains tightness heaviness or the pressure sensation     Problems Addressed this Visit        Cardiovascular and Mediastinum    Elevated cholesterol    Essential hypertension    Coronary artery disease involving native coronary artery of native heart without angina pectoris - Primary      Diagnoses       Codes Comments    Coronary artery disease involving native coronary artery of native heart without angina pectoris    -  Primary ICD-10-CM: I25.10  ICD-9-CM: 414.01     Essential hypertension     ICD-10-CM: I10  ICD-9-CM: 401.9     Elevated cholesterol     ICD-10-CM: E78.00  ICD-9-CM: 272.0         .    The following portions of the patient's history were reviewed and updated as appropriate: allergies, current medications, past family history, past medical history, past social history, past surgical history and problem list.    Past Medical History:   Diagnosis Date   • Arthritis    • Chronic pain in right foot    • Coronary artery disease    • ED (erectile dysfunction) 7/15/2016   • GERD (gastroesophageal reflux disease)    • Hypertension    • Myocardial infarction (CMS/[facility])      reports that he quit smoking about 20 years ago. His smoking use included cigarettes. He has a 3.50 pack-year smoking history. He has never used smokeless tobacco. He reports current alcohol use. He reports that he does not use drugs.   Family History   Problem Relation Age of Onset   • Diabetes Mother         Type 2   • Hypertension Mother    • Hyperlipidemia Mother    • Heart attack Mother    • Heart disease Mother    • Diabetes Father         Type 2   • Stroke Father    • Hypertension Father    •  "Hyperlipidemia Father    • Heart attack Father    • Heart disease Father    • Diabetes insipidus Father    • Alzheimer's disease Father    • Aneurysm Brother         Brain       Review of Systems  Constitutional: No wt loss, fever, fatigue  Gastrointestinal: No nausea, abdominal pain  Behavioral/Psych: No insomnia or anxiety   Cardiovascular no chest pains or tightness in the chest  Objective:       Physical Exam  /87   Pulse 72   Ht 182.9 cm (72\")   Wt 98.9 kg (218 lb)   BMI 29.57 kg/m²   General appearance: No acute changes   Neck: Trachea midline; NECK, supple, no thyromegaly or lymphadenopathy   Lungs: Normal size and shape, normal breath sounds, equal distribution of air, no rales and rhonchi   CV: S1-S2 regular, no murmurs, no rub, no gallop   Abdomen: Soft, non-tender; no masses , no abnormal abdominal sounds   Extremities: No deformity , normal color , no peripheral edema   Skin: Normal temperature, turgor and texture; no rash, ulcers            ECG 12 Lead    Date/Time: 11/23/2020 9:41 AM  Performed by: Kristina Billings MD  Authorized by: Kristina Billings MD   Comparison: compared with previous ECG   Similar to previous ECG  Rhythm: sinus rhythm  ST Flattening: all    Clinical impression: non-specific ECG          Conclusion       · Successful right and left coronary angiogram and LV gram  · Successful primary stenting of the mid RCA 70 to 80% reduced to 0% with 3.5/15 Xience stent dilated to 3.6  · Normal left main  · Left anterior descending shows early atherosclerotic plaque including the diagonal and  branches  · Circumflex arteries a nondominant and normal  · Right coronary artery dominant with the midportion 70 to 80% reduced to 0% with 3.5/15 Xience stent dilated to 3.6  · Normal LV gram         Echocardiogram:        Current Outpatient Medications:   •  aspirin 81 MG chewable tablet, Chew 1 tablet Daily., Disp: , Rfl:   •  atorvastatin (LIPITOR) 40 MG tablet, Take 1 " tablet by mouth Daily., Disp: 90 tablet, Rfl: 1  •  B Complex Vitamins (vitamin b complex) capsule capsule, Take  by mouth Daily. b12 and b6, Disp: , Rfl:   •  diclofenac (VOLTAREN) 1 % gel gel, Apply 4 g topically to the appropriate area as directed 4 (Four) Times a Day., Disp: 100 g, Rfl: 0  •  lisinopril (PRINIVIL,ZESTRIL) 2.5 MG tablet, TAKE ONE TABLET BY MOUTH DAILY, Disp: 90 tablet, Rfl: 1  •  omeprazole (priLOSEC) 20 MG capsule, Take 20 mg by mouth Daily., Disp: , Rfl:   •  ticagrelor (BRILINTA) 90 MG tablet tablet, Take 1 tablet by mouth 2 (Two) Times a Day., Disp: 60 tablet, Rfl: 6  •  vitamin C (ASCORBIC ACID) 500 MG tablet, Take 500 mg by mouth daily., Disp: , Rfl:   •  nitroglycerin (NITROSTAT) 0.4 MG SL tablet, 1 under the tongue as needed for angina, may repeat q5mins for up three doses, Disp: 25 tablet, Rfl: 3   Assessment:        Patient Active Problem List   Diagnosis   • Airway hyperreactivity   • Elevated cholesterol   • Gain of weight   • ED (erectile dysfunction)   • ST elevation myocardial infarction (STEMI) (CMS/HCC)   • Mallet finger   • Sprain of carpometacarpal joint   • Essential hypertension   • Coronary artery disease involving native coronary artery of native heart without angina pectoris   • Chest pain   • Precordial pain   • Spinal stenosis in cervical region   • Cervical radiculopathy   • Neck pain   • Abnormal cardiovascular stress test   • Depressive disorder, not elsewhere classified               Plan:            ICD-10-CM ICD-9-CM   1. Coronary artery disease involving native coronary artery of native heart without angina pectoris  I25.10 414.01   2. Essential hypertension  I10 401.9   3. Elevated cholesterol  E78.00 272.0     1. Coronary artery disease involving native coronary artery of native heart without angina pectoris  No angina pectoris angioplasty approximately a year ago we will discontinue Brilinta    2. Essential hypertension  Blood pressure under control    3.  Elevated cholesterol  Continue current treatment       Felton montilla    See in 1 yr  COUNSELING:    Sander Moore was given to patient for the following topics: diagnostic results, risk factor reductions, impressions, risks and benefits of treatment options and importance of treatment compliance .       SMOKING COUNSELING:    [unfilled]    Dictated using Dragon dictation

## 2021-01-09 DIAGNOSIS — E78.00 HYPERCHOLESTEREMIA: ICD-10-CM

## 2021-01-09 RX ORDER — ATORVASTATIN CALCIUM 40 MG/1
TABLET, FILM COATED ORAL
Qty: 90 TABLET | Refills: 1 | Status: SHIPPED | OUTPATIENT
Start: 2021-01-09 | End: 2021-05-03 | Stop reason: SDUPTHER

## 2021-03-16 ENCOUNTER — BULK ORDERING (OUTPATIENT)
Dept: CASE MANAGEMENT | Facility: OTHER | Age: 66
End: 2021-03-16

## 2021-03-16 DIAGNOSIS — Z23 IMMUNIZATION DUE: ICD-10-CM

## 2021-04-30 ENCOUNTER — OFFICE VISIT (OUTPATIENT)
Dept: FAMILY MEDICINE CLINIC | Facility: CLINIC | Age: 66
End: 2021-04-30

## 2021-04-30 VITALS
HEART RATE: 62 BPM | HEIGHT: 72 IN | DIASTOLIC BLOOD PRESSURE: 80 MMHG | BODY MASS INDEX: 29.04 KG/M2 | TEMPERATURE: 98 F | WEIGHT: 214.4 LBS | SYSTOLIC BLOOD PRESSURE: 122 MMHG | OXYGEN SATURATION: 97 %

## 2021-04-30 DIAGNOSIS — N52.1 ERECTILE DISORDER DUE TO MEDICAL CONDITION IN MALE: ICD-10-CM

## 2021-04-30 DIAGNOSIS — I10 ESSENTIAL HYPERTENSION: ICD-10-CM

## 2021-04-30 DIAGNOSIS — K21.9 GASTROESOPHAGEAL REFLUX DISEASE, UNSPECIFIED WHETHER ESOPHAGITIS PRESENT: ICD-10-CM

## 2021-04-30 DIAGNOSIS — E78.00 ELEVATED CHOLESTEROL: ICD-10-CM

## 2021-04-30 DIAGNOSIS — Z00.00 ENCOUNTER FOR ANNUAL PHYSICAL EXAM: Primary | ICD-10-CM

## 2021-04-30 DIAGNOSIS — E78.2 MIXED HYPERLIPIDEMIA: ICD-10-CM

## 2021-04-30 PROBLEM — R94.39 ABNORMAL CARDIOVASCULAR STRESS TEST: Status: RESOLVED | Noted: 2019-09-09 | Resolved: 2021-04-30

## 2021-04-30 PROBLEM — R07.2 PRECORDIAL PAIN: Status: RESOLVED | Noted: 2017-07-22 | Resolved: 2021-04-30

## 2021-04-30 PROBLEM — M54.2 NECK PAIN: Status: RESOLVED | Noted: 2017-12-15 | Resolved: 2021-04-30

## 2021-04-30 PROBLEM — I21.3 ST ELEVATION MYOCARDIAL INFARCTION (STEMI): Status: RESOLVED | Noted: 2017-02-15 | Resolved: 2021-04-30

## 2021-04-30 PROCEDURE — 99396 PREV VISIT EST AGE 40-64: CPT | Performed by: NURSE PRACTITIONER

## 2021-04-30 RX ORDER — LANOLIN ALCOHOL/MO/W.PET/CERES
1000 CREAM (GRAM) TOPICAL DAILY
COMMUNITY

## 2021-04-30 RX ORDER — NIACIN 250 MG
250 TABLET ORAL 3 TIMES WEEKLY
COMMUNITY
End: 2021-07-30

## 2021-04-30 RX ORDER — MULTIVITAMIN WITH IRON
100 TABLET ORAL DAILY
COMMUNITY

## 2021-04-30 RX ORDER — VITAMIN E 268 MG
400 CAPSULE ORAL DAILY
COMMUNITY
End: 2022-09-20 | Stop reason: HOSPADM

## 2021-04-30 NOTE — PROGRESS NOTES
Subjective   Sander White is a 65 y.o. male.     Chief Complaint   Patient presents with   • Annual Exam   • Erectile Dysfunction       History of Present Illness   New patient, here to establish care; previous PCP Dr. Cat/ANGELICA Seo; patient presents for CPE with fasting labs; healthy diet; regular exercise, does a lot of walking with job, walks at least 5 miles daily; regular dental visits; last eye exam about 2 years ago, needs to schedule follow up, wears glasses on occasion; slight decrease in hearing, not too bad; immunizations: will consider Shingrix; colonoscopy 2015, normal; no family history of colon cancer.    F/U HTN: takes Lisinopril daily; health nurse at work monitors BP, typically runs 110s/80s; no headaches; no swelling, no orthostasis.    F/U Hyperlipidemia: takes Atorvastatin daily; no myalgias; watches intake of saturated fats; fasting today.    F/U KRISTOFER: takes Omeprazole daily and works well; will have symptoms if misses a dose; has had EGD in past.    Also, c/o ED; has had problems since MI on 2/14/16; sees Dr. Billings, cardiology; does snore if sleeps on back; some days feels rested when wakes up, some days does not; takes Melatonin nightly and helps fall asleep; wakes up at 0200 from having to get up at that time for work for many years; now gets up at 0500 for work; works in [place], [place] and stays with son during the week; works from [id number], previously worked 1257-4314 daily; has trouble getting and maintaining erection; has abnormal shape; went to [facility] and did not help; has not seen urology; has tried Viagra and Cialis in past and did not help.    Mother passed on his birthday last year and father passed 2 months ago; typical grief; worked through on own with meditation; coping well at this point.    The following portions of the patient's history were reviewed and updated as appropriate: allergies, current medications, past family history, past  medical history, past social history, past surgical history and problem list.       Current Outpatient Medications   Medication Sig Dispense Refill   • aspirin 81 MG chewable tablet Chew 1 tablet Daily.     • atorvastatin (LIPITOR) 40 MG tablet TAKE ONE TABLET BY MOUTH DAILY 90 tablet 1   • diclofenac (VOLTAREN) 1 % gel gel Apply 4 g topically to the appropriate area as directed 4 (Four) Times a Day. 100 g 0   • ELDERBERRY PO Take 1 tablet by mouth Daily.     • lisinopril (PRINIVIL,ZESTRIL) 2.5 MG tablet TAKE ONE TABLET BY MOUTH DAILY 90 tablet 1   • Melatonin 3 MG capsule Take 1 tablet by mouth Every Night.     • niacin 250 MG tablet Take 250 mg by mouth 3 (Three) Times a Week.     • nitroglycerin (NITROSTAT) 0.4 MG SL tablet 1 under the tongue as needed for angina, may repeat q5mins for up three doses 25 tablet 3   • omeprazole (priLOSEC) 20 MG capsule Take 20 mg by mouth Daily.     • vitamin B-12 (CYANOCOBALAMIN) 1000 MCG tablet Take 1,000 mcg by mouth Daily.     • vitamin B-6 (PYRIDOXINE) 100 MG tablet Take 100 mg by mouth Daily.     • vitamin C (ASCORBIC ACID) 500 MG tablet Take 500 mg by mouth daily.     • vitamin E 400 UNIT capsule Take 400 Units by mouth Daily.       No current facility-administered medications for this visit.       Past Medical History:   Diagnosis Date   • Abnormal cardiovascular stress test 9/9/2019    Added automatically from request for surgery [id number]   • Airway hyperreactivity 5/3/2016   • Arthritis    • Chronic pain in right foot    • Collapse of right lung 12/23/1999   • Coronary artery disease    • ED (erectile dysfunction) 7/15/2016   • GERD (gastroesophageal reflux disease)    • Hypertension    • Mallet finger 10/20/2014   • Myocardial infarction (CMS/[facility])    • Neck pain 12/15/2017   • Precordial pain 7/22/2017    Added automatically from request for surgery [id number]   • Sprain of carpometacarpal joint 10/20/2014   • ST elevation myocardial infarction (STEMI) (CMS/[facility]) 2/15/2017        Past Surgical History:   Procedure Laterality Date   • APPENDECTOMY     • CARDIAC CATHETERIZATION N/A 2/15/2017    Procedure: Left Heart Cath;  Surgeon: Kristina Billings MD;  [facility] C[facility]AS[facility] LOC[facility];  Service:    • CARDIAC CATHETERIZATION  2/15/2017    Procedure: Percutaneous Manual Thrombectomy;  Surgeon: Kristina Billings MD;  Location: [facility] CATH [facility]ASIVE LOCATION;  Service:    • CARDIAC CATHETERIZATION N/A 2/15/2017    Procedure: Coronary angiography;  Surgeon: Kristina Billings MD;  Location[facility] C[facility]IVE LOC[facility];  Service:    • CARDIAC CATHETERIZATION N/A 2/15/2017    Procedure: Left ventriculography;  Surgeon: Kristina Billings MD;  Location: [facility] CATH IN[facility]ASIVE LOCATION;  Service:    • CARDIAC CATHETERIZATION N/A 7/24/2017    Procedure: Left Heart Cath;  Surgeon: Kristina Billings MD;  Location[facility] C[facility] LOC[facility];  Service:    • CARDIAC CATHETERIZATION N/A 7/24/2017    Procedure: Coronary angiography;  Surgeon: Kristina Billings MD;  Location: [facility] CATH INVASIVE LOCATION;  Service:    • CARDIAC CATHETERIZATION N/A 7/24/2017    Procedure: Left ventriculography;  Surgeon: Kristina Billings MD;  Location[facility] C[facility] LOC[facility];  Service:    • CARDIAC CATHETERIZATION N/A 9/11/2019    Procedure: Left Heart Cath;  Surgeon: Kristina Billings MD;  Location: [facility] CATH [facility] LOC[facility];  Service: Cardiology   • CARDIAC CATHETERIZATION N/A 9/11/2019    Procedure: Coronary angiography;  Surgeon: Kristina iBllings MD;  Location: [facility] CATH INVASIVE LOCATION;  Service: Cardiology   • CARDIAC CATHETERIZATION N/A 9/11/2019    Procedure: Stent CAYLA coronary;  Surgeon: Kristina Billings MD;  Location: [facility] CATH IN[facility] LOCATION;  Service: Cardiology   • CARDIAC CATHETERIZATION N/A 9/11/2019    Procedure: Left ventriculography;  Surgeon: Kristina Billings MD;  Location: [facility] CATH [facility] LOC[facility];  Service:  Cardiology   • CARPAL TUNNEL RELEASE Right    • CHOLECYSTECTOMY     • COLONOSCOPY N/A 2015    Normal   • CORONARY ANGIOPLASTY     • CORONARY STENT PLACEMENT      x2   • CYST REMOVAL      thyroglossal cyst removed   • HERNIA REPAIR Right     Inguinal   • MA RT/LT HEART CATHETERS N/A 2/15/2017    Procedure: Percutaneous Coronary Intervention;  Surgeon: Kristina Billings MD;  Location: [facility] INVASIVE LOCATION;  Service: Cardiovascular       Family History   Problem Relation Age of Onset   • Diabetes Mother         Type 2   • Hypertension Mother    • Hyperlipidemia Mother    • Heart attack Mother    • Heart disease Mother         S/P CABG   • Diabetes Father         Type 2   • Stroke Father    • Hypertension Father    • Hyperlipidemia Father    • Heart attack Father    • Heart disease Father         S/P CABG   • Alzheimer's disease Father    • Aneurysm Brother         Brain       Social History     Socioeconomic History   • Marital status:      Spouse name: Not on file   • Number of children: 5   • Years of education: 2 YEARS OF COLLEGE   • Highest education level: Not on file   Tobacco Use   • Smoking status: Former Smoker     Packs/day: 0.50     Years: 7.00     Pack years: 3.50     Types: Cigarettes     Quit date: 1999     Years since quittin.3   • Smokeless tobacco: Never Used   Substance and Sexual Activity   • Alcohol use: Yes     Comment: occasional   • Drug use: No   • Sexual activity: Defer       Review of Systems   Constitutional: Negative for appetite change, chills, fever, unexpected weight gain and unexpected weight loss. Fatigue: some days, other days not.   HENT: Negative for ear pain, postnasal drip, sinus pressure, sore throat and trouble swallowing.    Eyes: Negative for blurred vision and pain.   Respiratory: Negative for apnea (see HPI), cough (rarely, nonproductive, has had cough longer than has been on Lisinopril), chest tightness and shortness of breath.   "  Cardiovascular: Negative for chest pain and palpitations.   Gastrointestinal: Negative for abdominal pain, blood in stool, constipation, diarrhea, GERD (none as long as takes medication) and indigestion.   Endocrine: Negative for cold intolerance, heat intolerance and polydipsia.   Genitourinary: Positive for nocturia (gets up once per night to urinate; some weaker urinary stream) and erectile dysfunction. Negative for dysuria and frequency.   Musculoskeletal: Negative for arthralgias (some on occasion with weather changes) and back pain.   Skin: Negative for rash and skin lesions.   Neurological: Negative for dizziness, syncope, weakness, light-headedness and headache.   Hematological: Bruises/bleeds easily: seems to bleed easily; no longer bruising easily since stopped Brilinta.       Objective   Vitals:    04/30/21 0928   BP: 122/80   BP Location: Left arm   Patient Position: Sitting   Cuff Size: Adult   Pulse: 62   Temp: 98 °F (36.7 °C)   TempSrc: Temporal   SpO2: 97%   Weight: 97.3 kg (214 lb 6.4 oz)   Height: 182.9 cm (72.01\")     Body mass index is 29.07 kg/m².    Physical Exam  Vitals and nursing note reviewed.   Constitutional:       General: He is not in acute distress.     Appearance: He is well-developed and well-groomed. He is not diaphoretic.   HENT:      Head: Normocephalic and atraumatic.      Jaw: No tenderness or pain on movement.      Right Ear: Tympanic membrane and external ear normal. No decreased hearing noted.      Left Ear: Tympanic membrane and external ear normal. No decreased hearing noted.      Nose: Nose normal.      Right Sinus: No maxillary sinus tenderness or frontal sinus tenderness.      Left Sinus: No maxillary sinus tenderness or frontal sinus tenderness.      Mouth/Throat:      Mouth: Mucous membranes are moist.      Pharynx: No posterior oropharyngeal erythema. Oropharyngeal exudate: mild drainage in posterior pharynx.   Eyes:      Extraocular Movements: Extraocular movements " intact.      Conjunctiva/sclera: Conjunctivae normal.      Pupils: Pupils are equal, round, and reactive to light.   Neck:      Thyroid: No thyroid mass or thyromegaly.      Vascular: No carotid bruit.      Trachea: No tracheal deviation.   Cardiovascular:      Rate and Rhythm: Normal rate and regular rhythm.      Pulses: Normal pulses.      Heart sounds: Normal heart sounds. No murmur heard.     Pulmonary:      Effort: Pulmonary effort is normal. No respiratory distress.      Breath sounds: Normal breath sounds.   Abdominal:      General: Bowel sounds are normal.      Palpations: Abdomen is soft. There is no hepatomegaly or splenomegaly.      Tenderness: There is no abdominal tenderness.   Musculoskeletal:         General: Normal range of motion.      Cervical back: Normal range of motion and neck supple. No bony tenderness.      Thoracic back: No bony tenderness.      Lumbar back: No bony tenderness.      Right lower leg: No edema.      Left lower leg: No edema.   Lymphadenopathy:      Cervical: No cervical adenopathy.   Skin:     General: Skin is warm and dry.      Findings: No rash.   Neurological:      Mental Status: He is alert and oriented to person, place, and time.      Cranial Nerves: No cranial nerve deficit.      Motor: Motor function is intact.      Coordination: Coordination normal.      Gait: Gait normal.      Deep Tendon Reflexes: Reflexes are normal and symmetric.   Psychiatric:         Mood and Affect: Mood normal.         Behavior: Behavior normal.         Thought Content: Thought content normal.         Cognition and Memory: Cognition normal.         Judgment: Judgment normal.         Lab Results   Component Value Date    WBC 6.17 11/02/2020    RBC 4.96 11/02/2020    HGB 15.8 11/02/2020    HCT 46.8 11/02/2020    MCV 94.4 11/02/2020    MCH 31.9 11/02/2020    MCHC 33.8 11/02/2020    RDW 12.3 11/02/2020    RDWSD 39.5 03/07/2020    MPV 10.4 03/07/2020     11/02/2020    NEUTRORELPCT 54.5  11/02/2020    LYMPHORELPCT 23.8 11/02/2020    MONORELPCT 13.3 (H) 11/02/2020    EOSRELPCT 7.3 (H) 11/02/2020    BASORELPCT 0.8 11/02/2020    AUTOIGPER 0.9 (H) 03/07/2020    NEUTROABS 3.36 11/02/2020    LYMPHSABS 1.47 11/02/2020    MONOSABS 0.82 11/02/2020    EOSABS 0.45 (H) 11/02/2020    BASOSABS 0.05 11/02/2020    AUTOIGNUM 0.05 03/07/2020    NRBC 0.0 11/02/2020     Lab Results   Component Value Date    GLUCOSE 109 (H) 03/07/2020    BUN 13 11/02/2020    CREATININE 1.03 11/02/2020    EGFRIFNONA 73 11/02/2020    EGFRIFAFRI 88 11/02/2020    BCR 12.6 11/02/2020    K 4.4 11/02/2020    CO2 26.4 11/02/2020    CALCIUM 9.9 11/02/2020    PROTENTOTREF 6.9 11/02/2020    ALBUMIN 4.80 11/02/2020    LABIL2 2.3 11/02/2020    AST 29 11/02/2020    ALT 32 11/02/2020      Lab Results   Component Value Date    CHLPL 137 11/02/2020    TRIG 100 11/02/2020    HDL 53 11/02/2020    VLDL 18 11/02/2020    LDL 66 11/02/2020     Lab Results   Component Value Date    TSH 1.140 11/02/2020     Lab Results   Component Value Date    HGBA1C 5.60 11/02/2020     Lab Results   Component Value Date    PSA 0.872 11/02/2020         Assessment/Plan .  Problem List Items Addressed This Visit     Mixed hyperlipidemia    Current Assessment & Plan     Continue to work on healthy diet and exercise.  Continue Atorvastatin daily.         Erectile disorder due to medical condition in male    Relevant Orders    Ambulatory Referral to Urology    Essential hypertension    Current Assessment & Plan     Hypertension is stable.  Regular aerobic exercise.  Continue current medications.  Ambulatory blood pressure monitoring.  Blood pressure will be reassessed at the next regular appointment.  Continue Lisinopril daily.         Relevant Orders    CBC & Differential    Lipid Panel With LDL / HDL Ratio    Comprehensive Metabolic Panel    GERD (gastroesophageal reflux disease)    Current Assessment & Plan     Stable on Omeprazole daily.           Other Visit Diagnoses      Encounter for annual physical exam    -  Primary    Relevant Orders    CBC & Differential    Lipid Panel With LDL / HDL Ratio    Comprehensive Metabolic Panel          Return in about 6 months (around 10/30/2021) for Recheck.or sooner if problems or concerns.  Impression: Health maintenance visit.  Currently, eats a healthy diet and has a regular exercise routine.  Colorectal cancer screening: UTD.  Prostate cancer screening: UTD.  Screening lab work includes: CMP, lipid.  Immunizations: will consider Shingrix; risks and benefits of immunizations were discussed with patient.  Patient was advised to be evaluated by ophthalmology.  Advice and education were given regarding nutrition and aerobic exercise.  Will send refill of Atorvastatin and Lisinopril to [facility] for 90 day supply pending lab results.         COVID-19 Precautions - Patient was compliant in wearing a mask. When I saw the patient, I used appropriate personal protective equipment (PPE) including mask, gloves, and eye shield (standard procedure).  Hand hygiene was completed before and after seeing the patient.

## 2021-04-30 NOTE — PATIENT INSTRUCTIONS
Continue to monitor your blood pressure periodically and record results.  Continue to work on healthy diet and exercise.  Follow up pending lab results.  Follow up in 6 months, or sooner if problems or concerns.

## 2021-04-30 NOTE — ASSESSMENT & PLAN NOTE
Hypertension is stable.  Regular aerobic exercise.  Continue current medications.  Ambulatory blood pressure monitoring.  Blood pressure will be reassessed at the next regular appointment.  Continue Lisinopril daily.

## 2021-05-01 LAB
ALBUMIN SERPL-MCNC: 4.6 G/DL (ref 3.5–5.2)
ALBUMIN/GLOB SERPL: 2.2 G/DL
ALP SERPL-CCNC: 77 U/L (ref 39–117)
ALT SERPL-CCNC: 27 U/L (ref 1–41)
AST SERPL-CCNC: 23 U/L (ref 1–40)
BASOPHILS # BLD AUTO: 0.06 10*3/MM3 (ref 0–0.2)
BASOPHILS NFR BLD AUTO: 1 % (ref 0–1.5)
BILIRUB SERPL-MCNC: 0.9 MG/DL (ref 0–1.2)
BUN SERPL-MCNC: 13 MG/DL (ref 8–23)
BUN/CREAT SERPL: 13.5 (ref 7–25)
CALCIUM SERPL-MCNC: 9.7 MG/DL (ref 8.6–10.5)
CHLORIDE SERPL-SCNC: 104 MMOL/L (ref 98–107)
CHOLEST SERPL-MCNC: 132 MG/DL (ref 0–200)
CO2 SERPL-SCNC: 25.9 MMOL/L (ref 22–29)
CREAT SERPL-MCNC: 0.96 MG/DL (ref 0.76–1.27)
EOSINOPHIL # BLD AUTO: 0.34 10*3/MM3 (ref 0–0.4)
EOSINOPHIL NFR BLD AUTO: 5.9 % (ref 0.3–6.2)
ERYTHROCYTE [DISTWIDTH] IN BLOOD BY AUTOMATED COUNT: 11.8 % (ref 12.3–15.4)
GLOBULIN SER CALC-MCNC: 2.1 GM/DL
GLUCOSE SERPL-MCNC: 95 MG/DL (ref 65–99)
HCT VFR BLD AUTO: 43.7 % (ref 37.5–51)
HDLC SERPL-MCNC: 51 MG/DL (ref 40–60)
HGB BLD-MCNC: 15.1 G/DL (ref 13–17.7)
IMM GRANULOCYTES # BLD AUTO: 0.03 10*3/MM3 (ref 0–0.05)
IMM GRANULOCYTES NFR BLD AUTO: 0.5 % (ref 0–0.5)
LDLC SERPL CALC-MCNC: 63 MG/DL (ref 0–100)
LDLC/HDLC SERPL: 1.21 {RATIO}
LYMPHOCYTES # BLD AUTO: 1.29 10*3/MM3 (ref 0.7–3.1)
LYMPHOCYTES NFR BLD AUTO: 22.6 % (ref 19.6–45.3)
MCH RBC QN AUTO: 31.1 PG (ref 26.6–33)
MCHC RBC AUTO-ENTMCNC: 34.6 G/DL (ref 31.5–35.7)
MCV RBC AUTO: 90.1 FL (ref 79–97)
MONOCYTES # BLD AUTO: 0.81 10*3/MM3 (ref 0.1–0.9)
MONOCYTES NFR BLD AUTO: 14.2 % (ref 5–12)
NEUTROPHILS # BLD AUTO: 3.19 10*3/MM3 (ref 1.7–7)
NEUTROPHILS NFR BLD AUTO: 55.8 % (ref 42.7–76)
NRBC BLD AUTO-RTO: 0 /100 WBC (ref 0–0.2)
PLATELET # BLD AUTO: 210 10*3/MM3 (ref 140–450)
POTASSIUM SERPL-SCNC: 4.1 MMOL/L (ref 3.5–5.2)
PROT SERPL-MCNC: 6.7 G/DL (ref 6–8.5)
RBC # BLD AUTO: 4.85 10*6/MM3 (ref 4.14–5.8)
SODIUM SERPL-SCNC: 140 MMOL/L (ref 136–145)
TRIGL SERPL-MCNC: 96 MG/DL (ref 0–150)
VLDLC SERPL CALC-MCNC: 18 MG/DL (ref 5–40)
WBC # BLD AUTO: 5.72 10*3/MM3 (ref 3.4–10.8)

## 2021-05-03 ENCOUNTER — TELEPHONE (OUTPATIENT)
Dept: FAMILY MEDICINE CLINIC | Facility: CLINIC | Age: 66
End: 2021-05-03

## 2021-05-03 DIAGNOSIS — I10 ESSENTIAL HYPERTENSION: ICD-10-CM

## 2021-05-03 DIAGNOSIS — E78.00 HYPERCHOLESTEREMIA: ICD-10-CM

## 2021-05-03 RX ORDER — LISINOPRIL 2.5 MG/1
2.5 TABLET ORAL DAILY
Qty: 90 TABLET | Refills: 1 | Status: SHIPPED | OUTPATIENT
Start: 2021-05-03 | End: 2021-11-12 | Stop reason: SDUPTHER

## 2021-05-03 RX ORDER — ATORVASTATIN CALCIUM 40 MG/1
40 TABLET, FILM COATED ORAL DAILY
Qty: 90 TABLET | Refills: 1 | Status: SHIPPED | OUTPATIENT
Start: 2021-05-03 | End: 2021-07-27 | Stop reason: SDUPTHER

## 2021-05-03 NOTE — TELEPHONE ENCOUNTER
Caller: Sander White    Relationship: Self    Best call back number: [phone]    Medication needed: lisinopril (PRINIVIL,ZESTRIL) 2.5 MG tablet      When do you need the refill by: 05/03/21      Does the patient have less than a 3 day supply:  [x] Yes  [] No    What is the patient's preferred pharmacy:    [facility] [address] [place] [address] AT [facility] [phone] [place] [phone]   [phone]    PLEASE ADVISE.

## 2021-05-24 ENCOUNTER — TELEPHONE (OUTPATIENT)
Dept: CARDIOLOGY | Facility: CLINIC | Age: 66
End: 2021-05-24

## 2021-05-24 NOTE — TELEPHONE ENCOUNTER
----- Message from June Silveira sent at 2021  8:42 AM EDT -----  Regarding: restriction note  Pt called to say that his job is trying to make him rotate shifts and work in extreme heat. They said his work restriction note from 2016 and his heart attack has .    He is wanting a new updated note that keeps him with the same restrictions he is on now.    Pt phone: [phone]    Email letter if possible to: [email or web address]

## 2021-05-25 NOTE — TELEPHONE ENCOUNTER
Called informed pt we could write note stating Due to heart diease, extreme conditions could be unsafe for pt .    Pt verbalized understanding and would like to make appt.   Made appt for 6/14

## 2021-05-26 ENCOUNTER — TELEPHONE (OUTPATIENT)
Dept: CARDIOLOGY | Facility: CLINIC | Age: 66
End: 2021-05-26

## 2021-05-26 NOTE — TELEPHONE ENCOUNTER
PT CALLED STATING THAT HE IS AT WORK AND EXPERIENCED CHEST PAIN, RADIATING DOWN LEFT ARM.  TOOK 1 NITRO AND CHEST PAIN SUBSIDED.  STILL HAS NUMBNESS IN LEFT ARM.  PT INSTRUCTED TO GO TO ER.

## 2021-06-14 ENCOUNTER — OFFICE VISIT (OUTPATIENT)
Dept: CARDIOLOGY | Facility: CLINIC | Age: 66
End: 2021-06-14

## 2021-06-14 VITALS
DIASTOLIC BLOOD PRESSURE: 80 MMHG | SYSTOLIC BLOOD PRESSURE: 123 MMHG | WEIGHT: 213 LBS | HEIGHT: 72 IN | HEART RATE: 70 BPM | BODY MASS INDEX: 28.85 KG/M2

## 2021-06-14 DIAGNOSIS — I10 ESSENTIAL HYPERTENSION: ICD-10-CM

## 2021-06-14 DIAGNOSIS — I25.10 CORONARY ARTERY DISEASE INVOLVING NATIVE CORONARY ARTERY OF NATIVE HEART WITHOUT ANGINA PECTORIS: ICD-10-CM

## 2021-06-14 DIAGNOSIS — R07.2 PRECORDIAL PAIN: Primary | ICD-10-CM

## 2021-06-14 PROCEDURE — 99213 OFFICE O/P EST LOW 20 MIN: CPT | Performed by: INTERNAL MEDICINE

## 2021-06-14 NOTE — PROGRESS NOTES
HOSPITAL FOLLOW UP    Subjective:        Sander White is a 65 y.o. male who here for follow up    CC  EPISODE OF CP RELIEVED WITH S/L NTG    STILL HAVE CP  HPI  65-year-old male known history of coronary artery disease hypertension had an episode of chest pain relieved with sublingual nitroglycerin continues to complains of the retrosternal chest pains and tightness in the chest     Problems Addressed this Visit        Cardiac and Vasculature    Essential hypertension    Coronary artery disease involving native coronary artery of native heart without angina pectoris    Relevant Orders    Case Request Cath Lab: Left Heart Cath (Completed)    CBC & Differential    Basic Metabolic Panel    aPTT    Protime-INR    Chest pain - Primary    Relevant Orders    Case Request Cath Lab: Left Heart Cath (Completed)    CBC & Differential    Basic Metabolic Panel    aPTT    Protime-INR      Diagnoses       Codes Comments    Precordial pain    -  Primary ICD-10-CM: R07.2  ICD-9-CM: 786.51     Coronary artery disease involving native coronary artery of native heart without angina pectoris     ICD-10-CM: I25.10  ICD-9-CM: 414.01     Essential hypertension     ICD-10-CM: I10  ICD-9-CM: 401.9         .    The following portions of the patient's history were reviewed and updated as appropriate: allergies, current medications, past family history, past medical history, past social history, past surgical history and problem list.    Past Medical History:   Diagnosis Date   • Abnormal cardiovascular stress test 9/9/2019    Added automatically from request for surgery [id number]   • Airway hyperreactivity 5/3/2016   • Arthritis    • Chronic pain in right foot    • Collapse of right lung 12/23/1999   • Coronary artery disease    • ED (erectile dysfunction) 7/15/2016   • GERD (gastroesophageal reflux disease)    • Hypertension    • Mallet finger 10/20/2014   • Myocardial infarction (CMS/HCC)    • Neck pain 12/15/2017   • Precordial pain  "7/22/2017    Added automatically from request for surgery [id number]   • Sprain of carpometacarpal joint 10/20/2014   • ST elevation myocardial infarction (STEMI) (CMS/[facility]) 2/15/2017     reports that he quit smoking about 21 years ago. His smoking use included cigarettes. He has a 3.50 pack-year smoking history. He has never used smokeless tobacco. He reports current alcohol use. He reports that he does not use drugs.   Family History   Problem Relation Age of Onset   • Diabetes Mother         Type 2   • Hypertension Mother    • Hyperlipidemia Mother    • Heart attack Mother    • Heart disease Mother         S/P CABG   • Diabetes Father         Type 2   • Stroke Father    • Hypertension Father    • Hyperlipidemia Father    • Heart attack Father    • Heart disease Father         S/P CABG   • Alzheimer's disease Father    • Aneurysm Brother         Brain       Review of Systems  Constitutional: No wt loss, fever, fatigue  Gastrointestinal: No nausea, abdominal pain  Behavioral/Psych: No insomnia or anxiety   Cardiovascular chest pains and tightness in the chest  Objective:       Physical Exam  /80   Pulse 70   Ht 182.9 cm (72.01\")   Wt 96.6 kg (213 lb)   BMI 28.88 kg/m²   General appearance: No acute changes   Neck: Trachea midline; NECK, supple, no thyromegaly or lymphadenopathy   Lungs: Normal size and shape, normal breath sounds, equal distribution of air, no rales and rhonchi   CV: S1-S2 regular, no murmurs, no rub, no gallop   Abdomen: Soft, non-tender; no masses , no abnormal abdominal sounds   Extremities: No deformity , normal color , no peripheral edema   Skin: Normal temperature, turgor and texture; no rash, ulcers          Procedures      Echocardiogram:        Current Outpatient Medications:   •  aspirin 81 MG chewable tablet, Chew 1 tablet Daily., Disp: , Rfl:   •  atorvastatin (LIPITOR) 40 MG tablet, Take 1 tablet by mouth Daily., Disp: 90 tablet, Rfl: 1  •  diclofenac (VOLTAREN) 1 % gel gel, Apply " 4 g topically to the appropriate area as directed 4 (Four) Times a Day., Disp: 100 g, Rfl: 0  •  ELDERBERRY PO, Take 1 tablet by mouth Daily., Disp: , Rfl:   •  lisinopril (PRINIVIL,ZESTRIL) 2.5 MG tablet, Take 1 tablet by mouth Daily., Disp: 90 tablet, Rfl: 1  •  Melatonin 3 MG capsule, Take 1 tablet by mouth Every Night., Disp: , Rfl:   •  niacin 250 MG tablet, Take 250 mg by mouth 3 (Three) Times a Week., Disp: , Rfl:   •  nitroglycerin (NITROSTAT) 0.4 MG SL tablet, 1 under the tongue as needed for angina, may repeat q5mins for up three doses, Disp: 25 tablet, Rfl: 3  •  omeprazole (priLOSEC) 20 MG capsule, Take 20 mg by mouth Daily., Disp: , Rfl:   •  vitamin B-12 (CYANOCOBALAMIN) 1000 MCG tablet, Take 1,000 mcg by mouth Daily., Disp: , Rfl:   •  vitamin B-6 (PYRIDOXINE) 100 MG tablet, Take 100 mg by mouth Daily., Disp: , Rfl:   •  vitamin C (ASCORBIC ACID) 500 MG tablet, Take 500 mg by mouth daily., Disp: , Rfl:   •  vitamin E 400 UNIT capsule, Take 400 Units by mouth Daily., Disp: , Rfl:    Assessment:        Patient Active Problem List   Diagnosis   • Mixed hyperlipidemia   • Erectile disorder due to medical condition in male   • Essential hypertension   • Coronary artery disease involving native coronary artery of native heart without angina pectoris   • Chest pain   • Spinal stenosis in cervical region   • Cervical radiculopathy   • Depressive disorder, not elsewhere classified   • GERD (gastroesophageal reflux disease)               Plan:            ICD-10-CM ICD-9-CM   1. Precordial pain  R07.2 786.51   2. Coronary artery disease involving native coronary artery of native heart without angina pectoris  I25.10 414.01   3. Essential hypertension  I10 401.9     1. Precordial pain  Procedure, risks and options of cardiac cath explained to pt INCLUDING BUT NOT LIMITED TO MI, STROKE, DEATH, INFECTION HAEMORRHAGE, . Pt understands well and agrees with no further questions.    - Case Request Cath Lab: Left Heart  Cath  - CBC & Differential; Future  - Basic Metabolic Panel  - aPTT; Future  - Protime-INR; Future    2. Coronary artery disease involving native coronary artery of native heart without angina pectoris  Procedure, risks and options of cardiac cath explained to pt INCLUDING BUT NOT LIMITED TO MI, STROKE, DEATH, INFECTION HAEMORRHAGE, . Pt understands well and agrees with no further questions.    - Case Request Cath Lab: Left Heart Cath  - CBC & Differential; Future  - Basic Metabolic Panel  - aPTT; Future  - Protime-INR; Future    3. Essential hypertension  Blood pressure under control     HAD CP WITH EXCERTION  Procedure, risks and options of cardiac cath explained to pt INCLUDING BUT NOT LIMITED TO MI, STROKE, DEATH, INFECTION HAEMORRHAGE, . Pt understands well and agrees with no further questions.      FOLLOW UP  COUNSELING:    Sander Moore was given to patient for the following topics: diagnostic results, risk factor reductions, impressions, risks and benefits of treatment options and importance of treatment compliance .       SMOKING COUNSELING:    [unfilled]    Dictated using Dragon dictation

## 2021-06-14 NOTE — H&P (VIEW-ONLY)
HOSPITAL FOLLOW UP    Subjective:        Sander White is a 65 y.o. male who here for follow up    CC  EPISODE OF CP RELIEVED WITH S/L NTG    STILL HAVE CP  HPI  65-year-old male known history of coronary artery disease hypertension had an episode of chest pain relieved with sublingual nitroglycerin continues to complains of the retrosternal chest pains and tightness in the chest     Problems Addressed this Visit        Cardiac and Vasculature    Essential hypertension    Coronary artery disease involving native coronary artery of native heart without angina pectoris    Relevant Orders    Case Request Cath Lab: Left Heart Cath (Completed)    CBC & Differential    Basic Metabolic Panel    aPTT    Protime-INR    Chest pain - Primary    Relevant Orders    Case Request Cath Lab: Left Heart Cath (Completed)    CBC & Differential    Basic Metabolic Panel    aPTT    Protime-INR      Diagnoses       Codes Comments    Precordial pain    -  Primary ICD-10-CM: R07.2  ICD-9-CM: 786.51     Coronary artery disease involving native coronary artery of native heart without angina pectoris     ICD-10-CM: I25.10  ICD-9-CM: 414.01     Essential hypertension     ICD-10-CM: I10  ICD-9-CM: 401.9         .    The following portions of the patient's history were reviewed and updated as appropriate: allergies, current medications, past family history, past medical history, past social history, past surgical history and problem list.    Past Medical History:   Diagnosis Date   • Abnormal cardiovascular stress test 9/9/2019    Added automatically from request for surgery [id number]   • Airway hyperreactivity 5/3/2016   • Arthritis    • Chronic pain in right foot    • Collapse of right lung 12/23/1999   • Coronary artery disease    • ED (erectile dysfunction) 7/15/2016   • GERD (gastroesophageal reflux disease)    • Hypertension    • Mallet finger 10/20/2014   • Myocardial infarction (CMS/HCC)    • Neck pain 12/15/2017   • Precordial pain  "7/22/2017    Added automatically from request for surgery [id number]   • Sprain of carpometacarpal joint 10/20/2014   • ST elevation myocardial infarction (STEMI) (CMS/[facility]) 2/15/2017     reports that he quit smoking about 21 years ago. His smoking use included cigarettes. He has a 3.50 pack-year smoking history. He has never used smokeless tobacco. He reports current alcohol use. He reports that he does not use drugs.   Family History   Problem Relation Age of Onset   • Diabetes Mother         Type 2   • Hypertension Mother    • Hyperlipidemia Mother    • Heart attack Mother    • Heart disease Mother         S/P CABG   • Diabetes Father         Type 2   • Stroke Father    • Hypertension Father    • Hyperlipidemia Father    • Heart attack Father    • Heart disease Father         S/P CABG   • Alzheimer's disease Father    • Aneurysm Brother         Brain       Review of Systems  Constitutional: No wt loss, fever, fatigue  Gastrointestinal: No nausea, abdominal pain  Behavioral/Psych: No insomnia or anxiety   Cardiovascular chest pains and tightness in the chest  Objective:       Physical Exam  /80   Pulse 70   Ht 182.9 cm (72.01\")   Wt 96.6 kg (213 lb)   BMI 28.88 kg/m²   General appearance: No acute changes   Neck: Trachea midline; NECK, supple, no thyromegaly or lymphadenopathy   Lungs: Normal size and shape, normal breath sounds, equal distribution of air, no rales and rhonchi   CV: S1-S2 regular, no murmurs, no rub, no gallop   Abdomen: Soft, non-tender; no masses , no abnormal abdominal sounds   Extremities: No deformity , normal color , no peripheral edema   Skin: Normal temperature, turgor and texture; no rash, ulcers          Procedures      Echocardiogram:        Current Outpatient Medications:   •  aspirin 81 MG chewable tablet, Chew 1 tablet Daily., Disp: , Rfl:   •  atorvastatin (LIPITOR) 40 MG tablet, Take 1 tablet by mouth Daily., Disp: 90 tablet, Rfl: 1  •  diclofenac (VOLTAREN) 1 % gel gel, Apply " 4 g topically to the appropriate area as directed 4 (Four) Times a Day., Disp: 100 g, Rfl: 0  •  ELDERBERRY PO, Take 1 tablet by mouth Daily., Disp: , Rfl:   •  lisinopril (PRINIVIL,ZESTRIL) 2.5 MG tablet, Take 1 tablet by mouth Daily., Disp: 90 tablet, Rfl: 1  •  Melatonin 3 MG capsule, Take 1 tablet by mouth Every Night., Disp: , Rfl:   •  niacin 250 MG tablet, Take 250 mg by mouth 3 (Three) Times a Week., Disp: , Rfl:   •  nitroglycerin (NITROSTAT) 0.4 MG SL tablet, 1 under the tongue as needed for angina, may repeat q5mins for up three doses, Disp: 25 tablet, Rfl: 3  •  omeprazole (priLOSEC) 20 MG capsule, Take 20 mg by mouth Daily., Disp: , Rfl:   •  vitamin B-12 (CYANOCOBALAMIN) 1000 MCG tablet, Take 1,000 mcg by mouth Daily., Disp: , Rfl:   •  vitamin B-6 (PYRIDOXINE) 100 MG tablet, Take 100 mg by mouth Daily., Disp: , Rfl:   •  vitamin C (ASCORBIC ACID) 500 MG tablet, Take 500 mg by mouth daily., Disp: , Rfl:   •  vitamin E 400 UNIT capsule, Take 400 Units by mouth Daily., Disp: , Rfl:    Assessment:        Patient Active Problem List   Diagnosis   • Mixed hyperlipidemia   • Erectile disorder due to medical condition in male   • Essential hypertension   • Coronary artery disease involving native coronary artery of native heart without angina pectoris   • Chest pain   • Spinal stenosis in cervical region   • Cervical radiculopathy   • Depressive disorder, not elsewhere classified   • GERD (gastroesophageal reflux disease)               Plan:            ICD-10-CM ICD-9-CM   1. Precordial pain  R07.2 786.51   2. Coronary artery disease involving native coronary artery of native heart without angina pectoris  I25.10 414.01   3. Essential hypertension  I10 401.9     1. Precordial pain  Procedure, risks and options of cardiac cath explained to pt INCLUDING BUT NOT LIMITED TO MI, STROKE, DEATH, INFECTION HAEMORRHAGE, . Pt understands well and agrees with no further questions.    - Case Request Cath Lab: Left Heart  Cath  - CBC & Differential; Future  - Basic Metabolic Panel  - aPTT; Future  - Protime-INR; Future    2. Coronary artery disease involving native coronary artery of native heart without angina pectoris  Procedure, risks and options of cardiac cath explained to pt INCLUDING BUT NOT LIMITED TO MI, STROKE, DEATH, INFECTION HAEMORRHAGE, . Pt understands well and agrees with no further questions.    - Case Request Cath Lab: Left Heart Cath  - CBC & Differential; Future  - Basic Metabolic Panel  - aPTT; Future  - Protime-INR; Future    3. Essential hypertension  Blood pressure under control     HAD CP WITH EXCERTION  Procedure, risks and options of cardiac cath explained to pt INCLUDING BUT NOT LIMITED TO MI, STROKE, DEATH, INFECTION HAEMORRHAGE, . Pt understands well and agrees with no further questions.      FOLLOW UP  COUNSELING:    Sander Moore was given to patient for the following topics: diagnostic results, risk factor reductions, impressions, risks and benefits of treatment options and importance of treatment compliance .       SMOKING COUNSELING:    [unfilled]    Dictated using Dragon dictation

## 2021-06-21 ENCOUNTER — HOSPITAL ENCOUNTER (OUTPATIENT)
Dept: CARDIOLOGY | Facility: HOSPITAL | Age: 66
Discharge: HOME | End: 2021-06-21
Admitting: INTERNAL MEDICINE

## 2021-06-21 DIAGNOSIS — R07.2 PRECORDIAL PAIN: ICD-10-CM

## 2021-06-21 DIAGNOSIS — I25.10 CORONARY ARTERY DISEASE INVOLVING NATIVE CORONARY ARTERY OF NATIVE HEART WITHOUT ANGINA PECTORIS: ICD-10-CM

## 2021-06-21 LAB
ANION GAP SERPL CALCULATED.3IONS-SCNC: 12.7 MMOL/L (ref 5–15)
APTT PPP: 29.5 SECONDS (ref 22.7–35.4)
BASOPHILS # BLD AUTO: 0.04 10*3/MM3 (ref 0–0.2)
BASOPHILS NFR BLD AUTO: 0.7 % (ref 0–1.5)
BUN SERPL-MCNC: 12 MG/DL (ref 8–23)
BUN/CREAT SERPL: 10.9 (ref 7–25)
CALCIUM SPEC-SCNC: 9 MG/DL (ref 8.6–10.5)
CHLORIDE SERPL-SCNC: 105 MMOL/L (ref 98–107)
CO2 SERPL-SCNC: 22.3 MMOL/L (ref 22–29)
CREAT SERPL-MCNC: 1.1 MG/DL (ref 0.76–1.27)
DEPRECATED RDW RBC AUTO: 43.2 FL (ref 37–54)
EOSINOPHIL # BLD AUTO: 0.53 10*3/MM3 (ref 0–0.4)
EOSINOPHIL NFR BLD AUTO: 9.5 % (ref 0.3–6.2)
ERYTHROCYTE [DISTWIDTH] IN BLOOD BY AUTOMATED COUNT: 12.5 % (ref 12.3–15.4)
GFR SERPL CREATININE-BSD FRML MDRD: 67 ML/MIN/1.73
GLUCOSE SERPL-MCNC: 99 MG/DL (ref 65–99)
HCT VFR BLD AUTO: 46.2 % (ref 37.5–51)
HGB BLD-MCNC: 15.9 G/DL (ref 13–17.7)
IMM GRANULOCYTES # BLD AUTO: 0.03 10*3/MM3 (ref 0–0.05)
IMM GRANULOCYTES NFR BLD AUTO: 0.5 % (ref 0–0.5)
INR PPP: 0.93 (ref 0.9–1.1)
LYMPHOCYTES # BLD AUTO: 1.28 10*3/MM3 (ref 0.7–3.1)
LYMPHOCYTES NFR BLD AUTO: 23 % (ref 19.6–45.3)
MCH RBC QN AUTO: 32.1 PG (ref 26.6–33)
MCHC RBC AUTO-ENTMCNC: 34.4 G/DL (ref 31.5–35.7)
MCV RBC AUTO: 93.1 FL (ref 79–97)
MONOCYTES # BLD AUTO: 0.86 10*3/MM3 (ref 0.1–0.9)
MONOCYTES NFR BLD AUTO: 15.5 % (ref 5–12)
NEUTROPHILS NFR BLD AUTO: 2.82 10*3/MM3 (ref 1.7–7)
NEUTROPHILS NFR BLD AUTO: 50.8 % (ref 42.7–76)
NRBC BLD AUTO-RTO: 0 /100 WBC (ref 0–0.2)
PLATELET # BLD AUTO: 206 10*3/MM3 (ref 140–450)
PMV BLD AUTO: 12.4 FL (ref 6–12)
POTASSIUM SERPL-SCNC: 4.4 MMOL/L (ref 3.5–5.2)
PROTHROMBIN TIME: 12.3 SECONDS (ref 11.7–14.2)
RBC # BLD AUTO: 4.96 10*6/MM3 (ref 4.14–5.8)
SODIUM SERPL-SCNC: 140 MMOL/L (ref 136–145)
WBC # BLD AUTO: 5.56 10*3/MM3 (ref 3.4–10.8)

## 2021-06-21 PROCEDURE — 85730 THROMBOPLASTIN TIME PARTIAL: CPT | Performed by: INTERNAL MEDICINE

## 2021-06-21 PROCEDURE — 85610 PROTHROMBIN TIME: CPT | Performed by: INTERNAL MEDICINE

## 2021-06-21 PROCEDURE — 85025 COMPLETE CBC W/AUTO DIFF WBC: CPT | Performed by: INTERNAL MEDICINE

## 2021-06-21 PROCEDURE — 80048 BASIC METABOLIC PNL TOTAL CA: CPT | Performed by: INTERNAL MEDICINE

## 2021-06-21 PROCEDURE — 36415 COLL VENOUS BLD VENIPUNCTURE: CPT | Performed by: INTERNAL MEDICINE

## 2021-06-25 ENCOUNTER — HOSPITAL ENCOUNTER (OUTPATIENT)
Facility: HOSPITAL | Age: 66
Setting detail: HOSPITAL OUTPATIENT SURGERY
Discharge: HOME | End: 2021-06-25
Attending: INTERNAL MEDICINE | Admitting: INTERNAL MEDICINE

## 2021-06-25 VITALS
RESPIRATION RATE: 18 BRPM | BODY MASS INDEX: 29.12 KG/M2 | DIASTOLIC BLOOD PRESSURE: 77 MMHG | OXYGEN SATURATION: 95 % | HEART RATE: 53 BPM | HEIGHT: 72 IN | SYSTOLIC BLOOD PRESSURE: 108 MMHG | TEMPERATURE: 98 F | WEIGHT: 215 LBS

## 2021-06-25 DIAGNOSIS — R07.2 PRECORDIAL PAIN: ICD-10-CM

## 2021-06-25 DIAGNOSIS — I25.10 CORONARY ARTERY DISEASE INVOLVING NATIVE CORONARY ARTERY OF NATIVE HEART WITHOUT ANGINA PECTORIS: ICD-10-CM

## 2021-06-25 PROCEDURE — 99152 MOD SED SAME PHYS/QHP 5/>YRS: CPT | Performed by: INTERNAL MEDICINE

## 2021-06-25 PROCEDURE — 93458 L HRT ARTERY/VENTRICLE ANGIO: CPT | Performed by: INTERNAL MEDICINE

## 2021-06-25 PROCEDURE — C1894 INTRO/SHEATH, NON-LASER: HCPCS | Performed by: INTERNAL MEDICINE

## 2021-06-25 PROCEDURE — 25010000002 FENTANYL CITRATE (PF) 50 MCG/ML SOLUTION: Performed by: INTERNAL MEDICINE

## 2021-06-25 PROCEDURE — C1769 GUIDE WIRE: HCPCS | Performed by: INTERNAL MEDICINE

## 2021-06-25 PROCEDURE — 0 IOPAMIDOL PER 1 ML: Performed by: INTERNAL MEDICINE

## 2021-06-25 PROCEDURE — 25010000002 MIDAZOLAM PER 1 MG: Performed by: INTERNAL MEDICINE

## 2021-06-25 PROCEDURE — 25010000002 HEPARIN (PORCINE) PER 1000 UNITS: Performed by: INTERNAL MEDICINE

## 2021-06-25 RX ORDER — MIDAZOLAM HYDROCHLORIDE 1 MG/ML
INJECTION INTRAMUSCULAR; INTRAVENOUS AS NEEDED
Status: DISCONTINUED | OUTPATIENT
Start: 2021-06-25 | End: 2021-06-25 | Stop reason: HOSPADM

## 2021-06-25 RX ORDER — FENTANYL CITRATE 50 UG/ML
INJECTION, SOLUTION INTRAMUSCULAR; INTRAVENOUS AS NEEDED
Status: DISCONTINUED | OUTPATIENT
Start: 2021-06-25 | End: 2021-06-25 | Stop reason: HOSPADM

## 2021-06-25 RX ORDER — SODIUM CHLORIDE 0.9 % (FLUSH) 0.9 %
10 SYRINGE (ML) INJECTION AS NEEDED
Status: DISCONTINUED | OUTPATIENT
Start: 2021-06-25 | End: 2021-06-25 | Stop reason: HOSPADM

## 2021-06-25 RX ORDER — SODIUM CHLORIDE 0.9 % (FLUSH) 0.9 %
3 SYRINGE (ML) INJECTION EVERY 12 HOURS SCHEDULED
Status: DISCONTINUED | OUTPATIENT
Start: 2021-06-25 | End: 2021-06-25 | Stop reason: HOSPADM

## 2021-06-25 RX ORDER — LIDOCAINE HYDROCHLORIDE 20 MG/ML
INJECTION, SOLUTION INFILTRATION; PERINEURAL AS NEEDED
Status: DISCONTINUED | OUTPATIENT
Start: 2021-06-25 | End: 2021-06-25 | Stop reason: HOSPADM

## 2021-06-25 RX ORDER — SODIUM CHLORIDE 9 MG/ML
75 INJECTION, SOLUTION INTRAVENOUS CONTINUOUS
Status: DISCONTINUED | OUTPATIENT
Start: 2021-06-25 | End: 2021-06-25 | Stop reason: HOSPADM

## 2021-06-25 RX ORDER — ACETAMINOPHEN 325 MG/1
650 TABLET ORAL EVERY 4 HOURS PRN
Status: DISCONTINUED | OUTPATIENT
Start: 2021-06-25 | End: 2021-06-25 | Stop reason: HOSPADM

## 2021-06-25 RX ADMIN — SODIUM CHLORIDE 75 ML/HR: 9 INJECTION, SOLUTION INTRAVENOUS at 09:17

## 2021-06-25 NOTE — DISCHARGE INSTRUCTIONS
[facility]  [address] [place] [address]    Coronary Angiogram (Radial/Ulnar Approach) After Care    Refer to this sheet in the next few weeks. These instructions provide you with information on caring for yourself after your procedure. Your caregiver may also give you more specific instructions. Your treatment has been planned according to current medical practices, but problems sometimes occur. Call your caregiver if you have any problems or questions after your procedure.    Home Care Instructions:  · You may shower the day after the procedure. Remove the bandage (dressing) and gently wash the site with plain soap and water. Gently pat the site dry. You may apply a band aid daily for 2 days if desired.    · Do not apply powder or lotion to the site.  · Do not submerge the affected site in water for 3 to 5 days or until the site is completely healed.   · Do not lift, push or pull anything over 5 pounds for 5 days after your procedure or as directed by your physician.  As a reference, a gallon of milk weighs 8 pounds.   · Inspect the site at least twice daily. You may notice some bruising at the site and it may be tender for 1 to 2 weeks.     · Increase your fluid intake for the next 2 days.    · Keep arm elevated for 24 hours. For the remainder of the day, keep your arm in “Pledge of Allegiance” position when up and about.     · You may drive 24 hours after the procedure unless otherwise instructed by your caregiver.  · Do not operate machinery or power tools for 24 hours.  · A responsible adult should be with you for the first 24 hours after you arrive home. Do not make any important legal decisions or sign legal papers for 24 hours.  Do not drink alcohol for 24 hours.    · Metformin or any medications containing Metformin should not be taken for 48 hours after your procedure.      Call Your Doctor if:   · You have unusual pain at the radial/ulnar (wrist) site.  · You have redness, warmth,  swelling, or pain at the radial/ulnar (wrist) site.  · You have drainage (other than a small amount of blood on the dressing).  · You have chills or a fever > 101.  · Your arm becomes pale or dark, cool, tingly, or numb.  · You have heavy bleeding from the site, hold pressure on the site for 20 minutes.  If the bleeding stops, apply a fresh bandage and call your cardiologist.  However, if you continue to have bleeding, call 911.

## 2021-07-27 DIAGNOSIS — E78.00 HYPERCHOLESTEREMIA: ICD-10-CM

## 2021-07-27 RX ORDER — ATORVASTATIN CALCIUM 40 MG/1
40 TABLET, FILM COATED ORAL DAILY
Qty: 90 TABLET | Refills: 1 | Status: SHIPPED | OUTPATIENT
Start: 2021-07-27 | End: 2022-03-10 | Stop reason: SDUPTHER

## 2021-07-27 NOTE — TELEPHONE ENCOUNTER
Caller: Sander White    Relationship: Self    Best call back number: [phone]     Medication needed:   Requested Prescriptions     Pending Prescriptions Disp Refills   • atorvastatin (LIPITOR) 40 MG tablet 90 tablet 1     Sig: Take 1 tablet by mouth Daily.       When do you need the refill by: 07/27/21    What additional details did the patient provide when requesting the medication: PATIENT IS COMPLETELY OUT OF MEDICATION     Does the patient have less than a 3 day supply:  [x] Yes  [] No    What is the patient's preferred pharmacy: [facility] [place] [phone] [place] [phone]

## 2021-07-29 NOTE — PROGRESS NOTES
Subjective   Sander White is a 65 y.o. male.     Chief Complaint   Patient presents with   • hearing voices     discuss        History of Present Illness   Patient presents with c/o hearing voices when sound asleep; symptoms started this week; heard someone knocking on door; heard someone yelling his name; has been under a lot of stress recently; lost both parents recently; lost mother in November on his birthday and father in February; then had time share cancelled few days ago; also had recent chest pain episode; went to ER [facility] on 5/26/21 and admitted for 2 days; had negative work up; saw Dr. De Paz, cardiology and had recent heart cath--normal; does snore; no sleep study in past; no problems sleeping; has been rotating shifts recently; recently started 2 months on night shift; sleep depends on how woke up; if gets up to urinate, no problems going back to sleep; if someone wakes him up, will have trouble falling back to sleep; will wake up when hears voices/sounds; some overeating; see PHQ-9; no SI/HI.    F/U HTN: takes Lisinopril daily; health nurse at work monitors BP, BP typically runs 110s/70s; no headaches; no orthostasis; no swelling; has follow up with cardiology on 8/2/21.      The following portions of the patient's history were reviewed and updated as appropriate: allergies, current medications, past family history, past medical history, past social history, past surgical history and problem list.    Current Outpatient Medications on File Prior to Visit   Medication Sig   • aspirin 81 MG chewable tablet Chew 1 tablet Daily.   • atorvastatin (LIPITOR) 40 MG tablet Take 1 tablet by mouth Daily.   • diclofenac (VOLTAREN) 1 % gel gel Apply 4 g topically to the appropriate area as directed 4 (Four) Times a Day.   • ELDERBERRY PO Take 1 tablet by mouth Daily.   • lisinopril (PRINIVIL,ZESTRIL) 2.5 MG tablet Take 1 tablet by mouth Daily.   • nitroglycerin (NITROSTAT) 0.4 MG SL tablet 1  under the tongue as needed for angina, may repeat q5mins for up three doses   • omeprazole (priLOSEC) 20 MG capsule Take 20 mg by mouth Daily.   • vitamin B-12 (CYANOCOBALAMIN) 1000 MCG tablet Take 1,000 mcg by mouth Daily.   • vitamin B-6 (PYRIDOXINE) 100 MG tablet Take 100 mg by mouth Daily.   • vitamin C (ASCORBIC ACID) 500 MG tablet Take 500 mg by mouth daily.   • vitamin E 400 UNIT capsule Take 400 Units by mouth Daily.     No current facility-administered medications on file prior to visit.          Past Medical History:   Diagnosis Date   • Abnormal cardiovascular stress test 9/9/2019    Added automatically from request for surgery [id number]   • Airway hyperreactivity 5/3/2016   • Arthritis    • Chronic pain in right foot    • Collapse of right lung 12/23/1999   • Coronary artery disease    • ED (erectile dysfunction) 7/15/2016   • GERD (gastroesophageal reflux disease)    • Hypertension    • Mallet finger 10/20/2014   • Myocardial infarction (CMS/[facility])    • Neck pain 12/15/2017   • Precordial pain 7/22/2017    Added automatically from request for surgery [id number]   • Sprain of carpometacarpal joint 10/20/2014   • ST elevation myocardial infarction (STEMI) (CMS/[facility]) 2/15/2017       Past Surgical History:   Procedure Laterality Date   • APPENDECTOMY     • CARDIAC CATHETERIZATION N/A 2/15/2017    Procedure: Left Heart Cath;  Surgeon: Kristina Billings MD;  Location: [facility] INVASIVE LOCATION;  Service:    • CARDIAC CATHETERIZATION  2/15/2017    Procedure: Percutaneous Manual Thrombectomy;  Surgeon: Kristina Billings MD;  Location: [facility] CATH INVASIVE LOCATION;  Service:    • CARDIAC CATHETERIZATION N/A 2/15/2017    Procedure: Coronary angiography;  Surgeon: Kristina Billings MD;  Location: [facility] CATH INVASIVE LOCATION;  Service:    • CARDIAC CATHETERIZATION N/A 2/15/2017    Procedure: Left ventriculography;  Surgeon: Kristina Billings MD;  Location: [facility] CATH INVASIVE LOCATION;  Service:    •  CARDIAC CATHETERIZATION N/A 7/24/2017    Procedure: Left Heart Cath;  Surgeon: Kristina Billings MD;  Location:  [facility] CATH INVASIVE LOCATION;  Service:    • CARDIAC CATHETERIZATION N/A 7/24/2017    Procedure: Coronary angiography;  Surgeon: Kristina Billings MD;  Location:  [facility] CATH INVASIVE LOCATION;  Service:    • CARDIAC CATHETERIZATION N/A 7/24/2017    Procedure: Left ventriculography;  Surgeon: Kristina Billings MD;  Location:  [facility] CATH INVASIVE LOCATION;  Service:    • CARDIAC CATHETERIZATION N/A 9/11/2019    Procedure: Left Heart Cath;  Surgeon: Kristina Billings MD;  Location:  [facility] CATH INVASIVE LOCATION;  Service: Cardiology   • CARDIAC CATHETERIZATION N/A 9/11/2019    Procedure: Coronary angiography;  Surgeon: Kristina Billings MD;  Location:  [facility] CATH INVASIVE LOCATION;  Service: Cardiology   • CARDIAC CATHETERIZATION N/A 9/11/2019    Procedure: Stent CAYLA coronary;  Surgeon: Kristina Billings MD;  [facility];  Service: Cardiology   • CARDIAC CATHETERIZATION N/A 9/11/2019    Procedure: Left ventriculography;  Surgeon: Kristina Billings MD;  [facility];  Service: Cardiology   • CARDIAC CATHETERIZATION N/A 6/25/2021    Procedure: Left Heart Cath;  Surgeon: Kristina Billings MD;  [facility];  Service: Cardiology;  Laterality: N/A;   • CARDIAC CATHETERIZATION N/A 6/25/2021    Procedure: Coronary angiography;  Surgeon: Kristina Billings MD;  [facility];  Service: Cardiology;  Laterality: N/A;   • CARDIAC CATHETERIZATION N/A 6/25/2021    Procedure: Left ventriculography;  Surgeon: Kristina Billings MD;  [facility];  Service: Cardiology;  Laterality: N/A;   • CARPAL TUNNEL RELEASE Right    • CHOLECYSTECTOMY     • COLONOSCOPY N/A 02/27/2015    Normal   • CORONARY ANGIOPLASTY     • CORONARY STENT PLACEMENT      x2   •  CYST REMOVAL      thyroglossal cyst removed   • HERNIA REPAIR Right     Inguinal   • RI RT/LT HEART CATHETERS N/A 2/15/2017    Procedure: Percutaneous Coronary Intervention;  Surgeon: Kristina Billings MD;  Location: [facility] INVASIVE LOCATION;  Service: Cardiovascular       Family History   Problem Relation Age of Onset   • Diabetes Mother         Type 2   • Hypertension Mother    • Hyperlipidemia Mother    • Heart attack Mother    • Heart disease Mother         S/P CABG   • Diabetes Father         Type 2   • Stroke Father    • Hypertension Father    • Hyperlipidemia Father    • Heart attack Father    • Heart disease Father         S/P CABG   • Alzheimer's disease Father    • Aneurysm Brother         Brain   • Pancreatitis Sister        Social History     Socioeconomic History   • Marital status:      Spouse name: Not on file   • Number of children: 5   • Years of education: 2 YEARS OF COLLEGE   • Highest education level: Not on file   Tobacco Use   • Smoking status: Former Smoker     Packs/day: 0.50     Years: 7.00     Pack years: 3.50     Types: Cigarettes     Quit date: 1999     Years since quittin.6   • Smokeless tobacco: Never Used   Vaping Use   • Vaping Use: Never used   Substance and Sexual Activity   • Alcohol use: Yes     Comment: occasional   • Drug use: No   • Sexual activity: Defer       Review of Systems   Constitutional: Positive for fatigue. Negative for appetite change, chills, fever, unexpected weight gain and unexpected weight loss.   HENT: Negative for ear pain, sinus pressure, sore throat and trouble swallowing.    Eyes: Negative for blurred vision and pain.   Respiratory: Negative for cough, chest tightness and shortness of breath.    Cardiovascular: Negative for chest pain and palpitations.   Gastrointestinal: Negative for abdominal pain, constipation, diarrhea and GERD. Indigestion: mild heartburn, particularly if forgets to take Omeprazole more than 1 day   "  Endocrine: Negative for polydipsia.   Genitourinary: Negative for dysuria and frequency.   Musculoskeletal: Negative for back pain. Arthralgias: occasional discomfort in left hip with prolonged sitting.   Skin: Negative for rash.   Neurological: Negative for syncope, weakness and headache.   Hematological: Bruises/bleeds easily: mild bruising with ASA.   Psychiatric/Behavioral: Negative for suicidal ideas.        PHQ-9 Depression Screening  Little interest or pleasure in doing things? 0   Feeling down, depressed, or hopeless? 2   Trouble falling or staying asleep, or sleeping too much? 1   Feeling tired or having little energy? 3   Poor appetite or overeating? 1   Feeling bad about yourself - or that you are a failure or have let yourself or your family down? 0   Trouble concentrating on things, such as reading the newspaper or watching television? 0   Moving or speaking so slowly that other people could have noticed? Or the opposite - being so fidgety or restless that you have been moving around a lot more than usual? 0   Thoughts that you would be better off dead, or of hurting yourself in some way? 0   PHQ-9 Total Score 7   If you checked off any problems, how difficult have these problems made it for you to do your work, take care of things at home, or get along with other people? Not difficult at all         Objective   Vitals:    07/30/21 0845   BP: 110/70   BP Location: Left arm   Patient Position: Sitting   Cuff Size: Adult   Pulse: 76   Temp: 98 °F (36.7 °C)   SpO2: 95%   Weight: 98.1 kg (216 lb 3.2 oz)   Height: 182.9 cm (72\")     Body mass index is 29.32 kg/m².    Physical Exam  Vitals and nursing note reviewed.   Constitutional:       General: He is not in acute distress.     Appearance: He is well-developed and well-groomed. He is not diaphoretic.   HENT:      Head: Normocephalic.      Right Ear: External ear normal.      Left Ear: External ear normal.   Eyes:      Conjunctiva/sclera: Conjunctivae " normal.   Neck:      Thyroid: No thyromegaly.      Vascular: No carotid bruit.   Cardiovascular:      Rate and Rhythm: Normal rate and regular rhythm.      Pulses: Normal pulses.      Heart sounds: Normal heart sounds. No murmur heard.     Pulmonary:      Effort: Pulmonary effort is normal. No respiratory distress.      Breath sounds: Normal breath sounds.   Abdominal:      General: Bowel sounds are normal.      Palpations: Abdomen is soft. There is no hepatomegaly or splenomegaly.      Tenderness: There is no abdominal tenderness. There is no guarding.   Musculoskeletal:      Cervical back: Normal range of motion and neck supple.      Right lower leg: No edema.      Left lower leg: No edema.   Skin:     General: Skin is warm and dry.          Neurological:      Mental Status: He is alert and oriented to person, place, and time.      Motor: Motor function is intact.      Gait: Gait is intact.   Psychiatric:         Mood and Affect: Mood normal.         Behavior: Behavior normal.         Thought Content: Thought content normal.         Cognition and Memory: Cognition normal.         Judgment: Judgment normal.         Lab Results   Component Value Date    WBC 5.56 06/21/2021    RBC 4.96 06/21/2021    HGB 15.9 06/21/2021    HCT 46.2 06/21/2021    MCV 93.1 06/21/2021    MCH 32.1 06/21/2021    MCHC 34.4 06/21/2021    RDW 12.5 06/21/2021    RDWSD 43.2 06/21/2021    MPV 12.4 (H) 06/21/2021     06/21/2021    NEUTRORELPCT 50.8 06/21/2021    LYMPHORELPCT 23.0 06/21/2021    MONORELPCT 15.5 (H) 06/21/2021    EOSRELPCT 9.5 (H) 06/21/2021    BASORELPCT 0.7 06/21/2021    AUTOIGPER 0.5 06/21/2021    NEUTROABS 2.82 06/21/2021    LYMPHSABS 1.28 06/21/2021    MONOSABS 0.86 06/21/2021    EOSABS 0.53 (H) 06/21/2021    BASOSABS 0.04 06/21/2021    AUTOIGNUM 0.03 06/21/2021    NRBC 0.0 06/21/2021     Lab Results   Component Value Date    GLUCOSE 99 06/21/2021    BUN 12 06/21/2021    CREATININE 1.10 06/21/2021    EGFRIFNONA 67  06/21/2021    EGFRIFAFRI 96 04/30/2021    BCR 10.9 06/21/2021    K 4.4 06/21/2021    CO2 22.3 06/21/2021    CALCIUM 9.0 06/21/2021    PROTENTOTREF 6.7 04/30/2021    ALBUMIN 4.60 04/30/2021    LABIL2 2.2 04/30/2021    AST 23 04/30/2021    ALT 27 04/30/2021      Lab Results   Component Value Date    CHLPL 132 04/30/2021    TRIG 96 04/30/2021    HDL 51 04/30/2021    VLDL 18 04/30/2021    LDL 63 04/30/2021     Lab Results   Component Value Date    TSH 1.140 11/02/2020     Lab Results   Component Value Date    HGBA1C 5.60 11/02/2020     Lab Results   Component Value Date    PSA 0.872 11/02/2020     ER records reviewed from 5/26/21; had negative troponin and EKD nonischemic; had stress test and myocardial perfusion scan, both without obvious ischemia.  5/26/21 lipid panel: , Trig 85, HDL 42, LDL 63; CBC WNL; BMP WNL except glucose 107  6/25/21 heart cath: normal; EF 60%; previous stent widely patent.    Assessment/Plan .  Problem List Items Addressed This Visit     Essential hypertension - Primary    Current Assessment & Plan     Hypertension is stable.  Regular aerobic exercise.  Continue current medications.  Ambulatory blood pressure monitoring.  Blood pressure will be reassessed at the next regular appointment.  Continue Lisinopril daily.         Positive depression screening    Current Assessment & Plan     Schedule an appointment with counselor/therapist.         Sleep disturbance    Relevant Orders    Ambulatory Referral to Sleep Medicine    Snoring    Relevant Orders    Ambulatory Referral to Sleep Medicine    Anxiety    Current Assessment & Plan     Schedule an appointment with counselor/therapist.         Skin lesion of back    Relevant Orders    Ambulatory Referral to Dermatology          Return in about 3 months (around 10/30/2021) for Recheck.or sooner if symptoms persist or worsen.  Discussed concerns for low oxygenation during the night due to possible sleep apnea as cause for symptoms; will refer to  sleep medicine for further evaluation; will consider Rx for Escitalopram if symptoms persist or worsen.     COVID-19 Precautions - Patient was compliant in wearing a mask. When I saw the patient, I used appropriate personal protective equipment (PPE) including mask, gloves, and eye shield (standard procedure).  Hand hygiene was completed before and after seeing the patient.

## 2021-07-30 ENCOUNTER — OFFICE VISIT (OUTPATIENT)
Dept: FAMILY MEDICINE CLINIC | Facility: CLINIC | Age: 66
End: 2021-07-30

## 2021-07-30 VITALS
HEART RATE: 76 BPM | WEIGHT: 216.2 LBS | DIASTOLIC BLOOD PRESSURE: 70 MMHG | SYSTOLIC BLOOD PRESSURE: 110 MMHG | HEIGHT: 72 IN | TEMPERATURE: 98 F | OXYGEN SATURATION: 95 % | BODY MASS INDEX: 29.28 KG/M2

## 2021-07-30 DIAGNOSIS — I10 ESSENTIAL HYPERTENSION: Primary | ICD-10-CM

## 2021-07-30 DIAGNOSIS — R06.83 SNORING: ICD-10-CM

## 2021-07-30 DIAGNOSIS — F41.9 ANXIETY: ICD-10-CM

## 2021-07-30 DIAGNOSIS — G47.9 SLEEP DISTURBANCE: ICD-10-CM

## 2021-07-30 DIAGNOSIS — L98.9 SKIN LESION OF BACK: ICD-10-CM

## 2021-07-30 DIAGNOSIS — Z13.31 POSITIVE DEPRESSION SCREENING: ICD-10-CM

## 2021-07-30 PROCEDURE — 99213 OFFICE O/P EST LOW 20 MIN: CPT | Performed by: NURSE PRACTITIONER

## 2021-07-30 NOTE — PATIENT INSTRUCTIONS
Continue to monitor your blood pressure periodically and record results.  Continue to work on healthy diet and exercise.  Follow up in 3 months, or sooner if problems or concerns.  Schedule an appointment with counselor/therapist.

## 2021-08-09 ENCOUNTER — TELEPHONE (OUTPATIENT)
Dept: CARDIOLOGY | Facility: HOSPITAL | Age: 66
End: 2021-08-09

## 2021-08-09 NOTE — TELEPHONE ENCOUNTER
Wants to know if he is ok to work 12hrs on 12 hours off, work 2 off 2 rotating every 8 weeks. This is different than what his schedule used to be and wants to know with his history if this is ok.

## 2021-09-24 ENCOUNTER — APPOINTMENT (OUTPATIENT)
Dept: SLEEP MEDICINE | Facility: HOSPITAL | Age: 66
End: 2021-09-24

## 2021-11-01 ENCOUNTER — OFFICE VISIT (OUTPATIENT)
Dept: FAMILY MEDICINE CLINIC | Facility: CLINIC | Age: 66
End: 2021-11-01

## 2021-11-01 VITALS
TEMPERATURE: 98 F | OXYGEN SATURATION: 97 % | WEIGHT: 212 LBS | SYSTOLIC BLOOD PRESSURE: 118 MMHG | HEIGHT: 72 IN | DIASTOLIC BLOOD PRESSURE: 84 MMHG | HEART RATE: 83 BPM | BODY MASS INDEX: 28.71 KG/M2

## 2021-11-01 DIAGNOSIS — F41.9 ANXIETY: ICD-10-CM

## 2021-11-01 DIAGNOSIS — I10 ESSENTIAL HYPERTENSION: Primary | ICD-10-CM

## 2021-11-01 DIAGNOSIS — L98.9 SKIN LESION OF BACK: ICD-10-CM

## 2021-11-01 DIAGNOSIS — R06.83 SNORING: ICD-10-CM

## 2021-11-01 DIAGNOSIS — M54.32 SCIATICA OF LEFT SIDE: ICD-10-CM

## 2021-11-01 PROCEDURE — 99214 OFFICE O/P EST MOD 30 MIN: CPT | Performed by: NURSE PRACTITIONER

## 2021-11-01 NOTE — PROGRESS NOTES
Subjective   Sander White is a 65 y.o. male.     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     F/U sleep disturbances/snoring: did not see sleep medicine, has not had time; no more episodes of hearing voices when sound asleep.    F/U anxiety: saw counselor and felt better; was having a lot of worry about wife and mother-in-law; problems at work have resolved; stress level has improved; no problems with sleep.    Also, c/o pain down back of left leg after has been sitting for period of time; symptoms started about 6 months ago; has tried exercises and stretching; will have to reposition self with prolonged driving; has tried [employer] and will help short term; no weakness in left leg; no bladder or bowel dysfunction; no problems walking; has tried moving wallet, but has not helped.    The following portions of the patient's history were reviewed and updated as appropriate: allergies, current medications, past family history, past medical history, past social history, past surgical history and problem list.    Current Outpatient Medications on File Prior to Visit   Medication Sig   • aspirin 81 MG chewable tablet Chew 1 tablet Daily.   • atorvastatin (LIPITOR) 40 MG tablet Take 1 tablet by mouth Daily.   • diclofenac (VOLTAREN) 1 % gel gel Apply 4 g topically to the appropriate area as directed 4 (Four) Times a Day.   • ELDERBERRY PO Take 1 tablet by mouth Daily.   • lisinopril (PRINIVIL,ZESTRIL) 2.5 MG tablet Take 1 tablet by mouth Daily.   • nitroglycerin (NITROSTAT) 0.4 MG SL tablet 1 under the tongue as needed for angina, may repeat q5mins for up three doses   • omeprazole  (priLOSEC) 20 MG capsule Take 20 mg by mouth Daily.   • vitamin B-12 (CYANOCOBALAMIN) 1000 MCG tablet Take 1,000 mcg by mouth Daily.   • vitamin B-6 (PYRIDOXINE) 100 MG tablet Take 100 mg by mouth Daily.   • vitamin C (ASCORBIC ACID) 500 MG tablet Take 500 mg by mouth daily.   • VITAMIN D PO Take  by mouth.   • vitamin E 400 UNIT capsule Take 400 Units by mouth Daily.     No current facility-administered medications on file prior to visit.        Past Medical History:   Diagnosis Date   • Abnormal cardiovascular stress test 9/9/2019    Added automatically from request for surgery [id number]   • Airway hyperreactivity 5/3/2016   • Arthritis    • Chronic pain in right foot    • Collapse of right lung 12/23/1999   • Coronary artery disease    • ED (erectile dysfunction) 7/15/2016   • GERD (gastroesophageal reflux disease)    • Hypertension    • Mallet finger 10/20/2014   • Myocardial infarction (HCC)    • Neck pain 12/15/2017   • Precordial pain 7/22/2017    Added automatically from request for surgery [id number]   • Sprain of carpometacarpal joint 10/20/2014   • ST elevation myocardial infarction (STEMI) (HCC) 2/15/2017       Past Surgical History:   Procedure Laterality Date   • APPENDECTOMY     • CARDIAC CATHETERIZATION N/A 2/15/2017    Procedure: Left Heart Cath;  Surgeon: Kristina Billings MD;  Location: [facility] CATH INVASIVE LOCATION;  Service:    • CARDIAC CATHETERIZATION  2/15/2017    Procedure: Percutaneous Manual Thrombectomy;  Surgeon: Kristina Billings MD;  Location: [facility] CATH INVASIVE LOCATION;  Service:    • CARDIAC CATHETERIZATION N/A 2/15/2017    Procedure: Coronary angiography;  Surgeon: Kristina Billings MD;  Location: [facility] CATH INVASIVE LOCATION;  Service:    • CARDIAC CATHETERIZATION N/A 2/15/2017    Procedure: Left ventriculography;  Surgeon: Kristina Billings MD;  Location: [facility] CATH INVASIVE LOCATION;  Service:    • CARDIAC CATHETERIZATION N/A 7/24/2017    Procedure: Left Heart  Cath;  Surgeon: Kristina Billings MD;  Location:  [facility] CATH INVASIVE LOCATION;  Service:    • CARDIAC CATHETERIZATION N/A 7/24/2017    Procedure: Coronary angiography;  Surgeon: Kristina Billings MD;  Location:  [facility] CATH INVASIVE LOCATION;  Service:    • CARDIAC CATHETERIZATION N/A 7/24/2017    Procedure: Left ventriculography;  Surgeon: Kristina Billings MD;  Location:  [facility] CATH INVASIVE LOCATION;  Service:    • CARDIAC CATHETERIZATION N/A 9/11/2019    Procedure: Left Heart Cath;  Surgeon: Kristina Billnigs MD;  Location:  [facility] CATH INVASIVE LOCATION;  Service: Cardiology   • CARDIAC CATHETERIZATION N/A 9/11/2019    Procedure: Coronary angiography;  Surgeon: Kristina Billings MD;  Location:  [facility] CATH INVASIVE LOCATION;  Service: Cardiology   • CARDIAC CATHETERIZATION N/A 9/11/2019    Procedure: Stent CAYLA coronary;  Surgeon: Kristina Billings MD;  Location: [facility] CATH INVASIVE LOCATION;  Service: Cardiology   • CARDIAC CATHETERIZATION N/A 9/11/2019    Procedure: Left ventriculography;  Surgeon: Kristina Billings MD;  Location: [facility] CATH INVASIVE LOCATION;  Service: Cardiology   • CARDIAC CATHETERIZATION N/A 6/25/2021    Procedure: Left Heart Cath;  Surgeon: Kristina Billings MD;  Location: [facility] CATH [facility]ASIVE LOC[facility];  Service: Cardiology;  Laterality: N/A;   • CARDIAC CATHETERIZATION N/A 6/25/2021    Procedure: Coronary angiography;  Surgeon: Kristina Billings MD;  Location: [facility] CATH INVASIVE LOCATION;  Service: Cardiology;  Laterality: N/A;   • CARDIAC CATHETERIZATION N/A 6/25/2021    Procedure: Left ventriculography;  Surgeon: Kristina Billings MD;  Location: [facility] CATH INVASIVE LOC[facility];  Service: Cardiology;  Laterality: N/A;   • CARPAL TUNNEL RELEASE Right    • CHOLECYSTECTOMY     • COLONOSCOPY N/A 02/27/2015    Normal   • CORONARY ANGIOPLASTY     • CORONARY STENT PLACEMENT      x2   • CYST REMOVAL      thyroglossal cyst removed   • HERNIA REPAIR  Right     Inguinal   • DE RT/LT HEART CATHETERS N/A 2/15/2017    Procedure: Percutaneous Coronary Intervention;  Surgeon: Kristina Billings MD;  Location: [facility] INVASIVE LOCATION;  Service: Cardiovascular       Family History   Problem Relation Age of Onset   • Diabetes Mother         Type 2   • Hypertension Mother    • Hyperlipidemia Mother    • Heart attack Mother    • Heart disease Mother         S/P CABG   • Diabetes Father         Type 2   • Stroke Father    • Hypertension Father    • Hyperlipidemia Father    • Heart attack Father    • Heart disease Father         S/P CABG   • Alzheimer's disease Father    • Aneurysm Brother         Brain   • Pancreatitis Sister        Social History     Socioeconomic History   • Marital status:    • Number of children: 5   • Years of education: 2 YEARS OF COLLEGE   Tobacco Use   • Smoking status: Former Smoker     Packs/day: 0.50     Years: 7.00     Pack years: 3.50     Types: Cigarettes     Quit date: 1999     Years since quittin.8   • Smokeless tobacco: Never Used   Vaping Use   • Vaping Use: Never used   Substance and Sexual Activity   • Alcohol use: Yes     Comment: occasional   • Drug use: No   • Sexual activity: Defer       Review of Systems   Constitutional: Positive for fatigue (some, has been working 12 hours per day). Negative for appetite change, chills, fever, unexpected weight gain and unexpected weight loss (has been trying to lose weight).   Eyes: Negative for blurred vision.   Respiratory: Negative for chest tightness and shortness of breath. Cough: no change in occasional nonproductive cough.    Cardiovascular: Negative for chest pain and palpitations.   Gastrointestinal: Negative for abdominal pain, blood in stool, constipation, diarrhea, GERD (no problems as long as takes Omeprazole daily and avoids spicy foods) and indigestion.   Endocrine: Negative for polydipsia.   Genitourinary: Negative for dysuria and frequency.  "  Musculoskeletal: Arthralgias: some in bilateral hands.   Skin: Negative for rash.   Neurological: Negative for syncope.   Hematological: Bruises/bleeds easily: some easy bleeding, attributes to skin being thinner.       Objective   Vitals:    11/01/21 1309   BP: 118/84   Pulse: 83   Temp: 98 °F (36.7 °C)   TempSrc: Infrared   SpO2: 97%   Weight: 96.2 kg (212 lb)   Height: 182.9 cm (72\")   PainSc: 0-No pain     Body mass index is 28.75 kg/m².    Physical Exam  Vitals and nursing note reviewed.   Constitutional:       General: He is not in acute distress.     Appearance: He is well-developed and well-groomed. He is not diaphoretic.   HENT:      Head: Normocephalic.      Right Ear: External ear normal.      Left Ear: External ear normal.   Eyes:      Conjunctiva/sclera: Conjunctivae normal.   Neck:      Vascular: No carotid bruit.   Cardiovascular:      Rate and Rhythm: Normal rate and regular rhythm.      Pulses: Normal pulses.      Heart sounds: Normal heart sounds. No murmur heard.      Pulmonary:      Effort: Pulmonary effort is normal. No respiratory distress.      Breath sounds: Normal breath sounds.   Abdominal:      General: Bowel sounds are normal.      Palpations: Abdomen is soft. There is no hepatomegaly or splenomegaly.      Tenderness: There is no abdominal tenderness. There is no guarding.   Musculoskeletal:      Cervical back: Normal range of motion and neck supple. No bony tenderness.      Thoracic back: No bony tenderness.      Lumbar back: No bony tenderness. Negative right straight leg raise test and negative left straight leg raise test.      Right hip: No bony tenderness. Normal range of motion.      Left hip: No bony tenderness. Normal range of motion.      Right lower leg: No edema.      Left lower leg: No edema.      Comments: Bilateral hamstrings tight   Skin:     General: Skin is warm and dry.      Findings: No rash.   Neurological:      Mental Status: He is alert and oriented to person, " place, and time.      Gait: Gait is intact.      Deep Tendon Reflexes:      Reflex Scores:       Patellar reflexes are 2+ on the right side and 2+ on the left side.  Psychiatric:         Mood and Affect: Mood normal.         Behavior: Behavior normal.         Thought Content: Thought content normal.         Cognition and Memory: Cognition normal.         Judgment: Judgment normal.         Lab Results   Component Value Date    WBC 5.56 06/21/2021    RBC 4.96 06/21/2021    HGB 15.9 06/21/2021    HCT 46.2 06/21/2021    MCV 93.1 06/21/2021    MCH 32.1 06/21/2021    MCHC 34.4 06/21/2021    RDW 12.5 06/21/2021    RDWSD 43.2 06/21/2021    MPV 12.4 (H) 06/21/2021     06/21/2021    NEUTRORELPCT 50.8 06/21/2021    LYMPHORELPCT 23.0 06/21/2021    MONORELPCT 15.5 (H) 06/21/2021    EOSRELPCT 9.5 (H) 06/21/2021    BASORELPCT 0.7 06/21/2021    AUTOIGPER 0.5 06/21/2021    NEUTROABS 2.82 06/21/2021    LYMPHSABS 1.28 06/21/2021    MONOSABS 0.86 06/21/2021    EOSABS 0.53 (H) 06/21/2021    BASOSABS 0.04 06/21/2021    AUTOIGNUM 0.03 06/21/2021    NRBC 0.0 06/21/2021     Lab Results   Component Value Date    GLUCOSE 99 06/21/2021    BUN 12 06/21/2021    CREATININE 1.10 06/21/2021    EGFRIFNONA 67 06/21/2021    EGFRIFAFRI 96 04/30/2021    BCR 10.9 06/21/2021    K 4.4 06/21/2021    CO2 22.3 06/21/2021    CALCIUM 9.0 06/21/2021    PROTENTOTREF 6.7 04/30/2021    ALBUMIN 4.60 04/30/2021    LABIL2 2.2 04/30/2021    AST 23 04/30/2021    ALT 27 04/30/2021      Lab Results   Component Value Date    CHLPL 132 04/30/2021    TRIG 96 04/30/2021    HDL 51 04/30/2021    VLDL 18 04/30/2021    LDL 63 04/30/2021     Lab Results   Component Value Date    TSH 1.140 11/02/2020     Lab Results   Component Value Date    HGBA1C 5.60 11/02/2020     Lab Results   Component Value Date    PSA 0.872 11/02/2020         Assessment/Plan .  Problem List Items Addressed This Visit     Essential hypertension - Primary    Current Assessment & Plan     Hypertension  is stable.  Regular aerobic exercise.  Continue current medications.  Ambulatory blood pressure monitoring.  Blood pressure will be reassessed at the next regular appointment.  Continue Lisinopril daily.         Snoring    Current Assessment & Plan     Call [phone] to schedule an appointment with sleep medicine.         Anxiety    Current Assessment & Plan     Saw counselor and symptoms improved.         Skin lesion of back    Current Assessment & Plan     Reschedule appointment with dermatology.         Sciatica of left side    Current Assessment & Plan     Continue regular walking.  Try ice/heat to lower back, whichever feels better.  Continue Voltaren gel.  May try Tylenol as needed for discomfort.               Return in about 4 months (around 3/1/2022) for Medicare Wellness, Recheck.or sooner if symptoms persist or worsen.  Will get labs at follow up appointment.  Will consider physical therapy if symptoms persist or worsen.       I spent 30 minutes caring for Sander on this date of service. This time includes time spent by me in the following activities:performing a medically appropriate examination and/or evaluation , counseling and educating the patient/family/caregiver, ordering medications, tests, or procedures and documenting information in the medical record    COVID-19 Precautions - Patient was compliant in wearing a mask. When I saw the patient, I used appropriate personal protective equipment (PPE) including mask, gloves, and eye shield (standard procedure).  Hand hygiene was completed before and after seeing the patient.

## 2021-11-01 NOTE — PATIENT INSTRUCTIONS
Continue to monitor your blood pressure periodically and record results.  Continue to work on healthy diet and exercise.  Try ice/heat to lower back, whichever feels better.  Continue Voltaren gel.  May try Tylenol as needed for discomfort.  Follow up in 4 months for Medicare Wellness, or sooner if symptoms persist or worsen.  Call [phone] to schedule an appointment with sleep medicine.

## 2021-11-02 NOTE — ASSESSMENT & PLAN NOTE
Continue regular walking.  Try ice/heat to lower back, whichever feels better.  Continue Voltaren gel.  May try Tylenol as needed for discomfort.

## 2021-11-10 DIAGNOSIS — I10 ESSENTIAL HYPERTENSION: ICD-10-CM

## 2021-11-11 NOTE — TELEPHONE ENCOUNTER
PATIENT IS CALLING IN STATING THAT HE HAS NO REFILLS LEFT ON HIS LISINOPRIL 2.5 AND HE WILL BE GOING OUT OF TOWN SOON.  HE HAS RUN OUT OF THE MEDICATION AND WANTS TO KNOW IF HE NEEDS TO HAVE A NEW PRESCRIPTION WRITTEN BEFORE THIS CAN BE REFILLED.  THE EMPTY BOTTLE THAT HE HAS SAYS HE HAS ONE MORE REFILL THAT WILL NOT  UNTIL 2022  SO HE DOES NOT KNOW WHY THE PHARMACY IS TELLING HIM THAT HE IS OUT OF REFILLS.    PT CALL BACK  [phone]  PHARMACY  [facility]  [address] [place] [address] [phone]

## 2021-11-12 DIAGNOSIS — I10 ESSENTIAL HYPERTENSION: ICD-10-CM

## 2021-11-12 RX ORDER — LISINOPRIL 2.5 MG/1
2.5 TABLET ORAL DAILY
Qty: 90 TABLET | Refills: 0 | OUTPATIENT
Start: 2021-11-12

## 2021-11-12 RX ORDER — LISINOPRIL 2.5 MG/1
2.5 TABLET ORAL DAILY
Qty: 90 TABLET | Refills: 1 | Status: SHIPPED | OUTPATIENT
Start: 2021-11-12 | End: 2022-03-10 | Stop reason: SDUPTHER

## 2021-11-12 RX ORDER — LISINOPRIL 2.5 MG/1
TABLET ORAL
Qty: 90 TABLET | Refills: 1 | OUTPATIENT
Start: 2021-11-12

## 2021-11-12 NOTE — TELEPHONE ENCOUNTER
Rx Refill Note  Requested Prescriptions      No prescriptions requested or ordered in this encounter      Last office visit with prescribing clinician: 11/1/2021      Next office visit with prescribing clinician: 3/7/2022            Oliva Brower MA  11/12/21, 11:49 EST

## 2021-12-13 ENCOUNTER — OFFICE VISIT (OUTPATIENT)
Dept: FAMILY MEDICINE CLINIC | Facility: CLINIC | Age: 66
End: 2021-12-13

## 2021-12-13 VITALS
OXYGEN SATURATION: 99 % | DIASTOLIC BLOOD PRESSURE: 88 MMHG | HEIGHT: 72 IN | WEIGHT: 211.8 LBS | HEART RATE: 70 BPM | TEMPERATURE: 98.1 F | SYSTOLIC BLOOD PRESSURE: 130 MMHG | BODY MASS INDEX: 28.69 KG/M2

## 2021-12-13 DIAGNOSIS — J30.9 ALLERGIC RHINITIS, UNSPECIFIED SEASONALITY, UNSPECIFIED TRIGGER: ICD-10-CM

## 2021-12-13 DIAGNOSIS — H00.015 HORDEOLUM EXTERNUM OF LEFT LOWER EYELID: Primary | ICD-10-CM

## 2021-12-13 DIAGNOSIS — M54.32 SCIATICA OF LEFT SIDE: ICD-10-CM

## 2021-12-13 PROCEDURE — 99213 OFFICE O/P EST LOW 20 MIN: CPT | Performed by: NURSE PRACTITIONER

## 2021-12-13 RX ORDER — BACITRACIN 500 [USP'U]/G
OINTMENT OPHTHALMIC 4 TIMES DAILY
Qty: 3.5 G | Refills: 0 | Status: SHIPPED | OUTPATIENT
Start: 2021-12-13 | End: 2021-12-20

## 2021-12-13 NOTE — PROGRESS NOTES
Subjective   Sander White is a 66 y.o. male.     Chief Complaint   Patient presents with   • Stye     left eye X 1 week, itches, blurry        History of Present Illness   Patient presents with c/o lump on left lower eye lid x1 week; has had itching and blurry vision in left eye due to drainage; has been waking up with left eye matted shut in mornings; has constant watering of left eye; no pain with movement in eye; has tried warm compresses, but has not helped much; no ear pain; no runny/stuffy nose; no known injury.    F/U lower back pain: has been stretching and helps; did not do physical therapy at this point, symptoms improving; takes Tylenol rarely and helps.    Monitors BP, typically runs 110-120s/80s; no headaches; no swelling.    The following portions of the patient's history were reviewed and updated as appropriate: allergies, current medications, past family history, past medical history, past social history, past surgical history and problem list.    Current Outpatient Medications on File Prior to Visit   Medication Sig   • aspirin 81 MG chewable tablet Chew 1 tablet Daily.   • atorvastatin (LIPITOR) 40 MG tablet Take 1 tablet by mouth Daily.   • diclofenac (VOLTAREN) 1 % gel gel Apply 4 g topically to the appropriate area as directed 4 (Four) Times a Day.   • ELDERBERRY PO Take 1 tablet by mouth Daily.   • lisinopril (PRINIVIL,ZESTRIL) 2.5 MG tablet Take 1 tablet by mouth Daily.   • nitroglycerin (NITROSTAT) 0.4 MG SL tablet 1 under the tongue as needed for angina, may repeat q5mins for up three doses   • omeprazole (priLOSEC) 20 MG capsule Take 20 mg by mouth Daily.   • vitamin B-12 (CYANOCOBALAMIN) 1000 MCG tablet Take 1,000 mcg by mouth Daily.   • vitamin B-6 (PYRIDOXINE) 100 MG tablet Take 100 mg by mouth Daily.   • vitamin C (ASCORBIC ACID) 500 MG tablet Take 500 mg by mouth daily.   • VITAMIN D PO Take  by mouth.   • vitamin E 400 UNIT capsule Take 400 Units by mouth Daily.     No current  facility-administered medications on file prior to visit.        Past Medical History:   Diagnosis Date   • Abnormal cardiovascular stress test 9/9/2019    Added automatically from request for surgery [id number]   • Airway hyperreactivity 5/3/2016   • Arthritis    • Chronic pain in right foot    • Collapse of right lung 12/23/1999   • Coronary artery disease    • ED (erectile dysfunction) 7/15/2016   • GERD (gastroesophageal reflux disease)    • Hypertension    • Mallet finger 10/20/2014   • Myocardial infarction (HCC)    • Neck pain 12/15/2017   • Precordial pain 7/22/2017    Added automatically from request for surgery [id number]   • Sprain of carpometacarpal joint 10/20/2014   • ST elevation myocardial infarction (STEMI) ([facility]) 2/15/2017       Past Surgical History:   Procedure Laterality Date   • APPENDECTOMY     • CARDIAC CATHETERIZATION N/A 2/15/2017    Procedure: Left Heart Cath;  Surgeon: Kristina Billings MD;  Location: [facility] INVASIVE LOCATION;  Service:    • CARDIAC CATHETERIZATION  2/15/2017    Procedure: Percutaneous Manual Thrombectomy;  Surgeon: Kristina Billings MD;  Location: [facility] INVASIVE LOCATION;  Service:    • CARDIAC CATHETERIZATION N/A 2/15/2017    Procedure: Coronary angiography;  Surgeon: Kristina Billings MD;  Location: [facility] INVASIVE LOCATION;  Service:    • CARDIAC CATHETERIZATION N/A 2/15/2017    Procedure: Left ventriculography;  Surgeon: Kristina Billnigs MD;  Location: [facility] INVASIVE LOCATION;  Service:    • CARDIAC CATHETERIZATION N/A 7/24/2017    Procedure: Left Heart Cath;  Surgeon: Kristina Billings MD;  Location: [facility] INVASIVE LOCATION;  Service:    • CARDIAC CATHETERIZATION N/A 7/24/2017    Procedure: Coronary angiography;  Surgeon: Kristina Billings MD;  Location: [facility] INVASIVE LOCATION;  Service:    • CARDIAC CATHETERIZATION N/A 7/24/2017    Procedure: Left ventriculography;  Surgeon: Kristina Billings MD;  Location:   [facility] LOC[facility];  Service:    • CARDIAC CATHETERIZATION N/A 9/11/2019    Procedure: Left Heart Cath;  Surgeon: Kristina Billings MD;  Location:  [facility] INVAS[facility] LOCATION;  Service: Cardiology   • CARDIAC CATHETERIZATION N/A 9/11/2019    Procedure: Coronary angiography;  Surgeon: Kristina Billings MD;  Location:  [facility] INV[facility]ATION;  Service: Cardiology   • CARDIAC CATHETERIZATION N/A 9/11/2019    Procedure: Stent CAYLA coronary;  Surgeon: Kristina Billings MD;  Location:  [facility] LOC[facility];  Service: Cardiology   • CARDIAC CATHETERIZATION N/A 9/11/2019    Procedure: Left ventriculography;  Surgeon: Kristina Billings MD;  Location:  [facility];  Service: Cardiology   • CARDIAC CATHETERIZATION N/A 6/25/2021    Procedure: Left Heart Cath;  Surgeon: Kristina Billings MD;  [facility];  Service: Cardiology;  Laterality: N/A;   • CARDIAC CATHETERIZATION N/A 6/25/2021    Procedure: Coronary angiography;  Surgeon: Kristina Billings MD;  Location: [facility] LOC[facility];  Service: Cardiology;  Laterality: N/A;   • CARDIAC CATHETERIZATION N/A 6/25/2021    Procedure: Left ventriculography;  Surgeon: Kristina Billings MD;  Location: [facility] LOC[facility];  Service: Cardiology;  Laterality: N/A;   • CARPAL TUNNEL RELEASE Right    • CHOLECYSTECTOMY     • COLONOSCOPY N/A 02/27/2015    Normal   • CORONARY ANGIOPLASTY     • CORONARY STENT PLACEMENT      x2   • CYST REMOVAL      thyroglossal cyst removed   • HERNIA REPAIR Right     Inguinal   • SD RT/LT HEART CATHETERS N/A 2/15/2017    Procedure: Percutaneous Coronary Intervention;  Surgeon: Kristina Billings MD;  Location: [facility] LOC[facility];  Service: Cardiovascular       Family History   Problem Relation Age of Onset   • Diabetes Mother         Type 2   • Hypertension Mother    • Hyperlipidemia Mother    • Heart attack Mother    • Heart  "disease Mother         S/P CABG   • Diabetes Father         Type 2   • Stroke Father    • Hypertension Father    • Hyperlipidemia Father    • Heart attack Father    • Heart disease Father         S/P CABG   • Alzheimer's disease Father    • Aneurysm Brother         Brain   • Pancreatitis Sister        Social History     Socioeconomic History   • Marital status:    • Number of children: 5   • Years of education: 2 YEARS OF COLLEGE   Tobacco Use   • Smoking status: Former Smoker     Packs/day: 0.50     Years: 7.00     Pack years: 3.50     Types: Cigarettes     Quit date: 1999     Years since quittin.9   • Smokeless tobacco: Never Used   Vaping Use   • Vaping Use: Never used   Substance and Sexual Activity   • Alcohol use: Yes     Comment: occasional   • Drug use: No   • Sexual activity: Defer       Review of Systems   Constitutional: Negative for appetite change, chills, fever, unexpected weight gain and unexpected weight loss (has been trying to lose weight by watching what eats). Fatigue: some fatigue, attributes to working 12 hours daily.   HENT: Negative for ear pain and sore throat.    Eyes: Negative for pain.   Respiratory: Negative for chest tightness and shortness of breath. Cough: no change in rare cough, nonproductive cough.    Cardiovascular: Negative for chest pain, palpitations and leg swelling.   Gastrointestinal: Negative for abdominal pain, constipation and diarrhea.   Skin: Negative for rash.   Neurological: Negative for dizziness and light-headedness.       Objective   Vitals:    21 1046   BP: 130/88   BP Location: Left arm   Patient Position: Sitting   Cuff Size: Adult   Pulse: 70   Temp: 98.1 °F (36.7 °C)   SpO2: 99%   Weight: 96.1 kg (211 lb 12.8 oz)   Height: 182.9 cm (72\")     Body mass index is 28.73 kg/m².    Physical Exam  Vitals and nursing note reviewed.   Constitutional:       General: He is not in acute distress.     Appearance: He is well-developed and " well-groomed. He is not ill-appearing or diaphoretic.   HENT:      Head: Normocephalic.      Right Ear: External ear normal. Right ear middle ear effusion: TM dull. Tympanic membrane is not erythematous.      Left Ear: External ear normal. Left ear middle ear effusion: TM dull. Tympanic membrane is not erythematous.      Nose: Nose normal.      Right Sinus: No maxillary sinus tenderness or frontal sinus tenderness.      Left Sinus: No maxillary sinus tenderness or frontal sinus tenderness.      Mouth/Throat:      Mouth: Mucous membranes are moist.      Pharynx: No oropharyngeal exudate. Posterior oropharyngeal erythema: mild in posterior pharynx.   Eyes:      Extraocular Movements: Extraocular movements intact.      Conjunctiva/sclera: Conjunctivae normal.      Pupils: Pupils are equal, round, and reactive to light.        Comments: No pain with EOM   Neck:      Vascular: No carotid bruit.   Cardiovascular:      Rate and Rhythm: Normal rate and regular rhythm.      Pulses: Normal pulses.      Heart sounds: Normal heart sounds. No murmur heard.      Pulmonary:      Effort: Pulmonary effort is normal. No respiratory distress.      Breath sounds: Normal breath sounds.   Abdominal:      Palpations: Abdomen is soft. There is no hepatomegaly or splenomegaly.      Tenderness: There is no abdominal tenderness. There is no guarding.   Musculoskeletal:      Cervical back: Normal range of motion and neck supple.      Right lower leg: No edema.      Left lower leg: No edema.   Lymphadenopathy:      Cervical: No cervical adenopathy.   Skin:     General: Skin is warm and dry.      Findings: No rash.   Neurological:      Mental Status: He is alert and oriented to person, place, and time.      Gait: Gait is intact.   Psychiatric:         Mood and Affect: Mood normal.         Behavior: Behavior normal.         Thought Content: Thought content normal.         Cognition and Memory: Cognition normal.         Judgment: Judgment normal.          Lab Results   Component Value Date    WBC 5.56 06/21/2021    RBC 4.96 06/21/2021    HGB 15.9 06/21/2021    HCT 46.2 06/21/2021    MCV 93.1 06/21/2021    MCH 32.1 06/21/2021    MCHC 34.4 06/21/2021    RDW 12.5 06/21/2021    RDWSD 43.2 06/21/2021    MPV 12.4 (H) 06/21/2021     06/21/2021    NEUTRORELPCT 50.8 06/21/2021    LYMPHORELPCT 23.0 06/21/2021    MONORELPCT 15.5 (H) 06/21/2021    EOSRELPCT 9.5 (H) 06/21/2021    BASORELPCT 0.7 06/21/2021    AUTOIGPER 0.5 06/21/2021    NEUTROABS 2.82 06/21/2021    LYMPHSABS 1.28 06/21/2021    MONOSABS 0.86 06/21/2021    EOSABS 0.53 (H) 06/21/2021    BASOSABS 0.04 06/21/2021    AUTOIGNUM 0.03 06/21/2021    NRBC 0.0 06/21/2021     Lab Results   Component Value Date    GLUCOSE 99 06/21/2021    BUN 12 06/21/2021    CREATININE 1.10 06/21/2021    EGFRIFNONA 67 06/21/2021    EGFRIFAFRI 96 04/30/2021    BCR 10.9 06/21/2021    K 4.4 06/21/2021    CO2 22.3 06/21/2021    CALCIUM 9.0 06/21/2021    PROTENTOTREF 6.7 04/30/2021    ALBUMIN 4.60 04/30/2021    LABIL2 2.2 04/30/2021    AST 23 04/30/2021    ALT 27 04/30/2021      Lab Results   Component Value Date    CHLPL 132 04/30/2021    TRIG 96 04/30/2021    HDL 51 04/30/2021    VLDL 18 04/30/2021    LDL 63 04/30/2021     Lab Results   Component Value Date    TSH 1.140 11/02/2020     Lab Results   Component Value Date    HGBA1C 5.60 11/02/2020     Lab Results   Component Value Date    PSA 0.872 11/02/2020         Assessment/Plan .  Problem List Items Addressed This Visit     Sciatica of left side    Current Assessment & Plan     Improving.  Continue gentle stretching.  Continue Tylenol as needed.         Hordeolum externum of left lower eyelid - Primary    Current Assessment & Plan     Continue warm compresses to eye.         Relevant Medications    bacitracin 500 UNIT/GM ophthalmic ointment    Allergic rhinitis    Current Assessment & Plan     May try over the counter antihistamine, such as Claritin or Allegra, daily for  allergies.               Return if symptoms worsen or fail to improve.       COVID-19 Precautions - Patient was compliant in wearing a mask. When I saw the patient, I used appropriate personal protective equipment (PPE) including mask, gloves, and eye shield (standard procedure).  Hand hygiene was completed before and after seeing the patient.

## 2021-12-14 PROBLEM — J30.9 ALLERGIC RHINITIS: Status: ACTIVE | Noted: 2021-12-14

## 2021-12-14 PROBLEM — H00.015 HORDEOLUM EXTERNUM OF LEFT LOWER EYELID: Status: ACTIVE | Noted: 2021-12-14

## 2022-03-07 ENCOUNTER — OFFICE VISIT (OUTPATIENT)
Dept: FAMILY MEDICINE CLINIC | Facility: CLINIC | Age: 67
End: 2022-03-07

## 2022-03-07 VITALS
HEIGHT: 72 IN | HEART RATE: 84 BPM | SYSTOLIC BLOOD PRESSURE: 110 MMHG | TEMPERATURE: 96.9 F | BODY MASS INDEX: 27.9 KG/M2 | DIASTOLIC BLOOD PRESSURE: 78 MMHG | WEIGHT: 206 LBS | OXYGEN SATURATION: 97 %

## 2022-03-07 DIAGNOSIS — K21.9 GASTROESOPHAGEAL REFLUX DISEASE, UNSPECIFIED WHETHER ESOPHAGITIS PRESENT: ICD-10-CM

## 2022-03-07 DIAGNOSIS — F33.0 MILD EPISODE OF RECURRENT MAJOR DEPRESSIVE DISORDER: ICD-10-CM

## 2022-03-07 DIAGNOSIS — I10 ESSENTIAL HYPERTENSION: Primary | ICD-10-CM

## 2022-03-07 DIAGNOSIS — R06.83 SNORING: ICD-10-CM

## 2022-03-07 DIAGNOSIS — M54.32 SCIATICA OF LEFT SIDE: ICD-10-CM

## 2022-03-07 DIAGNOSIS — E78.2 MIXED HYPERLIPIDEMIA: ICD-10-CM

## 2022-03-07 DIAGNOSIS — F41.9 ANXIETY: ICD-10-CM

## 2022-03-07 DIAGNOSIS — L98.9 SKIN LESION OF BACK: ICD-10-CM

## 2022-03-07 PROBLEM — H00.015 HORDEOLUM EXTERNUM OF LEFT LOWER EYELID: Status: RESOLVED | Noted: 2021-12-14 | Resolved: 2022-03-07

## 2022-03-07 PROBLEM — F33.1 MODERATE EPISODE OF RECURRENT MAJOR DEPRESSIVE DISORDER: Status: ACTIVE | Noted: 2019-12-06

## 2022-03-07 PROCEDURE — 99214 OFFICE O/P EST MOD 30 MIN: CPT | Performed by: NURSE PRACTITIONER

## 2022-03-07 RX ORDER — BACITRACIN ZINC AND POLYMYXIN B SULFATE 500; 10000 [USP'U]/G; [USP'U]/G
OINTMENT OPHTHALMIC
COMMUNITY
Start: 2021-12-14 | End: 2022-09-20

## 2022-03-07 NOTE — ASSESSMENT & PLAN NOTE
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 Their depression is currently active and the condition is worsening. This will be reassessed at the next regular appointment. F/U as described:patient referred to Mental Health Specialist.  Schedule a follow up appointment with counselor/therapist.

## 2022-03-07 NOTE — PATIENT INSTRUCTIONS
Continue to monitor your blood pressure periodically and record results.  Continue to work on healthy diet and exercise.  Continue stretching for back.  Follow up pending lab results.  Follow up in 6 months for physical with fasting labs, or sooner if symptoms persist or worsen.   Call dermatology at [phone] schedule an appointment about skin lesion of back.

## 2022-03-07 NOTE — PROGRESS NOTES
Subjective   Sander White is a 66 y.o. male.     Chief Complaint   Patient presents with   • Hypertension   • Hyperlipidemia     Med refill       History of Present Illness   Patient presents for follow up HTN: takes Lisinopril daily; monitors BP weekly and typically runs about 110s/70s; no swelling; no orthostasis; no headaches; has been trying to lose weight by watching diet and decreasing portions; has been eating smaller amounts several times daily; has had MI in past; has not seen seen cardiology recently.    F/U Hyperlipidemia: takes Atorvastatin daily; no myalgias; no formal exercise, does a lot of walking daily at work; walks 5-7 miles daily; watches saturated fats in diet; not fasting today.    F/U snoring: never saw sleep med; declines at this time; sleep has improved since stress has decreased.    F/U anxiety: currently going through a divorce; [place]; came home and accused of cheating; also lost both parents within last 1.5 years; has trouble staying asleep most days; no problems falling asleep; has not seen counselor recently; plans to schedule appointment.    F/U KRISTOFER: takes Omeprazole daily and works well; will have symptoms if misses a dose; has had EGD in past.    F/U sciatica: no longer taking Tylenol or using Voltaren gel; has some discomfort in left buttock/hip and down to left knee; previously down to left toes, but has improved; does exercises and stretches and helps; no bladder or bowel dysfunction.    The following portions of the patient's history were reviewed and updated as appropriate: allergies, current medications, past family history, past medical history, past social history, past surgical history and problem list.    Current Outpatient Medications on File Prior to Visit   Medication Sig   • aspirin 81 MG chewable tablet Chew 1 tablet Daily.   • atorvastatin (LIPITOR) 40 MG tablet Take 1 tablet by mouth Daily.   • ELDERBERRY PO Take 1 tablet by mouth  Daily.   • lisinopril (PRINIVIL,ZESTRIL) 2.5 MG tablet Take 1 tablet by mouth Daily.   • nitroglycerin (NITROSTAT) 0.4 MG SL tablet 1 under the tongue as needed for angina, may repeat q5mins for up three doses   • omeprazole (priLOSEC) 20 MG capsule Take 20 mg by mouth Daily.   • vitamin B-12 (CYANOCOBALAMIN) 1000 MCG tablet Take 1,000 mcg by mouth Daily.   • vitamin B-6 (PYRIDOXINE) 100 MG tablet Take 100 mg by mouth Daily.   • vitamin C (ASCORBIC ACID) 500 MG tablet Take 500 mg by mouth daily.   • VITAMIN D PO Take  by mouth.   • vitamin E 400 UNIT capsule Take 400 Units by mouth Daily.   • bacitracin-polymyxin b (POLYSPORIN) 500-06584 UNIT/GM ophthalmic ointment    • diclofenac (VOLTAREN) 1 % gel gel Apply 4 g topically to the appropriate area as directed 4 (Four) Times a Day.     No current facility-administered medications on file prior to visit.        Past Medical History:   Diagnosis Date   • Abnormal cardiovascular stress test 9/9/2019    Added automatically from request for surgery [id number]   • Airway hyperreactivity 5/3/2016   • Arthritis    • Chronic pain in right foot    • Collapse of right lung 12/23/1999   • Coronary artery disease    • ED (erectile dysfunction) 7/15/2016   • GERD (gastroesophageal reflux disease)    • Hordeolum externum of left lower eyelid 12/14/2021   • Hypertension    • Mallet finger 10/20/2014   • Myocardial infarction (HCC)    • Neck pain 12/15/2017   • Precordial pain 7/22/2017    Added automatically from request for surgery [id number]   • Sprain of carpometacarpal joint 10/20/2014   • ST elevation myocardial infarction (STEMI) (HCC) 2/15/2017       Past Surgical History:   Procedure Laterality Date   • APPENDECTOMY     • CARDIAC CATHETERIZATION N/A 2/15/2017    Procedure: Left Heart Cath;  Surgeon: Kristina Billings MD;  Location: [facility] CATH INVASIVE LOCATION;  Service:    • CARDIAC CATHETERIZATION  2/15/2017    Procedure: Percutaneous Manual Thrombectomy;  Surgeon: Kristina  MD Awa;  Location:  [facility] CATH INVASIVE LOCATION;  Service:    • CARDIAC CATHETERIZATION N/A 2/15/2017    Procedure: Coronary angiography;  Surgeon: Kristina Billings MD;  Location:  [facility] CATH INVASIVE LOCATION;  Service:    • CARDIAC CATHETERIZATION N/A 2/15/2017    Procedure: Left ventriculography;  Surgeon: Kristina Billings MD;  Location:  [facility] CATH INVASIVE LOCATION;  Service:    • CARDIAC CATHETERIZATION N/A 7/24/2017    Procedure: Left Heart Cath;  Surgeon: Kristina Billings MD;  Location:  [facility] CATH INVASIVE LOCATION;  Service:    • CARDIAC CATHETERIZATION N/A 7/24/2017    Procedure: Coronary angiography;  Surgeon: Kristina Billings MD;  Location:  [facility] CATH INVASIVE LOCATION;  Service:    • CARDIAC CATHETERIZATION N/A 7/24/2017    Procedure: Left ventriculography;  Surgeon: Kristina Billings MD;  Location:  [facility] CATH INVASIVE LOCATION;  Service:    • CARDIAC CATHETERIZATION N/A 9/11/2019    Procedure: Left Heart Cath;  Surgeon: Kristina Billings MD;  [facility];  Service: Cardiology   • CARDIAC CATHETERIZATION N/A 9/11/2019    Procedure: Coronary angiography;  Surgeon: Kristina Billings MD;  Location:  [facility] CATH INVASIVE LOCATION;  Service: Cardiology   • CARDIAC CATHETERIZATION N/A 9/11/2019    Procedure: Stent CAYLA coronary;  Surgeon: Kristina Billings MD;  [facility];  Service: Cardiology   • CARDIAC CATHETERIZATION N/A 9/11/2019    Procedure: Left ventriculography;  Surgeon: Kristina Billings MD;  [facility];  Service: Cardiology   • CARDIAC CATHETERIZATION N/A 6/25/2021    Procedure: Left Heart Cath;  Surgeon: Kristina Billings MD;  Location:  [facility] CATH INVASIVE LOCATION;  Service: Cardiology;  Laterality: N/A;   • CARDIAC CATHETERIZATION N/A 6/25/2021    Procedure: Coronary angiography;  Surgeon: Kristina Billings MD;  Location:  [facility] CATH INVASIVE  LOCATION;  Service: Cardiology;  Laterality: N/A;   • CARDIAC CATHETERIZATION N/A 2021    Procedure: Left ventriculography;  Surgeon: Kristina Billings MD;  Location:  [facility] CATH INVASIVE LOCATION;  Service: Cardiology;  Laterality: N/A;   • CARPAL TUNNEL RELEASE Right    • CHOLECYSTECTOMY     • COLONOSCOPY N/A 2015    Normal   • CORONARY ANGIOPLASTY     • CORONARY STENT PLACEMENT      x2   • CYST REMOVAL      thyroglossal cyst removed   • HERNIA REPAIR Right     Inguinal   • VA RT/LT HEART CATHETERS N/A 2/15/2017    Procedure: Percutaneous Coronary Intervention;  Surgeon: Kristina Billings MD;  Location:  [facility] CATH INVASIVE LOCATION;  Service: Cardiovascular       Family History   Problem Relation Age of Onset   • Diabetes Mother         Type 2   • Hypertension Mother    • Hyperlipidemia Mother    • Heart attack Mother    • Heart disease Mother         S/P CABG   • Diabetes Father         Type 2   • Stroke Father    • Hypertension Father    • Hyperlipidemia Father    • Heart attack Father    • Heart disease Father         S/P CABG   • Alzheimer's disease Father    • Aneurysm Brother         Brain   • Pancreatitis Sister        Social History     Socioeconomic History   • Marital status:    • Number of children: 5   • Years of education: 2 YEARS OF COLLEGE   Tobacco Use   • Smoking status: Former Smoker     Packs/day: 0.50     Years: 7.00     Pack years: 3.50     Types: Cigarettes     Quit date: 1999     Years since quittin.2   • Smokeless tobacco: Never Used   Vaping Use   • Vaping Use: Never used   Substance and Sexual Activity   • Alcohol use: Yes     Comment: occasional   • Drug use: No   • Sexual activity: Defer       Review of Systems   Constitutional: Negative for appetite change, chills, fatigue, fever, unexpected weight gain and unexpected weight loss.   HENT: Positive for tinnitus (chronic since was in the [employer]). Negative for ear pain and sore throat.    Eyes:  "Negative for blurred vision (has had recent eye exam).   Respiratory: Negative for cough, chest tightness and shortness of breath.    Cardiovascular: Negative for chest pain and palpitations.   Gastrointestinal: Negative for abdominal pain, blood in stool, constipation and diarrhea.   Endocrine: Negative for polydipsia.   Genitourinary: Negative for dysuria and frequency.   Musculoskeletal: Back pain: see HPI.   Skin: Negative for rash.   Neurological: Negative for syncope and weakness.   Hematological: Does not bruise/bleed easily.     PHQ-2 Depression Screening  Little interest or pleasure in doing things? 2-->more than half the days   Feeling down, depressed, or hopeless? 3-->nearly every day   PHQ-2 Total Score 9     PHQ-9 total: 9      Objective   Vitals:    03/07/22 0822   BP: 110/78   Pulse: 84   Temp: 96.9 °F (36.1 °C)   TempSrc: Infrared   SpO2: 97%   Weight: 93.4 kg (206 lb)   Height: 182.9 cm (72\")   PainSc: 0-No pain     Body mass index is 27.94 kg/m².    Physical Exam  Vitals and nursing note reviewed.   Constitutional:       General: He is not in acute distress.     Appearance: He is well-developed. He is not diaphoretic.   HENT:      Head: Normocephalic.      Right Ear: External ear normal.      Left Ear: External ear normal.   Eyes:      Conjunctiva/sclera: Conjunctivae normal.   Cardiovascular:      Rate and Rhythm: Normal rate and regular rhythm.      Pulses: Normal pulses.      Heart sounds: Normal heart sounds. No murmur heard.  Pulmonary:      Effort: Pulmonary effort is normal. No respiratory distress.      Breath sounds: Normal breath sounds.   Abdominal:      General: Bowel sounds are normal.      Palpations: Abdomen is soft. There is no hepatomegaly or splenomegaly.      Tenderness: There is no abdominal tenderness.   Musculoskeletal:      Cervical back: Normal range of motion and neck supple.      Right lower leg: No edema.      Left lower leg: No edema.   Skin:     General: Skin is warm " and dry.   Neurological:      Mental Status: He is alert and oriented to person, place, and time.   Psychiatric:         Mood and Affect: Mood normal.         Behavior: Behavior normal.         Thought Content: Thought content normal.         Judgment: Judgment normal.         Lab Results   Component Value Date    WBC 5.56 06/21/2021    RBC 4.96 06/21/2021    HGB 15.9 06/21/2021    HCT 46.2 06/21/2021    MCV 93.1 06/21/2021    MCH 32.1 06/21/2021    MCHC 34.4 06/21/2021    RDW 12.5 06/21/2021    RDWSD 43.2 06/21/2021    MPV 12.4 (H) 06/21/2021     06/21/2021    NEUTRORELPCT 50.8 06/21/2021    LYMPHORELPCT 23.0 06/21/2021    MONORELPCT 15.5 (H) 06/21/2021    EOSRELPCT 9.5 (H) 06/21/2021    BASORELPCT 0.7 06/21/2021    AUTOIGPER 0.5 06/21/2021    NEUTROABS 2.82 06/21/2021    LYMPHSABS 1.28 06/21/2021    MONOSABS 0.86 06/21/2021    EOSABS 0.53 (H) 06/21/2021    BASOSABS 0.04 06/21/2021    AUTOIGNUM 0.03 06/21/2021    NRBC 0.0 06/21/2021     Lab Results   Component Value Date    GLUCOSE 99 06/21/2021    BUN 12 06/21/2021    CREATININE 1.10 06/21/2021    EGFRIFNONA 67 06/21/2021    EGFRIFAFRI 96 04/30/2021    BCR 10.9 06/21/2021    K 4.4 06/21/2021    CO2 22.3 06/21/2021    CALCIUM 9.0 06/21/2021    PROTENTOTREF 6.7 04/30/2021    ALBUMIN 4.60 04/30/2021    LABIL2 2.2 04/30/2021    AST 23 04/30/2021    ALT 27 04/30/2021      Lab Results   Component Value Date    CHLPL 132 04/30/2021    TRIG 96 04/30/2021    HDL 51 04/30/2021    VLDL 18 04/30/2021    LDL 63 04/30/2021     Lab Results   Component Value Date    TSH 1.140 11/02/2020     Lab Results   Component Value Date    HGBA1C 5.60 11/02/2020     Lab Results   Component Value Date    PSA 0.872 11/02/2020       Assessment/Plan .  Problem List Items Addressed This Visit     Mixed hyperlipidemia    Current Assessment & Plan     Continue Atorvastatin daily.  Continue to work on healthy diet and exercise.           Relevant Orders    Lipid Panel With LDL / HDL Ratio     Comprehensive Metabolic Panel    Essential hypertension - Primary    Current Assessment & Plan     Hypertension is stable.  Regular aerobic exercise.  Continue current medications.  Ambulatory blood pressure monitoring.  Blood pressure will be reassessed at the next regular appointment.  Continue Lisinopril daily.           Relevant Orders    Lipid Panel With LDL / HDL Ratio    Comprehensive Metabolic Panel    CBC & Differential    Mild episode of recurrent major depressive disorder (HCC)    Current Assessment & Plan     Patient's depression is recurrent and is mild without psychosis. Their depression is currently active and the condition is worsening. This will be reassessed at the next regular appointment. F/U as described:patient referred to Mental Health Specialist.  Schedule a follow up appointment with counselor/therapist.           GERD (gastroesophageal reflux disease)    Current Assessment & Plan     Stable on Omeprazole daily.           Snoring    Current Assessment & Plan     Declines sleep study at this time, accepts risks.           Anxiety    Current Assessment & Plan     Schedule a follow up appointment with counselor/therapist.           Skin lesion of back    Current Assessment & Plan     Call dermatology at [phone] schedule an appointment about skin lesion of back.           Sciatica of left side    Current Assessment & Plan     Improving.  Continue regular stretching.                 Return in about 6 months (around 9/7/2022) for Annual physical, Recheck.or sooner if symptoms persist or worsen.  Patient declines medication for mood at this time; will schedule a follow up appointment with counselor; instructed to call if symptoms persist or worsen.  Will send refill of medications to local pharmacy for 30 day supply pending lab results.     COVID-19 Precautions - Patient was compliant in wearing a mask. When I saw the patient, I used appropriate personal protective equipment (PPE) including mask,  gloves, and eye shield (standard procedure).  Hand hygiene was completed before and after seeing the patient.

## 2022-03-08 LAB
ALBUMIN SERPL-MCNC: 4.5 G/DL (ref 3.8–4.8)
ALBUMIN/GLOB SERPL: 1.9 {RATIO} (ref 1.2–2.2)
ALP SERPL-CCNC: 95 IU/L (ref 44–121)
ALT SERPL-CCNC: 29 IU/L (ref 0–44)
AST SERPL-CCNC: 28 IU/L (ref 0–40)
BASOPHILS # BLD AUTO: 0.1 X10E3/UL (ref 0–0.2)
BASOPHILS NFR BLD AUTO: 1 %
BILIRUB SERPL-MCNC: 0.4 MG/DL (ref 0–1.2)
BUN SERPL-MCNC: 14 MG/DL (ref 8–27)
BUN/CREAT SERPL: 11 (ref 10–24)
CALCIUM SERPL-MCNC: 9.5 MG/DL (ref 8.6–10.2)
CHLORIDE SERPL-SCNC: 110 MMOL/L (ref 96–106)
CHOLEST SERPL-MCNC: 137 MG/DL (ref 100–199)
CO2 SERPL-SCNC: 21 MMOL/L (ref 20–29)
CREAT SERPL-MCNC: 1.25 MG/DL (ref 0.76–1.27)
EGFR GENE MUT ANL BLD/T: 64 ML/MIN/1.73
EOSINOPHIL # BLD AUTO: 0.5 X10E3/UL (ref 0–0.4)
EOSINOPHIL NFR BLD AUTO: 9 %
ERYTHROCYTE [DISTWIDTH] IN BLOOD BY AUTOMATED COUNT: 11.9 % (ref 11.6–15.4)
GLOBULIN SER CALC-MCNC: 2.4 G/DL (ref 1.5–4.5)
GLUCOSE SERPL-MCNC: 125 MG/DL (ref 65–99)
HCT VFR BLD AUTO: 43.6 % (ref 37.5–51)
HDLC SERPL-MCNC: 49 MG/DL
HGB BLD-MCNC: 14.6 G/DL (ref 13–17.7)
IMM GRANULOCYTES # BLD AUTO: 0 X10E3/UL (ref 0–0.1)
IMM GRANULOCYTES NFR BLD AUTO: 0 %
LDLC SERPL CALC-MCNC: 67 MG/DL (ref 0–99)
LDLC/HDLC SERPL: 1.4 RATIO (ref 0–3.6)
LYMPHOCYTES # BLD AUTO: 1 X10E3/UL (ref 0.7–3.1)
LYMPHOCYTES NFR BLD AUTO: 18 %
MCH RBC QN AUTO: 30.9 PG (ref 26.6–33)
MCHC RBC AUTO-ENTMCNC: 33.5 G/DL (ref 31.5–35.7)
MCV RBC AUTO: 92 FL (ref 79–97)
MONOCYTES # BLD AUTO: 0.8 X10E3/UL (ref 0.1–0.9)
MONOCYTES NFR BLD AUTO: 14 %
NEUTROPHILS # BLD AUTO: 3.4 X10E3/UL (ref 1.4–7)
NEUTROPHILS NFR BLD AUTO: 58 %
PLATELET # BLD AUTO: 231 X10E3/UL (ref 150–450)
POTASSIUM SERPL-SCNC: 5.4 MMOL/L (ref 3.5–5.2)
PROT SERPL-MCNC: 6.9 G/DL (ref 6–8.5)
RBC # BLD AUTO: 4.73 X10E6/UL (ref 4.14–5.8)
SODIUM SERPL-SCNC: 145 MMOL/L (ref 134–144)
TRIGL SERPL-MCNC: 115 MG/DL (ref 0–149)
VLDLC SERPL CALC-MCNC: 21 MG/DL (ref 5–40)
WBC # BLD AUTO: 5.8 X10E3/UL (ref 3.4–10.8)

## 2022-03-10 DIAGNOSIS — I10 ESSENTIAL HYPERTENSION: ICD-10-CM

## 2022-03-10 DIAGNOSIS — E87.5 HYPERKALEMIA: Primary | ICD-10-CM

## 2022-03-10 DIAGNOSIS — E78.00 HYPERCHOLESTEREMIA: ICD-10-CM

## 2022-03-10 RX ORDER — LISINOPRIL 2.5 MG/1
2.5 TABLET ORAL DAILY
Qty: 30 TABLET | Refills: 5 | Status: SHIPPED | OUTPATIENT
Start: 2022-03-10 | End: 2022-05-20 | Stop reason: SDUPTHER

## 2022-03-10 RX ORDER — ATORVASTATIN CALCIUM 40 MG/1
40 TABLET, FILM COATED ORAL DAILY
Qty: 30 TABLET | Refills: 5 | Status: SHIPPED | OUTPATIENT
Start: 2022-03-10 | End: 2022-05-20 | Stop reason: SDUPTHER

## 2022-05-20 ENCOUNTER — TELEPHONE (OUTPATIENT)
Dept: FAMILY MEDICINE CLINIC | Facility: CLINIC | Age: 67
End: 2022-05-20

## 2022-05-20 DIAGNOSIS — E78.00 HYPERCHOLESTEREMIA: ICD-10-CM

## 2022-05-20 DIAGNOSIS — I10 ESSENTIAL HYPERTENSION: ICD-10-CM

## 2022-05-20 RX ORDER — ATORVASTATIN CALCIUM 40 MG/1
40 TABLET, FILM COATED ORAL DAILY
Qty: 90 TABLET | Refills: 1 | Status: SHIPPED | OUTPATIENT
Start: 2022-05-20 | End: 2022-09-15 | Stop reason: SDUPTHER

## 2022-05-20 RX ORDER — LISINOPRIL 2.5 MG/1
2.5 TABLET ORAL DAILY
Qty: 90 TABLET | Refills: 1 | Status: SHIPPED | OUTPATIENT
Start: 2022-05-20 | End: 2022-09-12 | Stop reason: SDUPTHER

## 2022-05-20 NOTE — TELEPHONE ENCOUNTER
.    Caller: Sander White    Relationship: Self    Best call back number: [phone]     Requested Prescriptions:    lisinopril (PRINIVIL,ZESTRIL) 2.5 MG tablet      atorvastatin (LIPITOR) 40 MG tablet     Pharmacy where request should be sent:    [id number] [address] [facility] [address] [facility] [phone]  - [phone]   [phone]    Additional details provided by patient: PATIENT CHANGED PHARMACIES.  HE NEEDS NEW PRESCRIPTIONS WITH REFILLS    Does the patient have less than a 3 day supply:  [x] Yes  [] No    Tram Montague, June Rep   05/20/22 12:09 EDT     PLEASE ADVISE.

## 2022-09-12 ENCOUNTER — OFFICE VISIT (OUTPATIENT)
Dept: FAMILY MEDICINE CLINIC | Facility: CLINIC | Age: 67
End: 2022-09-12

## 2022-09-12 VITALS
WEIGHT: 212.2 LBS | HEART RATE: 68 BPM | OXYGEN SATURATION: 98 % | BODY MASS INDEX: 28.74 KG/M2 | DIASTOLIC BLOOD PRESSURE: 78 MMHG | TEMPERATURE: 98.3 F | SYSTOLIC BLOOD PRESSURE: 110 MMHG | HEIGHT: 72 IN

## 2022-09-12 DIAGNOSIS — Z12.5 ENCOUNTER FOR PROSTATE CANCER SCREENING: ICD-10-CM

## 2022-09-12 DIAGNOSIS — L98.9 SKIN LESION OF BACK: ICD-10-CM

## 2022-09-12 DIAGNOSIS — I10 PRIMARY HYPERTENSION: ICD-10-CM

## 2022-09-12 DIAGNOSIS — Z87.891 FORMER SMOKER: ICD-10-CM

## 2022-09-12 DIAGNOSIS — R07.89 CHEST DISCOMFORT: ICD-10-CM

## 2022-09-12 DIAGNOSIS — K21.9 GASTROESOPHAGEAL REFLUX DISEASE, UNSPECIFIED WHETHER ESOPHAGITIS PRESENT: ICD-10-CM

## 2022-09-12 DIAGNOSIS — Z00.00 ENCOUNTER FOR ANNUAL PHYSICAL EXAM: Primary | ICD-10-CM

## 2022-09-12 DIAGNOSIS — F41.9 ANXIETY: ICD-10-CM

## 2022-09-12 DIAGNOSIS — E78.2 MIXED HYPERLIPIDEMIA: ICD-10-CM

## 2022-09-12 PROBLEM — M54.32 SCIATICA OF LEFT SIDE: Status: RESOLVED | Noted: 2021-11-01 | Resolved: 2022-09-12

## 2022-09-12 PROCEDURE — 99397 PER PM REEVAL EST PAT 65+ YR: CPT | Performed by: NURSE PRACTITIONER

## 2022-09-12 PROCEDURE — 93000 ELECTROCARDIOGRAM COMPLETE: CPT | Performed by: NURSE PRACTITIONER

## 2022-09-12 RX ORDER — LISINOPRIL 2.5 MG/1
2.5 TABLET ORAL DAILY
Qty: 90 TABLET | Refills: 1 | Status: SHIPPED | OUTPATIENT
Start: 2022-09-12 | End: 2023-01-11 | Stop reason: SDUPTHER

## 2022-09-12 NOTE — PROGRESS NOTES
Subjective   Sander White is a 66 y.o. male.     Chief Complaint   Patient presents with   • Annual Exam     Blood work          History of Present Illness   Patient presents for CPE with fasting labs; pretty healthy diet; regular exercise, walks all day at work, 12 hours daily; regular dental visits; last eye exam about 1 year ago, wears glasses some; some decrease in hearing, [facility], told has tinnitus and mild hearing loss; immunizations: needs Shingrix vaccine, will check with insurance; colonoscopy 2015, repeat in 10 years; no family history of colon cancer.    F/U HTN: takes Lisinopril daily; monitors BP at work; typically runs 110s/70s; no headaches; no orthostasis; mild swelling at end of the day; stands on concrete all day at work; swelling resolved after puts feet up for period of time; has not had recent follow up with cardiology.    F/U Hyperlipidemia: takes Atorvastatin daily; no myalgias.    F/U KRISTOFER: takes Omeprazole daily and works well; will have symptoms if misses a dose; has had EGD in past.    F/U anxiety: has not seen counselor at this point; considering, but feels like has been working through things well; has good support from family; occasional chest discomfort in left side of chest when thinks about how currently going through divorce; symptoms resolve after few minutes when calms down; nothing like when had heart attack; some SOA with activity, attributes to deconditioning; no problems going up 2 flights of stairs; no problems falling asleep, occasional waking up once per night, but not every night and able to go right back to sleep; no loss of interest in activities; occasional feeling down thinking about getting ready to retire in January; no SI/HI.      The following portions of the patient's history were reviewed and updated as appropriate: allergies, current medications, past family history, past medical history, past social history, past surgical history and  problem list.    Current Outpatient Medications on File Prior to Visit   Medication Sig   • aspirin 81 MG chewable tablet Chew 1 tablet Daily.   • atorvastatin (LIPITOR) 40 MG tablet Take 1 tablet by mouth Daily.   • diclofenac (VOLTAREN) 1 % gel gel Apply 4 g topically to the appropriate area as directed 4 (Four) Times a Day.   • ELDERBERRY PO Take 1 tablet by mouth Daily.   • nitroglycerin (NITROSTAT) 0.4 MG SL tablet 1 under the tongue as needed for angina, may repeat q5mins for up three doses   • omeprazole (priLOSEC) 20 MG capsule Take 20 mg by mouth Daily.   • vitamin B-12 (CYANOCOBALAMIN) 1000 MCG tablet Take 1,000 mcg by mouth Daily.   • vitamin B-6 (PYRIDOXINE) 100 MG tablet Take 100 mg by mouth Daily.   • vitamin C (ASCORBIC ACID) 500 MG tablet Take 500 mg by mouth daily.   • VITAMIN D PO Take  by mouth.   • vitamin E 400 UNIT capsule Take 400 Units by mouth Daily.   • [DISCONTINUED] lisinopril (PRINIVIL,ZESTRIL) 2.5 MG tablet Take 1 tablet by mouth Daily.   • bacitracin-polymyxin b (POLYSPORIN) 500-60131 UNIT/GM ophthalmic ointment      No current facility-administered medications on file prior to visit.        Past Medical History:   Diagnosis Date   • Abnormal cardiovascular stress test 9/9/2019    Added automatically from request for surgery [id number]   • Airway hyperreactivity 5/3/2016   • Arthritis    • Chronic pain in right foot    • Collapse of right lung 12/23/1999   • Coronary artery disease    • ED (erectile dysfunction) 7/15/2016   • GERD (gastroesophageal reflux disease)    • Hordeolum externum of left lower eyelid 12/14/2021   • Hypertension    • Mallet finger 10/20/2014   • Myocardial infarction (HCC)    • Neck pain 12/15/2017   • Precordial pain 7/22/2017    Added automatically from request for surgery [id number]   • Sciatica of left side 11/1/2021   • Sprain of carpometacarpal joint 10/20/2014   • ST elevation myocardial infarction (STEMI) (HCC) 2/15/2017       Past Surgical History:   Procedure  Laterality Date   • APPENDECTOMY     • CARDIAC CATHETERIZATION N/A 2/15/2017    Procedure: Left Heart Cath;  Surgeon: Kristina Billings MD;  Location:  [facility];  Service:    • CARDIAC CATHETERIZATION  2/15/2017    Procedure: Percutaneous Manual Thrombectomy;  Surgeon: Kristina Billings MD;  Location:  [facility];  Service:    • CARDIAC CATHETERIZATION N/A 2/15/2017    Procedure: Coronary angiography;  Surgeon: Kristina Billings MD;  [facility];  Service:    • CARDIAC CATHETERIZATION N/A 2/15/2017    Procedure: Left ventriculography;  Surgeon: Kristian Billings MD;  Location:  [facility];  Service:    • CARDIAC CATHETERIZATION N/A 7/24/2017    Procedure: Left Heart Cath;  Surgeon: Kristina Billings MD;  [facility];  Service:    • CARDIAC CATHETERIZATION N/A 7/24/2017    Procedure: Coronary angiography;  Surgeon: Kristina Billings MD;  [facility];  Service:    • CARDIAC CATHETERIZATION N/A 7/24/2017    Procedure: Left ventriculography;  Surgeon: Kristina Billings MD;  [facility];  Service:    • CARDIAC CATHETERIZATION N/A 9/11/2019    Procedure: Left Heart Cath;  Surgeon: Kristina Billings MD;  [facility];  Service: Cardiology   • CARDIAC CATHETERIZATION N/A 9/11/2019    Procedure: Coronary angiography;  Surgeon: Kristina Billings MD;  [facility];  Service: Cardiology   • CARDIAC CATHETERIZATION N/A 9/11/2019    Procedure: Stent CAYLA coronary;  Surgeon: Kristina Billings MD;  [facility];  Service: Cardiology   • CARDIAC CATHETERIZATION N/A 9/11/2019    Procedure: Left ventriculography;  Surgeon: Kristina Billings MD;  [facility];  Service: Cardiology   • CARDIAC CATHETERIZATION N/A  2021    Procedure: Left Heart Cath;  Surgeon: Kristina Billings MD;  Location:  [facility];  Service: Cardiology;  Laterality: N/A;   • CARDIAC CATHETERIZATION N/A 2021    Procedure: Coronary angiography;  Surgeon: Kristina Billings MD;  Location:  [facility];  Service: Cardiology;  Laterality: N/A;   • CARDIAC CATHETERIZATION N/A 2021    Procedure: Left ventriculography;  Surgeon: Kristina Billings MD;  Location:  [facility];  Service: Cardiology;  Laterality: N/A;   • CARPAL TUNNEL RELEASE Right    • CHOLECYSTECTOMY     • COLONOSCOPY N/A 2015    Normal   • CORONARY ANGIOPLASTY     • CORONARY STENT PLACEMENT      x2   • CYST REMOVAL      thyroglossal cyst removed   • HERNIA REPAIR Right     Inguinal   • MA RT/LT HEART CATHETERS N/A 2/15/2017    Procedure: Percutaneous Coronary Intervention;  Surgeon: Kristina Billings MD;  Location:  [facility];  Service: Cardiovascular       Family History   Problem Relation Age of Onset   • Diabetes Mother         Type 2   • Hypertension Mother    • Hyperlipidemia Mother    • Heart attack Mother    • Heart disease Mother         S/P CABG   • Diabetes Father         Type 2   • Stroke Father    • Hypertension Father    • Hyperlipidemia Father    • Heart attack Father    • Heart disease Father         S/P CABG   • Alzheimer's disease Father    • Aneurysm Brother         Brain   • Pancreatitis Sister        Social History     Socioeconomic History   • Marital status:    • Number of children: 5   • Years of education: 2 YEARS OF COLLEGE   Tobacco Use   • Smoking status: Former Smoker     Packs/day: 0.50     Years: 7.00     Pack years: 3.50     Types: Cigarettes     Quit date: 1999     Years since quittin.7   • Smokeless tobacco: Never Used   Vaping Use   • Vaping Use: Never used   Substance and Sexual Activity   • Alcohol use: Yes     Comment: occasional    • Drug use: No   • Sexual activity: Defer       Review of Systems   Constitutional: Negative for appetite change, chills, fever, unexpected weight gain and unexpected weight loss. Fatigue: some, works 12 hour days 5-6 days per week.   HENT: Negative for ear pain, sinus pressure, sore throat and trouble swallowing.    Eyes: Negative for blurred vision and discharge.   Respiratory: Negative for chest tightness. Apnea: some snoring if on back; declines sleep study. Cough: rare dry cough when exposed to certain allergens; had before started Lisinopril. Shortness of breath: see HPI.    Gastrointestinal: Negative for abdominal pain, blood in stool, constipation, diarrhea and GERD.   Endocrine: Negative for cold intolerance and heat intolerance. Polydipsia: has been trying to drink more water.   Genitourinary: Negative for dysuria and frequency. Nocturia: on occasion, no change in urinary stream.   Musculoskeletal: Negative for back pain. Arthralgias: some on occasion, no concern.   Skin: Negative for rash and skin lesions (did not see dermatology for skin lesion on back; pt has had son monitoring and no change).   Neurological: Negative for syncope and weakness.   Hematological: Does not bruise/bleed easily.   Psychiatric/Behavioral: Negative for suicidal ideas.       PHQ-9 Depression Screening  Little interest or pleasure in doing things? 0-->not at all   Feeling down, depressed, or hopeless? 2-->more than half the days   Trouble falling or staying asleep, or sleeping too much? 0-->not at all   Feeling tired or having little energy? 1-->several days   Poor appetite or overeating? 0-->not at all   Feeling bad about yourself - or that you are a failure or have let yourself or your family down? 0-->not at all   Trouble concentrating on things, such as reading the newspaper or watching television? 0-->not at all   Moving or speaking so slowly that other people could have noticed? Or the opposite - being so fidgety or  "restless that you have been moving around a lot more than usual? 0-->not at all   Thoughts that you would be better off dead, or of hurting yourself in some way? 0-->not at all   PHQ-9 Total Score 3   If you checked off any problems, how difficult have these problems made it for you to do your work, take care of things at home, or get along with other people?       XAVIER-7 anxiety score: 0    Objective   Vitals:    09/12/22 0830   BP: 110/78   BP Location: Right arm   Patient Position: Sitting   Cuff Size: Adult   Pulse: 68   Temp: 98.3 °F (36.8 °C)   SpO2: 98%   Weight: 96.3 kg (212 lb 3.2 oz)   Height: 182.9 cm (72\")     Body mass index is 28.78 kg/m².    Physical Exam  Vitals and nursing note reviewed.   Constitutional:       General: He is not in acute distress.     Appearance: He is well-developed and well-groomed. He is not diaphoretic.   HENT:      Head: Normocephalic and atraumatic.      Jaw: No tenderness or pain on movement.      Right Ear: Tympanic membrane and external ear normal. Right ear decreased hearing: able to hear finger rub.      Left Ear: External ear normal. Left ear decreased hearing: able to hear finger rub. Left ear middle ear effusion: mild. Tympanic membrane is not erythematous.      Nose: Nose normal.      Right Sinus: No maxillary sinus tenderness or frontal sinus tenderness.      Left Sinus: No maxillary sinus tenderness or frontal sinus tenderness.      Mouth/Throat:      Mouth: Mucous membranes are moist.      Pharynx: No posterior oropharyngeal erythema. Oropharyngeal exudate: mild drainage in posterior pharynx.   Eyes:      Extraocular Movements: Extraocular movements intact.      Conjunctiva/sclera: Conjunctivae normal.      Pupils: Pupils are equal, round, and reactive to light.   Neck:      Thyroid: No thyromegaly.      Vascular: No carotid bruit.      Trachea: No tracheal deviation.   Cardiovascular:      Rate and Rhythm: Normal rate and regular rhythm.      Pulses: Normal " pulses.      Heart sounds: Normal heart sounds. No murmur heard.  Pulmonary:      Effort: Pulmonary effort is normal. No respiratory distress.      Breath sounds: Normal breath sounds.   Abdominal:      General: Bowel sounds are normal.      Palpations: Abdomen is soft. There is no hepatomegaly or splenomegaly.      Tenderness: There is no abdominal tenderness. There is no guarding.   Musculoskeletal:         General: Normal range of motion.      Cervical back: Normal range of motion and neck supple. No bony tenderness.      Thoracic back: No bony tenderness.      Lumbar back: No bony tenderness.      Right lower leg: No edema.      Left lower leg: No edema.   Lymphadenopathy:      Cervical: No cervical adenopathy.   Skin:     General: Skin is warm and dry.      Findings: No rash.          Neurological:      Mental Status: He is alert and oriented to person, place, and time.      Cranial Nerves: No cranial nerve deficit.      Motor: Motor function is intact.      Coordination: Coordination normal.      Gait: Gait normal.      Deep Tendon Reflexes: Reflexes are normal and symmetric.   Psychiatric:         Mood and Affect: Mood normal.         Behavior: Behavior normal.         Thought Content: Thought content normal.         Cognition and Memory: Cognition normal.         Judgment: Judgment normal.           ECG 12 Lead    Date/Time: 9/12/2022 9:00 AM  Performed by: Claire Mckeon APRN  Authorized by: Claire Mckeon APRN   Comparison: compared with previous ECG from 11/23/2020  Similar to previous ECG  Rhythm: sinus rhythm  Rate: normal  T Waves: T waves normal    Clinical impression: normal ECG              Lab Results   Component Value Date    WBC 5.8 03/07/2022    RBC 4.73 03/07/2022    HGB 14.6 03/07/2022    HCT 43.6 03/07/2022    MCV 92 03/07/2022    MCH 30.9 03/07/2022    MCHC 33.5 03/07/2022    RDW 11.9 03/07/2022    RDWSD 43.2 06/21/2021    MPV 12.4 (H) 06/21/2021     03/07/2022    NEUTRORELPCT 58  03/07/2022    LYMPHORELPCT 18 03/07/2022    MONORELPCT 14 03/07/2022    EOSRELPCT 9 03/07/2022    BASORELPCT 1 03/07/2022    AUTOIGPER 0.5 06/21/2021    NEUTROABS 3.4 03/07/2022    LYMPHSABS 1.0 03/07/2022    MONOSABS 0.8 03/07/2022    EOSABS 0.5 (H) 03/07/2022    BASOSABS 0.1 03/07/2022    AUTOIGNUM 0.03 06/21/2021    NRBC 0.0 06/21/2021     Lab Results   Component Value Date    GLUCOSE 125 (H) 03/07/2022    BUN 14 03/07/2022    CREATININE 1.25 03/07/2022    EGFRIFNONA 67 06/21/2021    EGFRIFAFRI 104 05/26/2021    BCR 11 03/07/2022    K 5.4 (H) 03/07/2022    CO2 21 03/07/2022    CALCIUM 9.5 03/07/2022    PROTENTOTREF 6.9 03/07/2022    ALBUMIN 4.5 03/07/2022    LABIL2 1.9 03/07/2022    AST 28 03/07/2022    ALT 29 03/07/2022      Lab Results   Component Value Date    CHLPL 137 03/07/2022    TRIG 115 03/07/2022    HDL 49 03/07/2022    VLDL 21 03/07/2022    LDL 67 03/07/2022     Lab Results   Component Value Date    TSH 1.140 11/02/2020     Lab Results   Component Value Date    HGBA1C 5.5 05/26/2021     Lab Results   Component Value Date    PSA 0.872 11/02/2020         Assessment    Problem List Items Addressed This Visit     Mixed hyperlipidemia    Current Assessment & Plan     Continue Atorvastatin daily.  Continue to work on healthy diet and exercise.         Relevant Orders    Lipid Panel With LDL / HDL Ratio    Comprehensive Metabolic Panel    Primary hypertension    Current Assessment & Plan     Hypertension is stable.  Regular aerobic exercise.  Continue current medications.  Ambulatory blood pressure monitoring.  Blood pressure will be reassessed at the next regular appointment.  Continue Lisinopril daily.         Relevant Medications    lisinopril (PRINIVIL,ZESTRIL) 2.5 MG tablet    Other Relevant Orders    Lipid Panel With LDL / HDL Ratio    Comprehensive Metabolic Panel    GERD (gastroesophageal reflux disease)    Current Assessment & Plan     Stable.  Continue Omeprazole daily.         Anxiety    Current  Assessment & Plan     Stable.  Good social support.         Skin lesion of back    Relevant Orders    Ambulatory Referral to Dermatology    Chest discomfort    Current Assessment & Plan     Schedule a follow up appointment with cardiology.         Relevant Orders    ECG 12 Lead (Completed)    Former smoker    Relevant Orders    [place] aaa screen limited      Other Visit Diagnoses     Encounter for annual physical exam    -  Primary    Relevant Orders    PSA Screen    Lipid Panel With LDL / HDL Ratio    Comprehensive Metabolic Panel    Encounter for prostate cancer screening        Relevant Orders    PSA Screen          Return in about 6 months (around 3/12/2023) for Annual physical, Recheck.or sooner if symptoms persist or worsen.  Impression: Health maintenance visit.  Currently, eats a pretty healthy diet and has a regular exercise routine.  Colorectal cancer screening: UTD.  Prostate cancer screening: PSA today.  Screening lab work includes: CMP, lipid.  Immunizations: needs Shingrix vaccine, will check with insurance; risks and benefits of immunizations were discussed with patient.  Patient was advised to be evaluated by ophthalmology.  Advice and education were given regarding nutrition and aerobic exercise.  Recommended would have skin lesion on back checked by dermatology.  Will send refill of Atorvastatin to pharmacy in [place], [place] for 90 day supply pending lab results.         COVID-19 Precautions - Patient was compliant in wearing a mask. When I saw the patient, I used appropriate personal protective equipment (PPE) including mask, gloves, and eye shield (standard procedure).  Hand hygiene was completed before and after seeing the patient.

## 2022-09-12 NOTE — PATIENT INSTRUCTIONS
Continue to monitor your blood pressure periodically and record results.  Continue to work on healthy diet and exercise.  Follow up pending lab results.  Follow up in 6 months, or sooner if problems or concerns.   Schedule a follow up appointment with cardiology.

## 2022-09-13 LAB
ALBUMIN SERPL-MCNC: 5.1 G/DL (ref 3.5–5.2)
ALBUMIN/GLOB SERPL: 3 G/DL
ALP SERPL-CCNC: 81 U/L (ref 39–117)
ALT SERPL-CCNC: 31 U/L (ref 1–41)
AST SERPL-CCNC: 25 U/L (ref 1–40)
BILIRUB SERPL-MCNC: 0.4 MG/DL (ref 0–1.2)
BUN SERPL-MCNC: 18 MG/DL (ref 8–23)
BUN/CREAT SERPL: 19.4 (ref 7–25)
CALCIUM SERPL-MCNC: 9.8 MG/DL (ref 8.6–10.5)
CHLORIDE SERPL-SCNC: 107 MMOL/L (ref 98–107)
CHOLEST SERPL-MCNC: 170 MG/DL (ref 0–200)
CO2 SERPL-SCNC: 24 MMOL/L (ref 22–29)
CREAT SERPL-MCNC: 0.93 MG/DL (ref 0.76–1.27)
EGFRCR-CYS SERPLBLD CKD-EPI 2021: 91.1 ML/MIN/1.73
GLOBULIN SER CALC-MCNC: 1.7 GM/DL
GLUCOSE SERPL-MCNC: 107 MG/DL (ref 65–99)
HDLC SERPL-MCNC: 62 MG/DL (ref 40–60)
LDLC SERPL CALC-MCNC: 98 MG/DL (ref 0–100)
LDLC/HDLC SERPL: 1.59 {RATIO}
POTASSIUM SERPL-SCNC: 4.7 MMOL/L (ref 3.5–5.2)
PROT SERPL-MCNC: 6.8 G/DL (ref 6–8.5)
PSA SERPL-MCNC: 1.05 NG/ML (ref 0–4)
SODIUM SERPL-SCNC: 141 MMOL/L (ref 136–145)
TRIGL SERPL-MCNC: 48 MG/DL (ref 0–150)
VLDLC SERPL CALC-MCNC: 10 MG/DL (ref 5–40)

## 2022-09-13 NOTE — ASSESSMENT & PLAN NOTE
Schedule a follow up appointment with cardiology.   Brow Lift Text: A midfrontal incision was made medially to the defect to allow access to the tissues just superior to the left eyebrow. Following careful dissection inferiorly in a supraperiosteal plane to the level of the left eyebrow, several 3-0 monocryl sutures were used to resuspend the eyebrow orbicularis oculi muscular unit to the superior frontal bone periosteum. This resulted in an appropriate reapproximation of static eyebrow symmetry and correction of the left brow ptosis.

## 2022-09-15 ENCOUNTER — HOSPITAL ENCOUNTER (OUTPATIENT)
Dept: ULTRASOUND IMAGING | Facility: HOSPITAL | Age: 67
Discharge: HOME | End: 2022-09-15
Admitting: NURSE PRACTITIONER

## 2022-09-15 DIAGNOSIS — E78.00 HYPERCHOLESTEREMIA: ICD-10-CM

## 2022-09-15 DIAGNOSIS — Z87.891 FORMER SMOKER: ICD-10-CM

## 2022-09-15 PROBLEM — R73.01 ELEVATED FASTING GLUCOSE: Status: ACTIVE | Noted: 2022-09-15

## 2022-09-15 PROCEDURE — 76706 US ABDL AORTA SCREEN AAA: CPT

## 2022-09-15 RX ORDER — ATORVASTATIN CALCIUM 40 MG/1
40 TABLET, FILM COATED ORAL DAILY
Qty: 90 TABLET | Refills: 1 | Status: SHIPPED | OUTPATIENT
Start: 2022-09-15 | End: 2023-01-11 | Stop reason: SDUPTHER

## 2022-09-20 ENCOUNTER — OFFICE VISIT (OUTPATIENT)
Dept: CARDIOLOGY | Facility: CLINIC | Age: 67
End: 2022-09-20

## 2022-09-20 VITALS
HEART RATE: 60 BPM | SYSTOLIC BLOOD PRESSURE: 134 MMHG | DIASTOLIC BLOOD PRESSURE: 88 MMHG | WEIGHT: 212 LBS | BODY MASS INDEX: 28.71 KG/M2 | HEIGHT: 72 IN

## 2022-09-20 DIAGNOSIS — E78.2 MIXED HYPERLIPIDEMIA: ICD-10-CM

## 2022-09-20 DIAGNOSIS — I25.10 CORONARY ARTERY DISEASE INVOLVING NATIVE CORONARY ARTERY OF NATIVE HEART WITHOUT ANGINA PECTORIS: ICD-10-CM

## 2022-09-20 DIAGNOSIS — I10 PRIMARY HYPERTENSION: Primary | ICD-10-CM

## 2022-09-20 PROCEDURE — 99214 OFFICE O/P EST MOD 30 MIN: CPT

## 2022-09-20 RX ORDER — NITROGLYCERIN 0.4 MG/1
TABLET SUBLINGUAL
Qty: 25 TABLET | Refills: 3 | Status: SHIPPED | OUTPATIENT
Start: 2022-09-20

## 2023-01-11 ENCOUNTER — OFFICE VISIT (OUTPATIENT)
Dept: FAMILY MEDICINE CLINIC | Facility: CLINIC | Age: 68
End: 2023-01-11
Payer: COMMERCIAL

## 2023-01-11 VITALS
HEIGHT: 72 IN | DIASTOLIC BLOOD PRESSURE: 60 MMHG | WEIGHT: 218 LBS | HEART RATE: 67 BPM | TEMPERATURE: 98.1 F | BODY MASS INDEX: 29.53 KG/M2 | OXYGEN SATURATION: 97 % | SYSTOLIC BLOOD PRESSURE: 110 MMHG

## 2023-01-11 DIAGNOSIS — R73.01 ELEVATED FASTING GLUCOSE: ICD-10-CM

## 2023-01-11 DIAGNOSIS — I10 PRIMARY HYPERTENSION: Primary | ICD-10-CM

## 2023-01-11 DIAGNOSIS — K21.9 GASTROESOPHAGEAL REFLUX DISEASE, UNSPECIFIED WHETHER ESOPHAGITIS PRESENT: ICD-10-CM

## 2023-01-11 DIAGNOSIS — E78.2 MIXED HYPERLIPIDEMIA: ICD-10-CM

## 2023-01-11 PROCEDURE — 99214 OFFICE O/P EST MOD 30 MIN: CPT | Performed by: NURSE PRACTITIONER

## 2023-01-11 RX ORDER — ATORVASTATIN CALCIUM 40 MG/1
40 TABLET, FILM COATED ORAL DAILY
Qty: 90 TABLET | Refills: 1 | Status: SHIPPED | OUTPATIENT
Start: 2023-01-11

## 2023-01-11 RX ORDER — LISINOPRIL 2.5 MG/1
2.5 TABLET ORAL DAILY
Qty: 90 TABLET | Refills: 1 | Status: SHIPPED | OUTPATIENT
Start: 2023-01-11

## 2023-01-11 NOTE — PROGRESS NOTES
Subjective   Sander Wihte is a 67 y.o. male.     Chief Complaint   Patient presents with   • Hypertension   • Hyperlipidemia     Follow up          History of Present Illness   Patient presents for follow up HTN: takes Lisinopril daily; monitors BP at work, typically runs 110s/60s; no headaches; no orthostasis; no swelling; history of MI; sees Dr. De Paz, cardiology; had follow up in September.    F/U elevated fasting glucose: has been watching intake of carbs/sugars in diet; walks a lot at work daily; averages 20,000 steps daily.    F/U Hyperlipidemia: takes Atorvastatin daily; no myalgias; watches intake of saturated fats in diet; fasting today.    F/U KRISTOFER: takes Omeprazole daily and works well; has tried other medications and did not help; will have symptoms if misses a dose.    The following portions of the patient's history were reviewed and updated as appropriate: allergies, current medications, past family history, past medical history, past social history, past surgical history and problem list.      Current Outpatient Medications   Medication Sig Dispense Refill   • aspirin 81 MG chewable tablet Chew 1 tablet Daily.     • atorvastatin (LIPITOR) 40 MG tablet Take 1 tablet by mouth Daily. 90 tablet 1   • lisinopril (PRINIVIL,ZESTRIL) 2.5 MG tablet Take 1 tablet by mouth Daily. 90 tablet 1   • nitroglycerin (NITROSTAT) 0.4 MG SL tablet 1 under the tongue as needed for angina, may repeat q5mins for up three doses 25 tablet 3   • omeprazole (priLOSEC) 20 MG capsule Take 20 mg by mouth Daily.     • vitamin B-12 (CYANOCOBALAMIN) 1000 MCG tablet Take 1,000 mcg by mouth Daily.     • vitamin B-6 (PYRIDOXINE) 100 MG tablet Take 100 mg by mouth Daily.     • vitamin C (ASCORBIC ACID) 500 MG tablet Take 500 mg by mouth daily.     • VITAMIN D PO Take  by mouth.       No current facility-administered medications for this visit.       Past Medical History:   Diagnosis Date   • Abnormal cardiovascular stress test  9/9/2019    Added automatically from request for surgery [id number]   • Airway hyperreactivity 5/3/2016   • Arthritis    • Chronic pain in right foot    • Collapse of right lung 12/23/1999   • Coronary artery disease    • ED (erectile dysfunction) 7/15/2016   • GERD (gastroesophageal reflux disease)    • Hordeolum externum of left lower eyelid 12/14/2021   • Hypertension    • Mallet finger 10/20/2014   • Myocardial infarction (HCC)    • Neck pain 12/15/2017   • Precordial pain 7/22/2017    Added automatically from request for surgery [id number]   • Sciatica of left side 11/1/2021   • Sprain of carpometacarpal joint 10/20/2014   • ST elevation myocardial infarction (STEMI) ([facility]) 2/15/2017       Past Surgical History:   Procedure Laterality Date   • APPENDECTOMY     • CARDIAC CATHETERIZATION N/A 2/15/2017    Procedure: Left Heart Cath;  Surgeon: Kristina Billings MD;  Location: [facility] CATH INVASIVE LOCATION;  Service:    • CARDIAC CATHETERIZATION  2/15/2017    Procedure: Percutaneous Manual Thrombectomy;  Surgeon: Kristina Billings MD;  Location: [facility] CATH INVASIVE LOCATION;  Service:    • CARDIAC CATHETERIZATION N/A 2/15/2017    Procedure: Coronary angiography;  Surgeon: Kristina Billings MD;  [facility];  Service:    • CARDIAC CATHETERIZATION N/A 2/15/2017    Procedure: Left ventriculography;  Surgeon: Kristina Billings MD;  Location: [facility] CATH INVASIVE LOCATION;  Service:    • CARDIAC CATHETERIZATION N/A 7/24/2017    Procedure: Left Heart Cath;  Surgeon: Kristina Billings MD;  Location: [facility] CATH INVASIVE LOCATION;  Service:    • CARDIAC CATHETERIZATION N/A 7/24/2017    Procedure: Coronary angiography;  Surgeon: Kristina Billings MD;  Location: [facility] CATH INVASIVE LOCATION;  Service:    • CARDIAC CATHETERIZATION N/A 7/24/2017    Procedure: Left ventriculography;  Surgeon: Kristina Billings MD;  Location: [facility] CATH INVASIVE LOCATION;  Service:    • CARDIAC  CATHETERIZATION N/A 9/11/2019    Procedure: Left Heart Cath;  Surgeon: Kristina Billings MD;  Location:  [facility] CATH INVASIVE LOCATION;  Service: Cardiology   • CARDIAC CATHETERIZATION N/A 9/11/2019    Procedure: Coronary angiography;  Surgeon: Kristina Billings MD;  Location:  [facility] CATH INVASIVE LOCATION;  Service: Cardiology   • CARDIAC CATHETERIZATION N/A 9/11/2019    Procedure: Stent CAYLA coronary;  Surgeon: Kristina Billings MD;  Location:  [facility] CATH INVASIVE LOCATION;  Service: Cardiology   • CARDIAC CATHETERIZATION N/A 9/11/2019    Procedure: Left ventriculography;  Surgeon: Kristina Billings MD;  Location:  [facility] CATH INVASIVE LOCATION;  Service: Cardiology   • CARDIAC CATHETERIZATION N/A 6/25/2021    Procedure: Left Heart Cath;  Surgeon: Kristina Billings MD;  [facility];  Service: Cardiology;  Laterality: N/A;   • CARDIAC CATHETERIZATION N/A 6/25/2021    Procedure: Coronary angiography;  Surgeon: Kristina Billings MD;  [facility];  Service: Cardiology;  Laterality: N/A;   • CARDIAC CATHETERIZATION N/A 6/25/2021    Procedure: Left ventriculography;  Surgeon: Kristina Billings MD;  [facility];  Service: Cardiology;  Laterality: N/A;   • CARPAL TUNNEL RELEASE Right    • CHOLECYSTECTOMY     • COLONOSCOPY N/A 02/27/2015    Normal   • CORONARY ANGIOPLASTY     • CORONARY STENT PLACEMENT      x2   • CYST REMOVAL      thyroglossal cyst removed   • HERNIA REPAIR Right     Inguinal   • FL RT/LT HEART CATHETERS N/A 2/15/2017    Procedure: Percutaneous Coronary Intervention;  Surgeon: Kristina Billings MD;  Location: [facility] CATH INVASIVE LOCATION;  Service: Cardiovascular       Family History   Problem Relation Age of Onset   • Diabetes Mother         Type 2   • Hypertension Mother    • Hyperlipidemia Mother    • Heart attack Mother    • Heart disease Mother         S/P CABG   • Diabetes Father   "       Type 2   • Stroke Father    • Hypertension Father    • Hyperlipidemia Father    • Heart attack Father    • Heart disease Father         S/P CABG   • Alzheimer's disease Father    • Aneurysm Brother         Brain   • Pancreatitis Sister        Social History     Socioeconomic History   • Marital status:    • Number of children: 5   • Years of education: 2 YEARS OF COLLEGE   Tobacco Use   • Smoking status: Former     Packs/day: 0.50     Years: 7.00     Pack years: 3.50     Types: Cigarettes     Quit date: 1999     Years since quittin.0   • Smokeless tobacco: Never   Vaping Use   • Vaping Use: Never used   Substance and Sexual Activity   • Alcohol use: Yes     Comment: occasional   • Drug use: No   • Sexual activity: Defer       Review of Systems   Constitutional: Negative for appetite change, chills, fever, unexpected weight gain and unexpected weight loss. Fatigue: some, but recently started working nights with rotation shift.   HENT: Negative for ear pain, sore throat and trouble swallowing. Tinnitus: chronic.    Eyes: Negative for blurred vision.   Respiratory: Negative for cough, chest tightness and shortness of breath.    Cardiovascular: Negative for chest pain and palpitations.   Gastrointestinal: Negative for abdominal pain, blood in stool, constipation and diarrhea.   Endocrine: Negative for polydipsia.   Genitourinary: Negative for dysuria and frequency.   Musculoskeletal: Negative for back pain. Arthralgias: has arthritis.   Skin: Negative for rash.   Neurological: Negative for syncope and weakness.   Hematological: Does not bruise/bleed easily.       Objective   Vitals:    23 1315   BP: 110/60   BP Location: Left arm   Patient Position: Sitting   Cuff Size: Adult   Pulse: 67   Temp: 98.1 °F (36.7 °C)   SpO2: 97%   Weight: 98.9 kg (218 lb)   Height: 182.9 cm (72\")     Body mass index is 29.57 kg/m².    Physical Exam  Vitals and nursing note reviewed.   Constitutional:       " General: He is not in acute distress.     Appearance: He is well-developed and well-groomed. He is not diaphoretic.   HENT:      Head: Normocephalic.      Right Ear: External ear normal.      Left Ear: External ear normal.   Eyes:      Conjunctiva/sclera: Conjunctivae normal.   Neck:      Vascular: No carotid bruit.   Cardiovascular:      Rate and Rhythm: Normal rate and regular rhythm.      Pulses: Normal pulses.      Heart sounds: Normal heart sounds. No murmur heard.  Pulmonary:      Effort: Pulmonary effort is normal. No respiratory distress.      Breath sounds: Examination of the right-lower field reveals decreased breath sounds. Decreased breath sounds (mild) present.   Abdominal:      General: Bowel sounds are normal.      Palpations: Abdomen is soft. There is no hepatomegaly or splenomegaly.      Tenderness: There is no abdominal tenderness. There is no guarding.   Musculoskeletal:      Cervical back: Normal range of motion and neck supple.      Right lower leg: No edema.      Left lower leg: No edema.   Skin:     General: Skin is warm and dry.      Findings: No rash.   Neurological:      Mental Status: He is alert and oriented to person, place, and time.      Gait: Gait is intact.   Psychiatric:         Mood and Affect: Mood normal.         Behavior: Behavior normal.         Thought Content: Thought content normal.         Cognition and Memory: Cognition normal.         Judgment: Judgment normal.         Lab Results   Component Value Date    WBC 5.8 03/07/2022    RBC 4.73 03/07/2022    HGB 14.6 03/07/2022    HCT 43.6 03/07/2022    MCV 92 03/07/2022    MCH 30.9 03/07/2022    MCHC 33.5 03/07/2022    RDW 11.9 03/07/2022    RDWSD 43.2 06/21/2021    MPV 12.4 (H) 06/21/2021     03/07/2022    NEUTRORELPCT 58 03/07/2022    LYMPHORELPCT 18 03/07/2022    MONORELPCT 14 03/07/2022    EOSRELPCT 9 03/07/2022    BASORELPCT 1 03/07/2022    AUTOIGPER 0.5 06/21/2021    NEUTROABS 3.4 03/07/2022    LYMPHSABS 1.0  03/07/2022    MONOSABS 0.8 03/07/2022    EOSABS 0.5 (H) 03/07/2022    BASOSABS 0.1 03/07/2022    AUTOIGNUM 0.03 06/21/2021    NRBC 0.0 06/21/2021     Lab Results   Component Value Date    GLUCOSE 107 (H) 09/12/2022    BUN 18 09/12/2022    CREATININE 0.93 09/12/2022    EGFRIFNONA 67 06/21/2021    EGFRIFAFRI 104 05/26/2021    BCR 19.4 09/12/2022    K 4.7 09/12/2022    CO2 24.0 09/12/2022    CALCIUM 9.8 09/12/2022    PROTENTOTREF 6.8 09/12/2022    ALBUMIN 5.10 09/12/2022    LABIL2 3.0 09/12/2022    AST 25 09/12/2022    ALT 31 09/12/2022      Lab Results   Component Value Date    CHLPL 170 09/12/2022    TRIG 48 09/12/2022    HDL 62 (H) 09/12/2022    VLDL 10 09/12/2022    LDL 98 09/12/2022     Lab Results   Component Value Date    TSH 1.140 11/02/2020     Lab Results   Component Value Date    HGBA1C 5.5 05/26/2021     Lab Results   Component Value Date    PSA 1.050 09/12/2022         Assessment    Problem List Items Addressed This Visit     Mixed hyperlipidemia    Current Assessment & Plan     Continue Atorvastatin daily.  Continue to work on healthy diet and exercise.         Relevant Medications    atorvastatin (LIPITOR) 40 MG tablet    Other Relevant Orders    Comprehensive Metabolic Panel    Lipid Panel With LDL / HDL Ratio    Primary hypertension - Primary    Current Assessment & Plan     Hypertension is stable.  Regular aerobic exercise.  Continue current medications.  Ambulatory blood pressure monitoring.  Blood pressure will be reassessed at the next regular appointment.  Continue Lisinopril daily.         Relevant Medications    lisinopril (PRINIVIL,ZESTRIL) 2.5 MG tablet    Other Relevant Orders    Comprehensive Metabolic Panel    CBC & Differential    Lipid Panel With LDL / HDL Ratio    GERD (gastroesophageal reflux disease)    Current Assessment & Plan     Stable.  Continue Omeprazole daily.         Relevant Orders    CBC & Differential    Elevated fasting glucose    Current Assessment & Plan     Continue to  decrease intake of carbs/sugars in diet.         Relevant Orders    Hemoglobin A1c          Return in about 6 months (around 7/11/2023) for Recheck.or sooner if problems or concerns.           COVID-19 Precautions - Patient was compliant in wearing a mask. When I saw the patient, I used appropriate personal protective equipment (PPE) including mask, gloves, and eye shield (standard procedure).  Hand hygiene was completed before and after seeing the patient.

## 2023-01-12 LAB
ALBUMIN SERPL-MCNC: 4.5 G/DL (ref 3.8–4.8)
ALBUMIN/GLOB SERPL: 2 {RATIO} (ref 1.2–2.2)
ALP SERPL-CCNC: 85 IU/L (ref 44–121)
ALT SERPL-CCNC: 30 IU/L (ref 0–44)
AST SERPL-CCNC: 32 IU/L (ref 0–40)
BASOPHILS # BLD AUTO: 0 X10E3/UL (ref 0–0.2)
BASOPHILS NFR BLD AUTO: 1 %
BILIRUB SERPL-MCNC: 0.9 MG/DL (ref 0–1.2)
BUN SERPL-MCNC: 19 MG/DL (ref 8–27)
BUN/CREAT SERPL: 20 (ref 10–24)
CALCIUM SERPL-MCNC: 9.4 MG/DL (ref 8.6–10.2)
CHLORIDE SERPL-SCNC: 106 MMOL/L (ref 96–106)
CHOLEST SERPL-MCNC: 139 MG/DL (ref 100–199)
CO2 SERPL-SCNC: 22 MMOL/L (ref 20–29)
CREAT SERPL-MCNC: 0.96 MG/DL (ref 0.76–1.27)
EGFRCR SERPLBLD CKD-EPI 2021: 87 ML/MIN/1.73
EOSINOPHIL # BLD AUTO: 0.4 X10E3/UL (ref 0–0.4)
EOSINOPHIL NFR BLD AUTO: 7 %
ERYTHROCYTE [DISTWIDTH] IN BLOOD BY AUTOMATED COUNT: 12.2 % (ref 11.6–15.4)
GLOBULIN SER CALC-MCNC: 2.2 G/DL (ref 1.5–4.5)
GLUCOSE SERPL-MCNC: 106 MG/DL (ref 70–99)
HBA1C MFR BLD: 5.8 % (ref 4.8–5.6)
HCT VFR BLD AUTO: 42.3 % (ref 37.5–51)
HDLC SERPL-MCNC: 45 MG/DL
HGB BLD-MCNC: 14.6 G/DL (ref 13–17.7)
IMM GRANULOCYTES # BLD AUTO: 0 X10E3/UL (ref 0–0.1)
IMM GRANULOCYTES NFR BLD AUTO: 0 %
LDLC SERPL CALC-MCNC: 79 MG/DL (ref 0–99)
LDLC/HDLC SERPL: 1.8 RATIO (ref 0–3.6)
LYMPHOCYTES # BLD AUTO: 1.1 X10E3/UL (ref 0.7–3.1)
LYMPHOCYTES NFR BLD AUTO: 18 %
MCH RBC QN AUTO: 30.7 PG (ref 26.6–33)
MCHC RBC AUTO-ENTMCNC: 34.5 G/DL (ref 31.5–35.7)
MCV RBC AUTO: 89 FL (ref 79–97)
MONOCYTES # BLD AUTO: 0.9 X10E3/UL (ref 0.1–0.9)
MONOCYTES NFR BLD AUTO: 15 %
NEUTROPHILS # BLD AUTO: 3.5 X10E3/UL (ref 1.4–7)
NEUTROPHILS NFR BLD AUTO: 59 %
PLATELET # BLD AUTO: 222 X10E3/UL (ref 150–450)
POTASSIUM SERPL-SCNC: 5 MMOL/L (ref 3.5–5.2)
PROT SERPL-MCNC: 6.7 G/DL (ref 6–8.5)
RBC # BLD AUTO: 4.76 X10E6/UL (ref 4.14–5.8)
SODIUM SERPL-SCNC: 142 MMOL/L (ref 134–144)
TRIGL SERPL-MCNC: 78 MG/DL (ref 0–149)
VLDLC SERPL CALC-MCNC: 15 MG/DL (ref 5–40)
WBC # BLD AUTO: 6 X10E3/UL (ref 3.4–10.8)

## 2023-03-13 ENCOUNTER — OFFICE VISIT (OUTPATIENT)
Dept: FAMILY MEDICINE CLINIC | Facility: CLINIC | Age: 68
End: 2023-03-13
Payer: COMMERCIAL

## 2023-03-13 VITALS
WEIGHT: 220 LBS | TEMPERATURE: 98 F | SYSTOLIC BLOOD PRESSURE: 102 MMHG | OXYGEN SATURATION: 97 % | BODY MASS INDEX: 29.8 KG/M2 | HEART RATE: 72 BPM | HEIGHT: 72 IN | DIASTOLIC BLOOD PRESSURE: 76 MMHG

## 2023-03-13 DIAGNOSIS — I10 PRIMARY HYPERTENSION: ICD-10-CM

## 2023-03-13 DIAGNOSIS — F33.1 MODERATE EPISODE OF RECURRENT MAJOR DEPRESSIVE DISORDER: Primary | ICD-10-CM

## 2023-03-13 DIAGNOSIS — R73.03 PREDIABETES: ICD-10-CM

## 2023-03-13 DIAGNOSIS — K21.9 GASTROESOPHAGEAL REFLUX DISEASE, UNSPECIFIED WHETHER ESOPHAGITIS PRESENT: ICD-10-CM

## 2023-03-13 PROBLEM — Z13.31 POSITIVE DEPRESSION SCREENING: Status: RESOLVED | Noted: 2021-07-30 | Resolved: 2023-03-13

## 2023-03-13 PROBLEM — F33.0 MILD EPISODE OF RECURRENT MAJOR DEPRESSIVE DISORDER: Status: RESOLVED | Noted: 2019-12-06 | Resolved: 2023-03-13

## 2023-03-13 PROCEDURE — 99213 OFFICE O/P EST LOW 20 MIN: CPT | Performed by: NURSE PRACTITIONER

## 2023-03-13 RX ORDER — ESCITALOPRAM OXALATE 10 MG/1
10 TABLET ORAL DAILY
Qty: 30 TABLET | Refills: 1 | Status: SHIPPED | OUTPATIENT
Start: 2023-03-13

## 2023-03-13 NOTE — PATIENT INSTRUCTIONS
Start Escitalopram 10 mg half tablet daily x1 week, then may increase to whole tablet daily.  Continue to work on healthy diet and exercise.  Schedule an appointment with counselor/therapist.  Follow up in 1 month, or sooner if symptoms persist or worsen.

## 2023-03-13 NOTE — PROGRESS NOTES
Subjective   Sander White is a 67 y.o. male.     Chief Complaint   Patient presents with   • Depression       History of Present Illness   Patient presents with c/o depression; has been feeling down due to currently going through a divorce; has had trouble falling asleep and staying asleep some nights; has tried Melatonin and did not help; will be retiring soon and has nothing left; used to look forward to going home after work, but now nothing to go home to; having to live with son due to does not have money; see PHQ-9 and XAVIER-7; no SI/HI; considering seeing counselor through employer that has seen in past; has been on medication for mood in distant past, but does not remember name; has trouble with ED.    F/U HTN: takes Lisinopril daily; does not typically monitor BP; a few headaches; rare mild orthostasis if moves too fast; no swelling; has follow up with cardiology in September.    F/U prediabetes: has been watching intake of carbs/sugars in diet; not much formal exercise, has physical job; has been drinking more water.      The following portions of the patient's history were reviewed and updated as appropriate: allergies, current medications, past family history, past medical history, past social history, past surgical history and problem list.    Current Outpatient Medications on File Prior to Visit   Medication Sig   • aspirin 81 MG chewable tablet Chew 1 tablet Daily.   • atorvastatin (LIPITOR) 40 MG tablet Take 1 tablet by mouth Daily.   • lisinopril (PRINIVIL,ZESTRIL) 2.5 MG tablet Take 1 tablet by mouth Daily.   • nitroglycerin (NITROSTAT) 0.4 MG SL tablet 1 under the tongue as needed for angina, may repeat q5mins for up three doses   • omeprazole (priLOSEC) 20 MG capsule Take 1 capsule by mouth Daily.   • vitamin B-12 (CYANOCOBALAMIN) 1000 MCG tablet Take 1 tablet by mouth Daily.   • vitamin B-6 (PYRIDOXINE) 100 MG tablet Take 1 tablet by mouth Daily.   • vitamin C (ASCORBIC ACID) 500 MG tablet Take 1  tablet by mouth Daily.   • VITAMIN D PO Take  by mouth.     No current facility-administered medications on file prior to visit.        Past Medical History:   Diagnosis Date   • Abnormal cardiovascular stress test 9/9/2019    Added automatically from request for surgery [id number]   • Airway hyperreactivity 5/3/2016   • Arthritis    • Chronic pain in right foot    • Collapse of right lung 12/23/1999   • Coronary artery disease    • ED (erectile dysfunction) 7/15/2016   • GERD (gastroesophageal reflux disease)    • Hordeolum externum of left lower eyelid 12/14/2021   • Hypertension    • Mallet finger 10/20/2014   • Myocardial infarction (HCC)    • Neck pain 12/15/2017   • Precordial pain 7/22/2017    Added automatically from request for surgery [id number]   • Sciatica of left side 11/1/2021   • Sprain of carpometacarpal joint 10/20/2014   • ST elevation myocardial infarction (STEMI) ([facility]) 2/15/2017       Past Surgical History:   Procedure Laterality Date   • APPENDECTOMY     • CARDIAC CATHETERIZATION N/A 2/15/2017    Procedure: Left Heart Cath;  Surgeon: Kristina Billings MD;  Location: [facility] INVASIVE LOCATION;  Service:    • CARDIAC CATHETERIZATION  2/15/2017    Procedure: Percutaneous Manual Thrombectomy;  Surgeon: Kristina Billings MD;  Location: [facility] CATH INVASIVE LOCATION;  Service:    • CARDIAC CATHETERIZATION N/A 2/15/2017    Procedure: Coronary angiography;  Surgeon: Kristina Billings MD;  Location: [facility] INVASIVE LOCATION;  Service:    • CARDIAC CATHETERIZATION N/A 2/15/2017    Procedure: Left ventriculography;  Surgeon: Kristina Billings MD;  Location: [facility] INVASIVE LOCATION;  Service:    • CARDIAC CATHETERIZATION N/A 7/24/2017    Procedure: Left Heart Cath;  Surgeon: Kristina Billings MD;  Location: [facility] CATH INVASIVE LOCATION;  Service:    • CARDIAC CATHETERIZATION N/A 7/24/2017    Procedure: Coronary angiography;  Surgeon: Kristina Billings MD;  Location: [facility]  CATH INVASIVE LOCATION;  Service:    • CARDIAC CATHETERIZATION N/A 7/24/2017    Procedure: Left ventriculography;  Surgeon: Kristina Billings MD;  Location: [facility] CATH INVASIVE LOCATION;  Service:    • CARDIAC CATHETERIZATION N/A 9/11/2019    Procedure: Left Heart Cath;  Surgeon: Kristina Billings MD;  Location: [facility] CATH INVASIVE LOCATION;  Service: Cardiology   • CARDIAC CATHETERIZATION N/A 9/11/2019    Procedure: Coronary angiography;  Surgeon: Kristina Billings MD;  Location: [facility] CATH INVASIVE LOCATION;  Service: Cardiology   • CARDIAC CATHETERIZATION N/A 9/11/2019    Procedure: Stent CAYLA coronary;  Surgeon: Kristina Billings MD;  Location: [facility] CATH INVASIVE LOCATION;  Service: Cardiology   • CARDIAC CATHETERIZATION N/A 9/11/2019    Procedure: Left ventriculography;  Surgeon: Kristina Billings MD;  Location: [facility] CATH INVASIVE LOCATION;  Service: Cardiology   • CARDIAC CATHETERIZATION N/A 6/25/2021    Procedure: Left Heart Cath;  Surgeon: Kristina Billings MD;  Location: [facility] CATH INVASIVE LOCATION;  Service: Cardiology;  Laterality: N/A;   • CARDIAC CATHETERIZATION N/A 6/25/2021    Procedure: Coronary angiography;  Surgeon: Kristina Billings MD;  Location: [facility] CATH INVASIVE LOCATION;  Service: Cardiology;  Laterality: N/A;   • CARDIAC CATHETERIZATION N/A 6/25/2021    Procedure: Left ventriculography;  Surgeon: Kristina Billings MD;  Location: [facility] CATH INVASIVE LOCATION;  Service: Cardiology;  Laterality: N/A;   • CARPAL TUNNEL RELEASE Right    • CHOLECYSTECTOMY     • COLONOSCOPY N/A 02/27/2015    Normal   • CORONARY ANGIOPLASTY     • CORONARY STENT PLACEMENT      x2   • CYST REMOVAL      thyroglossal cyst removed   • HERNIA REPAIR Right     Inguinal   • NJ RT/LT HEART CATHETERS N/A 2/15/2017    Procedure: Percutaneous Coronary Intervention;  Surgeon: Kristina Billings MD;  Location: [facility] CATH INVASIVE LOCATION;  Service: Cardiovascular       Family  History   Problem Relation Age of Onset   • Diabetes Mother         Type 2   • Hypertension Mother    • Hyperlipidemia Mother    • Heart attack Mother    • Heart disease Mother         S/P CABG   • Diabetes Father         Type 2   • Stroke Father    • Hypertension Father    • Hyperlipidemia Father    • Heart attack Father    • Heart disease Father         S/P CABG   • Alzheimer's disease Father    • Aneurysm Brother         Brain   • Pancreatitis Sister        Social History     Socioeconomic History   • Marital status:    • Number of children: 5   • Years of education: 2 YEARS OF COLLEGE   Tobacco Use   • Smoking status: Former     Packs/day: 0.50     Years: 7.00     Pack years: 3.50     Types: Cigarettes     Quit date: 1999     Years since quittin.2   • Smokeless tobacco: Never   Vaping Use   • Vaping Use: Never used   Substance and Sexual Activity   • Alcohol use: Yes     Comment: occasional   • Drug use: No   • Sexual activity: Defer       Review of Systems   Constitutional: Positive for fatigue. Negative for chills, fever, unexpected weight gain and unexpected weight loss. Appetite change: some at times related to mood.   HENT: Negative for ear pain and sore throat. Tinnitus: chronic at times.    Respiratory: Negative for cough (occasional nonproductive cough), chest tightness and shortness of breath.    Cardiovascular: Negative for chest pain and palpitations.   Gastrointestinal: Negative for abdominal pain, blood in stool, constipation, diarrhea, GERD (none as long as takes Omeprazole) and indigestion.   Endocrine: Negative for polydipsia.   Genitourinary: Negative for dysuria.   Musculoskeletal: Negative for back pain. Arthralgias: some in right shoulder related to work and repetitive motion; no decreased ROM.   Skin: Negative for rash.   Neurological: Negative for syncope and weakness.   Hematological: Does not bruise/bleed easily.   Psychiatric/Behavioral: Negative for suicidal ideas.  "      PHQ-9 Depression Screening  Little interest or pleasure in doing things? 3-->nearly every day   Feeling down, depressed, or hopeless? 3-->nearly every day   Trouble falling or staying asleep, or sleeping too much? 2-->more than half the days   Feeling tired or having little energy? 2-->more than half the days   Poor appetite or overeating? 1-->several days   Feeling bad about yourself - or that you are a failure or have let yourself or your family down? 1-->several days   Trouble concentrating on things, such as reading the newspaper or watching television? 0-->not at all   Moving or speaking so slowly that other people could have noticed? Or the opposite - being so fidgety or restless that you have been moving around a lot more than usual? 1-->several days   Thoughts that you would be better off dead, or of hurting yourself in some way? 0-->not at all   PHQ-9 Total Score 13   If you checked off any problems, how difficult have these problems made it for you to do your work, take care of things at home, or get along with other people? not difficult at all     XAVIER-7 anxiety score: 4    Objective   Vitals:    03/13/23 0809   BP: 102/76   BP Location: Left arm   Patient Position: Sitting   Cuff Size: Adult   Pulse: 72   Temp: 98 °F (36.7 °C)   SpO2: 97%   Weight: 99.8 kg (220 lb)   Height: 182.9 cm (72\")     Body mass index is 29.84 kg/m².    Physical Exam  Vitals and nursing note reviewed.   Constitutional:       General: He is not in acute distress.     Appearance: He is well-developed and well-groomed. He is not diaphoretic.   HENT:      Head: Normocephalic.      Right Ear: External ear normal.      Left Ear: External ear normal.   Eyes:      Conjunctiva/sclera: Conjunctivae normal.   Neck:      Vascular: No carotid bruit.   Cardiovascular:      Rate and Rhythm: Normal rate and regular rhythm.      Pulses: Normal pulses.      Heart sounds: Normal heart sounds. No murmur heard.  Pulmonary:      Effort: " Pulmonary effort is normal. No respiratory distress.      Breath sounds: Normal breath sounds.   Abdominal:      General: Bowel sounds are normal.      Palpations: Abdomen is soft. There is no hepatomegaly or splenomegaly.      Tenderness: There is no abdominal tenderness. There is no guarding.   Musculoskeletal:      Cervical back: Normal range of motion and neck supple.      Right lower leg: No edema.      Left lower leg: No edema.   Skin:     General: Skin is warm and dry.      Findings: No rash.   Neurological:      Mental Status: He is alert and oriented to person, place, and time.      Gait: Gait is intact.   Psychiatric:         Mood and Affect: Mood normal.         Behavior: Behavior normal.         Thought Content: Thought content normal.         Cognition and Memory: Cognition normal.         Judgment: Judgment normal.         Lab Results   Component Value Date    WBC 6.0 01/11/2023    RBC 4.76 01/11/2023    HGB 14.6 01/11/2023    HCT 42.3 01/11/2023    MCV 89 01/11/2023    MCH 30.7 01/11/2023    MCHC 34.5 01/11/2023    RDW 12.2 01/11/2023    RDWSD 43.2 06/21/2021    MPV 12.4 (H) 06/21/2021     01/11/2023    NEUTRORELPCT 59 01/11/2023    LYMPHORELPCT 18 01/11/2023    MONORELPCT 15 01/11/2023    EOSRELPCT 7 01/11/2023    BASORELPCT 1 01/11/2023    AUTOIGPER 0.5 06/21/2021    NEUTROABS 3.5 01/11/2023    LYMPHSABS 1.1 01/11/2023    MONOSABS 0.9 01/11/2023    EOSABS 0.4 01/11/2023    BASOSABS 0.0 01/11/2023    AUTOIGNUM 0.03 06/21/2021    NRBC 0.0 06/21/2021     Lab Results   Component Value Date    GLUCOSE 106 (H) 01/11/2023    BUN 19 01/11/2023    CREATININE 0.96 01/11/2023    EGFRIFNONA 67 06/21/2021    EGFRIFAFRI 104 05/26/2021    BCR 20 01/11/2023    K 5.0 01/11/2023    CO2 22 01/11/2023    CALCIUM 9.4 01/11/2023    PROTENTOTREF 6.7 01/11/2023    ALBUMIN 4.5 01/11/2023    LABIL2 2.0 01/11/2023    AST 32 01/11/2023    ALT 30 01/11/2023      Lab Results   Component Value Date    CHLPL 139 01/11/2023     TRIG 78 01/11/2023    HDL 45 01/11/2023    VLDL 15 01/11/2023    LDL 79 01/11/2023     Lab Results   Component Value Date    TSH 1.140 11/02/2020     Lab Results   Component Value Date    HGBA1C 5.8 (H) 01/11/2023     Lab Results   Component Value Date    PSA 1.050 09/12/2022         Assessment    Problem List Items Addressed This Visit     Primary hypertension    Current Assessment & Plan     Hypertension is stable.  Regular aerobic exercise.  Continue current medications.  Ambulatory blood pressure monitoring.  Blood pressure will be reassessed at the next regular appointment.  Continue Lisinopril daily.         GERD (gastroesophageal reflux disease)    Current Assessment & Plan     Stable on Omeprazole daily.         Moderate episode of recurrent major depressive disorder (HCC) - Primary    Current Assessment & Plan     Patient's depression is recurrent and is moderate without psychosis. Their depression is currently active and the condition is newly identified. This will be reassessed in 4 weeks. F/U as described:patient was prescribed an antidepressant medicine   Start Escitalopram 10 mg half tablet daily x1 week, then may increase to whole tablet daily.  Increase exercise.  Schedule an appointment with counselor/therapist.         Relevant Medications    escitalopram (Lexapro) 10 MG tablet    Prediabetes    Current Assessment & Plan     Continue to decrease intake of carbs/sugars in diet.             Return in about 1 month (around 4/13/2023) for Recheck, Video visit.or sooner if symptoms persist or worsen.  Will start Escitalopram daily for mood; discussed AE/BBW with Escitalopram.       COVID-19 Precautions - Patient was compliant in wearing a mask. When I saw the patient, I used appropriate personal protective equipment (PPE) including mask, gloves, and eye shield (standard procedure).  Hand hygiene was completed before and after seeing the patient.

## 2023-03-13 NOTE — ASSESSMENT & PLAN NOTE
Patient's depression is recurrent and is moderate without psychosis. Their depression is currently active and the condition is newly identified. This will be reassessed in 4 weeks. F/U as described:patient was prescribed an antidepressant medicine   Start Escitalopram 10 mg half tablet daily x1 week, then may increase to whole tablet daily.  Increase exercise.  Schedule an appointment with counselor/therapist.

## 2023-05-15 DIAGNOSIS — F33.1 MODERATE EPISODE OF RECURRENT MAJOR DEPRESSIVE DISORDER: ICD-10-CM

## 2023-05-15 RX ORDER — ESCITALOPRAM OXALATE 10 MG/1
10 TABLET ORAL DAILY
Qty: 30 TABLET | Refills: 0 | Status: SHIPPED | OUTPATIENT
Start: 2023-05-15

## 2023-06-13 ENCOUNTER — OFFICE VISIT (OUTPATIENT)
Dept: FAMILY MEDICINE CLINIC | Facility: CLINIC | Age: 68
End: 2023-06-13
Payer: COMMERCIAL

## 2023-06-13 VITALS
HEIGHT: 72 IN | OXYGEN SATURATION: 95 % | WEIGHT: 221 LBS | SYSTOLIC BLOOD PRESSURE: 122 MMHG | DIASTOLIC BLOOD PRESSURE: 62 MMHG | HEART RATE: 78 BPM | BODY MASS INDEX: 29.93 KG/M2

## 2023-06-13 DIAGNOSIS — F41.9 ANXIETY: ICD-10-CM

## 2023-06-13 DIAGNOSIS — R06.83 SNORING: ICD-10-CM

## 2023-06-13 DIAGNOSIS — I10 PRIMARY HYPERTENSION: Primary | ICD-10-CM

## 2023-06-13 DIAGNOSIS — F33.1 MODERATE EPISODE OF RECURRENT MAJOR DEPRESSIVE DISORDER: ICD-10-CM

## 2023-06-13 DIAGNOSIS — K21.9 GASTROESOPHAGEAL REFLUX DISEASE, UNSPECIFIED WHETHER ESOPHAGITIS PRESENT: ICD-10-CM

## 2023-06-13 PROBLEM — G47.9 SLEEP DISTURBANCE: Status: RESOLVED | Noted: 2021-07-30 | Resolved: 2023-06-13

## 2023-06-13 PROCEDURE — 99213 OFFICE O/P EST LOW 20 MIN: CPT | Performed by: NURSE PRACTITIONER

## 2023-06-13 RX ORDER — LISINOPRIL 2.5 MG/1
2.5 TABLET ORAL DAILY
Qty: 90 TABLET | Refills: 0 | Status: SHIPPED | OUTPATIENT
Start: 2023-06-13

## 2023-06-13 NOTE — PROGRESS NOTES
Answers for HPI/ROS submitted by the patient on 6/12/2023  What is the primary reason for your visit?: Other  Please describe your symptoms.: depression  Have you had these symptoms before?: Yes  How long have you been having these symptoms?: Greater than 2 weeks  Please list any medications you are currently taking for this condition.: none  Please describe any probable cause for these symptoms. : just losing everything    Subjective   Sander White is a 67 y.o. male.     Chief Complaint   Patient presents with   • Med Refill     Medication follow up    • Depression     Follow up       History of Present Illness   Patient presents for follow up depression/anxiety: started Escitalopram daily; had problems with diarrhea and urgency of bowel with whole tablet of Escitalopram so stopped taking; diarrhea resolved after stopped taking Escitalopram; declines trial of alternative medication; overall doing better; has been seeing counselor through employer and has helped; currently going through a divorce and things are starting to get finalized; has been taking things one step at a time; has good support from family; no problems with sleep; see PHQ-2 and XAVIER-7; no SI/HI.    F/U HTN: takes Lisinopril daily; no change in occasional cough since was child; no concern; monitors BP at work, typically runs 110-120s/60s; no headaches; no orthostasis; no swelling; regular exercise, does a lot of walking at work; has follow up with cardiology in September.       The following portions of the patient's history were reviewed and updated as appropriate: allergies, current medications, past family history, past medical history, past social history, past surgical history and problem list.       Current Outpatient Medications   Medication Sig Dispense Refill   • aspirin 81 MG chewable tablet Chew 1 tablet Daily.     • atorvastatin (LIPITOR) 40 MG tablet Take 1 tablet by mouth Daily. 90 tablet 1   • lisinopril (PRINIVIL,ZESTRIL) 2.5 MG  tablet Take 1 tablet by mouth Daily. 90 tablet 0   • nitroglycerin (NITROSTAT) 0.4 MG SL tablet 1 under the tongue as needed for angina, may repeat q5mins for up three doses 25 tablet 3   • omeprazole (priLOSEC) 20 MG capsule Take 1 capsule by mouth Daily.     • vitamin B-12 (CYANOCOBALAMIN) 1000 MCG tablet Take 1 tablet by mouth Daily.     • vitamin B-6 (PYRIDOXINE) 100 MG tablet Take 1 tablet by mouth Daily.     • vitamin C (ASCORBIC ACID) 500 MG tablet Take 1 tablet by mouth Daily.     • VITAMIN D PO Take  by mouth.       No current facility-administered medications for this visit.       Past Medical History:   Diagnosis Date   • Abnormal cardiovascular stress test 9/9/2019    Added automatically from request for surgery [id number]   • Airway hyperreactivity 5/3/2016   • Arthritis    • Chronic pain in right foot    • Collapse of right lung 12/23/1999   • Coronary artery disease    • ED (erectile dysfunction) 7/15/2016   • GERD (gastroesophageal reflux disease)    • Hordeolum externum of left lower eyelid 12/14/2021   • Hypertension    • Mallet finger 10/20/2014   • Myocardial infarction    • Neck pain 12/15/2017   • Precordial pain 7/22/2017    Added automatically from request for surgery [id number]   • Sciatica of left side 11/1/2021   • Sprain of carpometacarpal joint 10/20/2014   • ST elevation myocardial infarction (STEMI) 2/15/2017       Past Surgical History:   Procedure Laterality Date   • APPENDECTOMY     • CARDIAC CATHETERIZATION N/A 02/15/2017    Procedure: Left Heart Cath;  Surgeon: Kristina Billings MD;  Location: [facility] CATH INVASIVE LOCATION;  Service:    • CARDIAC CATHETERIZATION  02/15/2017    Procedure: Percutaneous Manual Thrombectomy;  Surgeon: Kristina Billings MD;  Location: [facility] CATH INVASIVE LOCATION;  Service:    • CARDIAC CATHETERIZATION N/A 02/15/2017    Procedure: Coronary angiography;  Surgeon: Kristina Billings MD;  Location: [facility] CATH INVASIVE LOCATION;  Service:    •  CARDIAC CATHETERIZATION N/A 02/15/2017    Procedure: Left ventriculography;  Surgeon: Kristina Billings MD;  Location:  [facility] CATH INVASIVE LOCATION;  Service:    • CARDIAC CATHETERIZATION N/A 07/24/2017    Procedure: Left Heart Cath;  Surgeon: Kristina Billings MD;  Location:  [facility] CATH INVASIVE LOCATION;  Service:    • CARDIAC CATHETERIZATION N/A 07/24/2017    Procedure: Coronary angiography;  Surgeon: Kristina Billings MD;  Location:  [facility] CATH INVASIVE LOCATION;  Service:    • CARDIAC CATHETERIZATION N/A 07/24/2017    Procedure: Left ventriculography;  Surgeon: Kristina Billings MD;  Location:  [facility] CATH INVASIVE LOCATION;  Service:    • CARDIAC CATHETERIZATION N/A 09/11/2019    Procedure: Left Heart Cath;  Surgeon: Kristina Billings MD;  Location: [facility] CATH INVASIVE LOCATION;  Service: Cardiology   • CARDIAC CATHETERIZATION N/A 09/11/2019    Procedure: Coronary angiography;  Surgeon: Kristina Billings MD;  Location: [facility] CATH INVASIVE LOCATION;  Service: Cardiology   • CARDIAC CATHETERIZATION N/A 09/11/2019    Procedure: Stent CAYLA coronary;  Surgeon: Kristnia Billings MD;  Location: [facility] CATH INVASIVE LOCATION;  Service: Cardiology   • CARDIAC CATHETERIZATION N/A 09/11/2019    Procedure: Left ventriculography;  Surgeon: Kristina Billings MD;  Location: [facility] CATH INVASIVE LOCATION;  Service: Cardiology   • CARDIAC CATHETERIZATION N/A 06/25/2021    Procedure: Left Heart Cath;  Surgeon: Kristina Billings MD;  Location: [facility] CATH INVASIVE LOCATION;  Service: Cardiology;  Laterality: N/A;   • CARDIAC CATHETERIZATION N/A 06/25/2021    Procedure: Coronary angiography;  Surgeon: Kristina Billings MD;  Location: [facility] CATH INVASIVE LOCATION;  Service: Cardiology;  Laterality: N/A;   • CARDIAC CATHETERIZATION N/A 06/25/2021    Procedure: Left ventriculography;  Surgeon: Kristina Billings MD;  Location: [facility] CATH INVASIVE LOCATION;  Service: Cardiology;   Laterality: N/A;   • CARPAL TUNNEL RELEASE Right    • CHOLECYSTECTOMY     • COLONOSCOPY N/A 2015    Normal   • CORONARY ANGIOPLASTY     • CORONARY STENT PLACEMENT      x2   • CYST REMOVAL      thyroglossal cyst removed   • HERNIA REPAIR Right     Inguinal   • NH RT/LT HEART CATHETERS N/A 02/15/2017    Procedure: Percutaneous Coronary Intervention;  Surgeon: Kristina Billings MD;  Location: [facility] INVASIVE LOCATION;  Service: Cardiovascular   • TONSILLECTOMY  when I was 4       Family History   Problem Relation Age of Onset   • Diabetes Mother         Type 2   • Hypertension Mother    • Hyperlipidemia Mother    • Heart attack Mother    • Heart disease Mother         S/P CABG   • Diabetes Father         Type 2   • Stroke Father    • Hypertension Father    • Hyperlipidemia Father    • Heart attack Father    • Heart disease Father         S/P CABG   • Alzheimer's disease Father    • Aneurysm Brother         Brain   • Pancreatitis Sister    • Cancer Maternal Aunt         All of dad side sisters       Social History     Socioeconomic History   • Marital status:    • Number of children: 5   • Years of education: 2 YEARS OF COLLEGE   Tobacco Use   • Smoking status: Former     Packs/day: 0.50     Years: 7.00     Pack years: 3.50     Types: Cigarettes     Quit date: 1999     Years since quittin.4   • Smokeless tobacco: Never   Vaping Use   • Vaping Use: Never used   Substance and Sexual Activity   • Alcohol use: Yes     Alcohol/week: 2.0 standard drinks     Types: 2 Glasses of wine per week     Comment: occasional   • Drug use: No   • Sexual activity: Defer       Review of Systems   Constitutional:  Negative for appetite change, chills, fever, unexpected weight gain (some weight gain since has been eating too much at times) and unexpected weight loss. Fatigue: some at end of work day only.  HENT:  Negative for ear pain and sore throat.    Eyes:  Negative for blurred vision.   Respiratory:   "Negative for chest tightness and shortness of breath. Apnea: does snore, feels rested when wakes up; declines sleep study. Cough: see HPI.   Cardiovascular:  Negative for chest pain and palpitations.   Gastrointestinal:  Negative for abdominal pain, blood in stool, constipation, diarrhea, GERD (takes Omeprazole daily and works well) and indigestion.   Endocrine: Negative for polydipsia.   Genitourinary:  Negative for dysuria and frequency.   Musculoskeletal:  Negative for arthralgias (some in left elbow, has some tendonitis due to job) and back pain.   Skin:  Negative for rash.   Neurological:  Negative for syncope and weakness.   Hematological:  Does not bruise/bleed easily.     PHQ-2 Depression Screening  Little interest or pleasure in doing things? 0-->not at all   Feeling down, depressed, or hopeless? 1-->several days   PHQ-2 Total Score 1     XAVIER-7 anxiety score: 1    Objective   Vitals:    06/13/23 1256   BP: 122/62   BP Location: Left arm   Patient Position: Sitting   Cuff Size: Adult   Pulse: 78   SpO2: 95%   Weight: 100 kg (221 lb)   Height: 182.9 cm (72\")     Body mass index is 29.97 kg/m².    Physical Exam  Vitals and nursing note reviewed.   Constitutional:       General: He is not in acute distress.     Appearance: He is well-developed and well-groomed. He is not diaphoretic.   HENT:      Head: Normocephalic.      Right Ear: External ear normal. No decreased hearing noted. Right ear middle ear effusion: TM dull. Tympanic membrane is not erythematous.      Left Ear: External ear normal. No decreased hearing noted. Left ear middle ear effusion: TM dull. Tympanic membrane is not erythematous.      Nose: Nose normal.      Right Sinus: No maxillary sinus tenderness or frontal sinus tenderness.      Left Sinus: No maxillary sinus tenderness or frontal sinus tenderness.      Mouth/Throat:      Mouth: Mucous membranes are moist.      Pharynx: No oropharyngeal exudate. Posterior oropharyngeal erythema: mild in " posterior pharynx.   Eyes:      Conjunctiva/sclera: Conjunctivae normal.   Neck:      Vascular: No carotid bruit.   Cardiovascular:      Rate and Rhythm: Normal rate and regular rhythm.      Pulses: Normal pulses.      Heart sounds: Normal heart sounds. No murmur heard.  Pulmonary:      Effort: Pulmonary effort is normal. No respiratory distress.      Breath sounds: Normal breath sounds.   Abdominal:      General: Bowel sounds are normal.      Palpations: Abdomen is soft. There is no hepatomegaly or splenomegaly.      Tenderness: There is no abdominal tenderness. There is no guarding.   Musculoskeletal:      Cervical back: Normal range of motion and neck supple.      Right lower leg: No edema.      Left lower leg: No edema.   Lymphadenopathy:      Cervical: No cervical adenopathy.   Skin:     General: Skin is warm and dry.   Neurological:      Mental Status: He is alert and oriented to person, place, and time.      Gait: Gait is intact.   Psychiatric:         Mood and Affect: Mood normal.         Behavior: Behavior normal.         Thought Content: Thought content normal.         Cognition and Memory: Cognition normal.         Judgment: Judgment normal.         Lab Results   Component Value Date    WBC 6.0 01/11/2023    RBC 4.76 01/11/2023    HGB 14.6 01/11/2023    HCT 42.3 01/11/2023    MCV 89 01/11/2023    MCH 30.7 01/11/2023    MCHC 34.5 01/11/2023    RDW 12.2 01/11/2023    RDWSD 43.2 06/21/2021    MPV 12.4 (H) 06/21/2021     01/11/2023    NEUTRORELPCT 59 01/11/2023    LYMPHORELPCT 18 01/11/2023    MONORELPCT 15 01/11/2023    EOSRELPCT 7 01/11/2023    BASORELPCT 1 01/11/2023    AUTOIGPER 0.5 06/21/2021    NEUTROABS 3.5 01/11/2023    LYMPHSABS 1.1 01/11/2023    MONOSABS 0.9 01/11/2023    EOSABS 0.4 01/11/2023    BASOSABS 0.0 01/11/2023    AUTOIGNUM 0.03 06/21/2021    NRBC 0.0 06/21/2021     Lab Results   Component Value Date    GLUCOSE 106 (H) 01/11/2023    BUN 19 01/11/2023    CREATININE 0.96 01/11/2023     EGFRIFNONA 67 06/21/2021    EGFRIFAFRI 104 05/26/2021    BCR 20 01/11/2023    K 5.0 01/11/2023    CO2 22 01/11/2023    CALCIUM 9.4 01/11/2023    PROTENTOTREF 6.7 01/11/2023    ALBUMIN 4.5 01/11/2023    LABIL2 2.0 01/11/2023    AST 32 01/11/2023    ALT 30 01/11/2023      Lab Results   Component Value Date    CHLPL 139 01/11/2023    TRIG 78 01/11/2023    HDL 45 01/11/2023    VLDL 15 01/11/2023    LDL 79 01/11/2023     Lab Results   Component Value Date    TSH 1.140 11/02/2020     Lab Results   Component Value Date    HGBA1C 5.8 (H) 01/11/2023     Lab Results   Component Value Date    PSA 1.050 09/12/2022         Assessment    Problem List Items Addressed This Visit     Primary hypertension - Primary    Current Assessment & Plan     Hypertension is stable.  Regular aerobic exercise.  Continue current medications.  Ambulatory blood pressure monitoring.  Blood pressure will be reassessed in 3 months.  Continue Lisinopril daily.         Relevant Medications    lisinopril (PRINIVIL,ZESTRIL) 2.5 MG tablet    GERD (gastroesophageal reflux disease)    Current Assessment & Plan     Stable.  Continue Omeprazole daily.         Snoring    Current Assessment & Plan     Declines sleep study.         Anxiety    Current Assessment & Plan     Stable off medication.  Continue appointments with counselor.         Moderate episode of recurrent major depressive disorder    Current Assessment & Plan     Patient's depression is recurrent and is moderate without psychosis. Their depression is currently active and the condition is stable off medication. This will be reassessed in 3 months. F/U as described:continue appointments with counselor.                 Return in about 3 months (around 9/13/2023) for Annual physical, Recheck.or sooner if problems or concerns.

## 2023-06-13 NOTE — ASSESSMENT & PLAN NOTE
Patient's depression is recurrent and is moderate without psychosis. Their depression is currently active and the condition is stable off medication. This will be reassessed in 3 months. F/U as described:continue appointments with counselor.

## 2023-06-13 NOTE — PATIENT INSTRUCTIONS
Continue to monitor your blood pressure periodically and record results.  Continue to work on healthy diet and exercise.  Follow up in 3 months for physical with fasting labs, or sooner if problems or concerns.

## 2023-06-13 NOTE — ASSESSMENT & PLAN NOTE
Hypertension is stable.  Regular aerobic exercise.  Continue current medications.  Ambulatory blood pressure monitoring.  Blood pressure will be reassessed in 3 months.  Continue Lisinopril daily.

## 2023-08-11 DIAGNOSIS — I10 PRIMARY HYPERTENSION: ICD-10-CM

## 2023-08-14 RX ORDER — LISINOPRIL 2.5 MG/1
TABLET ORAL
Qty: 90 TABLET | Refills: 0 | Status: SHIPPED | OUTPATIENT
Start: 2023-08-14

## 2023-09-11 ENCOUNTER — OFFICE VISIT (OUTPATIENT)
Dept: CARDIOLOGY | Facility: CLINIC | Age: 68
End: 2023-09-11
Payer: COMMERCIAL

## 2023-09-11 VITALS
HEIGHT: 72 IN | SYSTOLIC BLOOD PRESSURE: 135 MMHG | BODY MASS INDEX: 29.39 KG/M2 | DIASTOLIC BLOOD PRESSURE: 89 MMHG | WEIGHT: 217 LBS | HEART RATE: 64 BPM

## 2023-09-11 DIAGNOSIS — E78.2 MIXED HYPERLIPIDEMIA: ICD-10-CM

## 2023-09-11 DIAGNOSIS — I25.10 CORONARY ARTERY DISEASE INVOLVING NATIVE CORONARY ARTERY OF NATIVE HEART WITHOUT ANGINA PECTORIS: ICD-10-CM

## 2023-09-11 DIAGNOSIS — I10 PRIMARY HYPERTENSION: ICD-10-CM

## 2023-09-11 DIAGNOSIS — I25.10 CORONARY ARTERY DISEASE INVOLVING NATIVE CORONARY ARTERY OF NATIVE HEART WITHOUT ANGINA PECTORIS: Primary | ICD-10-CM

## 2023-09-11 PROCEDURE — 93000 ELECTROCARDIOGRAM COMPLETE: CPT | Performed by: INTERNAL MEDICINE

## 2023-09-11 PROCEDURE — 99213 OFFICE O/P EST LOW 20 MIN: CPT | Performed by: INTERNAL MEDICINE

## 2023-09-11 RX ORDER — MAGNESIUM OXIDE 400 MG/1
400 TABLET ORAL DAILY
COMMUNITY

## 2023-09-11 RX ORDER — NITROGLYCERIN 0.4 MG/1
TABLET SUBLINGUAL
Qty: 25 TABLET | Refills: 3 | Status: SHIPPED | OUTPATIENT
Start: 2023-09-11

## 2023-09-11 NOTE — PROGRESS NOTES
Subjective:        Sander White is a 67 y.o. male who here for follow up    CC  Follow-up coronary artery disease hypertension hyperlipidemia  HPI  67 years old male with coronary artery disease hypertension hyperlipidemia here for the follow-up with no complaints of chest pain tightness heaviness or the pressure sensation     Problems Addressed this Visit          Cardiac and Vasculature    Mixed hyperlipidemia    Primary hypertension    Coronary artery disease involving native coronary artery of native heart without angina pectoris - Primary     Diagnoses         Codes Comments    Coronary artery disease involving native coronary artery of native heart without angina pectoris    -  Primary ICD-10-CM: I25.10  ICD-9-CM: 414.01     Primary hypertension     ICD-10-CM: I10  ICD-9-CM: 401.9     Mixed hyperlipidemia     ICD-10-CM: E78.2  ICD-9-CM: 272.2           .    The following portions of the patient's history were reviewed and updated as appropriate: allergies, current medications, past family history, past medical history, past social history, past surgical history and problem list.    Past Medical History:   Diagnosis Date    Abnormal cardiovascular stress test 9/9/2019    Added automatically from request for surgery [id number]    Airway hyperreactivity 5/3/2016    Arthritis     Chronic pain in right foot     Collapse of right lung 12/23/1999    Coronary artery disease     ED (erectile dysfunction) 7/15/2016    GERD (gastroesophageal reflux disease)     Hordeolum externum of left lower eyelid 12/14/2021    Hypertension     Mallet finger 10/20/2014    Myocardial infarction     Neck pain 12/15/2017    Precordial pain 7/22/2017    Added automatically from request for surgery [id number]    Sciatica of left side 11/1/2021    Sprain of carpometacarpal joint 10/20/2014    ST elevation myocardial infarction (STEMI) 2/15/2017     reports that he quit smoking about 23 years ago. His smoking use included cigarettes. He has a  "3.50 pack-year smoking history. He has never used smokeless tobacco. He reports current alcohol use of about 2.0 standard drinks per week. He reports that he does not use drugs.   Family History   Problem Relation Age of Onset    Diabetes Mother         Type 2    Hypertension Mother     Hyperlipidemia Mother     Heart attack Mother     Heart disease Mother         S/P CABG    Diabetes Father         Type 2    Stroke Father     Hypertension Father     Hyperlipidemia Father     Heart attack Father     Heart disease Father         S/P CABG    Alzheimer's disease Father     Pancreatitis Sister     Aneurysm Brother         Brain    Cancer Paternal Aunt         all father's sisters       Review of Systems  Constitutional: No wt loss, fever, fatigue  Gastrointestinal: No nausea, abdominal pain  Behavioral/Psych: No insomnia or anxiety   Cardiovascular no chest pains or tightness in the chest  Objective:       Physical Exam           Physical Exam  /89 (BP Location: Left arm, Patient Position: Sitting, Cuff Size: Adult)   Pulse 64   Ht 182.9 cm (72\")   Wt 98.4 kg (217 lb)   BMI 29.43 kg/m²     General appearance: No acute changes   Eyes: Sclerae conjunctivae normal, pupils reactive   HENT: Atraumatic; oropharynx clear with moist mucous membranes and no mucosal ulcerations;  Neck: Trachea midline; NECK, supple, no thyromegaly or lymphadenopathy   Lungs: Normal size and shape, normal breath sounds, equal distribution of air, no rales and rhonchi   CV: S1-S2 regular, no murmurs, no rub, no gallop   Abdomen: Soft, nontender; no masses , no abnormal abdominal sounds   Extremities: No deformity , normal color , no peripheral edema   Skin: Normal temperature, turgor and texture; no rash, ulcers  Psych: Appropriate affect, alert and oriented to person, place and time             ECG 12 Lead    Date/Time: 9/11/2023 10:48 AM  Performed by: Kristina Billings MD  Authorized by: Kristina Billings MD   Comparison: " compared with previous ECG   Similar to previous ECG  Rhythm: sinus rhythm    Clinical impression: non-specific ECG          Echocardiogram:        Current Outpatient Medications:     aspirin 81 MG chewable tablet, Chew 1 tablet Daily., Disp: , Rfl:     lisinopril (PRINIVIL,ZESTRIL) 2.5 MG tablet, TAKE ONE TABLET BY MOUTH DAILY, Disp: 90 tablet, Rfl: 0    magnesium oxide (MAG-OX) 400 MG tablet, Take 1 tablet by mouth Daily., Disp: , Rfl:     omeprazole (priLOSEC) 20 MG capsule, Take 1 capsule by mouth Daily., Disp: , Rfl:     vitamin B-6 (PYRIDOXINE) 100 MG tablet, Take 1 tablet by mouth Daily., Disp: , Rfl:     vitamin C (ASCORBIC ACID) 500 MG tablet, Take 1 tablet by mouth Daily., Disp: , Rfl:     VITAMIN D PO, Take  by mouth., Disp: , Rfl:     atorvastatin (LIPITOR) 40 MG tablet, Take 1 tablet by mouth Daily., Disp: 90 tablet, Rfl: 1    Coenzyme Q10 (COQ10 PO), Take 1 tablet by mouth Daily., Disp: , Rfl:     nitroglycerin (NITROSTAT) 0.4 MG SL tablet, 1 under the tongue as needed for angina, may repeat q5mins for up three doses, Disp: 25 tablet, Rfl: 3    vitamin B-12 (CYANOCOBALAMIN) 1000 MCG tablet, Take 1 tablet by mouth Every Other Day., Disp: , Rfl:    Assessment:        Patient Active Problem List   Diagnosis    Mixed hyperlipidemia    Erectile disorder due to medical condition in male    Primary hypertension    Coronary artery disease involving native coronary artery of native heart without angina pectoris    Chest pain    Spinal stenosis in cervical region    Cervical radiculopathy    GERD (gastroesophageal reflux disease)    Precordial pain    Anxiety    Skin lesion of back    Allergic rhinitis    Chest discomfort    Former smoker    Elevated fasting glucose    Prediabetes    Class 1 obesity with serious comorbidity and body mass index (BMI) of 30.0 to 30.9 in adult    Fatigue               Plan:            ICD-10-CM ICD-9-CM   1. Coronary artery disease involving native coronary artery of native heart  without angina pectoris  I25.10 414.01   2. Primary hypertension  I10 401.9   3. Mixed hyperlipidemia  E78.2 272.2     1. Coronary artery disease involving native coronary artery of native heart without angina pectoris  No angina pectoris    2. Primary hypertension  Continue current treatment    3. Mixed hyperlipidemia  Continue current treatment      1 yr  COUNSELING:    Sander Moore was given to patient for the following topics: diagnostic results, risk factor reductions, impressions, risks and benefits of treatment options and importance of treatment compliance .       SMOKING COUNSELING:        Dictated using Dragon dictation

## 2023-09-19 ENCOUNTER — OFFICE VISIT (OUTPATIENT)
Dept: FAMILY MEDICINE CLINIC | Facility: CLINIC | Age: 68
End: 2023-09-19
Payer: COMMERCIAL

## 2023-09-19 VITALS
HEIGHT: 72 IN | OXYGEN SATURATION: 99 % | WEIGHT: 222 LBS | DIASTOLIC BLOOD PRESSURE: 84 MMHG | SYSTOLIC BLOOD PRESSURE: 128 MMHG | HEART RATE: 79 BPM | BODY MASS INDEX: 30.07 KG/M2 | TEMPERATURE: 98.6 F

## 2023-09-19 DIAGNOSIS — K21.9 GASTROESOPHAGEAL REFLUX DISEASE, UNSPECIFIED WHETHER ESOPHAGITIS PRESENT: ICD-10-CM

## 2023-09-19 DIAGNOSIS — E78.2 MIXED HYPERLIPIDEMIA: ICD-10-CM

## 2023-09-19 DIAGNOSIS — E66.9 CLASS 1 OBESITY WITH SERIOUS COMORBIDITY AND BODY MASS INDEX (BMI) OF 30.0 TO 30.9 IN ADULT, UNSPECIFIED OBESITY TYPE: ICD-10-CM

## 2023-09-19 DIAGNOSIS — F33.1 MODERATE EPISODE OF RECURRENT MAJOR DEPRESSIVE DISORDER: ICD-10-CM

## 2023-09-19 DIAGNOSIS — Z12.5 PROSTATE CANCER SCREENING: ICD-10-CM

## 2023-09-19 DIAGNOSIS — Z23 ENCOUNTER FOR IMMUNIZATION: ICD-10-CM

## 2023-09-19 DIAGNOSIS — I10 PRIMARY HYPERTENSION: ICD-10-CM

## 2023-09-19 DIAGNOSIS — I25.10 CORONARY ARTERY DISEASE INVOLVING NATIVE CORONARY ARTERY OF NATIVE HEART WITHOUT ANGINA PECTORIS: ICD-10-CM

## 2023-09-19 DIAGNOSIS — R73.03 PREDIABETES: ICD-10-CM

## 2023-09-19 DIAGNOSIS — R53.83 FATIGUE, UNSPECIFIED TYPE: ICD-10-CM

## 2023-09-19 DIAGNOSIS — Z00.00 ENCOUNTER FOR ANNUAL PHYSICAL EXAM: Primary | ICD-10-CM

## 2023-09-19 DIAGNOSIS — F41.9 ANXIETY: ICD-10-CM

## 2023-09-19 PROBLEM — E66.811 CLASS 1 OBESITY WITH SERIOUS COMORBIDITY AND BODY MASS INDEX (BMI) OF 30.0 TO 30.9 IN ADULT: Status: ACTIVE | Noted: 2023-09-19

## 2023-09-19 PROBLEM — R06.83 SNORING: Status: RESOLVED | Noted: 2021-07-30 | Resolved: 2023-09-19

## 2023-09-19 NOTE — PROGRESS NOTES
Subjective   Sander White is a 67 y.o. male.     Chief Complaint   Patient presents with    Annual Exam       History of Present Illness   Patient presents for CPE with fasting labs; no new problems or concerns today; not very healthy diet recently; no formal exercise, does a lot of walking at work; regular dental visits; last eye exam about 1 year ago, has glasses; no problems hearing; immunizations: needs PCV20, declines Shingrix; colonoscopy 2015, repeat in 10 years; no family history of colon cancer.     F/U HTN: takes Lisinopril daily; monitors BP at work, typically runs 110s/70s; no headaches for most part; no orthostasis; no swelling other than mild swelling in right knee since twisted knee about 1 week ago; symptoms had improved and then slipped at work again; symptoms improving; no decreased ROM; had recent follow up with cardiology and no changes; to follow up in 1 year.     F/U prediabetes: has not been watching carbs and sugars in diet recently; has been drinking a lot of soda recently and has gained some weight; last A1c 5.8%.    F/U Hyperlipidemia: takes Atorvastatin daily; no myalgias; fasting today.    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    F/U KRISTOFER: takes OTC Omeprazole daily and works well; will have symptoms if misses a dose; has had EGD in past.      The following portions of the patient's history were reviewed and updated as appropriate: allergies, current medications, past family history, past medical history, past social history, past surgical history and problem list.    Current Outpatient Medications on File Prior to Visit   Medication Sig    aspirin 81 MG chewable tablet Chew 1 tablet Daily.    atorvastatin (LIPITOR) 40 MG tablet Take 1 tablet by mouth Daily.    Coenzyme Q10  (COQ10 PO) Take 1 tablet by mouth Daily.    lisinopril (PRINIVIL,ZESTRIL) 2.5 MG tablet TAKE ONE TABLET BY MOUTH DAILY    magnesium oxide (MAG-OX) 400 MG tablet Take 1 tablet by mouth Daily.    nitroglycerin (NITROSTAT) 0.4 MG SL tablet 1 under the tongue as needed for angina, may repeat q5mins for up three doses    omeprazole (priLOSEC) 20 MG capsule Take 1 capsule by mouth Daily.    vitamin B-12 (CYANOCOBALAMIN) 1000 MCG tablet Take 1 tablet by mouth Daily.    vitamin B-6 (PYRIDOXINE) 100 MG tablet Take 1 tablet by mouth Daily.    vitamin C (ASCORBIC ACID) 500 MG tablet Take 1 tablet by mouth Daily.    VITAMIN D PO Take  by mouth.     No current facility-administered medications on file prior to visit.        Past Medical History:   Diagnosis Date    Abnormal cardiovascular stress test 9/9/2019    Added automatically from request for surgery [id number]    Airway hyperreactivity 5/3/2016    Arthritis     Chronic pain in right foot     Collapse of right lung 12/23/1999    Coronary artery disease     ED (erectile dysfunction) 7/15/2016    GERD (gastroesophageal reflux disease)     Hordeolum externum of left lower eyelid 12/14/2021    Hypertension     Mallet finger 10/20/2014    Myocardial infarction     Neck pain 12/15/2017    Precordial pain 7/22/2017    Added automatically from request for surgery [id number]    Sciatica of left side 11/1/2021    Sprain of carpometacarpal joint 10/20/2014    ST elevation myocardial infarction (STEMI) 2/15/2017       Past Surgical History:   Procedure Laterality Date    APPENDECTOMY      CARDIAC CATHETERIZATION N/A 02/15/2017    Procedure: Left Heart Cath;  Surgeon: Kristina Billings MD;  Location: [facility] CATH INVASIVE LOCATION;  Service:     CARDIAC CATHETERIZATION  02/15/2017    Procedure: Percutaneous Manual Thrombectomy;  Surgeon: Kristina Billings MD;  Location: [facility] CATH INVASIVE LOCATION;  Service:     CARDIAC CATHETERIZATION N/A 02/15/2017    Procedure: Coronary  angiography;  Surgeon: Kristina Billings MD;  Location[facility] CATH INVAS[facility] LOC[facility];  Service:     CARDIAC CATHETERIZATION N/A 02/15/2017    Procedure: Left ventriculography;  Surgeon: Kristina Billings MD;  Location:  [facility] CATH INVASIVE LOCATION;  Service:     CARDIAC CATHETERIZATION N/A 07/24/2017    Procedure: Left Heart Cath;  Surgeon: Kristina Billings MD;  Location:  [facility] CATH INVASIVE LOCATION;  Service:     CARDIAC CATHETERIZATION N/A 07/24/2017    Procedure: Coronary angiography;  Surgeon: Kristina Billings MD;  Location:  [facility] CATH INVASIVE LOCATION;  Service:     CARDIAC CATHETERIZATION N/A 07/24/2017    Procedure: Left ventriculography;  Surgeon: Kristina Billings MD;  [facility];  Service:     CARDIAC CATHETERIZATION N/A 09/11/2019    Procedure: Left Heart Cath;  Surgeon: Kristina Billings MD;  [facility] C[facility];  Service: Cardiology    CARDIAC CATHETERIZATION N/A 09/11/2019    Procedure: Coronary angiography;  Surgeon: Kristina Billings MD;  [facility] C[facility];  Service: Cardiology    CARDIAC CATHETERIZATION N/A 09/11/2019    Procedure: Stent CAYLA coronary;  Surgeon: Kristina Billings MD;  Location: [facility] C[facility] LOC[facility];  Service: Cardiology    CARDIAC CATHETERIZATION N/A 09/11/2019    Procedure: Left ventriculography;  Surgeon: Kristina Billings MD;  Location: [facility] C[facility] LOC[facility];  Service: Cardiology    CARDIAC CATHETERIZATION N/A 06/25/2021    Procedure: Left Heart Cath;  Surgeon: Kristina Billings MD;  Location: [facility] C[facility] LOC[facility];  Service: Cardiology;  Laterality: N/A;    CARDIAC CATHETERIZATION N/A 06/25/2021    Procedure: Coronary angiography;  Surgeon: Kristina Billings MD;  [facility]: [facility] C[facility];  Service: Cardiology;  Laterality: N/A;    CARDIAC CATHETERIZATION N/A 06/25/2021    Procedure: Left ventriculography;   Surgeon: Kristina Billings MD;  Location:  [facility] CATH INVASIVE LOCATION;  Service: Cardiology;  Laterality: N/A;    CARPAL TUNNEL RELEASE Right     CHOLECYSTECTOMY      COLONOSCOPY N/A 2015    Normal    CORONARY ANGIOPLASTY      CORONARY STENT PLACEMENT      x2    CYST REMOVAL      thyroglossal cyst removed    HERNIA REPAIR Right     Inguinal    WI RT/LT HEART CATHETERS N/A 02/15/2017    Procedure: Percutaneous Coronary Intervention;  Surgeon: Kristina Billings MD;  Location:  [facility] CATH INVASIVE LOCATION;  Service: Cardiovascular    TONSILLECTOMY  when I was 4       Family History   Problem Relation Age of Onset    Diabetes Mother         Type 2    Hypertension Mother     Hyperlipidemia Mother     Heart attack Mother     Heart disease Mother         S/P CABG    Diabetes Father         Type 2    Stroke Father     Hypertension Father     Hyperlipidemia Father     Heart attack Father     Heart disease Father         S/P CABG    Alzheimer's disease Father     Pancreatitis Sister     Aneurysm Brother         Brain    Cancer Paternal Aunt         all father's sisters       Social History     Socioeconomic History    Marital status:     Number of children: 5    Years of education: 2 YEARS OF COLLEGE   Tobacco Use    Smoking status: Former     Packs/day: 0.50     Years: 7.00     Pack years: 3.50     Types: Cigarettes     Quit date: 1999     Years since quittin.7    Smokeless tobacco: Never   Vaping Use    Vaping Use: Never used   Substance and Sexual Activity    Alcohol use: Yes     Alcohol/week: 2.0 standard drinks     Types: 2 Glasses of wine per week     Comment: occasional    Drug use: No    Sexual activity: Defer       Review of Systems   Constitutional:  Positive for fatigue (works 12 hour days). Negative for appetite change, chills, fever, unexpected weight gain and unexpected weight loss.   HENT:  Negative for ear pain, sinus pressure, sore throat and trouble swallowing.    Eyes:   "Negative for blurred vision and visual disturbance.   Respiratory:  Negative for cough, chest tightness and shortness of breath. Apnea: Does snore, had sleep study in the past and told no sleep apnea..   Cardiovascular:  Negative for chest pain and palpitations.   Gastrointestinal:  Negative for abdominal pain, blood in stool, constipation and diarrhea.   Endocrine: Positive for heat intolerance. Negative for cold intolerance and polydipsia.   Genitourinary:  Negative for dysuria and frequency. Nocturia: gets up once per night to urinate; no change in urinary stream.  Musculoskeletal:  Negative for back pain. Arthralgias: some with arthritis, mild.  Skin:  Negative for rash and skin lesions.   Neurological:  Negative for syncope and weakness.   Hematological:  Bruises/bleeds easily: some easy bruising, mild.     Objective   Vitals:    09/19/23 0808   BP: 128/84   BP Location: Right arm   Patient Position: Sitting   Cuff Size: Large Adult   Pulse: 79   Temp: 98.6 °F (37 °C)   TempSrc: Temporal   SpO2: 99%   Weight: 101 kg (222 lb)   Height: 182.9 cm (72.01\")   PainSc: 0-No pain     Body mass index is 30.1 kg/m².    Physical Exam  Vitals and nursing note reviewed. Exam conducted with a chaperone present.   Constitutional:       General: He is not in acute distress.     Appearance: He is well-developed and well-groomed. He is not diaphoretic.   HENT:      Head: Normocephalic and atraumatic.      Jaw: No tenderness or pain on movement.      Right Ear: External ear normal. No decreased hearing noted. Right ear middle ear effusion: TM dull. Tympanic membrane is not erythematous.      Left Ear: External ear normal. Left ear middle ear effusion: TM dull. Tympanic membrane is not erythematous.      Nose: Nose normal.      Right Sinus: No maxillary sinus tenderness or frontal sinus tenderness.      Left Sinus: No maxillary sinus tenderness or frontal sinus tenderness.      Mouth/Throat:      Mouth: Mucous membranes are moist. "      Pharynx: No oropharyngeal exudate (mild drainage in posterior pharynx) or posterior oropharyngeal erythema.   Eyes:      Extraocular Movements: Extraocular movements intact.      Conjunctiva/sclera: Conjunctivae normal.      Pupils: Pupils are equal, round, and reactive to light.   Neck:      Thyroid: No thyromegaly.      Vascular: No carotid bruit.      Trachea: No tracheal deviation.   Cardiovascular:      Rate and Rhythm: Normal rate and regular rhythm.      Pulses: Normal pulses.      Heart sounds: Normal heart sounds. No murmur heard.  Pulmonary:      Effort: Pulmonary effort is normal. No respiratory distress.      Breath sounds: Normal breath sounds.   Abdominal:      General: Bowel sounds are normal.      Palpations: Abdomen is soft. There is no hepatomegaly or splenomegaly.      Tenderness: There is no abdominal tenderness. There is no guarding.      Hernia: There is no hernia in the umbilical area.   Genitourinary:     Prostate: Enlarged (2/4). Not tender and no nodules present.   Musculoskeletal:         General: Normal range of motion.      Cervical back: Normal range of motion and neck supple. No bony tenderness.      Thoracic back: No bony tenderness.      Lumbar back: No bony tenderness.      Right lower leg: No edema.      Left lower leg: No edema.   Lymphadenopathy:      Cervical: No cervical adenopathy.   Skin:     General: Skin is warm and dry.      Findings: No rash.   Neurological:      Mental Status: He is alert and oriented to person, place, and time.      Cranial Nerves: No cranial nerve deficit.      Motor: Motor function is intact.      Coordination: Coordination normal.      Gait: Gait normal.      Deep Tendon Reflexes: Reflexes are normal and symmetric.   Psychiatric:         Mood and Affect: Mood normal.         Behavior: Behavior normal.         Thought Content: Thought content normal.         Cognition and Memory: Cognition normal.         Judgment: Judgment normal.       Lab  Results   Component Value Date    WBC 6.0 01/11/2023    RBC 4.76 01/11/2023    HGB 14.6 01/11/2023    HCT 42.3 01/11/2023    MCV 89 01/11/2023    MCH 30.7 01/11/2023    MCHC 34.5 01/11/2023    RDW 12.2 01/11/2023    RDWSD 43.2 06/21/2021    MPV 12.4 (H) 06/21/2021     01/11/2023    NEUTRORELPCT 59 01/11/2023    LYMPHORELPCT 18 01/11/2023    MONORELPCT 15 01/11/2023    EOSRELPCT 7 01/11/2023    BASORELPCT 1 01/11/2023    AUTOIGPER 0.5 06/21/2021    NEUTROABS 3.5 01/11/2023    LYMPHSABS 1.1 01/11/2023    MONOSABS 0.9 01/11/2023    EOSABS 0.4 01/11/2023    BASOSABS 0.0 01/11/2023    AUTOIGNUM 0.03 06/21/2021    NRBC 0.0 06/21/2021     Lab Results   Component Value Date    GLUCOSE 106 (H) 01/11/2023    BUN 19 01/11/2023    CREATININE 0.96 01/11/2023    EGFRIFNONA 67 06/21/2021    EGFRIFAFRI 104 05/26/2021    BCR 20 01/11/2023    K 5.0 01/11/2023    CO2 22 01/11/2023    CALCIUM 9.4 01/11/2023    PROTENTOTREF 6.7 01/11/2023    ALBUMIN 4.5 01/11/2023    LABIL2 2.0 01/11/2023    AST 32 01/11/2023    ALT 30 01/11/2023      Lab Results   Component Value Date    CHLPL 139 01/11/2023    TRIG 78 01/11/2023    HDL 45 01/11/2023    VLDL 15 01/11/2023    LDL 79 01/11/2023     Lab Results   Component Value Date    TSH 1.140 11/02/2020     Lab Results   Component Value Date    HGBA1C 5.8 (H) 01/11/2023     Lab Results   Component Value Date    PSA 1.050 09/12/2022     Lab Results   Component Value Date    RBCUA 21-30 (A) 02/16/2017    BACTERIA None Seen 02/16/2017    COLORU Yellow 04/04/2017    CLARITYU Clear 04/04/2017    LEUKOCYTESUR Negative 04/04/2017    GLUCOSEU Negative 04/04/2017    BLOODU Negative 04/04/2017    BILIRUBINUR Negative 04/04/2017    NITRITEU Negative 04/04/2017          Assessment    Problem List Items Addressed This Visit       Mixed hyperlipidemia    Current Assessment & Plan     Continue Atorvastatin daily.  Continue to work on healthy diet and exercise.         Relevant Orders    Comprehensive  Metabolic Panel    Lipid Panel With LDL / HDL Ratio    Primary hypertension    Current Assessment & Plan     Hypertension is  stable .  Weight loss.  Regular aerobic exercise.  Continue current medications.  Ambulatory blood pressure monitoring.  Blood pressure will be reassessed at the next regular appointment.  Continue Lisinopril daily.         Relevant Orders    Comprehensive Metabolic Panel    CBC & Differential    Lipid Panel With LDL / HDL Ratio    Coronary artery disease involving native coronary artery of native heart without angina pectoris    Current Assessment & Plan     Stable.  Continue daily aspirin.  Follow up as scheduled with cardiology.         GERD (gastroesophageal reflux disease)    Current Assessment & Plan     Stable.  Continue Omeprazole daily.         Anxiety    Current Assessment & Plan     Stable off medication.  Increase exercise.         Prediabetes    Current Assessment & Plan     Decrease intake of sodas.  Decrease intake of carbohydrates/sugars in diet.         Relevant Orders    Hemoglobin A1c    Class 1 obesity with serious comorbidity and body mass index (BMI) of 30.0 to 30.9 in adult    Current Assessment & Plan     Patient's (Body mass index is 30.1 kg/m².) indicates that they are obese (BMI >30) with health conditions that include hypertension, coronary heart disease, and dyslipidemias . Weight is unchanged. BMI  is above average; BMI management plan is completed. We discussed low calorie, low carb based diet program, portion control, and increasing exercise.          Fatigue    Relevant Orders    Comprehensive Metabolic Panel    CBC & Differential    Vitamin B12 & Folate    TSH Rfx On Abnormal To Free T4    RESOLVED: Moderate episode of recurrent major depressive disorder     Other Visit Diagnoses       Encounter for annual physical exam    -  Primary    Relevant Orders    Pneumococcal Conjugate Vaccine 20-Valent All (Completed)    Comprehensive Metabolic Panel    CBC &  Differential    Lipid Panel With LDL / HDL Ratio    Hemoglobin A1c    Encounter for immunization        Relevant Orders    Pneumococcal Conjugate Vaccine 20-Valent All (Completed)    Prostate cancer screening        Relevant Orders    PSA Screen             Return in about 6 months (around 3/19/2024) for Recheck.or sooner if problems or concerns.  Impression: Health maintenance visit.  Currently, eats a not very healthy diet and has a fair exercise routine.  Colorectal cancer screening: UTD.  Prostate cancer screening: PSA today.  Screening lab work includes: CMP, lipid.  Immunizations: PCV20 today, declines Shingrix or COVID-19 booster; risks and benefits of immunizations were discussed with patient.  Patient was advised to be evaluated by ophthalmology.  Advice and education were given regarding nutrition and aerobic exercise.

## 2023-09-19 NOTE — ASSESSMENT & PLAN NOTE
Patient's (Body mass index is 30.1 kg/m².) indicates that they are obese (BMI >30) with health conditions that include hypertension, coronary heart disease, and dyslipidemias . Weight is unchanged. BMI  is above average; BMI management plan is completed. We discussed low calorie, low carb based diet program, portion control, and increasing exercise.    W Plasty Text: The lesion was extirpated to the level of the fat with a #15 scalpel blade.  Given the location of the defect, shape of the defect and the proximity to free margins a W-plasty was deemed most appropriate for repair.  Using a sterile surgical marker, the appropriate transposition arms of the W-plasty were drawn incorporating the defect and placing the expected incisions within the relaxed skin tension lines where possible.    The area thus outlined was incised deep to adipose tissue with a #15 scalpel blade.  The skin margins were undermined to an appropriate distance in all directions utilizing iris scissors.  The opposing transposition arms were then transposed into place in opposite direction and anchored with interrupted buried subcutaneous sutures.

## 2023-09-20 DIAGNOSIS — E78.2 MIXED HYPERLIPIDEMIA: ICD-10-CM

## 2023-09-20 DIAGNOSIS — I10 PRIMARY HYPERTENSION: Primary | ICD-10-CM

## 2023-09-20 LAB
ALBUMIN SERPL-MCNC: 4.8 G/DL (ref 3.5–5.2)
ALBUMIN/GLOB SERPL: 2.2 G/DL
ALP SERPL-CCNC: 91 U/L (ref 39–117)
ALT SERPL-CCNC: 30 U/L (ref 1–41)
AST SERPL-CCNC: 27 U/L (ref 1–40)
BASOPHILS # BLD AUTO: 0.06 10*3/MM3 (ref 0–0.2)
BASOPHILS NFR BLD AUTO: 0.9 % (ref 0–1.5)
BILIRUB SERPL-MCNC: 0.7 MG/DL (ref 0–1.2)
BUN SERPL-MCNC: 14 MG/DL (ref 8–23)
BUN/CREAT SERPL: 15.9 (ref 7–25)
CALCIUM SERPL-MCNC: 9.8 MG/DL (ref 8.6–10.5)
CHLORIDE SERPL-SCNC: 106 MMOL/L (ref 98–107)
CHOLEST SERPL-MCNC: 138 MG/DL (ref 0–200)
CO2 SERPL-SCNC: 25 MMOL/L (ref 22–29)
CREAT SERPL-MCNC: 0.88 MG/DL (ref 0.76–1.27)
EGFRCR SERPLBLD CKD-EPI 2021: 94.8 ML/MIN/1.73
EOSINOPHIL # BLD AUTO: 0.62 10*3/MM3 (ref 0–0.4)
EOSINOPHIL NFR BLD AUTO: 9.4 % (ref 0.3–6.2)
ERYTHROCYTE [DISTWIDTH] IN BLOOD BY AUTOMATED COUNT: 12.2 % (ref 12.3–15.4)
FOLATE SERPL-MCNC: 8.79 NG/ML (ref 4.78–24.2)
GLOBULIN SER CALC-MCNC: 2.2 GM/DL
GLUCOSE SERPL-MCNC: 112 MG/DL (ref 65–99)
HBA1C MFR BLD: 5.8 % (ref 4.8–5.6)
HCT VFR BLD AUTO: 45 % (ref 37.5–51)
HDLC SERPL-MCNC: 51 MG/DL (ref 40–60)
HGB BLD-MCNC: 14.8 G/DL (ref 13–17.7)
IMM GRANULOCYTES # BLD AUTO: 0.03 10*3/MM3 (ref 0–0.05)
IMM GRANULOCYTES NFR BLD AUTO: 0.5 % (ref 0–0.5)
LDLC SERPL CALC-MCNC: 74 MG/DL (ref 0–100)
LDLC/HDLC SERPL: 1.47 {RATIO}
LYMPHOCYTES # BLD AUTO: 1.07 10*3/MM3 (ref 0.7–3.1)
LYMPHOCYTES NFR BLD AUTO: 16.3 % (ref 19.6–45.3)
MCH RBC QN AUTO: 29.8 PG (ref 26.6–33)
MCHC RBC AUTO-ENTMCNC: 32.9 G/DL (ref 31.5–35.7)
MCV RBC AUTO: 90.5 FL (ref 79–97)
MONOCYTES # BLD AUTO: 0.93 10*3/MM3 (ref 0.1–0.9)
MONOCYTES NFR BLD AUTO: 14.2 % (ref 5–12)
NEUTROPHILS # BLD AUTO: 3.86 10*3/MM3 (ref 1.7–7)
NEUTROPHILS NFR BLD AUTO: 58.7 % (ref 42.7–76)
NRBC BLD AUTO-RTO: 0 /100 WBC (ref 0–0.2)
PLATELET # BLD AUTO: 220 10*3/MM3 (ref 140–450)
POTASSIUM SERPL-SCNC: 5.3 MMOL/L (ref 3.5–5.2)
PROT SERPL-MCNC: 7 G/DL (ref 6–8.5)
PSA SERPL-MCNC: 1.03 NG/ML (ref 0–4)
RBC # BLD AUTO: 4.97 10*6/MM3 (ref 4.14–5.8)
SODIUM SERPL-SCNC: 142 MMOL/L (ref 136–145)
TRIGL SERPL-MCNC: 61 MG/DL (ref 0–150)
TSH SERPL DL<=0.005 MIU/L-ACNC: 0.53 UIU/ML (ref 0.27–4.2)
VIT B12 SERPL-MCNC: 1701 PG/ML (ref 211–946)
VLDLC SERPL CALC-MCNC: 13 MG/DL (ref 5–40)
WBC # BLD AUTO: 6.57 10*3/MM3 (ref 3.4–10.8)

## 2023-09-20 RX ORDER — LANOLIN ALCOHOL/MO/W.PET/CERES
1000 CREAM (GRAM) TOPICAL EVERY OTHER DAY
Start: 2023-09-20

## 2023-09-20 RX ORDER — ATORVASTATIN CALCIUM 40 MG/1
40 TABLET, FILM COATED ORAL DAILY
Qty: 90 TABLET | Refills: 1 | Status: SHIPPED | OUTPATIENT
Start: 2023-09-20

## 2023-09-22 ENCOUNTER — PATIENT ROUNDING (BHMG ONLY) (OUTPATIENT)
Dept: FAMILY MEDICINE CLINIC | Facility: CLINIC | Age: 68
End: 2023-09-22
Payer: COMMERCIAL

## 2023-09-22 NOTE — PROGRESS NOTES
A My-Chart message has been sent to the patient for PATIENT ROUNDING with [facility]

## 2023-11-20 DIAGNOSIS — I10 PRIMARY HYPERTENSION: ICD-10-CM

## 2023-11-20 RX ORDER — LISINOPRIL 2.5 MG/1
2.5 TABLET ORAL DAILY
Qty: 90 TABLET | Refills: 0 | Status: SHIPPED | OUTPATIENT
Start: 2023-11-20

## 2024-02-23 DIAGNOSIS — I10 PRIMARY HYPERTENSION: ICD-10-CM

## 2024-02-23 RX ORDER — LISINOPRIL 2.5 MG/1
2.5 TABLET ORAL DAILY
Qty: 90 TABLET | Refills: 1 | Status: SHIPPED | OUTPATIENT
Start: 2024-02-23

## 2024-03-25 ENCOUNTER — OFFICE VISIT (OUTPATIENT)
Dept: FAMILY MEDICINE CLINIC | Facility: CLINIC | Age: 69
End: 2024-03-25
Payer: COMMERCIAL

## 2024-03-25 VITALS
OXYGEN SATURATION: 97 % | HEIGHT: 72 IN | DIASTOLIC BLOOD PRESSURE: 82 MMHG | BODY MASS INDEX: 30.34 KG/M2 | WEIGHT: 224 LBS | SYSTOLIC BLOOD PRESSURE: 128 MMHG | HEART RATE: 78 BPM

## 2024-03-25 DIAGNOSIS — K21.9 GASTROESOPHAGEAL REFLUX DISEASE, UNSPECIFIED WHETHER ESOPHAGITIS PRESENT: ICD-10-CM

## 2024-03-25 DIAGNOSIS — I10 PRIMARY HYPERTENSION: Primary | ICD-10-CM

## 2024-03-25 DIAGNOSIS — R73.03 PREDIABETES: ICD-10-CM

## 2024-03-25 DIAGNOSIS — Z23 ENCOUNTER FOR IMMUNIZATION: ICD-10-CM

## 2024-03-25 DIAGNOSIS — R53.83 FATIGUE, UNSPECIFIED TYPE: ICD-10-CM

## 2024-03-25 DIAGNOSIS — E78.2 MIXED HYPERLIPIDEMIA: ICD-10-CM

## 2024-03-25 NOTE — PROGRESS NOTES
Subjective   Sander White is a 68 y.o. male.     Chief Complaint   Patient presents with    Hypertension       History of Present Illness   Patient presents for follow up HTN: takes Lisinopril daily; monitors BP at work, typically runs 110s/70s; no headaches; no orthostasis; no swelling; regular exercise, does a lot of walking; has follow up with cardiology 9/2024.     F/U prediabetes: last A1c 5.8%; has been avoiding sodas; watches carbs/sugars in diet.     F/U Hyperlipidemia: takes Atorvastatin daily; no myalgias; watches saturated fats in diet, avoids red meat and fried foods; fasting today.     F/U KRISTOFER: takes OTC Omeprazole daily and works well; will have symptoms if misses a dose; has had EGD in past.          The following portions of the patient's history were reviewed and updated as appropriate: allergies, current medications, past family history, past medical history, past social history, past surgical history and problem list.    Current Outpatient Medications on File Prior to Visit   Medication Sig    aspirin 81 MG chewable tablet Chew 1 tablet Daily.    atorvastatin (LIPITOR) 40 MG tablet Take 1 tablet by mouth Daily.    glucosamine-chondroitin 500-400 MG capsule capsule Take 1 capsule by mouth 3 (Three) Times a Day With Meals.    lisinopril (PRINIVIL,ZESTRIL) 2.5 MG tablet TAKE 1 TABLET BY MOUTH DAILY    magnesium oxide (MAG-OX) 400 MG tablet Take 1 tablet by mouth Daily.    nitroglycerin (NITROSTAT) 0.4 MG SL tablet 1 under the tongue as needed for angina, may repeat q5mins for up three doses    omeprazole (priLOSEC) 20 MG capsule Take 1 capsule by mouth Daily.    TURMERIC PO Take 1 capsule by mouth Daily.    vitamin B-12 (CYANOCOBALAMIN) 1000 MCG tablet Take 1 tablet by mouth Every Other Day.    vitamin B-6 (PYRIDOXINE) 100 MG tablet Take 1 tablet by mouth Daily.    vitamin C (ASCORBIC ACID) 500 MG tablet Take 1 tablet by mouth Daily.    VITAMIN D PO Take  by mouth.     No current  facility-administered medications on file prior to visit.        Past Medical History:   Diagnosis Date    Abnormal cardiovascular stress test 9/9/2019    Added automatically from request for surgery [id number]    Airway hyperreactivity 5/3/2016    Arthritis     Chronic pain in right foot     Collapse of right lung 12/23/1999    Coronary artery disease     ED (erectile dysfunction) 7/15/2016    GERD (gastroesophageal reflux disease)     Hordeolum externum of left lower eyelid 12/14/2021    Hypertension     Mallet finger 10/20/2014    Myocardial infarction     Neck pain 12/15/2017    Precordial pain 7/22/2017    Added automatically from request for surgery [id number]    Sciatica of left side 11/1/2021    Sprain of carpometacarpal joint 10/20/2014    ST elevation myocardial infarction (STEMI) 2/15/2017       Past Surgical History:   Procedure Laterality Date    APPENDECTOMY      CARDIAC CATHETERIZATION N/A 02/15/2017    Procedure: Left Heart Cath;  Surgeon: Kristina Billings MD;  Location: [facility] CATH INVASIVE LOCATION;  Service:     CARDIAC CATHETERIZATION  02/15/2017    Procedure: Percutaneous Manual Thrombectomy;  Surgeon: Kristina Billings MD;  Location[facility] LOC[facility];  Service:     CARDIAC CATHETERIZATION N/A 02/15/2017    Procedure: Coronary angiography;  Surgeon: Kristina Billings MD;  [facility];  Service:     CARDIAC CATHETERIZATION N/A 02/15/2017    Procedure: Left ventriculography;  Surgeon: Kristina Billings MD;  [facility];  Service:     CARDIAC CATHETERIZATION N/A 07/24/2017    Procedure: Left Heart Cath;  Surgeon: Kristina Billings MD;  Location: [facility] CATH INVASIVE LOCATION;  Service:     CARDIAC CATHETERIZATION N/A 07/24/2017    Procedure: Coronary angiography;  Surgeon: Kristina Billings MD;  Location: [facility] CATH INVASIVE LOCATION;  Service:     CARDIAC CATHETERIZATION N/A 07/24/2017    Procedure: Left  ventriculography;  Surgeon: Kristina Billings MD;  Location:  [facility] CATH INVASIVE LOCATION;  Service:     CARDIAC CATHETERIZATION N/A 09/11/2019    Procedure: Left Heart Cath;  Surgeon: Kristina Billings MD;  Location:  [facility] CATH INVASIVE LOCATION;  Service: Cardiology    CARDIAC CATHETERIZATION N/A 09/11/2019    Procedure: Coronary angiography;  Surgeon: Kristina Billings MD;  Location:  [facility] CATH INVASIVE LOCATION;  Service: Cardiology    CARDIAC CATHETERIZATION N/A 09/11/2019    Procedure: Stent CAYLA coronary;  Surgeon: Kristina Billings MD;  Location:  [facility] CATH INVASIVE LOCATION;  Service: Cardiology    CARDIAC CATHETERIZATION N/A 09/11/2019    Procedure: Left ventriculography;  Surgeon: Kristina Billings MD;  Location:  [facility] CATH INVASIVE LOCATION;  Service: Cardiology    CARDIAC CATHETERIZATION N/A 06/25/2021    Procedure: Left Heart Cath;  Surgeon: Kristina Billings MD;  [facility];  Service: Cardiology;  Laterality: N/A;    CARDIAC CATHETERIZATION N/A 06/25/2021    Procedure: Coronary angiography;  Surgeon: Kristina Billings MD;  Location:  [facility] CATH INVASIVE LOCATION;  Service: Cardiology;  Laterality: N/A;    CARDIAC CATHETERIZATION N/A 06/25/2021    Procedure: Left ventriculography;  Surgeon: Kristina Billings MD;  [facility];  Service: Cardiology;  Laterality: N/A;    CARPAL TUNNEL RELEASE Right     CHOLECYSTECTOMY      COLONOSCOPY N/A 02/27/2015    Normal    CORONARY ANGIOPLASTY      CORONARY STENT PLACEMENT      x2    CYST REMOVAL      thyroglossal cyst removed    HERNIA REPAIR Right     Inguinal    AL RT/LT HEART CATHETERS N/A 02/15/2017    Procedure: Percutaneous Coronary Intervention;  Surgeon: Kristina Billings MD;  Location:  [facility] CATH INVASIVE LOCATION;  Service: Cardiovascular    TONSILLECTOMY  when I was 4       Family History   Problem Relation Age of Onset    Diabetes Mother         Type  2    Hypertension Mother     Hyperlipidemia Mother     Heart attack Mother     Heart disease Mother         S/P CABG    Diabetes Father         Type 2    Stroke Father     Hypertension Father     Hyperlipidemia Father     Heart attack Father     Heart disease Father         S/P CABG    Alzheimer's disease Father     Pancreatitis Sister     Aneurysm Brother         Brain    Cancer Paternal Aunt         all father's sisters       Social History     Socioeconomic History    Marital status:     Number of children: 5    Years of education: 2 YEARS OF COLLEGE   Tobacco Use    Smoking status: Former     Current packs/day: 0.00     Average packs/day: 0.5 packs/day for 7.0 years (3.5 ttl pk-yrs)     Types: Cigarettes     Start date: 1992     Quit date: 1999     Years since quittin.2    Smokeless tobacco: Never   Vaping Use    Vaping status: Never Used   Substance and Sexual Activity    Alcohol use: Yes     Alcohol/week: 2.0 standard drinks of alcohol     Types: 2 Glasses of wine per week     Comment: occasional    Drug use: No    Sexual activity: Defer       Review of Systems   Constitutional:  Negative for appetite change, chills, fever, unexpected weight gain and unexpected weight loss. Fatigue: some at times; particuarly after taking care of grandkids.  HENT:  Negative for ear pain, sore throat and trouble swallowing.    Eyes:  Negative for blurred vision.   Respiratory:  Negative for cough, chest tightness and shortness of breath.    Cardiovascular:  Negative for chest pain and palpitations.   Gastrointestinal:  Negative for abdominal pain, blood in stool, constipation and diarrhea.   Genitourinary:  Negative for dysuria and frequency.   Musculoskeletal:  Negative for arthralgias (mild in joints, takes Turmeric and Glucosamine daily and helps) and back pain.   Skin:  Rash: has dry patch on medial ankle that itches; has been using Vaseline and helps.   Neurological:  Negative for syncope and  "weakness.   Hematological:  Does not bruise/bleed easily.       Objective   Vitals:    03/25/24 1122   BP: 128/82   BP Location: Right arm   Patient Position: Sitting   Cuff Size: Adult   Pulse: 78   SpO2: 97%   Weight: 102 kg (224 lb)   Height: 182.9 cm (72\")     Body mass index is 30.38 kg/m².    Physical Exam  Vitals and nursing note reviewed.   Constitutional:       General: He is not in acute distress.     Appearance: He is well-developed and well-groomed. He is not diaphoretic.   HENT:      Head: Normocephalic.      Right Ear: External ear normal.      Left Ear: External ear normal.   Eyes:      Conjunctiva/sclera: Conjunctivae normal.   Neck:      Vascular: No carotid bruit.   Cardiovascular:      Rate and Rhythm: Normal rate and regular rhythm.      Pulses: Normal pulses.      Heart sounds: Normal heart sounds. No murmur heard.  Pulmonary:      Effort: Pulmonary effort is normal. No respiratory distress.      Breath sounds: Normal breath sounds.   Abdominal:      General: Bowel sounds are normal.      Palpations: Abdomen is soft. There is no hepatomegaly or splenomegaly.      Tenderness: There is no abdominal tenderness. There is no guarding.   Musculoskeletal:      Cervical back: Normal range of motion and neck supple.      Right lower leg: No edema.      Left lower leg: No edema.   Skin:     General: Skin is warm and dry.          Neurological:      Mental Status: He is alert and oriented to person, place, and time.      Gait: Gait normal.   Psychiatric:         Mood and Affect: Mood normal.         Behavior: Behavior normal.         Thought Content: Thought content normal.         Cognition and Memory: Cognition normal.         Judgment: Judgment normal.         Lab Results   Component Value Date    WBC 6.57 09/19/2023    RBC 4.97 09/19/2023    HGB 14.8 09/19/2023    HCT 45.0 09/19/2023    MCV 90.5 09/19/2023    MCH 29.8 09/19/2023    MCHC 32.9 09/19/2023    RDW 12.2 (L) 09/19/2023    RDWSD 43.2 " 06/21/2021    MPV 12.4 (H) 06/21/2021     09/19/2023    NEUTRORELPCT 58.7 09/19/2023    LYMPHORELPCT 16.3 (L) 09/19/2023    MONORELPCT 14.2 (H) 09/19/2023    EOSRELPCT 9.4 (H) 09/19/2023    BASORELPCT 0.9 09/19/2023    AUTOIGPER 0.5 06/21/2021    NEUTROABS 3.86 09/19/2023    LYMPHSABS 1.07 09/19/2023    MONOSABS 0.93 (H) 09/19/2023    EOSABS 0.62 (H) 09/19/2023    BASOSABS 0.06 09/19/2023    AUTOIGNUM 0.03 06/21/2021    NRBC 0.0 09/19/2023     Lab Results   Component Value Date    GLUCOSE 112 (H) 09/19/2023    BUN 14 09/19/2023    CREATININE 0.88 09/19/2023    EGFRIFNONA 67 06/21/2021    EGFRIFAFRI 104 05/26/2021    BCR 15.9 09/19/2023    K 5.3 (H) 09/19/2023    CO2 25.0 09/19/2023    CALCIUM 9.8 09/19/2023    PROTENTOTREF 7.0 09/19/2023    ALBUMIN 4.8 09/19/2023    LABIL2 2.2 09/19/2023    AST 27 09/19/2023    ALT 30 09/19/2023      Lab Results   Component Value Date    CHLPL 138 09/19/2023    TRIG 61 09/19/2023    HDL 51 09/19/2023    VLDL 13 09/19/2023    LDL 74 09/19/2023     Lab Results   Component Value Date    TSH 0.533 09/19/2023     Lab Results   Component Value Date    HGBA1C 5.80 (H) 09/19/2023     Lab Results   Component Value Date    PSA 1.030 09/19/2023       Assessment    Problem List Items Addressed This Visit       Mixed hyperlipidemia    Current Assessment & Plan     Continue Atorvastatin daily.  Continue to work on healthy diet and exercise.         Relevant Orders    Comprehensive Metabolic Panel    Lipid Panel With LDL / HDL Ratio    Primary hypertension - Primary    Current Assessment & Plan     Hypertension is stable and controlled  Continue current treatment regimen.  Regular aerobic exercise.  Ambulatory blood pressure monitoring.  Blood pressure will be reassessed in 6 months.  Continue Lisinopril daily.         Relevant Orders    Comprehensive Metabolic Panel    CBC & Differential    Lipid Panel With LDL / HDL Ratio    GERD (gastroesophageal reflux disease)    Current Assessment &  Plan     Stable.  Continue OTC Omeprazole daily.         Relevant Orders    CBC & Differential    Prediabetes    Current Assessment & Plan     Continue to work on decreasing carbohydrates/sugars in diet.         Relevant Orders    Hemoglobin A1c    Fatigue    Relevant Orders    CBC & Differential    Vitamin B12 & Folate     Other Visit Diagnoses       Encounter for immunization        Relevant Orders    Fluzone High-Dose 65+yrs (Completed)              Return in about 6 months (around 9/25/2024) for Medicare Wellness, Recheck.or sooner if problems or concerns.  Will send refill of Atorvastatin for 90 day supply to [facility] pending lab results.

## 2024-03-26 LAB
ALBUMIN SERPL-MCNC: 4.8 G/DL (ref 3.9–4.9)
ALBUMIN/GLOB SERPL: 2.1 {RATIO} (ref 1.2–2.2)
ALP SERPL-CCNC: 100 IU/L (ref 44–121)
ALT SERPL-CCNC: 33 IU/L (ref 0–44)
AST SERPL-CCNC: 28 IU/L (ref 0–40)
BASOPHILS # BLD AUTO: 0.1 X10E3/UL (ref 0–0.2)
BASOPHILS NFR BLD AUTO: 1 %
BILIRUB SERPL-MCNC: 0.7 MG/DL (ref 0–1.2)
BUN SERPL-MCNC: 12 MG/DL (ref 8–27)
BUN/CREAT SERPL: 11 (ref 10–24)
CALCIUM SERPL-MCNC: 11.1 MG/DL (ref 8.6–10.2)
CHLORIDE SERPL-SCNC: 105 MMOL/L (ref 96–106)
CHOLEST SERPL-MCNC: 169 MG/DL (ref 100–199)
CO2 SERPL-SCNC: 22 MMOL/L (ref 20–29)
CREAT SERPL-MCNC: 1.05 MG/DL (ref 0.76–1.27)
EGFRCR SERPLBLD CKD-EPI 2021: 78 ML/MIN/1.73
EOSINOPHIL # BLD AUTO: 0.5 X10E3/UL (ref 0–0.4)
EOSINOPHIL NFR BLD AUTO: 8 %
ERYTHROCYTE [DISTWIDTH] IN BLOOD BY AUTOMATED COUNT: 12 % (ref 11.6–15.4)
FOLATE SERPL-MCNC: 9 NG/ML
GLOBULIN SER CALC-MCNC: 2.3 G/DL (ref 1.5–4.5)
GLUCOSE SERPL-MCNC: 105 MG/DL (ref 70–99)
HBA1C MFR BLD: 5.9 % (ref 4.8–5.6)
HCT VFR BLD AUTO: 47.1 % (ref 37.5–51)
HDLC SERPL-MCNC: 51 MG/DL
HGB BLD-MCNC: 15.7 G/DL (ref 13–17.7)
IMM GRANULOCYTES # BLD AUTO: 0 X10E3/UL (ref 0–0.1)
IMM GRANULOCYTES NFR BLD AUTO: 0 %
LDLC SERPL CALC-MCNC: 104 MG/DL (ref 0–99)
LDLC/HDLC SERPL: 2 RATIO (ref 0–3.6)
LYMPHOCYTES # BLD AUTO: 1.1 X10E3/UL (ref 0.7–3.1)
LYMPHOCYTES NFR BLD AUTO: 19 %
MCH RBC QN AUTO: 30.5 PG (ref 26.6–33)
MCHC RBC AUTO-ENTMCNC: 33.3 G/DL (ref 31.5–35.7)
MCV RBC AUTO: 92 FL (ref 79–97)
MONOCYTES # BLD AUTO: 0.9 X10E3/UL (ref 0.1–0.9)
MONOCYTES NFR BLD AUTO: 15 %
NEUTROPHILS # BLD AUTO: 3.5 X10E3/UL (ref 1.4–7)
NEUTROPHILS NFR BLD AUTO: 57 %
PLATELET # BLD AUTO: 232 X10E3/UL (ref 150–450)
POTASSIUM SERPL-SCNC: 5.1 MMOL/L (ref 3.5–5.2)
PROT SERPL-MCNC: 7.1 G/DL (ref 6–8.5)
RBC # BLD AUTO: 5.15 X10E6/UL (ref 4.14–5.8)
SODIUM SERPL-SCNC: 142 MMOL/L (ref 134–144)
TRIGL SERPL-MCNC: 73 MG/DL (ref 0–149)
VIT B12 SERPL-MCNC: 1413 PG/ML (ref 232–1245)
VLDLC SERPL CALC-MCNC: 14 MG/DL (ref 5–40)
WBC # BLD AUTO: 6 X10E3/UL (ref 3.4–10.8)

## 2024-03-26 RX ORDER — SODIUM PHOSPHATE,MONO-DIBASIC 19G-7G/118
1 ENEMA (ML) RECTAL
COMMUNITY

## 2024-03-26 NOTE — PATIENT INSTRUCTIONS
Continue to monitor your blood pressure periodically and record results.  Continue to work on healthy diet and exercise.  Follow up pending lab results.  Follow up in 6 months for Medicare Wellness, or sooner if problems or concerns.

## 2024-03-27 DIAGNOSIS — E83.52 HYPERCALCEMIA: Primary | ICD-10-CM

## 2024-03-27 RX ORDER — LANOLIN ALCOHOL/MO/W.PET/CERES
1000 CREAM (GRAM) TOPICAL
Start: 2024-03-27

## 2024-04-18 DIAGNOSIS — E83.52 HYPERCALCEMIA: ICD-10-CM

## 2024-04-19 LAB
25(OH)D3+25(OH)D2 SERPL-MCNC: 34.3 NG/ML (ref 30–100)
CALCIUM SERPL-MCNC: 9 MG/DL (ref 8.6–10.2)
INTACT PTH: NORMAL
PTH-INTACT SERPL-MCNC: 19 PG/ML (ref 15–65)

## 2024-05-09 DIAGNOSIS — E78.2 MIXED HYPERLIPIDEMIA: ICD-10-CM

## 2024-05-09 RX ORDER — ATORVASTATIN CALCIUM 40 MG/1
40 TABLET, FILM COATED ORAL DAILY
Qty: 90 TABLET | Refills: 1 | Status: SHIPPED | OUTPATIENT
Start: 2024-05-09

## 2024-07-29 DIAGNOSIS — I10 PRIMARY HYPERTENSION: ICD-10-CM

## 2024-07-29 RX ORDER — LISINOPRIL 2.5 MG/1
2.5 TABLET ORAL DAILY
Qty: 90 TABLET | Refills: 1 | Status: SHIPPED | OUTPATIENT
Start: 2024-07-29

## 2024-07-29 NOTE — TELEPHONE ENCOUNTER
LOV                   3/25/2024  NOV                   9/23/2024  Last RF              2/23/24      PROTOCOL       met    PÉREZ Becker/ARTUROR

## 2024-09-11 ENCOUNTER — OFFICE VISIT (OUTPATIENT)
Dept: CARDIOLOGY | Facility: CLINIC | Age: 69
End: 2024-09-11
Payer: COMMERCIAL

## 2024-09-11 VITALS
BODY MASS INDEX: 29.26 KG/M2 | HEIGHT: 72 IN | HEART RATE: 76 BPM | DIASTOLIC BLOOD PRESSURE: 89 MMHG | SYSTOLIC BLOOD PRESSURE: 137 MMHG | WEIGHT: 216 LBS

## 2024-09-11 DIAGNOSIS — E78.5 HYPERLIPIDEMIA, UNSPECIFIED HYPERLIPIDEMIA TYPE: ICD-10-CM

## 2024-09-11 DIAGNOSIS — I10 PRIMARY HYPERTENSION: ICD-10-CM

## 2024-09-11 DIAGNOSIS — R07.2 PRECORDIAL PAIN: Primary | ICD-10-CM

## 2024-09-11 DIAGNOSIS — R06.02 SHORTNESS OF BREATH: ICD-10-CM

## 2024-09-11 DIAGNOSIS — I25.10 CORONARY ARTERY DISEASE INVOLVING NATIVE CORONARY ARTERY OF NATIVE HEART WITHOUT ANGINA PECTORIS: ICD-10-CM

## 2024-09-11 PROCEDURE — 93000 ELECTROCARDIOGRAM COMPLETE: CPT | Performed by: NURSE PRACTITIONER

## 2024-09-11 RX ORDER — NITROGLYCERIN 0.4 MG/1
TABLET SUBLINGUAL
Qty: 25 TABLET | Refills: 3 | Status: SHIPPED | OUTPATIENT
Start: 2024-09-11

## 2024-09-11 NOTE — PROGRESS NOTES
Subjective:        Sander White is a 68 y.o. male who here for follow up    Chief Complaint   Patient presents with    Follow-up     1yr        HPI      This is a 68-year-old male who has a history of hypertension, CAD, GERD, hyperlipidemia who is here today for management of his hypertension.  EKG today shows sinus rhythm.    4/20/2017 echo: EF 52.9%, trace to mild MV regurgitation, trace to mild TV regurgitation and no evidence of pericardial effusion.    6/25/2021 cardiac cath: LAD normal including the diagonal and  branches.  Normal left main.  Circumflex artery nondominant and normal.  RCA dominant and normal with previous stent widely patent.    The following portions of the patient's history were reviewed and updated as appropriate: allergies, current medications, past family history, past medical history, past social history, past surgical history and problem list.    Past Medical History:   Diagnosis Date    Abnormal cardiovascular stress test 9/9/2019    Added automatically from request for surgery [id number]    Airway hyperreactivity 5/3/2016    Arthritis     Chronic pain in right foot     Collapse of right lung 12/23/1999    Coronary artery disease     ED (erectile dysfunction) 7/15/2016    GERD (gastroesophageal reflux disease)     Hordeolum externum of left lower eyelid 12/14/2021    Hypertension     Mallet finger 10/20/2014    Myocardial infarction     Neck pain 12/15/2017    Precordial pain 7/22/2017    Added automatically from request for surgery [id number]    Sciatica of left side 11/1/2021    Sprain of carpometacarpal joint 10/20/2014    ST elevation myocardial infarction (STEMI) 2/15/2017         reports that he quit smoking about 24 years ago. His smoking use included cigarettes. He started smoking about 31 years ago. He has a 3.5 pack-year smoking history. He has never used smokeless tobacco. He reports current alcohol use of about 2.0 standard drinks of alcohol per week. He reports that  he does not use drugs.     Family History   Problem Relation Age of Onset    Diabetes Mother         Type 2    Hypertension Mother     Hyperlipidemia Mother     Heart attack Mother     Heart disease Mother         S/P CABG    Diabetes Father         Type 2    Stroke Father     Hypertension Father     Hyperlipidemia Father     Heart attack Father     Heart disease Father         S/P CABG    Alzheimer's disease Father     Pancreatitis Sister     Aneurysm Brother         Brain    Cancer Paternal Aunt         all father's sisters       ROS     Review of Systems  Constitutional: No wt loss, fever, fatigue  Gastrointestinal: No nausea, abdominal pain  Behavioral/Psych: No insomnia or anxiety  Cardiovascular: +chest pain, and shortness of breath worse with exertion      Objective:           Constitutional:       Appearance: Well-developed.   Neck:      Vascular: No JVD.      Trachea: No tracheal deviation.   Pulmonary:      Effort: Pulmonary effort is normal. No respiratory distress.      Breath sounds: Normal breath sounds. No stridor. No decreased breath sounds. No wheezing. No rhonchi. No rales.   Chest:      Chest wall: Not tender to palpatation.   Cardiovascular:      Normal rate. Regular rhythm.      No gallop.    Pulses:     Intact distal pulses.   Edema:     Peripheral edema absent.   Abdominal:      General: Bowel sounds are normal. There is no distension.      Palpations: Abdomen is soft.      Tenderness: There is no abdominal tenderness.   Skin:     General: Skin is warm.   Neurological:      Mental Status: Alert and oriented to person, place, and time.      Deep Tendon Reflexes: Reflexes are normal and symmetric.           ECG 12 Lead    Date/Time: 9/11/2024 8:40 AM  Performed by: Serina Low APRN    Authorized by: Serina Low APRN  Comparison: compared with previous ECG from 9/11/2023  Similar to previous ECG  Rhythm: sinus rhythm    Clinical impression: non-specific ECG          2021  Conclusion       Successful right and left coronary angiogram and LV gram  Normal left main  Left anterior descending normal including the diagonal and  branches  Circumflex artery nondominant and normal  Right coronary artery dominant and normal, previous stent widely patent  Normal LV gram     June 25, 2021     Sander White  1955  65 y.o.  male  [id number]         2017  Interpretation Summary    Left Ventricle: Calculated EF = 52.9%.  Trace-to-mild mitral valve regurgitation  Trace to mild tricuspid valve regurgitation is present  There is no evidence of pericardial effusion    Current Outpatient Medications:     aspirin 81 MG chewable tablet, Chew 1 tablet Daily., Disp: , Rfl:     atorvastatin (LIPITOR) 40 MG tablet, TAKE 1 TABLET BY MOUTH DAILY, Disp: 90 tablet, Rfl: 1    glucosamine-chondroitin 500-400 MG capsule capsule, Take 1 capsule by mouth 3 (Three) Times a Day With Meals., Disp: , Rfl:     lisinopril (PRINIVIL,ZESTRIL) 2.5 MG tablet, TAKE 1 TABLET BY MOUTH DAILY, Disp: 90 tablet, Rfl: 1    nitroglycerin (NITROSTAT) 0.4 MG SL tablet, 1 under the tongue as needed for angina, may repeat q5mins for up three doses, Disp: 25 tablet, Rfl: 3    omeprazole (priLOSEC) 20 MG capsule, Take 1 capsule by mouth Daily., Disp: , Rfl:     TURMERIC PO, Take 1 capsule by mouth Daily., Disp: , Rfl:     vitamin C (ASCORBIC ACID) 500 MG tablet, Take 1 tablet by mouth Daily., Disp: , Rfl:      Assessment:        Patient Active Problem List   Diagnosis    Mixed hyperlipidemia    Erectile disorder due to medical condition in male    Primary hypertension    Coronary artery disease involving native coronary artery of native heart without angina pectoris    Chest pain    Spinal stenosis in cervical region    Cervical radiculopathy    GERD (gastroesophageal reflux disease)    Precordial pain    Anxiety    Skin lesion of back    Allergic rhinitis    Chest discomfort    Former smoker    Elevated fasting glucose     Prediabetes    Class 1 obesity with serious comorbidity and body mass index (BMI) of 30.0 to 30.9 in adult    Fatigue               Plan:   1.  CAD: He has been having cp associated to shortness of breath. Shortness of breath worse with exertion. Previous ischemic workup as above.      It was explained to the patient that stress testing carries 85% specificity/sensitivity, and does not rule out future cardiac event.  Risks of the procedure were explained to the patient including shortness of breath, induction of myocardial infarction, and dizziness.  Patient is agreeable to proceeding with stress testing.     Risk reduction for the coronary artery disease, controlling the blood pressure, blood sugar management, cholesterol management, exercise, stress management, and proper compliance with medications and follow-up has been discussed    2.  Hypertension: Stable.  No changes.    Educated patient on exercising for at least 30 minutes a day for 2 to 3 days a week. Importance of controlling hypertension and blood pressure checkup on the regular basis has been explained. Hypertension as a silent killer has been discussed. Risk reduction of the weight and regular exercises to control the hypertension has been explained.    3.  Hyperlipidemia: He states his primary care manages his labs and cholesterol.    Risk of the hyperlipidemia, importance of the treatment has been explained. Pros and cons of the statins has been explained. Regular blood workup as well as side effects including the liver failure, myelopathy death has been explained.                 No diagnosis found.    There are no diagnoses linked to this encounter.    COUNSELING: ad Moore was given to patient for the following topics: diagnostic results, risk factor reductions, impressions, risks and benefits of treatment options and importance of treatment compliance .       SMOKING COUNSELING: denies    -Positive shortness of breath and  chest pain. He will have a stress test and an echo.   -refill nitro    Sincerely,   ANGELICA Russo  [facility]  09/11/24  08:35 EDT    EMR Dragon/Transcription disclaimer: Dictated utilizing Dragon Dictation

## 2024-09-23 ENCOUNTER — HOSPITAL ENCOUNTER (OUTPATIENT)
Dept: CARDIOLOGY | Facility: HOSPITAL | Age: 69
Discharge: HOME | End: 2024-09-23
Payer: COMMERCIAL

## 2024-09-23 ENCOUNTER — OFFICE VISIT (OUTPATIENT)
Dept: CARDIOLOGY | Facility: CLINIC | Age: 69
End: 2024-09-23
Payer: COMMERCIAL

## 2024-09-23 ENCOUNTER — HOSPITAL ENCOUNTER (OUTPATIENT)
Dept: CARDIOLOGY | Facility: HOSPITAL | Age: 69
Discharge: HOME | End: 2024-09-23
Admitting: NURSE PRACTITIONER
Payer: COMMERCIAL

## 2024-09-23 VITALS
HEIGHT: 72 IN | HEART RATE: 82 BPM | SYSTOLIC BLOOD PRESSURE: 140 MMHG | BODY MASS INDEX: 29.53 KG/M2 | WEIGHT: 218 LBS | DIASTOLIC BLOOD PRESSURE: 98 MMHG

## 2024-09-23 VITALS
HEART RATE: 64 BPM | DIASTOLIC BLOOD PRESSURE: 82 MMHG | OXYGEN SATURATION: 97 % | WEIGHT: 216 LBS | HEIGHT: 72 IN | SYSTOLIC BLOOD PRESSURE: 128 MMHG | BODY MASS INDEX: 29.26 KG/M2

## 2024-09-23 VITALS
DIASTOLIC BLOOD PRESSURE: 90 MMHG | BODY MASS INDEX: 29.26 KG/M2 | HEIGHT: 72 IN | OXYGEN SATURATION: 97 % | HEART RATE: 88 BPM | SYSTOLIC BLOOD PRESSURE: 129 MMHG | WEIGHT: 216.05 LBS

## 2024-09-23 DIAGNOSIS — I10 PRIMARY HYPERTENSION: ICD-10-CM

## 2024-09-23 DIAGNOSIS — I25.10 CORONARY ARTERY DISEASE INVOLVING NATIVE CORONARY ARTERY OF NATIVE HEART WITHOUT ANGINA PECTORIS: Primary | ICD-10-CM

## 2024-09-23 DIAGNOSIS — R07.2 PRECORDIAL PAIN: ICD-10-CM

## 2024-09-23 DIAGNOSIS — R06.02 SHORTNESS OF BREATH: ICD-10-CM

## 2024-09-23 DIAGNOSIS — E78.5 HYPERLIPIDEMIA, UNSPECIFIED HYPERLIPIDEMIA TYPE: ICD-10-CM

## 2024-09-23 LAB
BH CV REST NUCLEAR ISOTOPE DOSE: 10.9 MCI
BH CV STRESS BP STAGE 1: NORMAL
BH CV STRESS BP STAGE 2: NORMAL
BH CV STRESS BP STAGE 3: NORMAL
BH CV STRESS DURATION MIN STAGE 1: 3
BH CV STRESS DURATION MIN STAGE 2: 2
BH CV STRESS DURATION SEC STAGE 1: 1
BH CV STRESS DURATION SEC STAGE 2: 38
BH CV STRESS DURATION SEC STAGE 3: 51
BH CV STRESS GRADE STAGE 1: 10
BH CV STRESS GRADE STAGE 2: 12
BH CV STRESS GRADE STAGE 3: 0
BH CV STRESS HR STAGE 1: 112
BH CV STRESS HR STAGE 2: 132
BH CV STRESS HR STAGE 3: 136
BH CV STRESS METS STAGE 1: 4.6
BH CV STRESS METS STAGE 2: 7.1
BH CV STRESS METS STAGE 3: 7.3
BH CV STRESS NUCLEAR ISOTOPE DOSE: 32.9 MCI
BH CV STRESS PROTOCOL 1: NORMAL
BH CV STRESS RECOVERY BP: NORMAL MMHG
BH CV STRESS RECOVERY HR: 85 BPM
BH CV STRESS SPEED STAGE 1: 1.7
BH CV STRESS SPEED STAGE 2: 2.5
BH CV STRESS SPEED STAGE 3: 3.4
BH CV STRESS STAGE 1: 1
BH CV STRESS STAGE 2: 2
BH CV STRESS STAGE 3: 3
LV EF NUC BP: 58 %
MAXIMAL PREDICTED HEART RATE: 153 BPM
PERCENT MAX PREDICTED HR: 88.89 %
STRESS BASELINE BP: NORMAL MMHG
STRESS BASELINE HR: 64 BPM
STRESS O2 SAT REST: 97 %
STRESS PERCENT HR: 105 %
STRESS POST ESTIMATED WORKLOAD: 7.3 METS
STRESS POST EXERCISE DUR MIN: 6 MIN
STRESS POST EXERCISE DUR SEC: 31 SEC
STRESS POST PEAK BP: NORMAL MMHG
STRESS POST PEAK HR: 136 BPM
STRESS TARGET HR: 130 BPM

## 2024-09-23 PROCEDURE — A9500 TC99M SESTAMIBI: HCPCS | Performed by: INTERNAL MEDICINE

## 2024-09-23 PROCEDURE — 78452 HT MUSCLE IMAGE SPECT MULT: CPT

## 2024-09-23 PROCEDURE — 78452 HT MUSCLE IMAGE SPECT MULT: CPT | Performed by: INTERNAL MEDICINE

## 2024-09-23 PROCEDURE — 93306 TTE W/DOPPLER COMPLETE: CPT | Performed by: INTERNAL MEDICINE

## 2024-09-23 PROCEDURE — 93018 CV STRESS TEST I&R ONLY: CPT | Performed by: INTERNAL MEDICINE

## 2024-09-23 PROCEDURE — 93017 CV STRESS TEST TRACING ONLY: CPT

## 2024-09-23 PROCEDURE — 25510000001 PERFLUTREN 6.52 MG/ML SUSPENSION 2 ML VIAL: Performed by: NURSE PRACTITIONER

## 2024-09-23 PROCEDURE — 99213 OFFICE O/P EST LOW 20 MIN: CPT | Performed by: INTERNAL MEDICINE

## 2024-09-23 PROCEDURE — 0 TECHNETIUM SESTAMIBI: Performed by: INTERNAL MEDICINE

## 2024-09-23 PROCEDURE — 93306 TTE W/DOPPLER COMPLETE: CPT

## 2024-09-23 PROCEDURE — 93016 CV STRESS TEST SUPVJ ONLY: CPT | Performed by: INTERNAL MEDICINE

## 2024-09-23 RX ADMIN — TECHNETIUM TC 99M SESTAMIBI 1 DOSE: 1 INJECTION INTRAVENOUS at 07:06

## 2024-09-23 RX ADMIN — SODIUM CHLORIDE 4 ML: 9 INJECTION INTRAMUSCULAR; INTRAVENOUS; SUBCUTANEOUS at 09:04

## 2024-09-23 RX ADMIN — TECHNETIUM TC 99M SESTAMIBI 1 DOSE: 1 INJECTION INTRAVENOUS at 09:45

## 2024-09-26 LAB
AORTIC DIMENSIONLESS INDEX: 1.1 (DI)
ASCENDING AORTA: 3.3 CM
BH CV ECHO MEAS - ACS: 2.34 CM
BH CV ECHO MEAS - AO MAX PG: 2.48 MMHG
BH CV ECHO MEAS - AO MEAN PG: 1.28 MMHG
BH CV ECHO MEAS - AO V2 MAX: 78.7 CM/SEC
BH CV ECHO MEAS - AO V2 VTI: 14.2 CM
BH CV ECHO MEAS - AVA(I,D): 4.4 CM2
BH CV ECHO MEAS - EDV(CUBED): 132.7 ML
BH CV ECHO MEAS - EDV(MOD-SP2): 106 ML
BH CV ECHO MEAS - EDV(MOD-SP4): 143 ML
BH CV ECHO MEAS - EF(MOD-BP): 61.1 %
BH CV ECHO MEAS - EF(MOD-SP2): 58.5 %
BH CV ECHO MEAS - EF(MOD-SP4): 60.1 %
BH CV ECHO MEAS - ESV(CUBED): 55 ML
BH CV ECHO MEAS - ESV(MOD-SP2): 44 ML
BH CV ECHO MEAS - ESV(MOD-SP4): 57 ML
BH CV ECHO MEAS - FS: 25.4 %
BH CV ECHO MEAS - IVS/LVPW: 0.98 CM
BH CV ECHO MEAS - IVSD: 0.96 CM
BH CV ECHO MEAS - LA A2CS (ATRIAL LENGTH): 5 CM
BH CV ECHO MEAS - LA A4C LENGTH: 5 CM
BH CV ECHO MEAS - LAT PEAK E' VEL: 7.8 CM/SEC
BH CV ECHO MEAS - LV DIASTOLIC VOL/BSA (35-75): 65 CM2
BH CV ECHO MEAS - LV MASS(C)D: 181.4 GRAMS
BH CV ECHO MEAS - LV MAX PG: 3 MMHG
BH CV ECHO MEAS - LV MEAN PG: 1.43 MMHG
BH CV ECHO MEAS - LV SYSTOLIC VOL/BSA (12-30): 25.9 CM2
BH CV ECHO MEAS - LV V1 MAX: 86.8 CM/SEC
BH CV ECHO MEAS - LV V1 VTI: 15.9 CM
BH CV ECHO MEAS - LVIDD: 5.1 CM
BH CV ECHO MEAS - LVIDS: 3.8 CM
BH CV ECHO MEAS - LVOT AREA: 4 CM2
BH CV ECHO MEAS - LVOT DIAM: 2.25 CM
BH CV ECHO MEAS - LVPWD: 0.98 CM
BH CV ECHO MEAS - MED PEAK E' VEL: 6.2 CM/SEC
BH CV ECHO MEAS - MR MAX PG: 32.8 MMHG
BH CV ECHO MEAS - MR MAX VEL: 286.4 CM/SEC
BH CV ECHO MEAS - MV A DUR: 0.13 SEC
BH CV ECHO MEAS - MV A MAX VEL: 65.5 CM/SEC
BH CV ECHO MEAS - MV DEC SLOPE: 359.4 CM/SEC2
BH CV ECHO MEAS - MV DEC TIME: 0.18 SEC
BH CV ECHO MEAS - MV E MAX VEL: 66 CM/SEC
BH CV ECHO MEAS - MV E/A: 1.01
BH CV ECHO MEAS - MV MAX PG: 2.8 MMHG
BH CV ECHO MEAS - MV MEAN PG: 0.94 MMHG
BH CV ECHO MEAS - MV P1/2T: 62 MSEC
BH CV ECHO MEAS - MV V2 VTI: 24.7 CM
BH CV ECHO MEAS - MVA(P1/2T): 3.6 CM2
BH CV ECHO MEAS - MVA(VTI): 2.6 CM2
BH CV ECHO MEAS - PA ACC TIME: 0.13 SEC
BH CV ECHO MEAS - PA V2 MAX: 76 CM/SEC
BH CV ECHO MEAS - PULM A REVS DUR: 0.1 SEC
BH CV ECHO MEAS - PULM A REVS VEL: 32.3 CM/SEC
BH CV ECHO MEAS - PULM DIAS VEL: 40.2 CM/SEC
BH CV ECHO MEAS - PULM S/D: 1.05
BH CV ECHO MEAS - PULM SYS VEL: 42.1 CM/SEC
BH CV ECHO MEAS - QP/QS: 0.61
BH CV ECHO MEAS - RV MAX PG: 0.8 MMHG
BH CV ECHO MEAS - RV V1 MAX: 44.6 CM/SEC
BH CV ECHO MEAS - RV V1 VTI: 9.7 CM
BH CV ECHO MEAS - RVOT DIAM: 2.24 CM
BH CV ECHO MEAS - SV(LVOT): 63.1 ML
BH CV ECHO MEAS - SV(MOD-SP2): 62 ML
BH CV ECHO MEAS - SV(MOD-SP4): 86 ML
BH CV ECHO MEAS - SV(RVOT): 38.3 ML
BH CV ECHO MEAS - SVI(LVOT): 28.7 ML/M2
BH CV ECHO MEAS - SVI(MOD-SP2): 28.2 ML/M2
BH CV ECHO MEAS - SVI(MOD-SP4): 39.1 ML/M2
BH CV ECHO MEAS - TAPSE (>1.6): 1.96 CM
BH CV ECHO MEASUREMENTS AVERAGE E/E' RATIO: 9.43
BH CV XLRA - RV BASE: 2.8 CM
BH CV XLRA - RV LENGTH: 7 CM
BH CV XLRA - RV MID: 2.7 CM
BH CV XLRA - TDI S': 10.7 CM/SEC
LEFT ATRIUM VOLUME INDEX: 23.5 ML/M2
SINUS: 3.4 CM
STJ: 2.8 CM

## 2024-10-25 DIAGNOSIS — E78.2 MIXED HYPERLIPIDEMIA: ICD-10-CM

## 2024-10-28 RX ORDER — ATORVASTATIN CALCIUM 40 MG/1
40 TABLET, FILM COATED ORAL DAILY
Qty: 90 TABLET | Refills: 1 | OUTPATIENT
Start: 2024-10-28

## 2025-02-01 DIAGNOSIS — I10 PRIMARY HYPERTENSION: ICD-10-CM

## 2025-02-03 RX ORDER — LISINOPRIL 2.5 MG/1
2.5 TABLET ORAL DAILY
Qty: 30 TABLET | Refills: 0 | Status: SHIPPED | OUTPATIENT
Start: 2025-02-03

## 2025-02-07 ENCOUNTER — HOSPITAL ENCOUNTER (EMERGENCY)
Age: 70
Discharge: HOME | End: 2025-02-07
Attending: EMERGENCY MEDICINE
Payer: COMMERCIAL

## 2025-02-07 ENCOUNTER — APPOINTMENT (OUTPATIENT)
Dept: GENERAL RADIOLOGY | Age: 70
End: 2025-02-07
Payer: COMMERCIAL

## 2025-02-07 VITALS
HEART RATE: 64 BPM | RESPIRATION RATE: 19 BRPM | OXYGEN SATURATION: 96 % | DIASTOLIC BLOOD PRESSURE: 96 MMHG | SYSTOLIC BLOOD PRESSURE: 128 MMHG | TEMPERATURE: 97.5 F

## 2025-02-07 DIAGNOSIS — R55 SYNCOPE AND COLLAPSE: Primary | ICD-10-CM

## 2025-02-07 LAB
ALBUMIN SERPL-MCNC: 4.1 G/DL (ref 3.4–5)
ALBUMIN/GLOB SERPL: 1.7 {RATIO} (ref 1.1–2.2)
ALP SERPL-CCNC: 78 U/L (ref 40–129)
ALT SERPL-CCNC: 19 U/L (ref 10–40)
ANION GAP SERPL CALCULATED.3IONS-SCNC: 10 MMOL/L (ref 3–16)
AST SERPL-CCNC: 23 U/L (ref 15–37)
BASOPHILS # BLD: 0.1 K/UL (ref 0–0.2)
BASOPHILS NFR BLD: 1 %
BILIRUB SERPL-MCNC: 0.4 MG/DL (ref 0–1)
BUN SERPL-MCNC: 16 MG/DL (ref 7–20)
CALCIUM SERPL-MCNC: 8.9 MG/DL (ref 8.3–10.6)
CHLORIDE SERPL-SCNC: 107 MMOL/L (ref 99–110)
CO2 SERPL-SCNC: 23 MMOL/L (ref 21–32)
CREAT SERPL-MCNC: 1.1 MG/DL (ref 0.8–1.3)
DEPRECATED RDW RBC AUTO: 13.2 % (ref 12.4–15.4)
EKG ATRIAL RATE: 71 BPM
EKG DIAGNOSIS: NORMAL
EKG P AXIS: 55 DEGREES
EKG P-R INTERVAL: 174 MS
EKG Q-T INTERVAL: 396 MS
EKG QRS DURATION: 92 MS
EKG QTC CALCULATION (BAZETT): 430 MS
EKG R AXIS: 26 DEGREES
EKG T AXIS: 76 DEGREES
EKG VENTRICULAR RATE: 71 BPM
EOSINOPHIL # BLD: 0.5 K/UL (ref 0–0.6)
EOSINOPHIL NFR BLD: 8.4 %
GFR SERPLBLD CREATININE-BSD FMLA CKD-EPI: 73 ML/MIN/{1.73_M2}
GLUCOSE SERPL-MCNC: 109 MG/DL (ref 70–99)
HCT VFR BLD AUTO: 43.6 % (ref 40.5–52.5)
HGB BLD-MCNC: 14.9 G/DL (ref 13.5–17.5)
LYMPHOCYTES # BLD: 1.2 K/UL (ref 1–5.1)
LYMPHOCYTES NFR BLD: 19.8 %
MCH RBC QN AUTO: 30.9 PG (ref 26–34)
MCHC RBC AUTO-ENTMCNC: 34.1 G/DL (ref 31–36)
MCV RBC AUTO: 90.6 FL (ref 80–100)
MONOCYTES # BLD: 1 K/UL (ref 0–1.3)
MONOCYTES NFR BLD: 16.2 %
NEUTROPHILS # BLD: 3.2 K/UL (ref 1.7–7.7)
NEUTROPHILS NFR BLD: 54.6 %
PLATELET # BLD AUTO: 183 K/UL (ref 135–450)
PMV BLD AUTO: 9.9 FL (ref 5–10.5)
POTASSIUM SERPL-SCNC: 3.7 MMOL/L (ref 3.5–5.1)
PROT SERPL-MCNC: 6.5 G/DL (ref 6.4–8.2)
RBC # BLD AUTO: 4.81 M/UL (ref 4.2–5.9)
SODIUM SERPL-SCNC: 140 MMOL/L (ref 136–145)
TROPONIN, HIGH SENSITIVITY: 7 NG/L (ref 0–22)
TROPONIN, HIGH SENSITIVITY: 7 NG/L (ref 0–22)
WBC # BLD AUTO: 5.9 K/UL (ref 4–11)

## 2025-02-07 PROCEDURE — 71045 X-RAY EXAM CHEST 1 VIEW: CPT

## 2025-02-07 PROCEDURE — 93005 ELECTROCARDIOGRAM TRACING: CPT | Performed by: EMERGENCY MEDICINE

## 2025-02-07 PROCEDURE — 85025 COMPLETE CBC W/AUTO DIFF WBC: CPT

## 2025-02-07 PROCEDURE — 80053 COMPREHEN METABOLIC PANEL: CPT

## 2025-02-07 PROCEDURE — 84484 ASSAY OF TROPONIN QUANT: CPT

## 2025-02-07 PROCEDURE — 99285 EMERGENCY DEPT VISIT HI MDM: CPT

## 2025-02-07 ASSESSMENT — LIFESTYLE VARIABLES
HOW OFTEN DO YOU HAVE A DRINK CONTAINING ALCOHOL: NEVER
HOW MANY STANDARD DRINKS CONTAINING ALCOHOL DO YOU HAVE ON A TYPICAL DAY: PATIENT DOES NOT DRINK
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
## 2025-02-07 NOTE — ED NOTES
Pt verbalizes understanding of discharge instructions. Pt discharged with copy of AVS. Pt discharged home with son.      [place] POPPY Silverio  02/07/25 [id number]

## 2025-02-07 NOTE — DISCHARGE INSTRUCTIONS
You should follow-up with your cardiologist in [place].  You should be taking your heart medications as prescribed.  Return to the emergency department for new or worsening symptoms.

## 2025-02-10 ENCOUNTER — OFFICE VISIT (OUTPATIENT)
Dept: FAMILY MEDICINE CLINIC | Facility: CLINIC | Age: 70
End: 2025-02-10
Payer: COMMERCIAL

## 2025-02-10 VITALS
TEMPERATURE: 98.4 F | HEART RATE: 97 BPM | HEIGHT: 72 IN | DIASTOLIC BLOOD PRESSURE: 90 MMHG | OXYGEN SATURATION: 96 % | SYSTOLIC BLOOD PRESSURE: 122 MMHG | WEIGHT: 218.2 LBS | BODY MASS INDEX: 29.55 KG/M2

## 2025-02-10 DIAGNOSIS — K21.9 GASTROESOPHAGEAL REFLUX DISEASE, UNSPECIFIED WHETHER ESOPHAGITIS PRESENT: ICD-10-CM

## 2025-02-10 DIAGNOSIS — E83.52 HYPERCALCEMIA: ICD-10-CM

## 2025-02-10 DIAGNOSIS — E66.3 OVERWEIGHT WITH BODY MASS INDEX (BMI) OF 29 TO 29.9 IN ADULT: ICD-10-CM

## 2025-02-10 DIAGNOSIS — Z00.00 ENCOUNTER FOR ANNUAL PHYSICAL EXAM: Primary | ICD-10-CM

## 2025-02-10 DIAGNOSIS — I25.10 CORONARY ARTERY DISEASE INVOLVING NATIVE CORONARY ARTERY OF NATIVE HEART WITHOUT ANGINA PECTORIS: ICD-10-CM

## 2025-02-10 DIAGNOSIS — E78.2 MIXED HYPERLIPIDEMIA: ICD-10-CM

## 2025-02-10 DIAGNOSIS — I10 PRIMARY HYPERTENSION: ICD-10-CM

## 2025-02-10 DIAGNOSIS — R73.03 PREDIABETES: ICD-10-CM

## 2025-02-10 DIAGNOSIS — M25.561 RIGHT KNEE PAIN, UNSPECIFIED CHRONICITY: ICD-10-CM

## 2025-02-10 DIAGNOSIS — Z12.5 PROSTATE CANCER SCREENING: ICD-10-CM

## 2025-02-10 DIAGNOSIS — L98.9 SKIN LESION OF BACK: ICD-10-CM

## 2025-02-10 DIAGNOSIS — R55 SYNCOPE, UNSPECIFIED SYNCOPE TYPE: ICD-10-CM

## 2025-02-10 DIAGNOSIS — Z12.11 COLON CANCER SCREENING: ICD-10-CM

## 2025-02-10 DIAGNOSIS — Z23 ENCOUNTER FOR IMMUNIZATION: ICD-10-CM

## 2025-02-10 PROBLEM — R73.01 ELEVATED FASTING GLUCOSE: Status: RESOLVED | Noted: 2022-09-15 | Resolved: 2025-02-10

## 2025-02-10 PROCEDURE — 90662 IIV NO PRSV INCREASED AG IM: CPT | Performed by: NURSE PRACTITIONER

## 2025-02-10 PROCEDURE — 90471 IMMUNIZATION ADMIN: CPT | Performed by: NURSE PRACTITIONER

## 2025-02-10 PROCEDURE — 99397 PER PM REEVAL EST PAT 65+ YR: CPT | Performed by: NURSE PRACTITIONER

## 2025-02-10 NOTE — PATIENT INSTRUCTIONS
Schedule a follow up appointment with cardiology sooner.  Monitor your blood pressure periodically and record results.  Continue to work on healthy diet and exercise.  Follow up pending lab results.  Follow up in 6 months, or sooner if problems or concerns.

## 2025-02-10 NOTE — PROGRESS NOTES
Subjective   Sander White is a 69 y.o. male.     Chief Complaint   Patient presents with    Follow-up     On 2/07 he went to the ER after a spell at work where he was believed to pass out. He said he felt it was due to wether change as he was outside in the wet cold and then went back inside    Hypertension    Hyperlipidemia    Annual Exam       History of Present Illness   Patient presents for CPE with fasting labs.  Nutrition:  somewhat healthy diet  Exercise:  no formal exercise, has physical job and does a lot of walking  Dental:  regular dental visits, twice per year  Vision:  last eye exam 8-9 months ago, wears readers  Hearing:  has some hearing loss, has had recent hearing test  Immunizations:  declines COVID-19 booster; will check with insurance regarding Shingrix vaccine  Colonoscopy:  2/2015 and normal, to repeat in 10 years; no family history of colon cancer; no change in bowel habits, no blood in BM.    Patient had episode of syncope at work on 2/7/25 and went to [facility] ER in [place]; pt had been wet indoors and then exposed to change in temp when went outside; pt thought symptoms due to temperature change since he was covered in steam and then fire alarm went off and had to go out in cold; after 30 min standing in cold, went and sat in car while waiting, then could go back inside but then got cold again walking 1/4 mile back into work building; then once inside, body was adjusting and while sitting had 3 syncopal episodes within 15 minutes; did not hit head; no seizure activity; mild chest discomfort but attributed to tension in chest with temperature changes; had not been taking his medications recently; had ECG and noted no acute ST or T wave changes; labs WNL including negative troponin; also had negative chest x-ray; pt was seen by cardiology and recommended to resume previous medication; last saw cardiology 9/2024 and had normal stress Echo; to follow up in September; no  chest discomfort; needs note to return to work.    F/U HTN: takes Lisinopril daily; does not typically monitor BP; no headaches; no orthostasis; no swelling other than some swelling in right knee; saw ortho and will likely need knee surgery; had gel injections but has not helped; hard to walk at times; needs parking permit for disabled persons to be able to park closer at times.    F/U prediabetes: last A1c 5.9%; watches carbs/sugars in diet.    F/U Hyperlipidemia: takes Atorvastatin daily; has been out of medication for about 1 week; watches saturated fats in diet, no fried foods; fasting today.    F/U KRISTOFER: takes OTC Omeprazole daily and works well; will have symptoms if misses a dose.    Planning to retire and move to [place] 5/31/25.      The following portions of the patient's history were reviewed and updated as appropriate: allergies, current medications, past family history, past medical history, past social history, past surgical history and problem list.    Current Outpatient Medications on File Prior to Visit   Medication Sig    aspirin 81 MG chewable tablet Chew 1 tablet Daily.    glucosamine-chondroitin 500-400 MG capsule capsule Take 1 capsule by mouth 3 (Three) Times a Day With Meals.    lisinopril (PRINIVIL,ZESTRIL) 2.5 MG tablet TAKE 1 TABLET BY MOUTH DAILY    nitroglycerin (NITROSTAT) 0.4 MG SL tablet 1 under the tongue as needed for angina, may repeat q5mins for up three doses    omeprazole (priLOSEC) 20 MG capsule Take 1 capsule by mouth Daily.    TURMERIC PO Take 1 capsule by mouth Daily.    vitamin C (ASCORBIC ACID) 500 MG tablet Take 1 tablet by mouth Daily.    atorvastatin (LIPITOR) 40 MG tablet TAKE 1 TABLET BY MOUTH DAILY (Patient not taking: Reported on 2/10/2025)     No current facility-administered medications on file prior to visit.        Past Medical History:   Diagnosis Date    Abnormal cardiovascular stress test 9/9/2019    Added automatically from request for surgery [id number]     Airway hyperreactivity 5/3/2016    Arthritis     Chronic pain in right foot     Collapse of right lung 12/23/1999    Coronary artery disease     ED (erectile dysfunction) 7/15/2016    GERD (gastroesophageal reflux disease)     Hordeolum externum of left lower eyelid 12/14/2021    Hypertension     Mallet finger 10/20/2014    Myocardial infarction     Neck pain 12/15/2017    Precordial pain 7/22/2017    Added automatically from request for surgery [id number]    Sciatica of left side 11/1/2021    Sprain of carpometacarpal joint 10/20/2014    ST elevation myocardial infarction (STEMI) 2/15/2017       Past Surgical History:   Procedure Laterality Date    APPENDECTOMY      CARDIAC CATHETERIZATION N/A 02/15/2017    Procedure: Left Heart Cath;  Surgeon: Kristina Billings MD;  Location:  [facility];  Service:     CARDIAC CATHETERIZATION  02/15/2017    Procedure: Percutaneous Manual Thrombectomy;  Surgeon: Kristina Billings MD;  Location:  [facility];  Service:     CARDIAC CATHETERIZATION N/A 02/15/2017    Procedure: Coronary angiography;  Surgeon: Kristina Billings MD;  Location:  [facility];  Service:     CARDIAC CATHETERIZATION N/A 02/15/2017    Procedure: Left ventriculography;  Surgeon: Kristina Billings MD;  Location:  [facility];  Service:     CARDIAC CATHETERIZATION N/A 07/24/2017    Procedure: Left Heart Cath;  Surgeon: Kristina Billings MD;  [facility];  Service:     CARDIAC CATHETERIZATION N/A 07/24/2017    Procedure: Coronary angiography;  Surgeon: Kristina Billings MD;  [facility];  Service:     CARDIAC CATHETERIZATION N/A 07/24/2017    Procedure: Left ventriculography;  Surgeon: Kristina Billings MD;  [facility];  Service:     CARDIAC CATHETERIZATION N/A 09/11/2019    Procedure: Left Heart Cath;  Surgeon: Kristina Billings MD;   Location:  [facility];  Service: Cardiology    CARDIAC CATHETERIZATION N/A 09/11/2019    Procedure: Coronary angiography;  Surgeon: Kristina Billings MD;  Location:  [facility];  Service: Cardiology    CARDIAC CATHETERIZATION N/A 09/11/2019    Procedure: Stent CAYLA coronary;  Surgeon: Kristina Billings MD;  Location:  [facility];  Service: Cardiology    CARDIAC CATHETERIZATION N/A 09/11/2019    Procedure: Left ventriculography;  Surgeon: Kristina Billings MD;  [facility];  Service: Cardiology    CARDIAC CATHETERIZATION N/A 06/25/2021    Procedure: Left Heart Cath;  Surgeon: Kristina Billings MD;  [facility];  Service: Cardiology;  Laterality: N/A;    CARDIAC CATHETERIZATION N/A 06/25/2021    Procedure: Coronary angiography;  Surgeon: Kristina Billings MD;  [facility];  Service: Cardiology;  Laterality: N/A;    CARDIAC CATHETERIZATION N/A 06/25/2021    Procedure: Left ventriculography;  Surgeon: Kristina Billings MD;  [facility];  Service: Cardiology;  Laterality: N/A;    CARPAL TUNNEL RELEASE Right     CHOLECYSTECTOMY      COLONOSCOPY N/A 02/27/2015    Normal    CORONARY ANGIOPLASTY      CORONARY STENT PLACEMENT      x2    CYST REMOVAL      thyroglossal cyst removed    HERNIA REPAIR Right     Inguinal    TX RT/LT HEART CATHETERS N/A 02/15/2017    Procedure: Percutaneous Coronary Intervention;  Surgeon: Kristina Billings MD;  [facility];  Service: Cardiovascular    TONSILLECTOMY  when I was 4       Family History   Problem Relation Age of Onset    Diabetes Mother         Type 2    Hypertension Mother     Hyperlipidemia Mother     Heart attack Mother     Heart disease Mother         S/P CABG    Diabetes Father         Type 2    Stroke Father     Hypertension Father     Hyperlipidemia Father      Heart attack Father     Heart disease Father         S/P CABG    Alzheimer's disease Father     Pancreatitis Sister     Aneurysm Brother         Brain    Cancer Paternal Aunt         all father's sisters       Social History     Socioeconomic History    Marital status:     Number of children: 5    Years of education: 2 YEARS OF COLLEGE   Tobacco Use    Smoking status: Former     Current packs/day: 0.00     Average packs/day: 0.5 packs/day for 7.0 years (3.5 ttl pk-yrs)     Types: Cigarettes     Start date: 1992     Quit date: 1999     Years since quittin.1    Smokeless tobacco: Never   Vaping Use    Vaping status: Never Used   Substance and Sexual Activity    Alcohol use: Yes     Alcohol/week: 2.0 standard drinks of alcohol     Types: 2 Glasses of wine per week     Comment: occasional    Drug use: No    Sexual activity: Defer       Review of Systems   Constitutional:  Positive for fatigue (works nights). Negative for appetite change, chills, fever, unexpected weight gain and unexpected weight loss.   HENT:  Positive for tinnitus (chronic). Negative for ear pain, sinus pressure, sore throat and trouble swallowing.    Eyes:  Negative for blurred vision and discharge.   Respiratory:  Negative for cough, chest tightness and shortness of breath.    Cardiovascular:  Negative for chest pain and palpitations.   Gastrointestinal:  Negative for abdominal pain, blood in stool, constipation, diarrhea and GERD.   Endocrine: Negative for cold intolerance (see HPI), heat intolerance and polydipsia.   Genitourinary:  Negative for dysuria and frequency. Nocturia: gets up once per night on occasion; no change in urinary stream.  Musculoskeletal:  Negative for arthralgias (see HPI) and back pain.   Skin:  Negative for rash. Skin lesions: has place on right shoulder, has not seen derm, needs to schedule with derm near [facility].  Neurological:  Negative for weakness. Syncope: see HPI.  Hematological:   "Does not bruise/bleed easily.   Psychiatric/Behavioral:  Negative for depressed mood. The patient is not nervous/anxious.        Objective   Vitals:    02/10/25 0943 02/10/25 1030   BP: 158/90 122/90   BP Location: Left arm Left arm   Patient Position: Sitting Sitting   Cuff Size: Adult    Pulse: 97    Temp: 98.4 °F (36.9 °C)    TempSrc: Temporal    SpO2: 96%    Weight: 99 kg (218 lb 3.2 oz)    Height: 182.9 cm (72.01\")      Body mass index is 29.59 kg/m².    Physical Exam  Vitals and nursing note reviewed.   Constitutional:       General: He is not in acute distress.     Appearance: He is well-developed and well-groomed. He is not diaphoretic.   HENT:      Head: Normocephalic and atraumatic.      Jaw: No tenderness or pain on movement.      Right Ear: External ear normal. Right ear decreased hearing: able to hear finger rub. Right ear middle ear effusion: TM dull. Tympanic membrane is not erythematous.      Left Ear: External ear normal. Left ear decreased hearing: able to hear finger rub. Left ear middle ear effusion: TM dull. Tympanic membrane is not erythematous.      Nose: Nose normal.      Right Sinus: No maxillary sinus tenderness or frontal sinus tenderness.      Left Sinus: No maxillary sinus tenderness or frontal sinus tenderness.      Mouth/Throat:      Mouth: Mucous membranes are moist.      Pharynx: No oropharyngeal exudate or posterior oropharyngeal erythema.   Eyes:      Extraocular Movements: Extraocular movements intact.      Conjunctiva/sclera: Conjunctivae normal.      Pupils: Pupils are equal, round, and reactive to light.   Neck:      Thyroid: No thyromegaly.      Vascular: No carotid bruit.      Trachea: No tracheal deviation.   Cardiovascular:      Rate and Rhythm: Normal rate and regular rhythm.      Pulses: Normal pulses.      Heart sounds: Normal heart sounds. No murmur heard.  Pulmonary:      Effort: Pulmonary effort is normal. No respiratory distress.      Breath sounds: Normal breath " sounds.   Abdominal:      General: Bowel sounds are normal.      Palpations: Abdomen is soft. There is no hepatomegaly or splenomegaly.      Tenderness: There is no abdominal tenderness. There is no guarding.   Musculoskeletal:         General: Normal range of motion.      Cervical back: Normal range of motion and neck supple. No bony tenderness.      Thoracic back: No bony tenderness.      Lumbar back: No bony tenderness.      Right knee: Crepitus present. Swelling: mild.Normal range of motion. No tenderness.      Instability Tests: Anterior drawer test negative. Posterior drawer test negative.      Left knee: Crepitus: mild. Normal range of motion.      Right lower leg: No edema.      Left lower leg: No edema.   Lymphadenopathy:      Cervical: No cervical adenopathy.   Skin:     General: Skin is warm and dry.      Findings: No rash.          Neurological:      Mental Status: He is alert and oriented to person, place, and time.      Cranial Nerves: No cranial nerve deficit.      Motor: Motor function is intact.      Coordination: Coordination normal.      Gait: Gait normal.      Deep Tendon Reflexes: Reflexes are normal and symmetric.   Psychiatric:         Mood and Affect: Mood normal.         Behavior: Behavior normal.         Thought Content: Thought content normal.         Cognition and Memory: Cognition normal.         Judgment: Judgment normal.         Lab Results   Component Value Date    WBC 5.9 02/07/2025    RBC 4.81 02/07/2025    HGB 14.9 02/07/2025    HCT 43.6 02/07/2025    MCV 90.6 02/07/2025    MCH 30.9 02/07/2025    MCHC 34.1 02/07/2025    RDW 13.2 02/07/2025    RDWSD 43.2 06/21/2021    MPV 9.9 02/07/2025     02/07/2025    NEUTRORELPCT 54.6 02/07/2025    LYMPHORELPCT 19.8 02/07/2025    MONORELPCT 16.2 02/07/2025    EOSRELPCT 8.4 02/07/2025    BASORELPCT 1.0 02/07/2025    AUTOIGPER 0.5 06/21/2021    NEUTROABS 3.2 02/07/2025    LYMPHSABS 1.2 02/07/2025    MONOSABS 1.0 02/07/2025    EOSABS 0.5  02/07/2025    BASOSABS 0.1 02/07/2025    AUTOIGNUM 0.03 06/21/2021    NRBC 0.0 09/19/2023     Lab Results   Component Value Date    GLUCOSE 105 (H) 03/25/2024    BUN 12 03/25/2024    CREATININE 1.05 03/25/2024    EGFRIFNONA 67 06/21/2021    EGFRIFAFRI 104 05/26/2021    BCR 11 03/25/2024    K 5.1 03/25/2024    CO2 22 03/25/2024    CALCIUM 9.0 04/18/2024    PROTENTOTREF 7.1 03/25/2024    ALBUMIN 4.8 03/25/2024    LABIL2 2.1 03/25/2024    AST 28 03/25/2024    ALT 33 03/25/2024      Lab Results   Component Value Date    CHLPL 169 03/25/2024    TRIG 73 03/25/2024    HDL 51 03/25/2024    VLDL 14 03/25/2024     (H) 03/25/2024     Lab Results   Component Value Date    TSH 0.533 09/19/2023     Lab Results   Component Value Date    HGBA1C 5.9 (H) 03/25/2024     Lab Results   Component Value Date    PSA 1.030 09/19/2023        Records reviewed from [facility] ER on 2/7/25.  Sander White is a 69 y.o. male with coronary artery disease who presents with loss of consciousness. He is accompanied by his son. He experienced three episodes of syncope while outside in cold air this evening. He works at a [employer] and was exposed to cold temperatures after a fire alarm, which led to chest pain on the left side, described as reminiscent of past heart issues. He attempted to alleviate the cold by sitting in a car, but upon returning inside to a warm environment, he lost consciousness. He does not recall the events but was informed by others that he was grabbing his chest and appeared to be in pain. His son reports that during the syncope episodes, his heart rate was elevated to 120 bpm, and his oxygen saturation dropped to 75%, later decreasing to 71%. He appeared flushed and pale, and he passed out three times before EMS arrived. He has a history of coronary artery disease and had a massive heart attack in 2017, for which he received two stents. He is supposed to be on lisinopril 2.5 mg, atorvastatin, and a  baby aspirin but has not been taking these medications recently. When he was taking lisinopril, his blood pressure was well-controlled at 116-120 mmHg. He had a stress test with perfusion in September of the previous year, which showed a small infarct in the lateral wall but no significant ischemia. His echocardiogram showed a left ventricular ejection fraction of 61-65% with diastolic dysfunction and trace to mild valve regurgitations. No abdominal pain.  Did recently have both echo and a stress test done in the last 6 months which were reassuring.  However given that he was having a left-sided chest pressure with this episode of syncope and history of known CAD status post PCI think he is fairly high risk.  I have asked cardiology if they be willing to come by and see him today and see if there are any recommendations to be made.  This may all have been related to rapid temperature changes but I would be more comfortable if they did not think that any more acute interventions or diagnostics were warranted.  Labs reassuring.  Cardiology evaluated the patient and recommended that he get back on his prior medications but there was no further interventions or diagnostics to be warranted at this point.  No arrhythmia on monitoring here.  Patient and family comfortable going home to follow-up with their cardiologist and return for any changes.    2/7/25 chest x-ray: The heart is normal in size.  The lungs are clear without significant consolidation or suspicious nodularity. The bones demonstrate no acute findings.     2/7/25 troponin 7; CBC WNL; CMP WNL except glucose 109      Assessment    Problem List Items Addressed This Visit       Mixed hyperlipidemia    Current Assessment & Plan     Continue Atorvastatin daily.  Continue to work on healthy diet and exercise.         Relevant Orders    Lipid Panel With LDL / HDL Ratio    Comprehensive Metabolic Panel    Primary hypertension    Current Assessment & Plan      Hypertension is a little elevated today since had not been taking BP medication and resumed few days ago.  Continue current treatment regimen.  Weight loss.  Regular aerobic exercise.  Ambulatory blood pressure monitoring.  Blood pressure will be reassessed in 6 months.  Continue Lisinopril daily.         Relevant Orders    Lipid Panel With LDL / HDL Ratio    Comprehensive Metabolic Panel    Coronary artery disease involving native coronary artery of native heart without angina pectoris    Current Assessment & Plan     Stable.  Continue daily aspirin.  Schedule a follow-up with cardiology sooner.         GERD (gastroesophageal reflux disease)    Current Assessment & Plan     Stable.  Continue OTC Omeprazole daily.         Skin lesion of back    Relevant Orders    Ambulatory Referral to Dermatology    Prediabetes    Current Assessment & Plan     Continue to work on decreasing carbs/sugars in diet.         Relevant Orders    Hemoglobin A1c    Syncope    Current Assessment & Plan     Schedule a follow up with cardiology sooner.         Relevant Orders    Comprehensive Metabolic Panel    TSH Rfx On Abnormal To Free T4     Other Visit Diagnoses       Encounter for annual physical exam    -  Primary    Relevant Orders    Fluzone High-Dose 65+yrs (Completed)    Cologuard - Stool, Per Rectum    Lipid Panel With LDL / HDL Ratio    Comprehensive Metabolic Panel    TSH Rfx On Abnormal To Free T4    Hemoglobin A1c    PSA Screen    Colon cancer screening        Relevant Orders    Cologuard - Stool, Per Rectum    Encounter for immunization        Relevant Orders    Fluzone High-Dose 65+yrs (Completed)    Prostate cancer screening        Relevant Orders    PSA Screen    Hypercalcemia        Relevant Orders    Comprehensive Metabolic Panel    Vitamin D,25-Hydroxy             Return in about 6 months (around 8/10/2025) for Recheck.or sooner if symptoms persist or worsen.  Impression: Health maintenance visit.  Currently, eats a  somewhat healthy diet and has a fair exercise routine.  Colorectal cancer screening: due for repeat colon cancer screening; using shared decision making discussed risks vs benefits of Cologuard and colonoscopy, pt desires Cologuard.  Prostate cancer screening: PSA today.  Screening lab work includes: CMP, lipid.  Immunizations: declines COVID-19 booster; will check with insurance regarding Shingrix vaccine; risks and benefits of immunizations were discussed with patient.  Advice and education were given regarding nutrition and aerobic exercise.     Recommended to schedule a follow up appointment with cardiology sooner due to recent episode of syncope.    Parking permit for disabled person completed due to pain in right knee affects walking.

## 2025-02-10 NOTE — LETTER
February 10, 2025     Patient: Sander White   YOB: 1955   Date of Visit: 2/10/2025       To Whom It May Concern:    It is my medical opinion that Sander White may return to work without restrictions.       Sincerely,        ANGELICA Pelletier

## 2025-02-11 LAB
25(OH)D3+25(OH)D2 SERPL-MCNC: 28.5 NG/ML (ref 30–100)
ALBUMIN SERPL-MCNC: 4.5 G/DL (ref 3.5–5.2)
ALBUMIN/GLOB SERPL: 1.8 G/DL
ALP SERPL-CCNC: 90 U/L (ref 39–117)
ALT SERPL-CCNC: 21 U/L (ref 1–41)
AST SERPL-CCNC: 22 U/L (ref 1–40)
BILIRUB SERPL-MCNC: 0.8 MG/DL (ref 0–1.2)
BUN SERPL-MCNC: 8 MG/DL (ref 8–23)
BUN/CREAT SERPL: 7.5 (ref 7–25)
CALCIUM SERPL-MCNC: 9.3 MG/DL (ref 8.6–10.5)
CHLORIDE SERPL-SCNC: 105 MMOL/L (ref 98–107)
CHOLEST SERPL-MCNC: 208 MG/DL (ref 0–200)
CO2 SERPL-SCNC: 24.2 MMOL/L (ref 22–29)
CREAT SERPL-MCNC: 1.06 MG/DL (ref 0.76–1.27)
EGFRCR SERPLBLD CKD-EPI 2021: 76.4 ML/MIN/1.73
GLOBULIN SER CALC-MCNC: 2.5 GM/DL
GLUCOSE SERPL-MCNC: 87 MG/DL (ref 65–99)
HBA1C MFR BLD: 5.6 % (ref 4.8–5.6)
HDLC SERPL-MCNC: 54 MG/DL (ref 40–60)
LDLC SERPL CALC-MCNC: 139 MG/DL (ref 0–100)
LDLC/HDLC SERPL: 2.55 {RATIO}
POTASSIUM SERPL-SCNC: 4.3 MMOL/L (ref 3.5–5.2)
PROT SERPL-MCNC: 7 G/DL (ref 6–8.5)
PSA SERPL-MCNC: 1.35 NG/ML (ref 0–4)
SODIUM SERPL-SCNC: 141 MMOL/L (ref 136–145)
TRIGL SERPL-MCNC: 81 MG/DL (ref 0–150)
TSH SERPL DL<=0.005 MIU/L-ACNC: 0.84 UIU/ML (ref 0.27–4.2)
VLDLC SERPL CALC-MCNC: 15 MG/DL (ref 5–40)

## 2025-02-11 NOTE — ASSESSMENT & PLAN NOTE
Hypertension is a little elevated today since had not been taking BP medication and resumed few days ago.  Continue current treatment regimen.  Weight loss.  Regular aerobic exercise.  Ambulatory blood pressure monitoring.  Blood pressure will be reassessed in 6 months.  Continue Lisinopril daily.

## 2025-02-11 NOTE — ASSESSMENT & PLAN NOTE
Patient's (Body mass index is 29.59 kg/m².) indicates that they are overweight with health conditions that include hypertension, coronary heart disease, impaired fasting glucose, dyslipidemias, and GERD . Weight is unchanged. BMI is above average; BMI management plan is completed. We discussed low calorie, low carb based diet program, portion control, and increasing exercise.

## 2025-02-12 ENCOUNTER — TELEPHONE (OUTPATIENT)
Dept: FAMILY MEDICINE CLINIC | Facility: CLINIC | Age: 70
End: 2025-02-12

## 2025-02-12 DIAGNOSIS — E55.9 VITAMIN D DEFICIENCY: Primary | ICD-10-CM

## 2025-02-12 DIAGNOSIS — I10 PRIMARY HYPERTENSION: ICD-10-CM

## 2025-02-12 DIAGNOSIS — E78.2 MIXED HYPERLIPIDEMIA: ICD-10-CM

## 2025-02-12 RX ORDER — ATORVASTATIN CALCIUM 40 MG/1
40 TABLET, FILM COATED ORAL DAILY
Qty: 90 TABLET | Refills: 1 | Status: SHIPPED | OUTPATIENT
Start: 2025-02-12

## 2025-02-12 RX ORDER — LISINOPRIL 2.5 MG/1
2.5 TABLET ORAL DAILY
Qty: 90 TABLET | Refills: 1 | Status: SHIPPED | OUTPATIENT
Start: 2025-02-12

## 2025-02-12 RX ORDER — MULTIVIT-MIN/IRON/FOLIC ACID/K 18-600-40
2000 CAPSULE ORAL DAILY
Start: 2025-02-12

## 2025-02-12 NOTE — TELEPHONE ENCOUNTER
Discussed lab results with patient over the phone; patient started feeling bad shortly after leaving office visit yesterday; patient had flu shot yesterday and typically no problems with flu shot; discussed likely exposed to another illness prior to getting flu shot; patient had vomiting but has resolved; patient has had cough and headache and chest hurts due to coughing; instructed to increase intake of clear liquids and rest; discussed may try plain Mucinex to thin secretions and Delsym for cough; to follow-up if symptoms persist or worsen.

## 2025-02-12 NOTE — TELEPHONE ENCOUNTER
Caller: Sander White    Relationship: Self    Best call back number: [phone]     What is the best time to reach you: ANYTIME    Who are you requesting to speak with (clinical staff, provider,  specific staff member): CLINICAL    Do you know the name of the person who called: TURNER GREER    What was the call regarding: TEST RESULTS AND PATIENT HAS BEEN NAUSEATED, VOMITING, AND JUST GENERALLY BAD AFTER GETTING THE FLU SHOT YESTERDAY

## 2025-03-06 DIAGNOSIS — I10 PRIMARY HYPERTENSION: ICD-10-CM

## 2025-03-07 RX ORDER — LISINOPRIL 2.5 MG/1
2.5 TABLET ORAL DAILY
Qty: 30 TABLET | OUTPATIENT
Start: 2025-03-07

## 2025-08-10 DIAGNOSIS — E78.2 MIXED HYPERLIPIDEMIA: ICD-10-CM

## 2025-08-11 RX ORDER — ATORVASTATIN CALCIUM 40 MG/1
40 TABLET, FILM COATED ORAL DAILY
Qty: 90 TABLET | Refills: 0 | Status: SHIPPED | OUTPATIENT
Start: 2025-08-11

## (undated) DEVICE — DEV INDEFLATOR

## (undated) DEVICE — HI-TORQUE EXTRA S'PORT GUIDE WIRE .014 STRAIGHT TIP 3.0 CM X 190 CM: Brand: HI-TORQUE EXTRA S'PORT

## (undated) DEVICE — INTRO SHEATH ART/FEM ENGAGE .035 6F12CM

## (undated) DEVICE — PK CATH CARD 40

## (undated) DEVICE — GUIDE CATHETER: Brand: MACH1™

## (undated) DEVICE — KT MANIFLD CARDIAC

## (undated) DEVICE — GLIDESHEATH BASIC HYDROPHILIC COATED INTRODUCER SHEATH: Brand: GLIDESHEATH

## (undated) DEVICE — HI-TORQUE BALANCE MIDDLEWEIGHT GUIDE WIRE .014 STRAIGHT TIP 3.0 CM X 190 CM: Brand: HI-TORQUE BALANCE MIDDLEWEIGHT

## (undated) DEVICE — GW EMR FIX EXCHG J STD .035 3MM 260CM

## (undated) DEVICE — CATH DIAG IMPULSE FR4 5F 100CM

## (undated) DEVICE — GLIDESHEATH SLENDER STAINLESS STEEL KIT: Brand: GLIDESHEATH SLENDER

## (undated) DEVICE — CATH VENT MIV RADL PIG ST TIP 4F 110CM

## (undated) DEVICE — CATH DIAG IMPULSE FL3.5 5F 100CM

## (undated) DEVICE — CATH ASPIR EXPORTADVANCE 6F .014IN 140CM

## (undated) DEVICE — 6F .070 JR 4 100CM: Brand: CORDIS

## (undated) DEVICE — CATH VENT MIV RADL PIG ST TIP 5F 110CM

## (undated) DEVICE — FR5 INFINITI MULTIPAC: Brand: INFINITI

## (undated) DEVICE — CATH4F INF PIG 145Â° MOD 110CM: Brand: INFINITI

## (undated) DEVICE — TREK CORONARY DILATATION CATHETER 2.25 MM X 12 MM / RAPID-EXCHANGE: Brand: TREK

## (undated) DEVICE — GW INQWIRE FC PTFE J/3MM .035 180

## (undated) DEVICE — BND PRESS RADL COMFRT 14IN STRL